# Patient Record
Sex: MALE | Race: WHITE | NOT HISPANIC OR LATINO | Employment: OTHER | URBAN - METROPOLITAN AREA
[De-identification: names, ages, dates, MRNs, and addresses within clinical notes are randomized per-mention and may not be internally consistent; named-entity substitution may affect disease eponyms.]

---

## 2017-04-11 ENCOUNTER — TELEPHONE (OUTPATIENT)
Dept: NEUROLOGY | Facility: MEDICAL CENTER | Age: 77
End: 2017-04-11

## 2017-04-11 ENCOUNTER — OFFICE VISIT (OUTPATIENT)
Dept: NEUROLOGY | Facility: MEDICAL CENTER | Age: 77
End: 2017-04-11
Payer: MEDICARE

## 2017-04-11 VITALS
WEIGHT: 188 LBS | RESPIRATION RATE: 18 BRPM | SYSTOLIC BLOOD PRESSURE: 110 MMHG | TEMPERATURE: 97 F | DIASTOLIC BLOOD PRESSURE: 64 MMHG | HEART RATE: 74 BPM | BODY MASS INDEX: 25.47 KG/M2 | OXYGEN SATURATION: 96 % | HEIGHT: 72 IN

## 2017-04-11 DIAGNOSIS — Z89.512 HISTORY OF BELOW KNEE AMPUTATION, LEFT (HCC): ICD-10-CM

## 2017-04-11 DIAGNOSIS — Q85.89 STURGE-WEBER DISEASE (HCC): ICD-10-CM

## 2017-04-11 DIAGNOSIS — Z91.81 RISK FOR FALLS: ICD-10-CM

## 2017-04-11 DIAGNOSIS — G40.909 SEIZURE DISORDER (HCC): ICD-10-CM

## 2017-04-11 DIAGNOSIS — Z86.73 HISTORY OF CVA (CEREBROVASCULAR ACCIDENT): ICD-10-CM

## 2017-04-11 PROCEDURE — 1100F PTFALLS ASSESS-DOCD GE2>/YR: CPT | Performed by: NURSE PRACTITIONER

## 2017-04-11 PROCEDURE — G8432 DEP SCR NOT DOC, RNG: HCPCS | Performed by: NURSE PRACTITIONER

## 2017-04-11 PROCEDURE — G8419 CALC BMI OUT NRM PARAM NOF/U: HCPCS | Performed by: NURSE PRACTITIONER

## 2017-04-11 PROCEDURE — 1036F TOBACCO NON-USER: CPT | Performed by: NURSE PRACTITIONER

## 2017-04-11 PROCEDURE — 99214 OFFICE O/P EST MOD 30 MIN: CPT | Performed by: NURSE PRACTITIONER

## 2017-04-11 PROCEDURE — 0518F FALL PLAN OF CARE DOCD: CPT | Mod: 8P | Performed by: NURSE PRACTITIONER

## 2017-04-11 PROCEDURE — 4040F PNEUMOC VAC/ADMIN/RCVD: CPT | Performed by: NURSE PRACTITIONER

## 2017-04-11 PROCEDURE — 3288F FALL RISK ASSESSMENT DOCD: CPT | Performed by: NURSE PRACTITIONER

## 2017-04-11 RX ORDER — ASPIRIN 325 MG
325 TABLET, DELAYED RELEASE (ENTERIC COATED) ORAL DAILY
Qty: 90 TAB | Refills: 1 | Status: ON HOLD | OUTPATIENT
Start: 2017-04-11 | End: 2019-01-01

## 2017-04-11 RX ORDER — LACOSAMIDE 200 MG/1
200 TABLET ORAL 2 TIMES DAILY
Qty: 60 TAB | Refills: 5 | Status: SHIPPED
Start: 2017-04-11 | End: 2017-04-11 | Stop reason: SDUPTHER

## 2017-04-11 RX ORDER — LEVETIRACETAM 100 MG/ML
750 SOLUTION ORAL 2 TIMES DAILY
Qty: 450 ML | Refills: 5 | Status: SHIPPED | OUTPATIENT
Start: 2017-04-11 | End: 2017-10-20 | Stop reason: SDUPTHER

## 2017-04-11 RX ORDER — LACOSAMIDE 200 MG/1
200 TABLET ORAL 2 TIMES DAILY
Qty: 60 TAB | Refills: 5 | Status: SHIPPED
Start: 2017-04-11 | End: 2017-12-19

## 2017-04-11 ASSESSMENT — ENCOUNTER SYMPTOMS
ABDOMINAL PAIN: 0
SEIZURES: 0
DOUBLE VISION: 0
NAUSEA: 0
DIARRHEA: 0
FOCAL WEAKNESS: 1
HEADACHES: 0
DIZZINESS: 0
DEPRESSION: 0
SORE THROAT: 0
BACK PAIN: 1
COUGH: 1
VOMITING: 0
NERVOUS/ANXIOUS: 0

## 2017-04-11 NOTE — PROGRESS NOTES
Subjective:      Conner Mora is a 76 y.o. 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           HPI  Last seen 6 months ago.  He has been generally doing well.  Of concern, he is sleeping a lot during the day and sleeping at night as well.  He and his wife are very isolated and the most interesting thing he reports doing is watching TV.    Last known breakthrough seizure was February 2016.  Vimpat was increased to 200mg BID and had been seizure free since that time.    Of most concern is the copay for the Vimpat.  Their donut hole of $3700 has been filled.  He wants to back to phenytoin however he was not seizure free on that medication.    Has chronic nasal dripping-- asking about allergy medication.    Last EEG 2015, significantly abnormal per Dr Graf during EMU visit.    CVA management:  Continues with aspirin 325mg and metoprolol low dose.    Had one bad fall a few years ago requiring hip repair.  Has had one other fall within the home in the past year-- he tripped his foot up on a rug.  Since then, there are no rugs on the floor where he ambulates.    Current Outpatient Prescriptions   Medication Sig Dispense Refill   • levetiracetam (KEPPRA) 100 MG/ML Solution Take 7.5 mL by mouth 2 times a day. 450 mL 5   • lacosamide (VIMPAT) 200 MG Tab tablet Take 200 mg by mouth 2 Times a Day. 60 Tab 5   • Cholecalciferol (VITAMIN D3) 2000 UNITS Tab Take 2,000 Units by mouth every day. 30 Tab 1   • metoprolol (LOPRESSOR) 25 MG Tab Take 0.5 Tabs by mouth 2 Times a Day. 180 Tab 4   • haloperidol (HALDOL) 1 MG Tab Take 1 Tab by mouth every evening. (Patient taking differently: Take 0.5 mg by mouth every evening.) 90 Tab 4   • ferrous sulfate 325 (65 FE) MG tablet Take 1 Tab by mouth 2 times a day, with meals. 60 Tab 1   • aspirin  MG TBEC Take 1 Tab by mouth every day. 100 Tab 0     No current facility-administered  medications for this visit.       Review of Systems   Constitutional: Positive for malaise/fatigue.   HENT: Negative for hearing loss, nosebleeds and sore throat.         No recent head injury.   Eyes: Negative for double vision.        No new loss of vision.   Respiratory: Positive for cough (chronic).         No recent lung infections.   Cardiovascular: Negative for chest pain.   Gastrointestinal: Negative for nausea, vomiting, abdominal pain and diarrhea.   Genitourinary: Negative.    Musculoskeletal: Positive for back pain and joint pain (right hip).   Skin: Negative.    Neurological: Positive for focal weakness (chronic). Negative for dizziness, seizures and headaches.   Endo/Heme/Allergies:        No history of endocrine dysfunction.  No new problems.   Psychiatric/Behavioral: Negative for depression. The patient is not nervous/anxious.         No recent mood changes.          Objective:     /64 mmHg  Pulse 74  Temp(Src) 36.1 °C (97 °F)  Resp 18  Ht 1.829 m (6')  Wt 85.276 kg (188 lb)  BMI 25.49 kg/m2  SpO2 96%     Physical Exam   Constitutional: He is oriented to person, place, and time. He appears well-developed and well-nourished. No distress.   HENT:   Head: Normocephalic and atraumatic.   Nose: Nose normal.   Mouth/Throat: Oropharynx is clear and moist.   Large left facies Sturge Shabazz marking.  No new hearing loss.   Eyes: EOM are normal.   No double vision or loss of vision.   Neck: Normal range of motion. Neck supple.   Cardiovascular: Normal rate, regular rhythm and normal heart sounds.    No recent chest pain.   Pulmonary/Chest: Effort normal.   Occasional moist cough.   Abdominal: There is no tenderness.   Genitourinary:   No recent infections.   Lymphadenopathy:     He has no cervical adenopathy.   Neurological: He is alert and oriented to person, place, and time. He has normal strength. He displays no tremor. No cranial nerve deficit. He exhibits abnormal muscle tone. He displays no  seizure activity. 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   Reflex Scores:       Tricep reflexes are 2+ on the right side and 2+ on the left side.       Bicep reflexes are 2+ on the right side and 2+ on the left side.       Brachioradialis reflexes are 2+ on the right side and 2+ on the left side.       Patellar reflexes are 2+ on the right side and 2+ on the left side.       Achilles reflexes are 0 on the right side and 0 on the left side.  No observable changes in neurologic status.  See initial new patient examination for details.     Skin: Skin is warm and dry.   Psychiatric: His speech is normal. His mood appears not anxious. He does not exhibit a depressed mood.   Very quiet, pleasant, hard of hearing  Appears tired               Assessment/Plan:   Refractory partial onset seizures status post right CVA during [facility] hospital stay.    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  No seizures since late February and since titration of Vimpat to 200mg BID.    Right CVA:  Continue ASA 325mg qday.    Hallucinations/grandiose thinking:  Well-controlled with low dose Haldol per day.    Doing very well in regards to his epilepsy management.  Continue LEV 750mg BID suspension and Vimpat 200mg BID.    No recent falls.    Will call with any concern for seizures and I would be inclined to further increase the Vimpat.    Needs to have a co-pay reduction for Vimpat.    Return for follow-up in 4-6 months per patient request.   I spent 35+ minutes with this patient, over fifty percent was spent counseling patient on their condition, best management practices, reviewing test results and risks and benefits of treatment.

## 2017-04-11 NOTE — Clinical Note
April 11, 2017        Conner Mora  [address]  [place] [address]      To whom this may concern:    Mr Mora is followed by adult neurology for the diagnosis of Refractory Seizure Disorder.  Vimpat has been prescribed as additional treatment.  In the past, his seizures were not well controlled with phenytoin so Keppra was added.  He continued to have breakthrough seizures with that add-on therapy.  It was decided to add Vimpat and taper off of phenytoin.  When titrated Vimpat to 150mg BID he had a breakthrough seizure and thus Vimpat was increased to 200mg BID.  Since that time, November 2014, he has been seizure-free.    His co-pay at this time is $94 for a one month supply which is unobtainable for his family.  Also, because of having a co-pay less than $100 per month, he is disqualified from patient assistance to receive Vimpat.    This letter has been written to request a tier reduction from tier 3 to tier 1 (preferred) or teir 2.  As a team, I urge you to allow Mr Mora to continue to take a very effective medication to manage his epilepsy and thus improve his quality of life and safety.  Please consider a co-pay of $0-$30 for a one month supply at the very most.    If you have any questions or concerns, please don't hesitate to call.        Sincerely,        ROBERTA Silva.    Electronically Signed

## 2017-04-11 NOTE — TELEPHONE ENCOUNTER
Letter printed for Vimpat tier reduction :)  Please make sure to f/u on Vimpat Rx through [facility].

## 2017-04-11 NOTE — TELEPHONE ENCOUNTER
[facility] received rx but it will be the same price as [facility]. They can only help out patients with no insurance.

## 2017-04-11 NOTE — MR AVS SNAPSHOT
Conner Mora   2017 10:00 AM   Office Visit   MRN: [id number]    Department:  [facility]   Dept Phone:  [phone]    Description:  Male : 1940   Provider:  CORNELIO Silva           Reason for Visit     Follow-Up Seizure disorder (CMS-HCC)      Allergies as of 2017     Allergen Noted Reactions    Egg White 2009   Vomiting, Swelling    Egg Yolk 2009   Vomiting, Swelling    Chicken Allergy 2009   Vomiting, Swelling    Omeprazole 2016       Coughing and choking    Statins [Hmg-Coa-R Inhibitors] 2010   Shortness of Breath      You were diagnosed with     Seizure disorder (CMS-HCC)   [828532]       History of CVA (cerebrovascular accident)   [822925]         Vital Signs     Blood Pressure Pulse Temperature Respirations Height Weight    110/64 mmHg 74 36.1 °C (97 °F) 18 1.829 m (6') 85.276 kg (188 lb)    Body Mass Index Oxygen Saturation Smoking Status             25.49 kg/m2 96% Former Smoker         Basic Information     Date Of Birth Sex Race Ethnicity Preferred Language    1940 Male White Non- English      Your appointments     Oct 12, 2017 10:00 AM   Follow Up Visit with CORNELIO Silva   [facility] Neurology (--)    [address] [place] [address], [address]  [facility] [id number] [phone]           You will be receiving a confirmation call a few days before your appointment from our automated call confirmation system.              Problem List              ICD-10-CM Priority Class Noted - Resolved    Seizure disorder (CMS-[facility]) G40.909   8/3/2009 - Present    History of colostomy Z98.890   10/26/2011 - Present    Peripheral vascular disease (CMS-HCC) I73.9   2012 - Present    History of abdominal aortic aneurysm repair Z98.890   2012 - Present    Murmur, cardiac R01.1   2013 - Present    GERD (gastroesophageal reflux disease) K21.9   2013 - Present    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) Z93.3 Medium   7/8/2013 - Present    History of Crohn's disease [facility] Z87.19 Low  7/8/2013 - Present    Employs prosthetic leg Z97.10 Low  7/24/2014 - Present    History of below knee amputation (CMS-HCC) Z89.519   7/24/2014 - Present    Sturge-Shabazz disease, suspected Q85.8   1/31/2015 - Present    COPD (chronic obstructive pulmonary disease) (CMS-HCC) J44.9 Low  2/2/2015 - Present    Tachycardia R00.0   4/28/2015 - Present    History of CVA (cerebrovascular accident) Z86.73 Low  4/29/2015 - Present    Chronic respiratory failure with hypoxia (CMS-HCC) J96.11 Medium  9/11/2015 - Present    Anemia D64.9 Medium  3/10/2016 - Present    Pure hypercholesterolemia with target low density lipoprotein (LDL) less than 130 E78.00   7/22/2016 - Present    Mood disorder (CMS-HCC) F39   9/13/2016 - Present    Hypovitaminosis D E55.9   9/13/2016 - Present    Risk for falls Z91.81   9/13/2016 - Present      Health Maintenance        Date Due Completion Dates    IMM ZOSTER VACCINE 9/24/2002 ---    COLONOSCOPY 9/23/2018 9/23/2015, 3/8/2013 (Done)    Override on 3/8/2013: Done ([facility]- Normal)    IMM DTaP/Tdap/Td Vaccine (2 - Td) 7/8/2023 7/8/2013            Current Immunizations     13-VALENT PCV PREVNAR 9/11/2015    Pneumococcal polysaccharide vaccine (PPSV-23) 9/13/2016    Tdap Vaccine 7/8/2013 11:34 AM      Below and/or attached are the medications your provider expects you to take. Review all of your home medications and newly ordered medications with your provider and/or pharmacist. Follow medication instructions as directed by your provider and/or pharmacist. Please keep your medication list with you and share with your provider. Update the information when medications are discontinued, doses are changed, or new medications (including over-the-counter products) are added; and carry medication information at all times in the event of emergency situations     Allergies:  EGG WHITE - Vomiting,Swelling     EGG  YOLK - Vomiting,Swelling     CHICKEN ALLERGY - Vomiting,Swelling     OMEPRAZOLE - (reactions not documented)     STATINS - Shortness of Breath               Medications  Valid as of: April 11, 2017 - 10:44 AM    Generic Name Brand Name Tablet Size Instructions for use    Aspirin (Tablet Delayed Response) Aspirin 325 MG Take 1 Tab by mouth every day.        Cholecalciferol (Tab) Vitamin D3 2000 UNITS Take 2,000 Units by mouth every day.        Ferrous Sulfate (Tab) ferrous sulfate 325 (65 FE) MG Take 1 Tab by mouth 2 times a day, with meals.        Haloperidol (Tab) HALDOL 1 MG Take 1 Tab by mouth every evening.        Lacosamide (Tab) VIMPAT 200 MG Take 200 mg by mouth 2 Times a Day.        LevETIRAcetam (Solution) KEPPRA 100 MG/ML Take 7.5 mL by mouth 2 times a day.        Metoprolol Tartrate (Tab) LOPRESSOR 25 MG Take 0.5 Tabs by mouth 2 Times a Day.        .                 Medicines prescribed today were sent to:     [facility] [address] [place] [address]    Phone: [phone] Fax: [phone]    Open 24 Hours?: No    [facility] - [address] [address]    Phone: [phone] Fax: [phone]    Open 24 Hours?: No      Medication refill instructions:       If your prescription bottle indicates you have medication refills left, it is not necessary to call your provider’s office. Please contact your pharmacy and they will refill your medication.    If your prescription bottle indicates you do not have any refills left, you may request refills at any time through one of the following ways: The online [employer] system (except Urgent Care), by calling your provider’s office, or by asking your pharmacy to contact your provider’s office with a refill request. Medication refills are processed only during regular business hours and may not be available until the next business day. Your provider may request  additional information or to have a follow-up visit with you prior to refilling your medication.   *Please Note: Medication refills are assigned a new Rx number when refilled electronically. Your pharmacy may indicate that no refills were authorized even though a new prescription for the same medication is available at the pharmacy. Please request the medicine by name with the pharmacy before contacting your provider for a refill.        Referral     A referral request has been sent to our patient care coordination department. Please allow 3-5 business days for us to process this request and contact you either by phone or mail. If you do not hear from us by the 5th business day, please call us at [phone].        Other Notes About Your Plan     [place] calculated 10 yr cardiovascular risk 12%. moderate risk. Diabetic: no. Recommended LDL <100 due to peripheral vascular disease and history of abdominal aortic aneurysm repair.              MyChart Status: Patient Declined

## 2017-06-28 ENCOUNTER — NON-PROVIDER VISIT (OUTPATIENT)
Dept: NEUROLOGY | Facility: MEDICAL CENTER | Age: 77
End: 2017-06-28
Payer: MEDICARE

## 2017-06-28 DIAGNOSIS — G40.909 SEIZURE DISORDER (HCC): ICD-10-CM

## 2017-06-28 DIAGNOSIS — Z86.73 HISTORY OF CVA (CEREBROVASCULAR ACCIDENT): ICD-10-CM

## 2017-06-28 PROCEDURE — 95951 PR EEG MONITORING/VIDEORECORD: CPT | Mod: 52 | Performed by: PSYCHIATRY & NEUROLOGY

## 2017-06-28 NOTE — MR AVS SNAPSHOT
Conner Short Abby   2017 7:30 AM   Non-Provider Visit   MRN: [id number]    Department:  [facility]   Dept Phone:  [phone]    Description:  Male : 1940   Provider:  NEUR[facility]NOSTIC LAB [facility]           Reason for Visit     Procedure           Allergies as of 2017     Allergen Noted Reactions    Egg White 2009   Vomiting, Swelling    Egg Yolk 2009   Vomiting, Swelling    Chicken Allergy 2009   Vomiting, Swelling    Omeprazole 2016       Coughing and choking    Statins [Hmg-Coa-R Inhibitors] 2010   Shortness of Breath      You were diagnosed with     Seizure disorder (CMS-HCC)   [180109]       History of CVA (cerebrovascular accident)   [690568]         Vital Signs     Smoking Status                   Former Smoker           Basic Information     Date Of Birth Sex Race Ethnicity Preferred Language    1940 Male White Non- English      Your appointments     Oct 12, 2017 10:00 AM   Follow Up Visit with CORNELIO Silva   [facility] Neu[facility] (--)    [address] [place] [address], [address]  [facility] [id number] [phone]           You will be receiving a confirmation call a few days before your appointment from our automated call confirmation system.              Problem List              ICD-10-CM Priority Class Noted - Resolved    Seizure disorder (CMS-HCC) G40.909   8/3/2009 - Present    History of colostomy Z98.890   10/26/2011 - Present    Peripheral vascular disease (CMS-HCC) I73.9   2012 - Present    History of abdominal aortic aneurysm repair Z98.890   2012 - Present    Murmur, cardiac R01.1   2013 - Present    GERD (gastroesophageal reflux disease) K21.9   2013 - Present    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) Z93.3 Medium  2013 - Present    History of Crohn's disease [facility] Z87.19 Low  2013 - Present    Employs prosthetic leg Z97.10 Low  2014 - Present    History of below knee  amputation (CMS-HCC) Z89.519   7/24/2014 - Present    Sturge-Shabazz disease, 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  1/31/2015 - Present    COPD (chronic obstructive pulmonary disease) (CMS-HCC) J44.9 Low  2/2/2015 - Present    Tachycardia R00.0   4/28/2015 - Present    History of CVA (cerebrovascular accident) Z86.73 Low  4/29/2015 - Present    Chronic respiratory failure with hypoxia (CMS-HCC) J96.11 Medium  9/11/2015 - Present    Anemia D64.9 Medium  3/10/2016 - Present    Pure hypercholesterolemia with target low density lipoprotein (LDL) less than 130 E78.00   7/22/2016 - Present    Mood disorder (CMS-HCC) F39   9/13/2016 - Present    Hypovitaminosis D E55.9   9/13/2016 - Present    Risk for falls Z91.81   9/13/2016 - Present      Health Maintenance        Date Due Completion Dates    IMM ZOSTER VACCINE 9/24/2002 ---    COLONOSCOPY 9/23/2018 9/23/2015, 3/8/2013 (Done)    Override on 3/8/2013: Done ([facility]- Normal)    IMM DTaP/Tdap/Td Vaccine (2 - Td) 7/8/2023 7/8/2013            Current Immunizations     13-VALENT PCV PREVNAR 9/11/2015    Pneumococcal polysaccharide vaccine (PPSV-23) 9/13/2016    Tdap Vaccine 7/8/2013 11:34 AM      Below and/or attached are the medications your provider expects you to take. Review all of your home medications and newly ordered medications with your provider and/or pharmacist. Follow medication instructions as directed by your provider and/or pharmacist. Please keep your medication list with you and share with your provider. Update the information when medications are discontinued, doses are changed, or new medications (including over-the-counter products) are added; and carry medication information at all times in the event of emergency situations     Allergies:  EGG WHITE - Vomiting,Swelling     EGG YOLK - Vomiting,Swelling     CHICKEN ALLERGY - Vomiting,Swelling     OMEPRAZOLE - (reactions not documented)     STATINS - Shortness of Breath               Medications  Valid as of: June  28, 2017 - 10:29 AM    Generic Name Brand Name Tablet Size Instructions for use    Aspirin (Tablet Delayed Response) Aspirin 325 MG Take 1 Tab by mouth every day.        Cholecalciferol (Tab) Vitamin D3 2000 UNITS Take 2,000 Units by mouth every day.        Ferrous Sulfate (Tab) ferrous sulfate 325 (65 FE) MG Take 1 Tab by mouth 2 times a day, with meals.        Haloperidol (Tab) HALDOL 1 MG Take 1 Tab by mouth every evening.        Lacosamide (Tab) VIMPAT 200 MG Take 200 mg by mouth 2 Times a Day.        LevETIRAcetam (Solution) KEPPRA 100 MG/ML Take 7.5 mL by mouth 2 times a day.        Metoprolol Tartrate (Tab) LOPRESSOR 25 MG Take 0.5 Tabs by mouth 2 Times a Day.        .                 Medicines prescribed today were sent to:     [facility] [address] [place] [address]    Phone: [phone] Fax: [phone]    Open 24 Hours?: [address] [address]    Phone: [phone] Fax: [phone]    Open 24 Hours?: No      Medication refill instructions:       If your prescription bottle indicates you have medication refills left, it is not necessary to call your provider’s office. Please contact your pharmacy and they will refill your medication.    If your prescription bottle indicates you do not have any refills left, you may request refills at any time through one of the following ways: The online [employer] system (except Urgent Care), by calling your provider’s office, or by asking your pharmacy to contact your provider’s office with a refill request. Medication refills are processed only during regular business hours and may not be available until the next business day. Your provider may request additional information or to have a follow-up visit with you prior to refilling your medication.   *Please Note: Medication refills are assigned a new Rx number when refilled electronically.  Your pharmacy may indicate that no refills were authorized even though a new prescription for the same medication is available at the pharmacy. Please request the medicine by name with the pharmacy before contacting your provider for a refill.        Other Notes About Your Plan     [place] calculated 10 yr cardiovascular risk 12%. moderate risk. Diabetic: no. Recommended LDL <100 due to peripheral vascular disease and history of abdominal aortic aneurysm repair.              MyChart Status: Patient Declined

## 2017-07-01 NOTE — PROGRESS NOTES
ROUTINE ELECTROENCEPHALOGRAM REPORT        INDICATION:     Daphne Parker APMARIEL Pt with refractory partial seizures status post CVA. Also, hx of sturge-reed disorder.    TECHNIQUE: 30 channel routine electroencephalogram (EEG) was performed in accordance with the international 10-20 system. The study was reviewed in bipolar and referential montages. The recording examined the patient during wakeful and drowsy state(s).     DESCRIPTION OF THE RECORD:      Background rhythm during awake stage shows 10 to 11 hertz alpha activity in the posterior regions over left hemisphere and attenuated over the right.  It blocks with eye openingPhotic stimulation did not produce any abnormalities. Hyperventilation did not produce any abnormalities.  Polymorphic delta over right and nearly continuous, no definite epileptiform Stage I sleep was achieved.    ACTIVATION PROCEDURES:      Hyperventilation and Photic Stimulation were done    ICTAL AND/OR INTERICTAL FINDINGS:    Polymorphic delta over right and nearly continuous, no definite epileptiform   No clinical events or seizures were reported or recorded during the study.      EKG: sampling of the EKG recording demonstrated sinus rhythm.        INTERPRETATION:    ________________________________________________________________________    Focal cortical dysfunction - moderate over the right hemisphere-- moderate degree    No definite epileptiform in this record  ________________________________________________________________________

## 2017-07-05 ENCOUNTER — TELEPHONE (OUTPATIENT)
Dept: NEUROLOGY | Facility: MEDICAL CENTER | Age: 77
End: 2017-07-05

## 2017-07-05 NOTE — TELEPHONE ENCOUNTER
Spoke with patient's wife today, she reports that patient was not taking his Vimpat for a approximately 10 days. During this time his stumbling, speech difficulties, and other issues went away. Patient restarted medication and symptoms came back, patient and wife are wondering if he might be allergic to the Vimpat or if it is reacting weirdly with his other medications.    Patient takes Vimpat 200mg BID          Caller: Courtney  Phone: [phone]   Message: yes

## 2017-07-06 ENCOUNTER — TELEPHONE (OUTPATIENT)
Dept: NEUROLOGY | Facility: MEDICAL CENTER | Age: 77
End: 2017-07-06

## 2017-07-06 NOTE — TELEPHONE ENCOUNTER
I explained this to the patient's wife earlier when I spoke with her. I did ask that she please give us a call in a couple of days to let us know how he is doing.

## 2017-07-06 NOTE — TELEPHONE ENCOUNTER
Well, I'm glad he didn't have a seizure with stopping the Vimpat!  Let's reduce his dose of Vimpat from 200mg BID to 100-200mg and see if that helps.  Also, he needs to avoid taking his meds with caffeine and always near the end of a meal.

## 2017-07-06 NOTE — TELEPHONE ENCOUNTER
It is possible that he has too much anti-seizure medication which is why I'd like to taper.  I'm concerned that he will have a seizure if we simply stop the Vimpat.

## 2017-07-06 NOTE — TELEPHONE ENCOUNTER
Called and spoke with patients wife again today, she understood and will comply. She will try giving him the lower dosage for a week and then give us a phone call to let us know how he is doing.  She continues to be concerned, Patient is extremely lethargic any activity makes him need a nap upon completion. Patient's wife reports that even doing the dishes makes him exhausted. She reports that during the time he was without his Vimpat that he was feeling better. She is wondering if he is allergic to the Vimpat. Please advise

## 2017-07-10 ENCOUNTER — TELEPHONE (OUTPATIENT)
Dept: NEUROLOGY | Facility: MEDICAL CENTER | Age: 77
End: 2017-07-10

## 2017-07-10 NOTE — TELEPHONE ENCOUNTER
Received samples from Doctor Veronica,    4 bottles of  200mg 14tabs    Per last note patient is no longer taking Vimpat 200mg BID, and instead is taking Vimpat 100mg - 200mg    **Note from 7/6/17  Well, I'm glad he didn't have a seizure with stopping the Vimpat!  Let's reduce his dose of Vimpat from 200mg BID to 100-200mg and see if that helps.  Also, he needs to avoid taking his meds with caffeine and always near the end of a meal.**

## 2017-07-10 NOTE — TELEPHONE ENCOUNTER
Patient's wife phoned today, she wanted to know if we had vimpat samples? Their co-pay for his vimat is $255 this month, as they are still outside of their deductible for their insurance. Patient is unable to afford this at this time. They are planning on reaching out to [employer] to see if they can get some assistance with payment for this medication this month. They did not want to try to sign up for Patient Assistance [employer] for this medication as they would only need it for a month, and last time they attempted to do so, they were told that they made too much money.

## 2017-08-03 ENCOUNTER — OFFICE VISIT (OUTPATIENT)
Dept: MEDICAL GROUP | Facility: PHYSICIAN GROUP | Age: 77
End: 2017-08-03
Payer: MEDICARE

## 2017-08-03 VITALS
HEART RATE: 86 BPM | SYSTOLIC BLOOD PRESSURE: 120 MMHG | RESPIRATION RATE: 16 BRPM | TEMPERATURE: 97.6 F | DIASTOLIC BLOOD PRESSURE: 64 MMHG | HEIGHT: 72 IN | BODY MASS INDEX: 24.38 KG/M2 | WEIGHT: 180 LBS | OXYGEN SATURATION: 89 %

## 2017-08-03 DIAGNOSIS — R00.0 TACHYCARDIA: ICD-10-CM

## 2017-08-03 DIAGNOSIS — Z12.5 SCREENING PSA (PROSTATE SPECIFIC ANTIGEN): ICD-10-CM

## 2017-08-03 DIAGNOSIS — Z87.891 PERSONAL HISTORY OF TOBACCO USE, PRESENTING HAZARDS TO HEALTH: ICD-10-CM

## 2017-08-03 DIAGNOSIS — D64.9 ANEMIA, UNSPECIFIED TYPE: ICD-10-CM

## 2017-08-03 DIAGNOSIS — Z13.6 SCREENING FOR CARDIOVASCULAR CONDITION: ICD-10-CM

## 2017-08-03 DIAGNOSIS — F39 MOOD DISORDER (HCC): ICD-10-CM

## 2017-08-03 PROCEDURE — 99214 OFFICE O/P EST MOD 30 MIN: CPT | Performed by: FAMILY MEDICINE

## 2017-08-03 RX ORDER — HALOPERIDOL 1 MG/1
0.5 TABLET ORAL EVERY EVENING
Qty: 45 TAB | Refills: 4 | Status: SHIPPED | OUTPATIENT
Start: 2017-08-03 | End: 2018-01-01

## 2017-08-03 NOTE — PROGRESS NOTES
Subjective:     Chief Complaint   Patient presents with   • Anxiety     Rx   • Heart Problem     Rx       Conner Mora is a 76 y.o. male here today today to est care with new provider. Patient is a former patient of Dr. Johns who is since retired.  Patient continues to follow with neurology for seizures. Patient reports nose recent seizures.    Patient has a history of tachycardia for which he takes metoprolol daily. Patient denies any chest pain, racing heartbeat, palpitations, orthopnea, or dyspnea on exertion.  Patient uses Haldol each night to aid in sleep and stabilize anxiety.    Patient has reported history of hypercholesterolemia and anemia. There are no recent labs available. Patient has no new complaints.      Allergies   Allergen Reactions   • Egg White Vomiting and Swelling   • Egg Yolk Vomiting and Swelling   • Chicken Allergy Vomiting and Swelling   • Omeprazole      Coughing and choking   • Prednisone      Mood changes   • Statins [Hmg-Coa-R Inhibitors] Shortness of Breath     Current medicines (including changes today)  Current Outpatient Prescriptions   Medication Sig Dispense Refill   • metoprolol (LOPRESSOR) 25 MG Tab Take 0.5 Tabs by mouth 2 Times a Day. 90 Tab 4   • haloperidol (HALDOL) 1 MG Tab Take 0.5 Tabs by mouth every evening. 45 Tab 4   • levetiracetam (KEPPRA) 100 MG/ML Solution Take 7.5 mL by mouth 2 times a day. 450 mL 5   • Aspirin 325 MG Tablet Delayed Response Take 1 Tab by mouth every day. 90 Tab 1   • lacosamide (VIMPAT) 200 MG Tab tablet Take 200 mg by mouth 2 Times a Day. 60 Tab 5   • Cholecalciferol (VITAMIN D3) 2000 UNITS Tab Take 2,000 Units by mouth every day. 30 Tab 1   • ferrous sulfate 325 (65 FE) MG tablet Take 1 Tab by mouth 2 times a day, with meals. 60 Tab 1     No current facility-administered medications for this visit.     Social History   Substance Use Topics   • Smoking status: Former Smoker -- 1.00 packs/day for 57 years     Types: Cigarettes     Start  "date: 1954     Quit date: 2011   • Smokeless tobacco: Never Used      Comment: avoid tobacco products   • Alcohol Use: No     Family Status   Relation Status Death Age   • Mother     • Father     • Sister Alive    • Brother Alive    • Brother     • Brother       Family History   Problem Relation Age of Onset   • Heart Attack Mother    • No Known Problems Father    • No Known Problems Sister    • No Known Problems Brother    • Lung Disease Neg Hx    • Cancer Neg Hx    • Diabetes Neg Hx    • Hypertension Neg Hx    • Heart Disease Neg Hx    • Hyperlipidemia Neg Hx    • Stroke Neg Hx    • No Known Problems Brother    • No Known Problems Brother      He    has a past medical history of Seizure disorder (CMS-[facility]); Snoring; Dental disorder; Indigestion; Arrhythmia; History of colostomy (10/26/2011); GERD (gastroesophageal reflux disease) (2013); History of CVA (cerebrovascular accident) (2015); Hypertension; and Cataract.        ROS   GEN: no weight loss, fevers, or chills  HEENT: no blurry vision or change in vision  Resp: no shortness of breath, no cough  CV: no racing heart, no irregular beats, no chest pain  Abd: no nausea, no vomiting, no change in ostomy output, no blood in stool, no dark stools, no incontinence  : no dysuria, no hematuria, no urinary incontinence, no increased frequency  MSK: no muscle aches, no joint pain, no limited motion  Neuro: no headaches, no dizziness, no LOC, no weakness, no numbness/tingling       Objective:     Blood pressure 120/64, pulse 86, temperature 36.4 °C (97.6 °F), resp. rate 16, height 1.829 m (6' 0.01\"), weight 81.647 kg (180 lb), SpO2 89 %. Body mass index is 24.41 kg/(m^2).   Physical Exam:  Constitutional: Alert, no distress.  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  ENMT: Lips without lesions, oropharynx non-erythematous, no exudate, moist oral mucosa  Neck: Trachea midline, no masses, no thyromegaly. No cervical or supraclavicular lymphadenopathy. Full ROM  Respiratory: Unlabored respiratory effort, good air movement, lungs clear to auscultation, no wheezes, no ronchi.  Cardiovascular:RRR, +S1, S2, no murmur, no peripheral edema, pedal and radial pulses equal and intact bilat  Abdomen: Soft, non-tender, no masses, no hepatosplenomegaly.  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  Neuro: CN2-12 intact, no gross motor or sensory deficits      Assessment and Plan:   The following treatment plan was discussed    1. Tachycardia  Chronic: Patient reports taking Toprol due to racing heart rate.    Rate is controlled.  - metoprolol (LOPRESSOR) 25 MG Tab; Take 0.5 Tabs by mouth 2 Times a Day.  Dispense: 90 Tab; Refill: 4  - COMP METABOLIC PANEL; Future  - CBC WITH DIFFERENTIAL; Future    2. Mood disorder (CMS-HCC)  Chronic: Continue Haldol each night  - haloperidol (HALDOL) 1 MG Tab; Take 0.5 Tabs by mouth every evening.  Dispense: 45 Tab; Refill: 4  - COMP METABOLIC PANEL; Future  - CBC WITH DIFFERENTIAL; Future    3. Screening PSA (prostate specific antigen)  - COMP METABOLIC PANEL; Future  - CBC WITH DIFFERENTIAL; Future  - PROSTATE SPECIFIC AG SCREENING; Future    4. Screening for cardiovascular condition  - LIPID PROFILE; Future  - COMP METABOLIC PANEL; Future  - CBC WITH DIFFERENTIAL; Future    5. Anemia, unspecified type  Reported history: Will recheck  - COMP METABOLIC PANEL; Future  - CBC WITH DIFFERENTIAL; Future    6. Personal history of tobacco use, presenting hazards to health  Recommended lung cancer screening which patient declines. Patient states he would like to think about it  - COMP METABOLIC PANEL; Future  - CBC WITH DIFFERENTIAL; Future      Followup: Return in about 6 months  (around 2/3/2018) for chronic conditions.    Please note that this dictation was created using voice recognition software. I have made every reasonable attempt to correct obvious errors, but I expect that there are errors of grammar and possibly content that I did not discover before finalizing the note.

## 2017-08-03 NOTE — MR AVS SNAPSHOT
"        Conner Short Abby   8/3/2017 8:40 AM   Office Visit   MRN: [id number]    Department:  [facility], [place]   Dept Phone:  [phone]    Description:  Male : 1940   Provider:  Renee Loera D.O.           Reason for Visit     Anxiety Rx    Heart Problem Rx      Allergies as of 8/3/2017     Allergen Noted Reactions    Egg White 2009   Vomiting, Swelling    Egg Yolk 2009   Vomiting, Swelling    Chicken Allergy 2009   Vomiting, Swelling    Omeprazole 2016       Coughing and choking    Prednisone 2017       Mood changes    Statins [Hmg-Coa-R Inhibitors] 2010   Shortness of Breath      You were diagnosed with     Tachycardia   [645287]       Mood disorder (CMS-HCC)   [151547]       Screening PSA (prostate specific antigen)   [960005]       Screening for cardiovascular condition   [329857]       Anemia, unspecified type   [2949501]       Personal history of tobacco use, presenting hazards to health   [V15.82.ICD-9-CM]         Vital Signs     Blood Pressure Pulse Temperature Respirations Height Weight    120/64 mmHg 86 36.4 °C (97.6 °F) 16 1.829 m (6' 0.01\") 81.647 kg (180 lb)    Body Mass Index Oxygen Saturation Smoking Status             24.41 kg/m2 89% Former Smoker         Basic Information     Date Of Birth Sex Race Ethnicity Preferred Language    1940 Male White Non- English      Your appointments     Oct 12, 2017 10:00 AM   Follow Up Visit with CORNELIO Silva   [facility] (--)    [address] [place] [address], [address]  [facility] [address] [phone]           You will be receiving a confirmation call a few days before your appointment from our automated call confirmation system.              Problem List              ICD-10-CM Priority Class Noted - Resolved    Seizure disorder (CMS-HCC) G40.909   8/3/2009 - Present    History of colostomy Z98.890   10/26/2011 - Present    Peripheral vascular disease (CMS-HCC) I73.9   2012 - " Present    History of abdominal aortic aneurysm repair Z98.890   11/21/2012 - Present    Murmur, cardiac R01.1   5/28/2013 - Present    GERD (gastroesophageal reflux disease) K21.9   7/8/2013 - Present    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) Z93.3 Medium  7/8/2013 - Present    History of Crohn's disease [facility] Z87.19 Low  7/8/2013 - Present    Employs prosthetic leg Z97.10 Low  7/24/2014 - Present    History of below knee amputation (CMS-HCC) Z89.519   7/24/2014 - Present    Sturge-Shabazz disease, 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  1/31/2015 - Present    COPD (chronic obstructive pulmonary disease) (CMS-HCC) J44.9 Low  2/2/2015 - Present    Tachycardia R00.0   4/28/2015 - Present    History of CVA (cerebrovascular accident) Z86.73 Low  4/29/2015 - Present    Chronic respiratory failure with hypoxia (CMS-HCC) J96.11 Medium  9/11/2015 - Present    Anemia D64.9 Medium  3/10/2016 - Present    Pure hypercholesterolemia with target low density lipoprotein (LDL) less than 130 E78.00   7/22/2016 - Present    Mood disorder (CMS-HCC) F39   9/13/2016 - Present    Hypovitaminosis D E55.9   9/13/2016 - Present    Risk for falls Z91.81   9/13/2016 - Present      Health Maintenance        Date Due Completion Dates    IMM ZOSTER VACCINE 9/24/2002 ---    COLONOSCOPY 9/23/2018 9/23/2015, 3/8/2013 (Done)    Override on 3/8/2013: Done ([facility]- Normal)    IMM DTaP/Tdap/Td Vaccine (2 - Td) 7/8/2023 7/8/2013            Current Immunizations     13-VALENT PCV PREVNAR 9/11/2015    Pneumococcal polysaccharide vaccine (PPSV-23) 9/13/2016    Tdap Vaccine 7/8/2013 11:34 AM      Below and/or attached are the medications your provider expects you to take. Review all of your home medications and newly ordered medications with your provider and/or pharmacist. Follow medication instructions as directed by your provider and/or pharmacist. Please keep your medication list with you and share with your provider. Update the information when  medications are discontinued, doses are changed, or new medications (including over-the-counter products) are added; and carry medication information at all times in the event of emergency situations     Allergies:  EGG WHITE - Vomiting,Swelling     EGG YOLK - Vomiting,Swelling     CHICKEN ALLERGY - Vomiting,Swelling     OMEPRAZOLE - (reactions not documented)     PREDNISONE - (reactions not documented)     STATINS - Shortness of Breath               Medications  Valid as of: August 03, 2017 - 10:19 AM    Generic Name Brand Name Tablet Size Instructions for use    Aspirin (Tablet Delayed Response) Aspirin 325 MG Take 1 Tab by mouth every day.        Cholecalciferol (Tab) Vitamin D3 2000 UNITS Take 2,000 Units by mouth every day.        Ferrous Sulfate (Tab) ferrous sulfate 325 (65 FE) MG Take 1 Tab by mouth 2 times a day, with meals.        Haloperidol (Tab) HALDOL 1 MG Take 0.5 Tabs by mouth every evening.        Lacosamide (Tab) VIMPAT 200 MG Take 200 mg by mouth 2 Times a Day.        LevETIRAcetam (Solution) KEPPRA 100 MG/ML Take 7.5 mL by mouth 2 times a day.        Metoprolol Tartrate (Tab) LOPRESSOR 25 MG Take 0.5 Tabs by mouth 2 Times a Day.        .                 Medicines prescribed today were sent to:     [facility] [address] [place] [address]    Phone: [phone] Fax: [phone]    Open 24 Hours?: [facility] - [address] [address]    Phone: [phone] Fax: [phone]    Open 24 Hours?: No      Medication refill instructions:       If your prescription bottle indicates you have medication refills left, it is not necessary to call your provider’s office. Please contact your pharmacy and they will refill your medication.    If your prescription bottle indicates you do not have any refills left, you may request refills at any time through one of the following ways: The online  [employer] system (except Urgent Care), by calling your provider’s office, or by asking your pharmacy to contact your provider’s office with a refill request. Medication refills are processed only during regular business hours and may not be available until the next business day. Your provider may request additional information or to have a follow-up visit with you prior to refilling your medication.   *Please Note: Medication refills are assigned a new Rx number when refilled electronically. Your pharmacy may indicate that no refills were authorized even though a new prescription for the same medication is available at the pharmacy. Please request the medicine by name with the pharmacy before contacting your provider for a refill.        Your To Do List     Future Labs/Procedures Complete By Expires    CBC WITH DIFFERENTIAL  As directed 8/3/2018    COMP METABOLIC PANEL  As directed 8/3/2018    LIPID PROFILE  As directed 8/3/2018    PROSTATE SPECIFIC AG SCREENING  As directed 8/3/2018      Other Notes About Your Plan     [place] calculated 10 yr cardiovascular risk 12%. moderate risk. Diabetic: no. Recommended LDL <100 due to peripheral vascular disease and history of abdominal aortic aneurysm repair.              [employer] Status: Patient Declined

## 2017-08-23 ENCOUNTER — TELEPHONE (OUTPATIENT)
Dept: NEUROLOGY | Facility: MEDICAL CENTER | Age: 77
End: 2017-08-23

## 2017-08-23 NOTE — TELEPHONE ENCOUNTER
"Spoke with patient's wife today it was reported to us that patient is unable to afford his medications still. Patient states that they are still within the \"[place]\" for his deductible on his insurance. Provided patient with  4 bottles of Vimpat from Dr. Martin to get patient through the month.  "

## 2017-10-12 ENCOUNTER — OFFICE VISIT (OUTPATIENT)
Dept: NEUROLOGY | Facility: MEDICAL CENTER | Age: 77
End: 2017-10-12
Payer: MEDICARE

## 2017-10-12 VITALS
BODY MASS INDEX: 25.19 KG/M2 | DIASTOLIC BLOOD PRESSURE: 66 MMHG | OXYGEN SATURATION: 93 % | HEART RATE: 74 BPM | HEIGHT: 72 IN | TEMPERATURE: 96.5 F | WEIGHT: 186 LBS | SYSTOLIC BLOOD PRESSURE: 110 MMHG | RESPIRATION RATE: 16 BRPM

## 2017-10-12 DIAGNOSIS — F39 MOOD DISORDER (HCC): ICD-10-CM

## 2017-10-12 DIAGNOSIS — Q85.89 STURGE-WEBER DISEASE (HCC): ICD-10-CM

## 2017-10-12 DIAGNOSIS — Z86.73 HISTORY OF CVA (CEREBROVASCULAR ACCIDENT): ICD-10-CM

## 2017-10-12 DIAGNOSIS — G40.909 SEIZURE DISORDER (HCC): ICD-10-CM

## 2017-10-12 PROCEDURE — 99214 OFFICE O/P EST MOD 30 MIN: CPT | Performed by: NURSE PRACTITIONER

## 2017-10-12 RX ORDER — LORAZEPAM 1 MG/1
TABLET ORAL
Qty: 10 TAB | Refills: 0 | Status: SHIPPED | OUTPATIENT
Start: 2017-10-12 | End: 2018-01-01

## 2017-10-12 ASSESSMENT — ENCOUNTER SYMPTOMS
SORE THROAT: 0
DEPRESSION: 0
NERVOUS/ANXIOUS: 0
ABDOMINAL PAIN: 0
SEIZURES: 0
VOMITING: 0
DOUBLE VISION: 0
DIZZINESS: 0
NAUSEA: 0
HEADACHES: 0
DIARRHEA: 0
COUGH: 1
FOCAL WEAKNESS: 1
FALLS: 0

## 2017-10-12 ASSESSMENT — PATIENT HEALTH QUESTIONNAIRE - PHQ9: CLINICAL INTERPRETATION OF PHQ2 SCORE: 0

## 2017-10-12 NOTE — PROGRESS NOTES
Subjective:      Conner Mora is a 77 y.o. male who presents with Follow-Up (Seizure disorder (CMS-HCC))        HPI  Wishes to be weaned off of the Vimpat.  Their co-pay has been too high for a long time.  He is currently taking Vimpat 100mg BID and has been using samples for many months.    There have been no breakthrough seizures.  Last known breakthrough seizure was February 2016.  Vimpat was increased to 200mg BID and had been seizure free since that time.    CVA management:  Continues with aspirin 325mg and metoprolol low dose.       INTERPRETATION:   _______________________________________________________________________     Focal cortical dysfunction - moderate over the right hemisphere-- moderate degree     No definite epileptiform in this record  ________________________________________________________________________       Current Outpatient Prescriptions   Medication Sig Dispense Refill   • metoprolol (LOPRESSOR) 25 MG Tab Take 0.5 Tabs by mouth 2 Times a Day. 90 Tab 4   • haloperidol (HALDOL) 1 MG Tab Take 0.5 Tabs by mouth every evening. 45 Tab 4   • levetiracetam (KEPPRA) 100 MG/ML Solution Take 7.5 mL by mouth 2 times a day. 450 mL 5   • Aspirin 325 MG Tablet Delayed Response Take 1 Tab by mouth every day. 90 Tab 1   • lacosamide (VIMPAT) 200 MG Tab tablet Take 200 mg by mouth 2 Times a Day. 60 Tab 5   • Cholecalciferol (VITAMIN D3) 2000 UNITS Tab Take 2,000 Units by mouth every day. 30 Tab 1   • ferrous sulfate 325 (65 FE) MG tablet Take 1 Tab by mouth 2 times a day, with meals. 60 Tab 1     No current facility-administered medications for this visit.        Review of Systems   Constitutional: Positive for malaise/fatigue.   HENT: Negative for hearing loss, nosebleeds and sore throat.         No recent head injury.   Eyes: Negative for double vision.        No new loss of vision.   Respiratory: Positive for cough.         No recent lung infections.   Cardiovascular: Negative for chest pain.    Gastrointestinal: Negative for abdominal pain, diarrhea, nausea and vomiting.   Genitourinary: Negative.    Musculoskeletal: Positive for joint pain. Negative for falls.   Skin: Negative.    Neurological: Positive for focal weakness (chronic). Negative for dizziness, seizures and headaches.   Endo/Heme/Allergies:        No history of endocrine dysfunction.  No new problems.   Psychiatric/Behavioral: Negative for depression. The patient is not nervous/anxious.         No recent mood changes.          Objective:     /66   Pulse 74   Temp 35.8 °C (96.5 °F)   Resp 16   Ht 1.829 m (6')   Wt 84.4 kg (186 lb)   SpO2 93%   BMI 25.23 kg/m²      Physical Exam   Constitutional: He is oriented to person, place, and time. He appears well-developed and well-nourished. No distress.   HENT:   Head: Normocephalic and atraumatic.   Nose: Nose normal.   Mouth/Throat: Oropharynx is clear and moist.   No new hearing loss.    Large left facies Sturge Shabazz marking.   Eyes:   No double vision or loss of vision.   Neck: Normal range of motion.   Cardiovascular: Normal rate, regular rhythm and normal heart sounds.    No recent chest pain.   Pulmonary/Chest: Effort normal.   Abdominal: There is no tenderness.   Genitourinary:   Genitourinary Comments: No recent infections.   Musculoskeletal: Normal range of motion.   Lymphadenopathy:     He has no cervical adenopathy.   Neurological: He is alert and oriented to person, place, and time. He has normal strength. He displays no tremor. No cranial nerve deficit. He displays no seizure activity. Coordination ( left BKA amputation, s/p recovering from right hip repair, using cane with minimal support) and gait abnormal.   Reflex Scores:       Tricep reflexes are 2+ on the right side and 2+ on the left side.       Bicep reflexes are 2+ on the right side and 2+ on the left side.       Brachioradialis reflexes are 2+ on the right side and 2+ on the left side.  No observable changes in  neurologic status.  See initial new patient examination for details.     Skin: Skin is warm and dry. There is pallor.   Psychiatric: His speech is normal. His mood appears not anxious. He does not exhibit a depressed mood.   Very quiet, pleasant, hard of hearing  Appears well today.               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  No seizures since late February and since titration of Vimpat to 200mg BID.     Right CVA:  Continue ASA 325mg qday.     Hallucinations/grandiose thinking:  Well-controlled with low dose Haldol per day.     Doing very well in regards to his epilepsy management.  Continue LEV 750mg BID suspension.  Will further reduce the Vimpat from 100mg BID until off.  This is only due to the fact that their co-pay is too spendy.       No recent falls.     Will call with any concern for seizures and I would be inclined to quickly start Zonegran.  Would also consider Lamictal.    New Rx for ativan provided and discussed urgent and emergent usage.    Return for follow-up in 4-6 months per patient request.   I spent 35+ minutes with this patient, over fifty percent was spent counseling patient on their condition, best management practices, reviewing test results and risks and benefits of treatment.

## 2017-11-14 ENCOUNTER — PATIENT OUTREACH (OUTPATIENT)
Dept: HEALTH INFORMATION MANAGEMENT | Facility: OTHER | Age: 77
End: 2017-11-14

## 2017-11-14 NOTE — PROGRESS NOTES
WebIZ Checked & Epic Updated: Yes  · WebIZ Recommendations: ZOSTAVAX (Shingles)  · Is patient due for Tdap? NO  · Is patient due for Shingles? YES. Patient was notified of copay/out of pocket cost.  [facility] Verified: yes  Verify PCP: yes    Communication Preference Obtained: yes     Review Care Team: yes    Annual Wellness Visit Scheduling  1. Scheduling Status:Scheduled     Care Gap Scheduling (Attempt to Schedule EACH Overdue Care Gap!)     Health Maintenance Due   Topic Date Due   • PFT SCREENING-FEV1 AND FEV/FVC RATIO / SPIROMETRY SHOULD BE PERFORMED ANNUALLY  09/24/1960   • IMM ZOSTER VACCINE  Declined   • Annual Wellness Visit  09/11/2016        Scheduled patient for Annual Wellness Visit       MyChart Activation: declined

## 2017-12-12 ENCOUNTER — TELEPHONE (OUTPATIENT)
Dept: MEDICAL GROUP | Facility: PHYSICIAN GROUP | Age: 77
End: 2017-12-12

## 2017-12-12 NOTE — TELEPHONE ENCOUNTER
Future Appointments       Provider Department Center    12/19/2017 8:00 AM Renee Robles D.O.; [facility]    4/16/2018 9:00 AM ROBERTA Silva. [facility]           ANNUAL WELLNESS VISIT PRE-VISIT PLANNING     1.  Reviewed note from last office visit with PCP: YES Visit date: 08/03/2017    2.  If any orders were placed at last visit, do we have Results/Consult Notes?        •  Labs - Labs ordered, NOT completed. Patient advised to complete prior to next appointment.Prostate, CBC, CMP, Lipid profile        •  Imaging - Imaging was not ordered at last office visit.        •  Referrals - No referrals were ordered at last office visit.     3.  Immunizations were updated in [employer] using WebIZ?: Yes       •  WebIZ Recommendations: FLU and ZOSTAVAX (Shingles) Declined all vaccines        •  Is patient due for Tdap? NO       •  Is patient due for Shingles? YES. Patient was notified of copay/out of pocket cost.     4.  Patient is due for the following Health Maintenance Topics:   Health Maintenance Due   Topic Date Due   • PFT SCREENING-FEV1 AND FEV/FVC RATIO / SPIROMETRY SHOULD BE PERFORMED ANNUALLY  09/24/1958   • IMM ZOSTER VACCINE  09/24/2002   • Annual Wellness Visit  09/11/2016       5.  Reviewed/Updated the following with patient:       •   Preferred Pharmacy? YES       •   Preferred Lab? YES       •   Preferred Communication? YES       •   Allergies? YES       •   Medications? YES. Was Abstract Encounter opened and chart updated? YES       •   Social History? YES. Was Abstract Encounter opened and chart updated? YES       •   Family History (document living status of immediate family members and if + hx of cancer, diabetes, hypertension, hyperlipidemia, heart attack, stroke) YES. Was Abstract Encounter opened and chart updated? YES    6.  Care Team Updated:       •   DME Company (gait device, O2, CPAP, etc.): YES O2 at night         •   Other Specialists (eye doctor, derm, GYN, cardiology, endo, etc): YES       •   Last eye exam: last year     7. Patient has the following [facility] Path diagnoses on Problem List:  COPD      8.  Is patient in need of any refills prior to office visit? No       •    Separate refill encounter created?: no    9. DPA/Advanced Directive:  Patient does not have an Advanced Directive.  A packet and workshop information was given on Advanced Directives.     10.  Specialty Comments was updated with diagnosis information provided by SCP: YES There is a note     11.  Patient was advised: “This is a free wellness visit. The provider will screen for medical conditions to help you stay healthy. If you have other concerns to address you may be asked to discuss these at a separate visit or there may be an additional fee.”     12.  Patient was informed to arrive 15 min prior to their scheduled appointment and bring in their medication bottles?  YES

## 2017-12-14 ENCOUNTER — HOSPITAL ENCOUNTER (OUTPATIENT)
Dept: LAB | Facility: MEDICAL CENTER | Age: 77
End: 2017-12-14
Attending: FAMILY MEDICINE
Payer: MEDICARE

## 2017-12-14 DIAGNOSIS — Z12.5 SCREENING PSA (PROSTATE SPECIFIC ANTIGEN): ICD-10-CM

## 2017-12-14 DIAGNOSIS — F39 MOOD DISORDER (HCC): ICD-10-CM

## 2017-12-14 DIAGNOSIS — Z87.891 PERSONAL HISTORY OF TOBACCO USE, PRESENTING HAZARDS TO HEALTH: ICD-10-CM

## 2017-12-14 DIAGNOSIS — Z13.6 SCREENING FOR CARDIOVASCULAR CONDITION: ICD-10-CM

## 2017-12-14 DIAGNOSIS — R00.0 TACHYCARDIA: ICD-10-CM

## 2017-12-14 DIAGNOSIS — D64.9 ANEMIA, UNSPECIFIED TYPE: ICD-10-CM

## 2017-12-14 LAB
ALBUMIN SERPL BCP-MCNC: 3 G/DL (ref 3.2–4.9)
ALBUMIN/GLOB SERPL: 0.7 G/DL
ALP SERPL-CCNC: 235 U/L (ref 30–99)
ALT SERPL-CCNC: 77 U/L (ref 2–50)
ANION GAP SERPL CALC-SCNC: 7 MMOL/L (ref 0–11.9)
AST SERPL-CCNC: 92 U/L (ref 12–45)
BASOPHILS # BLD AUTO: 1 % (ref 0–1.8)
BASOPHILS # BLD: 0.07 K/UL (ref 0–0.12)
BILIRUB SERPL-MCNC: 1.1 MG/DL (ref 0.1–1.5)
BUN SERPL-MCNC: 9 MG/DL (ref 8–22)
CALCIUM SERPL-MCNC: 10 MG/DL (ref 8.5–10.5)
CHLORIDE SERPL-SCNC: 106 MMOL/L (ref 96–112)
CHOLEST SERPL-MCNC: 198 MG/DL (ref 100–199)
CO2 SERPL-SCNC: 27 MMOL/L (ref 20–33)
CREAT SERPL-MCNC: 1.2 MG/DL (ref 0.5–1.4)
EOSINOPHIL # BLD AUTO: 1.43 K/UL (ref 0–0.51)
EOSINOPHIL NFR BLD: 20.1 % (ref 0–6.9)
ERYTHROCYTE [DISTWIDTH] IN BLOOD BY AUTOMATED COUNT: 52.8 FL (ref 35.9–50)
GFR SERPL CREATININE-BSD FRML MDRD: 59 ML/MIN/1.73 M 2
GLOBULIN SER CALC-MCNC: 4.1 G/DL (ref 1.9–3.5)
GLUCOSE SERPL-MCNC: 97 MG/DL (ref 65–99)
HCT VFR BLD AUTO: 45.7 % (ref 42–52)
HDLC SERPL-MCNC: 35 MG/DL
HGB BLD-MCNC: 13.7 G/DL (ref 14–18)
IMM GRANULOCYTES # BLD AUTO: 0.01 K/UL (ref 0–0.11)
IMM GRANULOCYTES NFR BLD AUTO: 0.1 % (ref 0–0.9)
LDLC SERPL CALC-MCNC: 137 MG/DL
LYMPHOCYTES # BLD AUTO: 2.05 K/UL (ref 1–4.8)
LYMPHOCYTES NFR BLD: 28.8 % (ref 22–41)
MCH RBC QN AUTO: 31 PG (ref 27–33)
MCHC RBC AUTO-ENTMCNC: 30 G/DL (ref 33.7–35.3)
MCV RBC AUTO: 103.4 FL (ref 81.4–97.8)
MONOCYTES # BLD AUTO: 0.28 K/UL (ref 0–0.85)
MONOCYTES NFR BLD AUTO: 3.9 % (ref 0–13.4)
NEUTROPHILS # BLD AUTO: 3.28 K/UL (ref 1.82–7.42)
NEUTROPHILS NFR BLD: 46.1 % (ref 44–72)
NRBC # BLD AUTO: 0 K/UL
NRBC BLD AUTO-RTO: 0 /100 WBC
PLATELET # BLD AUTO: 225 K/UL (ref 164–446)
PMV BLD AUTO: 9.9 FL (ref 9–12.9)
POTASSIUM SERPL-SCNC: 4.7 MMOL/L (ref 3.6–5.5)
PROT SERPL-MCNC: 7.1 G/DL (ref 6–8.2)
PSA SERPL-MCNC: 2.96 NG/ML (ref 0–4)
RBC # BLD AUTO: 4.42 M/UL (ref 4.7–6.1)
SODIUM SERPL-SCNC: 140 MMOL/L (ref 135–145)
TRIGL SERPL-MCNC: 128 MG/DL (ref 0–149)
WBC # BLD AUTO: 7.1 K/UL (ref 4.8–10.8)

## 2017-12-14 PROCEDURE — 36415 COLL VENOUS BLD VENIPUNCTURE: CPT

## 2017-12-14 PROCEDURE — 85025 COMPLETE CBC W/AUTO DIFF WBC: CPT

## 2017-12-14 PROCEDURE — 84153 ASSAY OF PSA TOTAL: CPT

## 2017-12-14 PROCEDURE — 80061 LIPID PANEL: CPT

## 2017-12-14 PROCEDURE — 80053 COMPREHEN METABOLIC PANEL: CPT

## 2017-12-15 ENCOUNTER — PATIENT OUTREACH (OUTPATIENT)
Dept: HEALTH INFORMATION MANAGEMENT | Facility: OTHER | Age: 77
End: 2017-12-15

## 2017-12-15 NOTE — PROGRESS NOTES
Outbound call to Conner for medication review. Left a voice message requesting patient to call [phone]. Mailed  letter to patient describing our service. Will follow-up when patient makes contact.     Ozzie HarrisonD

## 2017-12-15 NOTE — LETTER
December 15, 2017        Conner Ray Loghry  [address] [address]        Dear Conner:    I am sorry I have been unable to reach you via phone. As a clinical pharmacist with [facility], I am working with your health care providers to ensure optimal medication therapy. This service is available to you at no cost.    By participating in this review, I will:     • Review your medications, vitamins and other over-the-counter items.    • Assure there are no harmful interactions with your medications.  • Lower your out-of-pocket prescription costs, if possible.   • Communicate medication concerns between your healthcare specialists.  • Provide you with wellness, healthy lifestyle, and disease prevention strategies.  • Refer you to a nurse or  if needed.  • Consider remote health monitoring if you have high blood pressure.  • Answer any questions that you may have about your medications.    Please contact me at [phone] to discuss your medications. I am available Monday through Friday from 8am to 5pm. I look forward to hearing from you.         Sincerely,        Ilda Eugene, OzzieD

## 2017-12-19 ENCOUNTER — OFFICE VISIT (OUTPATIENT)
Dept: MEDICAL GROUP | Facility: PHYSICIAN GROUP | Age: 77
End: 2017-12-19
Payer: MEDICARE

## 2017-12-19 VITALS
WEIGHT: 178 LBS | BODY MASS INDEX: 24.11 KG/M2 | DIASTOLIC BLOOD PRESSURE: 72 MMHG | OXYGEN SATURATION: 92 % | TEMPERATURE: 97.6 F | HEART RATE: 90 BPM | HEIGHT: 72 IN | SYSTOLIC BLOOD PRESSURE: 108 MMHG

## 2017-12-19 DIAGNOSIS — Q85.89 STURGE-WEBER DISEASE (HCC): ICD-10-CM

## 2017-12-19 DIAGNOSIS — K21.9 GASTROESOPHAGEAL REFLUX DISEASE WITHOUT ESOPHAGITIS: ICD-10-CM

## 2017-12-19 DIAGNOSIS — Z89.512 HISTORY OF AMPUTATION OF LEFT LOWER EXTREMITY THROUGH TIBIA AND FIBULA (HCC): ICD-10-CM

## 2017-12-19 DIAGNOSIS — I73.9 PERIPHERAL VASCULAR DISEASE (HCC): ICD-10-CM

## 2017-12-19 DIAGNOSIS — R74.8 ELEVATED ALKALINE PHOSPHATASE LEVEL: ICD-10-CM

## 2017-12-19 DIAGNOSIS — J43.9 PULMONARY EMPHYSEMA, UNSPECIFIED EMPHYSEMA TYPE (HCC): ICD-10-CM

## 2017-12-19 DIAGNOSIS — J96.11 CHRONIC RESPIRATORY FAILURE WITH HYPOXIA (HCC): ICD-10-CM

## 2017-12-19 DIAGNOSIS — Z00.00 MEDICARE ANNUAL WELLNESS VISIT, SUBSEQUENT: ICD-10-CM

## 2017-12-19 DIAGNOSIS — E78.00 PURE HYPERCHOLESTEROLEMIA WITH TARGET LOW DENSITY LIPOPROTEIN (LDL) CHOLESTEROL LESS THAN 130 MG/DL: ICD-10-CM

## 2017-12-19 DIAGNOSIS — Z91.81 RISK FOR FALLS: ICD-10-CM

## 2017-12-19 DIAGNOSIS — E55.9 HYPOVITAMINOSIS D: ICD-10-CM

## 2017-12-19 DIAGNOSIS — Z93.3 COLOSTOMY IN PLACE (HCC): ICD-10-CM

## 2017-12-19 DIAGNOSIS — D64.9 ANEMIA, UNSPECIFIED TYPE: ICD-10-CM

## 2017-12-19 DIAGNOSIS — F39 MOOD DISORDER (HCC): ICD-10-CM

## 2017-12-19 DIAGNOSIS — R00.0 TACHYCARDIA: ICD-10-CM

## 2017-12-19 DIAGNOSIS — G40.909 SEIZURE DISORDER (HCC): ICD-10-CM

## 2017-12-19 PROCEDURE — G0439 PPPS, SUBSEQ VISIT: HCPCS | Performed by: FAMILY MEDICINE

## 2017-12-19 ASSESSMENT — PATIENT HEALTH QUESTIONNAIRE - PHQ9: CLINICAL INTERPRETATION OF PHQ2 SCORE: 0

## 2017-12-19 NOTE — PROGRESS NOTES
Chief Complaint   Patient presents with   • Annual Wellness Visit         HPI:  Conner is a 77 y.o. 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     Priority: High   • Anemia 03/10/2016     Priority: Medium   • Chronic respiratory failure with hypoxia (CMS-HCC) 09/11/2015     Priority: Medium   • 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) 07/08/2013     Priority: Medium   • History of CVA (cerebrovascular accident) 04/29/2015     Priority: Low   • COPD (chronic obstructive pulmonary disease) (CMS-HCC) 02/02/2015     Priority: Low   • Employs prosthetic leg 07/24/2014     Priority: Low   • History of Crohn's disease [facility] 07/08/2013     Priority: Low   • Mood disorder (CMS-HCC) 09/13/2016   • Hypovitaminosis D 09/13/2016   • Risk for falls 09/13/2016   • Pure hypercholesterolemia with target low density lipoprotein (LDL) less than 130 07/22/2016   • Tachycardia 04/28/2015   • History of below knee amputation (CMS-HCC) 07/24/2014   • GERD (gastroesophageal reflux disease) 07/08/2013   • Murmur, cardiac 05/28/2013   • Peripheral vascular disease (CMS-HCC) 11/21/2012   • History of abdominal aortic aneurysm repair 11/21/2012   • Seizure disorder (CMS-HCC) 08/03/2009       Current Outpatient Prescriptions   Medication Sig Dispense Refill   • Tiotropium Bromide Monohydrate (SPIRIVA RESPIMAT) 1.25 MCG/ACT Aero Soln Inhale 1 Puff by mouth every day. 1 Inhaler 0   • levetiracetam (KEPPRA) 100 MG/ML Solution TAKE ONE & ONE-HALF TEASPOONFUL (7.5 ML) BY MOUTH TWICE DAILY 450 mL 5   • lorazepam (ATIVAN) 1 MG Tab Take 1/2-1 tablet PO PRN breakthrough seizures.  Do not exceed more than 2 tablets in 24 hours unless directed otherwise by physician. 10 Tab 0   • metoprolol (LOPRESSOR) 25 MG Tab Take 0.5 Tabs by mouth 2 Times a Day. 90 Tab 4   • haloperidol (HALDOL) 1 MG Tab Take 0.5 Tabs by mouth every evening. 45 Tab 4   •  Aspirin 325 MG Tablet Delayed Response Take 1 Tab by mouth every day. 90 Tab 1   • Cholecalciferol (VITAMIN D3) 2000 UNITS Tab Take 2,000 Units by mouth every day. 30 Tab 1   • ferrous sulfate 325 (65 FE) MG tablet Take 1 Tab by mouth 2 times a day, with meals. 60 Tab 1     No current facility-administered medications for this visit.         Patient is taking medications as noted in medication list.  Current supplements as per medication list.     Allergies: Egg white; Egg yolk; Chicken allergy; Omeprazole; Prednisone; and Statins [hmg-coa-r inhibitors]    Current social contact/activities: visit with family/ walking      Is patient current with immunizations? No, due for ZOSTAVAX (Shingles). Patient is interested in receiving NONE today.    He  reports that he quit smoking about 6 years ago. His smoking use included Cigarettes. He started smoking about 63 years ago. He has a 54.00 pack-year smoking history. He has never used smokeless tobacco. He reports that he does not drink alcohol or use drugs.  Counseling given: Not Answered        DPA/Advanced directive: Patient does not have an Advanced Directive.  A packet and workshop information was given on Advanced Directives.    ROS:    Gait: Uses a cane   Ostomy: no   Other tubes: no   Amputations: yes  L. Leg   Chronic oxygen use yes   Last eye exam Last year    Wears hearing aids: no   : Denies any urinary leakage during the last 6 months      Screening:    COPD    1.  Is patient under the care of a pulmonologist? No.  2.  Has patient ever completed a PFT or spirometry? Yes, on 2 years ago .  3.  Is patient on oxygen or CPAP? Yes, CareTeam was updated.  4.  Has patient ever had instruction on inhaler technique by health care professional? No  5.  Is patient interested in a referral to respiratory therapy for more information on COPD, inhaler technique, and/or information on establishing an action plan?  No, patient is NOT interested.    Depression  Screening    Little interest or pleasure in doing things?  0 - not at all  Feeling down, depressed, or hopeless? 0 - not at all  Patient Health Questionnaire Score: 0    If depressive symptoms identified deferred to follow up visit unless specifically addressed in assessment and plan.    Interpretation of PHQ-9 Total Score   Score Severity   1-4 No Depression   5-9 Mild Depression   10-14 Moderate Depression   15-19 Moderately Severe Depression   20-27 Severe Depression    Screening for Cognitive Impairment    Three Minute Recall (apple, watch, nicholas)  2/3    Draw clock face with all 12 numbers set to the hand to show 10 minutes past 11 o'clock  0 3/5  If cognitive concerns identified, deferred for follow up unless specifically addressed in assessment and plan.    Fall Risk Assessment    Has the patient had two or more falls in the last year or any fall with injury in the last year?  No  If fall risk identified, deferred for follow up unless specifically addressed in assessment and plan.    Safety Assessment    Throw rugs on floor.  Yes  Handrails on all stairs.  Yes  Good lighting in all hallways.  Yes  Difficulty hearing.  No  Patient counseled about all safety risks that were identified.    Functional Assessment ADLs    Are there any barriers preventing you from cooking for yourself or meeting nutritional needs?  No.    Are there any barriers preventing you from driving safely or obtaining transportation?  No.    Are there any barriers preventing you from using a telephone or calling for help?  No.    Are there any barriers preventing you from shopping?  No.    Are there any barriers preventing you from taking care of your own finances?  No.    Are there any barriers preventing you from managing your medications?  No.    Are you currently engaging any exercise or physical activity?  Yes.       Health Maintenance Summary                PFT SCREENING-FEV1 AND FEV/FVC RATIO / SPIROMETRY SHOULD BE PERFORMED ANNUALLY  Overdue 9/24/1960     IMM ZOSTER VACCINE Overdue 9/24/2002     Annual Wellness Visit Overdue 9/11/2016      Done 9/11/2015 Visit Dx: Medicare annual wellness visit, initial     Patient has more history with this topic...    COLONOSCOPY Next Due 9/23/2018      Done 9/23/2015 AMB REFERRAL TO GI FOR COLONOSCOPY     Patient has more history with this topic...    IMM DTaP/Tdap/Td Vaccine Next Due 7/8/2023      Done 7/8/2013 Imm Admin: Tdap Vaccine          Patient Care Team:  Renee Robles D.O. as PCP - General (Family Medicine)  Jazmyn Blackwell O.D. as Consulting Physician (Optometry)  CORNELOI Silva as Consulting Physician (Neurology)    Social History   Substance Use Topics   • Smoking status: Former Smoker     Packs/day: 1.00     Years: 54.00     Types: Cigarettes     Start date: 9/24/1954     Quit date: 3/19/2011   • Smokeless tobacco: Never Used      Comment: Started smoking at 17   • Alcohol use No     Family History   Problem Relation Age of Onset   • Heart Attack Mother    • No Known Problems Father    • Hyperlipidemia Sister    • No Known Problems Brother    • No Known Problems Brother    • Heart Attack Brother    • No Known Problems Maternal Grandmother    • No Known Problems Maternal Grandfather    • No Known Problems Paternal Grandmother    • No Known Problems Paternal Grandfather    • Lung Disease Neg Hx    • Cancer Neg Hx    • Diabetes Neg Hx    • Hypertension Neg Hx    • Stroke Neg Hx      He  has a past medical history of Arrhythmia; Cataract; Dental disorder; GERD (gastroesophageal reflux disease) (7/8/2013); History of colostomy (10/26/2011); History of CVA (cerebrovascular accident) (4/29/2015); Hypertension; Indigestion; Seizure disorder (CMS-[facility]); and Snoring.   Past Surgical History:   Procedure Laterality Date   • HIP NAILING INTRAMEDULLARY Right 3/9/2016    Procedure: HIP NAILING INTRAMEDULLARY;  Surgeon: Freddy Gan M.D.;  Location: SURGERY [facility];  Service:     • CATARACT PHACO WITH IOL  8/26/2014    Performed by Abran Barber M.D. at SURGERY [facility]   • CATARACT PHACO WITH IOL  8/12/2014    Performed by Abran Barber M.D. at [facility]   • COLECTOMY  11/21/2010    Performed by BRYCE HDEZ at SURGERY [facility]   • COLOSTOMY  11/21/2010    Performed by BRYCE HDEZ at SURGERY [facility]   • SIGMOIDOSCOPY FLEX  11/9/2010    Performed by MINA MAYER at ENDOSCOPY [facility]   • ABDOMINAL AORTIC ANEURYSM  10/14/2009    Performed by PHOEBE CHEATHAM at SURGERY [facility]   • HIP ORIF  7/21/2009    Performed by WAYNE HOLMAN at SURGERY [facility]   • ABDOMINAL AORTIC ANEURYSM  2009   • AMPUTATION, BELOW THE KNEE  1980   • OTHER ABDOMINAL SURGERY      AAA repair 2009           Exam:     Blood pressure 108/72, pulse 90, temperature 36.4 °C (97.6 °F), height 1.829 m (6'), weight 80.7 kg (178 lb), SpO2 92 %. Body mass index is 24.14 kg/m².    Hearing fair.    Resp: CTA bilat  Alert, oriented in no acute distress.  Eye contact is good, speech goal directed, affect calm      Assessment and Plan. The following treatment and monitoring plan is recommended:    1. Medicare annual wellness visit, subsequent      2. Seizure disorder (CMS-HCC)  Chronic: stable, no recent seizures    3. Peripheral vascular disease (CMS-HCC)  Continue ASA    4. Mood disorder (CMS-HCC)  Continue Haldol    5. 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 Pulmonary emphysema, unspecified emphysema type (CMS-HCC)  Patient is currently not taking inhalers. He reports shortness of breath with walking greater than 50 feet. Recommend Spiriva which has been sent to healthcare pharmacy.    7. Chronic respiratory failure with hypoxia (CMS-HCC)  2 nocturnal oxygen    8. 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)  No changes.    9. Gastroesophageal reflux disease without esophagitis  Chronic:  Controlled    10. Anemia, unspecified type  History of anemia. Currently macrocytic anemia concern for vitamin B12 deficiency we will check B12 level and repeat CBC.  - VITAMIN B12; Future  - CBC WITH DIFFERENTIAL; Future    11. Hypovitaminosis D  Vitamin D supplementation    12. Sturge-Shabazz disease, suspected  Noted in history.    13. Tachycardia  Resolved with beta blocker    14. Risk for falls  Declines physical therapy    15. Pure hypercholesterolemia with target low density lipoprotein (LDL) less than 130  The 10-year ASCVD risk score (Juanraffaele DAVIS Jr., et al., 2013) is: 21.2%    Values used to calculate the score:      Age: 77 years      Sex: Male      Is Non- : No      Diabetic: No      Tobacco smoker: No      Systolic Blood Pressure: 108 mmHg      Is BP treated: No      HDL Cholesterol: 35 mg/dL      Total Cholesterol: 198 mg/dL  Continue statin    16. Elevated alkaline phosphatase level  Patient has had multiple medication changes. Medications may have affected liver enzymes. Repeat CMP with GGT for specificity  - COMP METABOLIC PANEL; Future  - GAMMA GT (GGT); Future        Services suggested: No services needed at this time  Health Care Screening recommendations as per orders if indicated.  Referrals offered: PT/OT/Nutrition counseling/Behavioral Health/Smoking cessation as per orders if indicated.    Discussion today about general wellness and lifestyle habits:    · Prevent falls and reduce trip hazards; Cautioned about securing or removing rugs.  · Have a working fire alarm and carbon monoxide detector;   · Engage in regular physical activity and social activities       Follow-up: Return in about 3 months (around 3/19/2018) for chronic conditions.

## 2018-01-01 ENCOUNTER — OFFICE VISIT (OUTPATIENT)
Dept: CARDIOLOGY | Facility: MEDICAL CENTER | Age: 78
End: 2018-01-01
Payer: MEDICARE

## 2018-01-01 ENCOUNTER — APPOINTMENT (OUTPATIENT)
Dept: RADIOLOGY | Facility: MEDICAL CENTER | Age: 78
DRG: 871 | End: 2018-01-01
Attending: INTERNAL MEDICINE
Payer: MEDICARE

## 2018-01-01 ENCOUNTER — APPOINTMENT (OUTPATIENT)
Dept: RADIOLOGY | Facility: MEDICAL CENTER | Age: 78
DRG: 871 | End: 2018-01-01
Attending: RADIOLOGY
Payer: MEDICARE

## 2018-01-01 ENCOUNTER — APPOINTMENT (OUTPATIENT)
Dept: RADIOLOGY | Facility: MEDICAL CENTER | Age: 78
End: 2018-01-01
Attending: EMERGENCY MEDICINE
Payer: MEDICARE

## 2018-01-01 ENCOUNTER — APPOINTMENT (OUTPATIENT)
Dept: RADIOLOGY | Facility: MEDICAL CENTER | Age: 78
DRG: 871 | End: 2018-01-01
Attending: HOSPITALIST
Payer: MEDICARE

## 2018-01-01 ENCOUNTER — HOSPITAL ENCOUNTER (INPATIENT)
Facility: MEDICAL CENTER | Age: 78
LOS: 2 days | DRG: 057 | End: 2018-03-09
Attending: EMERGENCY MEDICINE | Admitting: HOSPITALIST
Payer: MEDICARE

## 2018-01-01 ENCOUNTER — APPOINTMENT (OUTPATIENT)
Dept: CARDIOLOGY | Facility: MEDICAL CENTER | Age: 78
DRG: 871 | End: 2018-01-01
Attending: HOSPITALIST
Payer: MEDICARE

## 2018-01-01 ENCOUNTER — PATIENT OUTREACH (OUTPATIENT)
Dept: HEALTH INFORMATION MANAGEMENT | Facility: OTHER | Age: 78
End: 2018-01-01

## 2018-01-01 ENCOUNTER — TELEPHONE (OUTPATIENT)
Dept: MEDICAL GROUP | Facility: PHYSICIAN GROUP | Age: 78
End: 2018-01-01

## 2018-01-01 ENCOUNTER — OFFICE VISIT (OUTPATIENT)
Dept: NEUROLOGY | Facility: MEDICAL CENTER | Age: 78
End: 2018-01-01
Payer: MEDICARE

## 2018-01-01 ENCOUNTER — APPOINTMENT (OUTPATIENT)
Dept: RADIOLOGY | Facility: MEDICAL CENTER | Age: 78
DRG: 057 | End: 2018-01-01
Attending: HOSPITALIST
Payer: MEDICARE

## 2018-01-01 ENCOUNTER — OFFICE VISIT (OUTPATIENT)
Dept: BEHAVIORAL HEALTH | Facility: PHYSICIAN GROUP | Age: 78
End: 2018-01-01
Payer: MEDICARE

## 2018-01-01 ENCOUNTER — APPOINTMENT (OUTPATIENT)
Dept: CARDIOLOGY | Facility: MEDICAL CENTER | Age: 78
DRG: 871 | End: 2018-01-01
Attending: INTERNAL MEDICINE
Payer: MEDICARE

## 2018-01-01 ENCOUNTER — HOSPITAL ENCOUNTER (OUTPATIENT)
Dept: LAB | Facility: MEDICAL CENTER | Age: 78
End: 2018-04-20
Attending: NURSE PRACTITIONER
Payer: MEDICARE

## 2018-01-01 ENCOUNTER — HOSPITAL ENCOUNTER (OUTPATIENT)
Dept: LAB | Facility: MEDICAL CENTER | Age: 78
End: 2018-04-20
Attending: FAMILY MEDICINE
Payer: MEDICARE

## 2018-01-01 ENCOUNTER — OFFICE VISIT (OUTPATIENT)
Dept: MEDICAL GROUP | Facility: MEDICAL CENTER | Age: 78
End: 2018-01-01
Payer: MEDICARE

## 2018-01-01 ENCOUNTER — HOSPITAL ENCOUNTER (INPATIENT)
Facility: MEDICAL CENTER | Age: 78
LOS: 23 days | DRG: 871 | End: 2019-01-03
Attending: EMERGENCY MEDICINE | Admitting: INTERNAL MEDICINE
Payer: MEDICARE

## 2018-01-01 ENCOUNTER — HOSPITAL ENCOUNTER (EMERGENCY)
Facility: MEDICAL CENTER | Age: 78
End: 2018-01-31
Attending: EMERGENCY MEDICINE
Payer: MEDICARE

## 2018-01-01 ENCOUNTER — OFFICE VISIT (OUTPATIENT)
Dept: BEHAVIORAL HEALTH | Facility: CLINIC | Age: 78
End: 2018-01-01
Payer: MEDICARE

## 2018-01-01 ENCOUNTER — APPOINTMENT (OUTPATIENT)
Dept: RADIOLOGY | Facility: MEDICAL CENTER | Age: 78
DRG: 871 | End: 2018-01-01
Attending: EMERGENCY MEDICINE
Payer: MEDICARE

## 2018-01-01 ENCOUNTER — RESOLUTE PROFESSIONAL BILLING HOSPITAL PROF FEE (OUTPATIENT)
Dept: HOSPITALIST | Facility: MEDICAL CENTER | Age: 78
End: 2018-01-01
Payer: MEDICARE

## 2018-01-01 VITALS
OXYGEN SATURATION: 91 % | HEART RATE: 110 BPM | DIASTOLIC BLOOD PRESSURE: 82 MMHG | WEIGHT: 174.4 LBS | SYSTOLIC BLOOD PRESSURE: 122 MMHG | BODY MASS INDEX: 23.11 KG/M2 | HEIGHT: 73 IN

## 2018-01-01 VITALS
DIASTOLIC BLOOD PRESSURE: 72 MMHG | BODY MASS INDEX: 21.47 KG/M2 | HEART RATE: 104 BPM | SYSTOLIC BLOOD PRESSURE: 93 MMHG | HEIGHT: 73 IN | WEIGHT: 162 LBS

## 2018-01-01 VITALS
SYSTOLIC BLOOD PRESSURE: 106 MMHG | DIASTOLIC BLOOD PRESSURE: 70 MMHG | HEIGHT: 73 IN | OXYGEN SATURATION: 96 % | BODY MASS INDEX: 23.19 KG/M2 | HEART RATE: 110 BPM | WEIGHT: 175 LBS | RESPIRATION RATE: 16 BRPM

## 2018-01-01 VITALS
HEIGHT: 73 IN | HEART RATE: 118 BPM | SYSTOLIC BLOOD PRESSURE: 142 MMHG | BODY MASS INDEX: 21.74 KG/M2 | DIASTOLIC BLOOD PRESSURE: 88 MMHG | WEIGHT: 164 LBS

## 2018-01-01 VITALS
RESPIRATION RATE: 18 BRPM | HEIGHT: 73 IN | SYSTOLIC BLOOD PRESSURE: 113 MMHG | TEMPERATURE: 97.8 F | DIASTOLIC BLOOD PRESSURE: 79 MMHG | BODY MASS INDEX: 23.4 KG/M2 | HEART RATE: 105 BPM | WEIGHT: 176.59 LBS | OXYGEN SATURATION: 95 %

## 2018-01-01 VITALS
DIASTOLIC BLOOD PRESSURE: 60 MMHG | WEIGHT: 173.28 LBS | SYSTOLIC BLOOD PRESSURE: 110 MMHG | TEMPERATURE: 97.6 F | BODY MASS INDEX: 22.97 KG/M2 | HEIGHT: 73 IN | HEART RATE: 100 BPM

## 2018-01-01 VITALS
BODY MASS INDEX: 21.74 KG/M2 | OXYGEN SATURATION: 90 % | SYSTOLIC BLOOD PRESSURE: 110 MMHG | HEIGHT: 73 IN | HEART RATE: 107 BPM | DIASTOLIC BLOOD PRESSURE: 68 MMHG | WEIGHT: 164 LBS

## 2018-01-01 VITALS
SYSTOLIC BLOOD PRESSURE: 141 MMHG | HEIGHT: 73 IN | TEMPERATURE: 98.1 F | BODY MASS INDEX: 23.19 KG/M2 | HEART RATE: 67 BPM | OXYGEN SATURATION: 95 % | DIASTOLIC BLOOD PRESSURE: 71 MMHG | WEIGHT: 175 LBS | RESPIRATION RATE: 18 BRPM

## 2018-01-01 VITALS
RESPIRATION RATE: 18 BRPM | HEIGHT: 73 IN | TEMPERATURE: 97.9 F | HEART RATE: 99 BPM | OXYGEN SATURATION: 90 % | SYSTOLIC BLOOD PRESSURE: 114 MMHG | BODY MASS INDEX: 21.75 KG/M2 | WEIGHT: 164.1 LBS | DIASTOLIC BLOOD PRESSURE: 76 MMHG

## 2018-01-01 DIAGNOSIS — I73.9 PERIPHERAL VASCULAR DISEASE (HCC): ICD-10-CM

## 2018-01-01 DIAGNOSIS — J43.9 PULMONARY EMPHYSEMA, UNSPECIFIED EMPHYSEMA TYPE (HCC): ICD-10-CM

## 2018-01-01 DIAGNOSIS — J18.9 PNEUMONIA OF BOTH LUNGS DUE TO INFECTIOUS ORGANISM, UNSPECIFIED PART OF LUNG: ICD-10-CM

## 2018-01-01 DIAGNOSIS — G40.909 SEIZURE DISORDER (HCC): ICD-10-CM

## 2018-01-01 DIAGNOSIS — G40.909 NONINTRACTABLE EPILEPSY WITHOUT STATUS EPILEPTICUS, UNSPECIFIED EPILEPSY TYPE (HCC): ICD-10-CM

## 2018-01-01 DIAGNOSIS — J18.9 PNEUMONIA OF BOTH LOWER LOBES DUE TO INFECTIOUS ORGANISM: ICD-10-CM

## 2018-01-01 DIAGNOSIS — I48.3 TYPICAL ATRIAL FLUTTER (HCC): ICD-10-CM

## 2018-01-01 DIAGNOSIS — D72.829 LEUKOCYTOSIS, UNSPECIFIED TYPE: ICD-10-CM

## 2018-01-01 DIAGNOSIS — E78.00 PURE HYPERCHOLESTEROLEMIA WITH TARGET LOW DENSITY LIPOPROTEIN (LDL) CHOLESTEROL LESS THAN 130 MG/DL: ICD-10-CM

## 2018-01-01 DIAGNOSIS — G47.9 SLEEP DISTURBANCES: ICD-10-CM

## 2018-01-01 DIAGNOSIS — R00.0 TACHYCARDIA: ICD-10-CM

## 2018-01-01 DIAGNOSIS — Z86.73 HISTORY OF CVA (CEREBROVASCULAR ACCIDENT): ICD-10-CM

## 2018-01-01 DIAGNOSIS — F39 MOOD DISORDER (HCC): ICD-10-CM

## 2018-01-01 DIAGNOSIS — Z93.3 COLOSTOMY IN PLACE (HCC): ICD-10-CM

## 2018-01-01 DIAGNOSIS — K86.3 PANCREATIC PSEUDOCYST: ICD-10-CM

## 2018-01-01 DIAGNOSIS — R56.9 SEIZURE (HCC): ICD-10-CM

## 2018-01-01 DIAGNOSIS — R11.14 BILIOUS VOMITING WITH NAUSEA: ICD-10-CM

## 2018-01-01 DIAGNOSIS — Z89.512 HISTORY OF BELOW KNEE AMPUTATION, LEFT (HCC): ICD-10-CM

## 2018-01-01 DIAGNOSIS — Q85.89 STURGE-WEBER DISEASE (HCC): ICD-10-CM

## 2018-01-01 DIAGNOSIS — D64.9 ANEMIA, UNSPECIFIED TYPE: ICD-10-CM

## 2018-01-01 DIAGNOSIS — Z97.10 EMPLOYS PROSTHETIC LEG: ICD-10-CM

## 2018-01-01 DIAGNOSIS — Z89.512 HISTORY OF AMPUTATION OF LEFT LOWER EXTREMITY THROUGH TIBIA AND FIBULA (HCC): ICD-10-CM

## 2018-01-01 DIAGNOSIS — R79.89 LOW VITAMIN B12 LEVEL: ICD-10-CM

## 2018-01-01 DIAGNOSIS — A41.9 SEPSIS, DUE TO UNSPECIFIED ORGANISM: ICD-10-CM

## 2018-01-01 DIAGNOSIS — I44.1 ATRIOVENTRICULAR BLOCK, MOBITZ TYPE 1, WENCKEBACH: Chronic | ICD-10-CM

## 2018-01-01 DIAGNOSIS — E86.0 DEHYDRATION WITH HYPERNATREMIA: ICD-10-CM

## 2018-01-01 DIAGNOSIS — R74.8 ELEVATED ALKALINE PHOSPHATASE LEVEL: ICD-10-CM

## 2018-01-01 DIAGNOSIS — S42.201A CLOSED FRACTURE OF PROXIMAL END OF RIGHT HUMERUS, UNSPECIFIED FRACTURE MORPHOLOGY, INITIAL ENCOUNTER: ICD-10-CM

## 2018-01-01 DIAGNOSIS — Z87.898 HISTORY OF DELIRIUM: ICD-10-CM

## 2018-01-01 DIAGNOSIS — R79.89 LOW VITAMIN D LEVEL: ICD-10-CM

## 2018-01-01 DIAGNOSIS — R91.8 MASS OF UPPER LOBE OF RIGHT LUNG: ICD-10-CM

## 2018-01-01 DIAGNOSIS — C34.90 SQUAMOUS CELL LUNG CANCER, UNSPECIFIED LATERALITY (HCC): ICD-10-CM

## 2018-01-01 DIAGNOSIS — K85.10 ACUTE BILIARY PANCREATITIS, UNSPECIFIED COMPLICATION STATUS: ICD-10-CM

## 2018-01-01 DIAGNOSIS — Z98.890 HISTORY OF ABDOMINAL AORTIC ANEURYSM REPAIR: ICD-10-CM

## 2018-01-01 DIAGNOSIS — E87.0 DEHYDRATION WITH HYPERNATREMIA: ICD-10-CM

## 2018-01-01 DIAGNOSIS — R13.11 ORAL PHASE DYSPHAGIA: ICD-10-CM

## 2018-01-01 DIAGNOSIS — Z87.19 HISTORY OF CROHN'S DISEASE: ICD-10-CM

## 2018-01-01 DIAGNOSIS — J96.11 CHRONIC RESPIRATORY FAILURE WITH HYPOXIA (HCC): ICD-10-CM

## 2018-01-01 DIAGNOSIS — J96.21 ACUTE ON CHRONIC RESPIRATORY FAILURE WITH HYPOXIA (HCC): ICD-10-CM

## 2018-01-01 LAB
25(OH)D3 SERPL-MCNC: 79 NG/ML (ref 30–100)
ABO GROUP BLD: NORMAL
ACTION RANGE TRIGGERED IACRT: NO
ALBUMIN SERPL BCP-MCNC: 1.9 G/DL (ref 3.2–4.9)
ALBUMIN SERPL BCP-MCNC: 2.1 G/DL (ref 3.2–4.9)
ALBUMIN SERPL BCP-MCNC: 2.2 G/DL (ref 3.2–4.9)
ALBUMIN SERPL BCP-MCNC: 2.2 G/DL (ref 3.2–4.9)
ALBUMIN SERPL BCP-MCNC: 2.4 G/DL (ref 3.2–4.9)
ALBUMIN SERPL BCP-MCNC: 2.4 G/DL (ref 3.2–4.9)
ALBUMIN SERPL BCP-MCNC: 2.5 G/DL (ref 3.2–4.9)
ALBUMIN SERPL BCP-MCNC: 2.6 G/DL (ref 3.2–4.9)
ALBUMIN SERPL BCP-MCNC: 2.7 G/DL (ref 3.2–4.9)
ALBUMIN SERPL BCP-MCNC: 2.8 G/DL (ref 3.2–4.9)
ALBUMIN SERPL BCP-MCNC: 2.9 G/DL (ref 3.2–4.9)
ALBUMIN SERPL BCP-MCNC: 2.9 G/DL (ref 3.2–4.9)
ALBUMIN SERPL BCP-MCNC: 3.1 G/DL (ref 3.2–4.9)
ALBUMIN SERPL BCP-MCNC: 3.1 G/DL (ref 3.2–4.9)
ALBUMIN SERPL BCP-MCNC: 3.5 G/DL (ref 3.2–4.9)
ALBUMIN SERPL BCP-MCNC: 3.7 G/DL (ref 3.2–4.9)
ALBUMIN SERPL BCP-MCNC: 3.8 G/DL (ref 3.2–4.9)
ALBUMIN/GLOB SERPL: 0.7 G/DL
ALBUMIN/GLOB SERPL: 0.8 G/DL
ALBUMIN/GLOB SERPL: 0.9 G/DL
ALBUMIN/GLOB SERPL: 1 G/DL
ALBUMIN/GLOB SERPL: 1 G/DL
ALBUMIN/GLOB SERPL: 1.2 G/DL
ALBUMIN/GLOB SERPL: 1.2 G/DL
ALBUMIN/GLOB SERPL: 1.3 G/DL
ALBUMIN/GLOB SERPL: 1.4 G/DL
ALP SERPL-CCNC: 107 U/L (ref 30–99)
ALP SERPL-CCNC: 110 U/L (ref 30–99)
ALP SERPL-CCNC: 111 U/L (ref 30–99)
ALP SERPL-CCNC: 116 U/L (ref 30–99)
ALP SERPL-CCNC: 117 U/L (ref 30–99)
ALP SERPL-CCNC: 128 U/L (ref 30–99)
ALP SERPL-CCNC: 132 U/L (ref 30–99)
ALP SERPL-CCNC: 135 U/L (ref 30–99)
ALP SERPL-CCNC: 143 U/L (ref 30–99)
ALP SERPL-CCNC: 152 U/L (ref 30–99)
ALP SERPL-CCNC: 158 U/L (ref 30–99)
ALP SERPL-CCNC: 200 U/L (ref 30–99)
ALP SERPL-CCNC: 209 U/L (ref 30–99)
ALP SERPL-CCNC: 211 U/L (ref 30–99)
ALP SERPL-CCNC: 214 U/L (ref 30–99)
ALP SERPL-CCNC: 275 U/L (ref 30–99)
ALP SERPL-CCNC: 325 U/L (ref 30–99)
ALP SERPL-CCNC: 80 U/L (ref 30–99)
ALP SERPL-CCNC: 88 U/L (ref 30–99)
ALP SERPL-CCNC: 98 U/L (ref 30–99)
ALT SERPL-CCNC: 11 U/L (ref 2–50)
ALT SERPL-CCNC: 12 U/L (ref 2–50)
ALT SERPL-CCNC: 13 U/L (ref 2–50)
ALT SERPL-CCNC: 14 U/L (ref 2–50)
ALT SERPL-CCNC: 14 U/L (ref 2–50)
ALT SERPL-CCNC: 15 U/L (ref 2–50)
ALT SERPL-CCNC: 16 U/L (ref 2–50)
ALT SERPL-CCNC: 19 U/L (ref 2–50)
ALT SERPL-CCNC: 20 U/L (ref 2–50)
ALT SERPL-CCNC: 25 U/L (ref 2–50)
ALT SERPL-CCNC: 26 U/L (ref 2–50)
ALT SERPL-CCNC: 27 U/L (ref 2–50)
ALT SERPL-CCNC: 31 U/L (ref 2–50)
ALT SERPL-CCNC: 32 U/L (ref 2–50)
ALT SERPL-CCNC: 37 U/L (ref 2–50)
ALT SERPL-CCNC: 47 U/L (ref 2–50)
ALT SERPL-CCNC: 54 U/L (ref 2–50)
ALT SERPL-CCNC: 7 U/L (ref 2–50)
ALT SERPL-CCNC: 7 U/L (ref 2–50)
ANION GAP SERPL CALC-SCNC: 10 MMOL/L (ref 0–11.9)
ANION GAP SERPL CALC-SCNC: 11 MMOL/L (ref 0–11.9)
ANION GAP SERPL CALC-SCNC: 19 MMOL/L (ref 0–11.9)
ANION GAP SERPL CALC-SCNC: 4 MMOL/L (ref 0–11.9)
ANION GAP SERPL CALC-SCNC: 6 MMOL/L (ref 0–11.9)
ANION GAP SERPL CALC-SCNC: 7 MMOL/L (ref 0–11.9)
ANION GAP SERPL CALC-SCNC: 8 MMOL/L (ref 0–11.9)
ANION GAP SERPL CALC-SCNC: 9 MMOL/L (ref 0–11.9)
ANISOCYTOSIS BLD QL SMEAR: ABNORMAL
ANISOCYTOSIS BLD QL SMEAR: ABNORMAL
APPEARANCE UR: ABNORMAL
APPEARANCE UR: CLEAR
AST SERPL-CCNC: 10 U/L (ref 12–45)
AST SERPL-CCNC: 12 U/L (ref 12–45)
AST SERPL-CCNC: 13 U/L (ref 12–45)
AST SERPL-CCNC: 13 U/L (ref 12–45)
AST SERPL-CCNC: 14 U/L (ref 12–45)
AST SERPL-CCNC: 14 U/L (ref 12–45)
AST SERPL-CCNC: 15 U/L (ref 12–45)
AST SERPL-CCNC: 16 U/L (ref 12–45)
AST SERPL-CCNC: 17 U/L (ref 12–45)
AST SERPL-CCNC: 19 U/L (ref 12–45)
AST SERPL-CCNC: 20 U/L (ref 12–45)
AST SERPL-CCNC: 22 U/L (ref 12–45)
AST SERPL-CCNC: 26 U/L (ref 12–45)
AST SERPL-CCNC: 26 U/L (ref 12–45)
AST SERPL-CCNC: 29 U/L (ref 12–45)
AST SERPL-CCNC: 30 U/L (ref 12–45)
AST SERPL-CCNC: 31 U/L (ref 12–45)
AST SERPL-CCNC: 32 U/L (ref 12–45)
AST SERPL-CCNC: 63 U/L (ref 12–45)
AST SERPL-CCNC: 76 U/L (ref 12–45)
BACTERIA #/AREA URNS HPF: NEGATIVE /HPF
BACTERIA BLD CULT: ABNORMAL
BACTERIA BLD CULT: NORMAL
BACTERIA BLD CULT: NORMAL
BACTERIA UR CULT: NORMAL
BARCODED ABORH UBTYP: 6200
BARCODED PRD CODE UBPRD: NORMAL
BARCODED UNIT NUM UBUNT: NORMAL
BASE EXCESS BLDA CALC-SCNC: 1 MMOL/L (ref -4–3)
BASOPHILS # BLD AUTO: 0 % (ref 0–1.8)
BASOPHILS # BLD AUTO: 0 % (ref 0–1.8)
BASOPHILS # BLD AUTO: 0.1 % (ref 0–1.8)
BASOPHILS # BLD AUTO: 0.2 % (ref 0–1.8)
BASOPHILS # BLD AUTO: 0.3 % (ref 0–1.8)
BASOPHILS # BLD AUTO: 0.4 % (ref 0–1.8)
BASOPHILS # BLD AUTO: 0.5 % (ref 0–1.8)
BASOPHILS # BLD AUTO: 0.7 % (ref 0–1.8)
BASOPHILS # BLD AUTO: 0.9 % (ref 0–1.8)
BASOPHILS # BLD AUTO: 0.9 % (ref 0–1.8)
BASOPHILS # BLD AUTO: 1 % (ref 0–1.8)
BASOPHILS # BLD AUTO: 1.1 % (ref 0–1.8)
BASOPHILS # BLD: 0 K/UL (ref 0–0.12)
BASOPHILS # BLD: 0 K/UL (ref 0–0.12)
BASOPHILS # BLD: 0.03 K/UL (ref 0–0.12)
BASOPHILS # BLD: 0.05 K/UL (ref 0–0.12)
BASOPHILS # BLD: 0.06 K/UL (ref 0–0.12)
BASOPHILS # BLD: 0.06 K/UL (ref 0–0.12)
BASOPHILS # BLD: 0.07 K/UL (ref 0–0.12)
BASOPHILS # BLD: 0.08 K/UL (ref 0–0.12)
BASOPHILS # BLD: 0.08 K/UL (ref 0–0.12)
BASOPHILS # BLD: 0.09 K/UL (ref 0–0.12)
BASOPHILS # BLD: 0.13 K/UL (ref 0–0.12)
BASOPHILS # BLD: 0.15 K/UL (ref 0–0.12)
BASOPHILS # BLD: 0.16 K/UL (ref 0–0.12)
BILIRUB CONJ SERPL-MCNC: 3.1 MG/DL (ref 0.1–0.5)
BILIRUB INDIRECT SERPL-MCNC: 0.9 MG/DL (ref 0–1)
BILIRUB SERPL-MCNC: 0.4 MG/DL (ref 0.1–1.5)
BILIRUB SERPL-MCNC: 0.5 MG/DL (ref 0.1–1.5)
BILIRUB SERPL-MCNC: 0.6 MG/DL (ref 0.1–1.5)
BILIRUB SERPL-MCNC: 0.7 MG/DL (ref 0.1–1.5)
BILIRUB SERPL-MCNC: 0.8 MG/DL (ref 0.1–1.5)
BILIRUB SERPL-MCNC: 0.8 MG/DL (ref 0.1–1.5)
BILIRUB SERPL-MCNC: 0.9 MG/DL (ref 0.1–1.5)
BILIRUB SERPL-MCNC: 1.2 MG/DL (ref 0.1–1.5)
BILIRUB SERPL-MCNC: 1.7 MG/DL (ref 0.1–1.5)
BILIRUB SERPL-MCNC: 4 MG/DL (ref 0.1–1.5)
BILIRUB UR QL STRIP.AUTO: NEGATIVE
BILIRUB UR QL STRIP.AUTO: NEGATIVE
BLD GP AB SCN SERPL QL: NORMAL
BODY TEMPERATURE: ABNORMAL DEGREES
BUN SERPL-MCNC: 13 MG/DL (ref 8–22)
BUN SERPL-MCNC: 14 MG/DL (ref 8–22)
BUN SERPL-MCNC: 16 MG/DL (ref 8–22)
BUN SERPL-MCNC: 16 MG/DL (ref 8–22)
BUN SERPL-MCNC: 17 MG/DL (ref 8–22)
BUN SERPL-MCNC: 20 MG/DL (ref 8–22)
BUN SERPL-MCNC: 24 MG/DL (ref 8–22)
BUN SERPL-MCNC: 26 MG/DL (ref 8–22)
BUN SERPL-MCNC: 28 MG/DL (ref 8–22)
BUN SERPL-MCNC: 29 MG/DL (ref 8–22)
BUN SERPL-MCNC: 29 MG/DL (ref 8–22)
BUN SERPL-MCNC: 30 MG/DL (ref 8–22)
BUN SERPL-MCNC: 31 MG/DL (ref 8–22)
BUN SERPL-MCNC: 32 MG/DL (ref 8–22)
BUN SERPL-MCNC: 36 MG/DL (ref 8–22)
BUN SERPL-MCNC: 40 MG/DL (ref 8–22)
BUN SERPL-MCNC: 42 MG/DL (ref 8–22)
BUN SERPL-MCNC: 42 MG/DL (ref 8–22)
BUN SERPL-MCNC: 43 MG/DL (ref 8–22)
BUN SERPL-MCNC: 44 MG/DL (ref 8–22)
BUN SERPL-MCNC: 45 MG/DL (ref 8–22)
CALCIUM SERPL-MCNC: 10 MG/DL (ref 8.5–10.5)
CALCIUM SERPL-MCNC: 10.1 MG/DL (ref 8.5–10.5)
CALCIUM SERPL-MCNC: 10.2 MG/DL (ref 8.5–10.5)
CALCIUM SERPL-MCNC: 10.6 MG/DL (ref 8.5–10.5)
CALCIUM SERPL-MCNC: 10.6 MG/DL (ref 8.5–10.5)
CALCIUM SERPL-MCNC: 10.8 MG/DL (ref 8.5–10.5)
CALCIUM SERPL-MCNC: 10.9 MG/DL (ref 8.5–10.5)
CALCIUM SERPL-MCNC: 11.6 MG/DL (ref 8.5–10.5)
CALCIUM SERPL-MCNC: 11.8 MG/DL (ref 8.5–10.5)
CALCIUM SERPL-MCNC: 8.7 MG/DL (ref 8.5–10.5)
CALCIUM SERPL-MCNC: 8.8 MG/DL (ref 8.5–10.5)
CALCIUM SERPL-MCNC: 8.9 MG/DL (ref 8.5–10.5)
CALCIUM SERPL-MCNC: 9.1 MG/DL (ref 8.5–10.5)
CALCIUM SERPL-MCNC: 9.5 MG/DL (ref 8.5–10.5)
CALCIUM SERPL-MCNC: 9.6 MG/DL (ref 8.5–10.5)
CALCIUM SERPL-MCNC: 9.6 MG/DL (ref 8.5–10.5)
CALCIUM SERPL-MCNC: 9.9 MG/DL (ref 8.5–10.5)
CALCIUM SERPL-MCNC: 9.9 MG/DL (ref 8.5–10.5)
CAOX CRY #/AREA URNS HPF: ABNORMAL /HPF
CHLORIDE SERPL-SCNC: 100 MMOL/L (ref 96–112)
CHLORIDE SERPL-SCNC: 101 MMOL/L (ref 96–112)
CHLORIDE SERPL-SCNC: 104 MMOL/L (ref 96–112)
CHLORIDE SERPL-SCNC: 105 MMOL/L (ref 96–112)
CHLORIDE SERPL-SCNC: 106 MMOL/L (ref 96–112)
CHLORIDE SERPL-SCNC: 108 MMOL/L (ref 96–112)
CHLORIDE SERPL-SCNC: 110 MMOL/L (ref 96–112)
CHLORIDE SERPL-SCNC: 110 MMOL/L (ref 96–112)
CHLORIDE SERPL-SCNC: 113 MMOL/L (ref 96–112)
CHLORIDE SERPL-SCNC: 113 MMOL/L (ref 96–112)
CHLORIDE SERPL-SCNC: 114 MMOL/L (ref 96–112)
CHLORIDE SERPL-SCNC: 116 MMOL/L (ref 96–112)
CHLORIDE SERPL-SCNC: 116 MMOL/L (ref 96–112)
CHLORIDE SERPL-SCNC: 118 MMOL/L (ref 96–112)
CHLORIDE SERPL-SCNC: 118 MMOL/L (ref 96–112)
CHLORIDE SERPL-SCNC: 95 MMOL/L (ref 96–112)
CHLORIDE SERPL-SCNC: 95 MMOL/L (ref 96–112)
CHLORIDE SERPL-SCNC: 99 MMOL/L (ref 96–112)
CHOLEST SERPL-MCNC: 157 MG/DL (ref 100–199)
CO2 BLDA-SCNC: 26 MMOL/L (ref 20–33)
CO2 SERPL-SCNC: 18 MMOL/L (ref 20–33)
CO2 SERPL-SCNC: 22 MMOL/L (ref 20–33)
CO2 SERPL-SCNC: 22 MMOL/L (ref 20–33)
CO2 SERPL-SCNC: 23 MMOL/L (ref 20–33)
CO2 SERPL-SCNC: 24 MMOL/L (ref 20–33)
CO2 SERPL-SCNC: 25 MMOL/L (ref 20–33)
CO2 SERPL-SCNC: 26 MMOL/L (ref 20–33)
CO2 SERPL-SCNC: 27 MMOL/L (ref 20–33)
CO2 SERPL-SCNC: 28 MMOL/L (ref 20–33)
CO2 SERPL-SCNC: 30 MMOL/L (ref 20–33)
CO2 SERPL-SCNC: 31 MMOL/L (ref 20–33)
CO2 SERPL-SCNC: 32 MMOL/L (ref 20–33)
CO2 SERPL-SCNC: 33 MMOL/L (ref 20–33)
CO2 SERPL-SCNC: 33 MMOL/L (ref 20–33)
CO2 SERPL-SCNC: 34 MMOL/L (ref 20–33)
CO2 SERPL-SCNC: 35 MMOL/L (ref 20–33)
CO2 SERPL-SCNC: 37 MMOL/L (ref 20–33)
COLOR UR: YELLOW
COLOR UR: YELLOW
COMMENT 1642: NORMAL
COMMENT 1642: NORMAL
COMPONENT R 8504R: NORMAL
CORTIS SERPL-MCNC: 14.6 UG/DL (ref 0–23)
CREAT SERPL-MCNC: 0.71 MG/DL (ref 0.5–1.4)
CREAT SERPL-MCNC: 0.72 MG/DL (ref 0.5–1.4)
CREAT SERPL-MCNC: 0.78 MG/DL (ref 0.5–1.4)
CREAT SERPL-MCNC: 0.89 MG/DL (ref 0.5–1.4)
CREAT SERPL-MCNC: 0.91 MG/DL (ref 0.5–1.4)
CREAT SERPL-MCNC: 0.93 MG/DL (ref 0.5–1.4)
CREAT SERPL-MCNC: 1.04 MG/DL (ref 0.5–1.4)
CREAT SERPL-MCNC: 1.08 MG/DL (ref 0.5–1.4)
CREAT SERPL-MCNC: 1.1 MG/DL (ref 0.5–1.4)
CREAT SERPL-MCNC: 1.11 MG/DL (ref 0.5–1.4)
CREAT SERPL-MCNC: 1.11 MG/DL (ref 0.5–1.4)
CREAT SERPL-MCNC: 1.15 MG/DL (ref 0.5–1.4)
CREAT SERPL-MCNC: 1.23 MG/DL (ref 0.5–1.4)
CREAT SERPL-MCNC: 1.24 MG/DL (ref 0.5–1.4)
CREAT SERPL-MCNC: 1.26 MG/DL (ref 0.5–1.4)
CREAT SERPL-MCNC: 1.28 MG/DL (ref 0.5–1.4)
CREAT SERPL-MCNC: 1.29 MG/DL (ref 0.5–1.4)
CREAT SERPL-MCNC: 1.3 MG/DL (ref 0.5–1.4)
CREAT SERPL-MCNC: 1.31 MG/DL (ref 0.5–1.4)
CREAT SERPL-MCNC: 1.33 MG/DL (ref 0.5–1.4)
CREAT SERPL-MCNC: 1.41 MG/DL (ref 0.5–1.4)
CREAT SERPL-MCNC: 1.43 MG/DL (ref 0.5–1.4)
CREAT SERPL-MCNC: 1.54 MG/DL (ref 0.5–1.4)
CREAT SERPL-MCNC: 1.61 MG/DL (ref 0.5–1.4)
CREAT SERPL-MCNC: 1.72 MG/DL (ref 0.5–1.4)
CRP SERPL HS-MCNC: 10.91 MG/DL (ref 0–0.75)
CRP SERPL HS-MCNC: 28.44 MG/DL (ref 0–0.75)
EKG IMPRESSION: NORMAL
EOSINOPHIL # BLD AUTO: 0 K/UL (ref 0–0.51)
EOSINOPHIL # BLD AUTO: 0.04 K/UL (ref 0–0.51)
EOSINOPHIL # BLD AUTO: 0.14 K/UL (ref 0–0.51)
EOSINOPHIL # BLD AUTO: 0.29 K/UL (ref 0–0.51)
EOSINOPHIL # BLD AUTO: 0.32 K/UL (ref 0–0.51)
EOSINOPHIL # BLD AUTO: 0.38 K/UL (ref 0–0.51)
EOSINOPHIL # BLD AUTO: 0.39 K/UL (ref 0–0.51)
EOSINOPHIL # BLD AUTO: 0.4 K/UL (ref 0–0.51)
EOSINOPHIL # BLD AUTO: 0.42 K/UL (ref 0–0.51)
EOSINOPHIL # BLD AUTO: 0.45 K/UL (ref 0–0.51)
EOSINOPHIL # BLD AUTO: 0.45 K/UL (ref 0–0.51)
EOSINOPHIL # BLD AUTO: 0.46 K/UL (ref 0–0.51)
EOSINOPHIL # BLD AUTO: 0.47 K/UL (ref 0–0.51)
EOSINOPHIL # BLD AUTO: 0.5 K/UL (ref 0–0.51)
EOSINOPHIL # BLD AUTO: 0.53 K/UL (ref 0–0.51)
EOSINOPHIL # BLD AUTO: 0.66 K/UL (ref 0–0.51)
EOSINOPHIL # BLD AUTO: 0.72 K/UL (ref 0–0.51)
EOSINOPHIL # BLD AUTO: 0.79 K/UL (ref 0–0.51)
EOSINOPHIL # BLD AUTO: 0.81 K/UL (ref 0–0.51)
EOSINOPHIL # BLD AUTO: 0.82 K/UL (ref 0–0.51)
EOSINOPHIL # BLD AUTO: 0.99 K/UL (ref 0–0.51)
EOSINOPHIL # BLD AUTO: 1.95 K/UL (ref 0–0.51)
EOSINOPHIL NFR BLD: 0 % (ref 0–6.9)
EOSINOPHIL NFR BLD: 0.2 % (ref 0–6.9)
EOSINOPHIL NFR BLD: 0.9 % (ref 0–6.9)
EOSINOPHIL NFR BLD: 1.4 % (ref 0–6.9)
EOSINOPHIL NFR BLD: 1.5 % (ref 0–6.9)
EOSINOPHIL NFR BLD: 1.6 % (ref 0–6.9)
EOSINOPHIL NFR BLD: 1.7 % (ref 0–6.9)
EOSINOPHIL NFR BLD: 10 % (ref 0–6.9)
EOSINOPHIL NFR BLD: 19.3 % (ref 0–6.9)
EOSINOPHIL NFR BLD: 2.1 % (ref 0–6.9)
EOSINOPHIL NFR BLD: 2.1 % (ref 0–6.9)
EOSINOPHIL NFR BLD: 2.2 % (ref 0–6.9)
EOSINOPHIL NFR BLD: 2.4 % (ref 0–6.9)
EOSINOPHIL NFR BLD: 2.4 % (ref 0–6.9)
EOSINOPHIL NFR BLD: 2.7 % (ref 0–6.9)
EOSINOPHIL NFR BLD: 3.4 % (ref 0–6.9)
EOSINOPHIL NFR BLD: 4.1 % (ref 0–6.9)
EOSINOPHIL NFR BLD: 5 % (ref 0–6.9)
EOSINOPHIL NFR BLD: 5.8 % (ref 0–6.9)
EOSINOPHIL NFR BLD: 6 % (ref 0–6.9)
EOSINOPHIL NFR BLD: 6.1 % (ref 0–6.9)
EOSINOPHIL NFR BLD: 7 % (ref 0–6.9)
EPI CELLS #/AREA URNS HPF: NEGATIVE /HPF
ERYTHROCYTE [DISTWIDTH] IN BLOOD BY AUTOMATED COUNT: 49.7 FL (ref 35.9–50)
ERYTHROCYTE [DISTWIDTH] IN BLOOD BY AUTOMATED COUNT: 50.5 FL (ref 35.9–50)
ERYTHROCYTE [DISTWIDTH] IN BLOOD BY AUTOMATED COUNT: 50.7 FL (ref 35.9–50)
ERYTHROCYTE [DISTWIDTH] IN BLOOD BY AUTOMATED COUNT: 50.9 FL (ref 35.9–50)
ERYTHROCYTE [DISTWIDTH] IN BLOOD BY AUTOMATED COUNT: 51 FL (ref 35.9–50)
ERYTHROCYTE [DISTWIDTH] IN BLOOD BY AUTOMATED COUNT: 51.3 FL (ref 35.9–50)
ERYTHROCYTE [DISTWIDTH] IN BLOOD BY AUTOMATED COUNT: 51.8 FL (ref 35.9–50)
ERYTHROCYTE [DISTWIDTH] IN BLOOD BY AUTOMATED COUNT: 51.8 FL (ref 35.9–50)
ERYTHROCYTE [DISTWIDTH] IN BLOOD BY AUTOMATED COUNT: 51.9 FL (ref 35.9–50)
ERYTHROCYTE [DISTWIDTH] IN BLOOD BY AUTOMATED COUNT: 52.1 FL (ref 35.9–50)
ERYTHROCYTE [DISTWIDTH] IN BLOOD BY AUTOMATED COUNT: 52.5 FL (ref 35.9–50)
ERYTHROCYTE [DISTWIDTH] IN BLOOD BY AUTOMATED COUNT: 52.9 FL (ref 35.9–50)
ERYTHROCYTE [DISTWIDTH] IN BLOOD BY AUTOMATED COUNT: 52.9 FL (ref 35.9–50)
ERYTHROCYTE [DISTWIDTH] IN BLOOD BY AUTOMATED COUNT: 53.1 FL (ref 35.9–50)
ERYTHROCYTE [DISTWIDTH] IN BLOOD BY AUTOMATED COUNT: 53.1 FL (ref 35.9–50)
ERYTHROCYTE [DISTWIDTH] IN BLOOD BY AUTOMATED COUNT: 53.7 FL (ref 35.9–50)
ERYTHROCYTE [DISTWIDTH] IN BLOOD BY AUTOMATED COUNT: 54.4 FL (ref 35.9–50)
ERYTHROCYTE [DISTWIDTH] IN BLOOD BY AUTOMATED COUNT: 54.5 FL (ref 35.9–50)
ERYTHROCYTE [DISTWIDTH] IN BLOOD BY AUTOMATED COUNT: 55 FL (ref 35.9–50)
ERYTHROCYTE [DISTWIDTH] IN BLOOD BY AUTOMATED COUNT: 55.5 FL (ref 35.9–50)
ERYTHROCYTE [DISTWIDTH] IN BLOOD BY AUTOMATED COUNT: 56 FL (ref 35.9–50)
ERYTHROCYTE [DISTWIDTH] IN BLOOD BY AUTOMATED COUNT: 56.4 FL (ref 35.9–50)
ERYTHROCYTE [DISTWIDTH] IN BLOOD BY AUTOMATED COUNT: 57.5 FL (ref 35.9–50)
ERYTHROCYTE [DISTWIDTH] IN BLOOD BY AUTOMATED COUNT: 57.9 FL (ref 35.9–50)
ERYTHROCYTE [DISTWIDTH] IN BLOOD BY AUTOMATED COUNT: 58.3 FL (ref 35.9–50)
EST. AVERAGE GLUCOSE BLD GHB EST-MCNC: 111 MG/DL
FLUAV RNA SPEC QL NAA+PROBE: NEGATIVE
FLUBV RNA SPEC QL NAA+PROBE: NEGATIVE
GGT SERPL-CCNC: 142 U/L (ref 7–51)
GLOBULIN SER CALC-MCNC: 2.2 G/DL (ref 1.9–3.5)
GLOBULIN SER CALC-MCNC: 2.3 G/DL (ref 1.9–3.5)
GLOBULIN SER CALC-MCNC: 2.5 G/DL (ref 1.9–3.5)
GLOBULIN SER CALC-MCNC: 2.5 G/DL (ref 1.9–3.5)
GLOBULIN SER CALC-MCNC: 2.6 G/DL (ref 1.9–3.5)
GLOBULIN SER CALC-MCNC: 2.6 G/DL (ref 1.9–3.5)
GLOBULIN SER CALC-MCNC: 2.7 G/DL (ref 1.9–3.5)
GLOBULIN SER CALC-MCNC: 2.7 G/DL (ref 1.9–3.5)
GLOBULIN SER CALC-MCNC: 2.8 G/DL (ref 1.9–3.5)
GLOBULIN SER CALC-MCNC: 3.1 G/DL (ref 1.9–3.5)
GLOBULIN SER CALC-MCNC: 3.2 G/DL (ref 1.9–3.5)
GLOBULIN SER CALC-MCNC: 3.3 G/DL (ref 1.9–3.5)
GLOBULIN SER CALC-MCNC: 3.3 G/DL (ref 1.9–3.5)
GLOBULIN SER CALC-MCNC: 3.4 G/DL (ref 1.9–3.5)
GLOBULIN SER CALC-MCNC: 3.5 G/DL (ref 1.9–3.5)
GLOBULIN SER CALC-MCNC: 3.5 G/DL (ref 1.9–3.5)
GLOBULIN SER CALC-MCNC: 3.6 G/DL (ref 1.9–3.5)
GLOBULIN SER CALC-MCNC: 3.7 G/DL (ref 1.9–3.5)
GLOBULIN SER CALC-MCNC: 3.9 G/DL (ref 1.9–3.5)
GLUCOSE BLD-MCNC: 108 MG/DL (ref 65–99)
GLUCOSE SERPL-MCNC: 104 MG/DL (ref 65–99)
GLUCOSE SERPL-MCNC: 107 MG/DL (ref 65–99)
GLUCOSE SERPL-MCNC: 109 MG/DL (ref 65–99)
GLUCOSE SERPL-MCNC: 113 MG/DL (ref 65–99)
GLUCOSE SERPL-MCNC: 117 MG/DL (ref 65–99)
GLUCOSE SERPL-MCNC: 118 MG/DL (ref 65–99)
GLUCOSE SERPL-MCNC: 122 MG/DL (ref 65–99)
GLUCOSE SERPL-MCNC: 123 MG/DL (ref 65–99)
GLUCOSE SERPL-MCNC: 123 MG/DL (ref 65–99)
GLUCOSE SERPL-MCNC: 125 MG/DL (ref 65–99)
GLUCOSE SERPL-MCNC: 126 MG/DL (ref 65–99)
GLUCOSE SERPL-MCNC: 127 MG/DL (ref 65–99)
GLUCOSE SERPL-MCNC: 130 MG/DL (ref 65–99)
GLUCOSE SERPL-MCNC: 134 MG/DL (ref 65–99)
GLUCOSE SERPL-MCNC: 142 MG/DL (ref 65–99)
GLUCOSE SERPL-MCNC: 147 MG/DL (ref 65–99)
GLUCOSE SERPL-MCNC: 162 MG/DL (ref 65–99)
GLUCOSE SERPL-MCNC: 168 MG/DL (ref 65–99)
GLUCOSE SERPL-MCNC: 87 MG/DL (ref 65–99)
GLUCOSE SERPL-MCNC: 90 MG/DL (ref 65–99)
GLUCOSE SERPL-MCNC: 95 MG/DL (ref 65–99)
GLUCOSE UR STRIP.AUTO-MCNC: NEGATIVE MG/DL
GLUCOSE UR STRIP.AUTO-MCNC: NEGATIVE MG/DL
HBA1C MFR BLD: 5.5 % (ref 0–5.6)
HCO3 BLDA-SCNC: 24.6 MMOL/L (ref 17–25)
HCT VFR BLD AUTO: 22.4 % (ref 42–52)
HCT VFR BLD AUTO: 26.4 % (ref 42–52)
HCT VFR BLD AUTO: 27.5 % (ref 42–52)
HCT VFR BLD AUTO: 27.5 % (ref 42–52)
HCT VFR BLD AUTO: 29.3 % (ref 42–52)
HCT VFR BLD AUTO: 29.4 % (ref 42–52)
HCT VFR BLD AUTO: 29.7 % (ref 42–52)
HCT VFR BLD AUTO: 31.7 % (ref 42–52)
HCT VFR BLD AUTO: 31.8 % (ref 42–52)
HCT VFR BLD AUTO: 32.3 % (ref 42–52)
HCT VFR BLD AUTO: 33 % (ref 42–52)
HCT VFR BLD AUTO: 33.4 % (ref 42–52)
HCT VFR BLD AUTO: 33.6 % (ref 42–52)
HCT VFR BLD AUTO: 33.7 % (ref 42–52)
HCT VFR BLD AUTO: 34.3 % (ref 42–52)
HCT VFR BLD AUTO: 34.4 % (ref 42–52)
HCT VFR BLD AUTO: 34.8 % (ref 42–52)
HCT VFR BLD AUTO: 35 % (ref 42–52)
HCT VFR BLD AUTO: 35.4 % (ref 42–52)
HCT VFR BLD AUTO: 35.5 % (ref 42–52)
HCT VFR BLD AUTO: 35.7 % (ref 42–52)
HCT VFR BLD AUTO: 36 % (ref 42–52)
HCT VFR BLD AUTO: 41.8 % (ref 42–52)
HCT VFR BLD AUTO: 43 % (ref 42–52)
HCT VFR BLD AUTO: 44.2 % (ref 42–52)
HCT VFR BLD AUTO: 45.1 % (ref 42–52)
HDLC SERPL-MCNC: 26 MG/DL
HEMOCCULT STL QL: POSITIVE
HGB BLD-MCNC: 10 G/DL (ref 14–18)
HGB BLD-MCNC: 10.1 G/DL (ref 14–18)
HGB BLD-MCNC: 10.3 G/DL (ref 14–18)
HGB BLD-MCNC: 10.4 G/DL (ref 14–18)
HGB BLD-MCNC: 10.5 G/DL (ref 14–18)
HGB BLD-MCNC: 10.5 G/DL (ref 14–18)
HGB BLD-MCNC: 10.6 G/DL (ref 14–18)
HGB BLD-MCNC: 10.6 G/DL (ref 14–18)
HGB BLD-MCNC: 10.7 G/DL (ref 14–18)
HGB BLD-MCNC: 10.9 G/DL (ref 14–18)
HGB BLD-MCNC: 10.9 G/DL (ref 14–18)
HGB BLD-MCNC: 11.1 G/DL (ref 14–18)
HGB BLD-MCNC: 11.1 G/DL (ref 14–18)
HGB BLD-MCNC: 11.2 G/DL (ref 14–18)
HGB BLD-MCNC: 11.2 G/DL (ref 14–18)
HGB BLD-MCNC: 13.1 G/DL (ref 14–18)
HGB BLD-MCNC: 13.8 G/DL (ref 14–18)
HGB BLD-MCNC: 13.8 G/DL (ref 14–18)
HGB BLD-MCNC: 13.9 G/DL (ref 14–18)
HGB BLD-MCNC: 6.9 G/DL (ref 14–18)
HGB BLD-MCNC: 8.2 G/DL (ref 14–18)
HGB BLD-MCNC: 8.2 G/DL (ref 14–18)
HGB BLD-MCNC: 8.5 G/DL (ref 14–18)
HGB BLD-MCNC: 8.7 G/DL (ref 14–18)
HGB BLD-MCNC: 8.8 G/DL (ref 14–18)
HGB BLD-MCNC: 9.3 G/DL (ref 14–18)
HGB BLD-MCNC: 9.4 G/DL (ref 14–18)
HOROWITZ INDEX BLDA+IHG-RTO: 99 MM[HG]
HYALINE CASTS #/AREA URNS LPF: ABNORMAL /LPF
HYPOCHROMIA BLD QL SMEAR: ABNORMAL
IMM GRANULOCYTES # BLD AUTO: 0.02 K/UL (ref 0–0.11)
IMM GRANULOCYTES # BLD AUTO: 0.04 K/UL (ref 0–0.11)
IMM GRANULOCYTES # BLD AUTO: 0.05 K/UL (ref 0–0.11)
IMM GRANULOCYTES # BLD AUTO: 0.06 K/UL (ref 0–0.11)
IMM GRANULOCYTES # BLD AUTO: 0.07 K/UL (ref 0–0.11)
IMM GRANULOCYTES # BLD AUTO: 0.08 K/UL (ref 0–0.11)
IMM GRANULOCYTES # BLD AUTO: 0.1 K/UL (ref 0–0.11)
IMM GRANULOCYTES # BLD AUTO: 0.1 K/UL (ref 0–0.11)
IMM GRANULOCYTES # BLD AUTO: 0.11 K/UL (ref 0–0.11)
IMM GRANULOCYTES # BLD AUTO: 0.12 K/UL (ref 0–0.11)
IMM GRANULOCYTES # BLD AUTO: 0.13 K/UL (ref 0–0.11)
IMM GRANULOCYTES # BLD AUTO: 0.13 K/UL (ref 0–0.11)
IMM GRANULOCYTES # BLD AUTO: 0.14 K/UL (ref 0–0.11)
IMM GRANULOCYTES # BLD AUTO: 0.19 K/UL (ref 0–0.11)
IMM GRANULOCYTES # BLD AUTO: 0.22 K/UL (ref 0–0.11)
IMM GRANULOCYTES # BLD AUTO: 0.28 K/UL (ref 0–0.11)
IMM GRANULOCYTES # BLD AUTO: 0.28 K/UL (ref 0–0.11)
IMM GRANULOCYTES # BLD AUTO: 0.32 K/UL (ref 0–0.11)
IMM GRANULOCYTES # BLD AUTO: 0.35 K/UL (ref 0–0.11)
IMM GRANULOCYTES NFR BLD AUTO: 0.2 % (ref 0–0.9)
IMM GRANULOCYTES NFR BLD AUTO: 0.3 % (ref 0–0.9)
IMM GRANULOCYTES NFR BLD AUTO: 0.4 % (ref 0–0.9)
IMM GRANULOCYTES NFR BLD AUTO: 0.5 % (ref 0–0.9)
IMM GRANULOCYTES NFR BLD AUTO: 0.6 % (ref 0–0.9)
IMM GRANULOCYTES NFR BLD AUTO: 0.8 % (ref 0–0.9)
IMM GRANULOCYTES NFR BLD AUTO: 0.9 % (ref 0–0.9)
IMM GRANULOCYTES NFR BLD AUTO: 1.2 % (ref 0–0.9)
IMM GRANULOCYTES NFR BLD AUTO: 1.2 % (ref 0–0.9)
IMM GRANULOCYTES NFR BLD AUTO: 1.4 % (ref 0–0.9)
IMM GRANULOCYTES NFR BLD AUTO: 1.6 % (ref 0–0.9)
IMM GRANULOCYTES NFR BLD AUTO: 1.7 % (ref 0–0.9)
INR PPP: 1.13 (ref 0.87–1.13)
INST. QUALIFIED PATIENT IIQPT: YES
KETONES UR STRIP.AUTO-MCNC: ABNORMAL MG/DL
KETONES UR STRIP.AUTO-MCNC: NEGATIVE MG/DL
LACTATE BLD-SCNC: 1.3 MMOL/L (ref 0.5–2)
LACTATE BLD-SCNC: 1.5 MMOL/L (ref 0.5–2)
LACTATE BLD-SCNC: 1.5 MMOL/L (ref 0.5–2)
LACTATE BLD-SCNC: 1.9 MMOL/L (ref 0.5–2)
LACTATE BLD-SCNC: 2.4 MMOL/L (ref 0.5–2)
LACTATE BLD-SCNC: 2.5 MMOL/L (ref 0.5–2)
LACTATE BLD-SCNC: 3.8 MMOL/L (ref 0.5–2)
LACTATE BLD-SCNC: 8.1 MMOL/L (ref 0.5–2)
LDLC SERPL CALC-MCNC: 103 MG/DL
LEUKOCYTE ESTERASE UR QL STRIP.AUTO: NEGATIVE
LEUKOCYTE ESTERASE UR QL STRIP.AUTO: NEGATIVE
LEVETIRACETAM SERPL-MCNC: 25 UG/ML (ref 12–46)
LG PLATELETS BLD QL SMEAR: NORMAL
LIPASE SERPL-CCNC: 21 U/L (ref 11–82)
LIPASE SERPL-CCNC: 263 U/L (ref 11–82)
LIPASE SERPL-CCNC: 45 U/L (ref 11–82)
LIPASE SERPL-CCNC: 7 U/L (ref 11–82)
LV EJECT FRACT  99904: 60
LV EJECT FRACT  99904: 60
LV EJECT FRACT  99904: 65
LV EJECT FRACT MOD 2C 99903: 56.36
LV EJECT FRACT MOD 2C 99903: 66.96
LV EJECT FRACT MOD 4C 99902: 58.94
LV EJECT FRACT MOD 4C 99902: 64.65
LV EJECT FRACT MOD 4C 99902: 79.83
LV EJECT FRACT MOD BP 99901: 66.11
LV EJECT FRACT MOD BP 99901: 70.79
LYMPHOCYTES # BLD AUTO: 0.14 K/UL (ref 1–4.8)
LYMPHOCYTES # BLD AUTO: 0.69 K/UL (ref 1–4.8)
LYMPHOCYTES # BLD AUTO: 0.77 K/UL (ref 1–4.8)
LYMPHOCYTES # BLD AUTO: 1.05 K/UL (ref 1–4.8)
LYMPHOCYTES # BLD AUTO: 1.1 K/UL (ref 1–4.8)
LYMPHOCYTES # BLD AUTO: 1.23 K/UL (ref 1–4.8)
LYMPHOCYTES # BLD AUTO: 1.29 K/UL (ref 1–4.8)
LYMPHOCYTES # BLD AUTO: 1.3 K/UL (ref 1–4.8)
LYMPHOCYTES # BLD AUTO: 1.31 K/UL (ref 1–4.8)
LYMPHOCYTES # BLD AUTO: 1.33 K/UL (ref 1–4.8)
LYMPHOCYTES # BLD AUTO: 1.4 K/UL (ref 1–4.8)
LYMPHOCYTES # BLD AUTO: 1.4 K/UL (ref 1–4.8)
LYMPHOCYTES # BLD AUTO: 1.41 K/UL (ref 1–4.8)
LYMPHOCYTES # BLD AUTO: 1.41 K/UL (ref 1–4.8)
LYMPHOCYTES # BLD AUTO: 1.43 K/UL (ref 1–4.8)
LYMPHOCYTES # BLD AUTO: 1.5 K/UL (ref 1–4.8)
LYMPHOCYTES # BLD AUTO: 1.53 K/UL (ref 1–4.8)
LYMPHOCYTES # BLD AUTO: 1.57 K/UL (ref 1–4.8)
LYMPHOCYTES # BLD AUTO: 1.61 K/UL (ref 1–4.8)
LYMPHOCYTES # BLD AUTO: 1.63 K/UL (ref 1–4.8)
LYMPHOCYTES # BLD AUTO: 1.81 K/UL (ref 1–4.8)
LYMPHOCYTES # BLD AUTO: 1.91 K/UL (ref 1–4.8)
LYMPHOCYTES # BLD AUTO: 2.01 K/UL (ref 1–4.8)
LYMPHOCYTES # BLD AUTO: 2.76 K/UL (ref 1–4.8)
LYMPHOCYTES NFR BLD: 0.9 % (ref 22–41)
LYMPHOCYTES NFR BLD: 10.3 % (ref 22–41)
LYMPHOCYTES NFR BLD: 10.8 % (ref 22–41)
LYMPHOCYTES NFR BLD: 10.8 % (ref 22–41)
LYMPHOCYTES NFR BLD: 11.1 % (ref 22–41)
LYMPHOCYTES NFR BLD: 11.7 % (ref 22–41)
LYMPHOCYTES NFR BLD: 11.8 % (ref 22–41)
LYMPHOCYTES NFR BLD: 14.7 % (ref 22–41)
LYMPHOCYTES NFR BLD: 14.9 % (ref 22–41)
LYMPHOCYTES NFR BLD: 2.6 % (ref 22–41)
LYMPHOCYTES NFR BLD: 20.4 % (ref 22–41)
LYMPHOCYTES NFR BLD: 4.8 % (ref 22–41)
LYMPHOCYTES NFR BLD: 5 % (ref 22–41)
LYMPHOCYTES NFR BLD: 5.5 % (ref 22–41)
LYMPHOCYTES NFR BLD: 6.1 % (ref 22–41)
LYMPHOCYTES NFR BLD: 6.3 % (ref 22–41)
LYMPHOCYTES NFR BLD: 7.3 % (ref 22–41)
LYMPHOCYTES NFR BLD: 7.3 % (ref 22–41)
LYMPHOCYTES NFR BLD: 7.4 % (ref 22–41)
LYMPHOCYTES NFR BLD: 7.6 % (ref 22–41)
LYMPHOCYTES NFR BLD: 7.7 % (ref 22–41)
LYMPHOCYTES NFR BLD: 8.3 % (ref 22–41)
LYMPHOCYTES NFR BLD: 9.5 % (ref 22–41)
LYMPHOCYTES NFR BLD: 9.8 % (ref 22–41)
MACROCYTES BLD QL SMEAR: ABNORMAL
MACROCYTES BLD QL SMEAR: ABNORMAL
MAGNESIUM SERPL-MCNC: 1.7 MG/DL (ref 1.5–2.5)
MAGNESIUM SERPL-MCNC: 2 MG/DL (ref 1.5–2.5)
MAGNESIUM SERPL-MCNC: 2 MG/DL (ref 1.5–2.5)
MAGNESIUM SERPL-MCNC: 2.3 MG/DL (ref 1.5–2.5)
MAGNESIUM SERPL-MCNC: 2.4 MG/DL (ref 1.5–2.5)
MANUAL DIFF BLD: NORMAL
MCH RBC QN AUTO: 30.3 PG (ref 27–33)
MCH RBC QN AUTO: 31.2 PG (ref 27–33)
MCH RBC QN AUTO: 31.2 PG (ref 27–33)
MCH RBC QN AUTO: 31.5 PG (ref 27–33)
MCH RBC QN AUTO: 31.6 PG (ref 27–33)
MCH RBC QN AUTO: 31.6 PG (ref 27–33)
MCH RBC QN AUTO: 31.7 PG (ref 27–33)
MCH RBC QN AUTO: 31.8 PG (ref 27–33)
MCH RBC QN AUTO: 31.9 PG (ref 27–33)
MCH RBC QN AUTO: 32 PG (ref 27–33)
MCH RBC QN AUTO: 32.1 PG (ref 27–33)
MCH RBC QN AUTO: 32.1 PG (ref 27–33)
MCH RBC QN AUTO: 32.2 PG (ref 27–33)
MCH RBC QN AUTO: 32.3 PG (ref 27–33)
MCH RBC QN AUTO: 32.4 PG (ref 27–33)
MCH RBC QN AUTO: 32.7 PG (ref 27–33)
MCH RBC QN AUTO: 32.9 PG (ref 27–33)
MCHC RBC AUTO-ENTMCNC: 29.9 G/DL (ref 33.7–35.3)
MCHC RBC AUTO-ENTMCNC: 29.9 G/DL (ref 33.7–35.3)
MCHC RBC AUTO-ENTMCNC: 30.3 G/DL (ref 33.7–35.3)
MCHC RBC AUTO-ENTMCNC: 30.5 G/DL (ref 33.7–35.3)
MCHC RBC AUTO-ENTMCNC: 30.8 G/DL (ref 33.7–35.3)
MCHC RBC AUTO-ENTMCNC: 30.9 G/DL (ref 33.7–35.3)
MCHC RBC AUTO-ENTMCNC: 31.1 G/DL (ref 33.7–35.3)
MCHC RBC AUTO-ENTMCNC: 31.1 G/DL (ref 33.7–35.3)
MCHC RBC AUTO-ENTMCNC: 31.2 G/DL (ref 33.7–35.3)
MCHC RBC AUTO-ENTMCNC: 31.3 G/DL (ref 33.7–35.3)
MCHC RBC AUTO-ENTMCNC: 31.5 G/DL (ref 33.7–35.3)
MCHC RBC AUTO-ENTMCNC: 31.6 G/DL (ref 33.7–35.3)
MCHC RBC AUTO-ENTMCNC: 31.7 G/DL (ref 33.7–35.3)
MCHC RBC AUTO-ENTMCNC: 31.8 G/DL (ref 33.7–35.3)
MCHC RBC AUTO-ENTMCNC: 32.1 G/DL (ref 33.7–35.3)
MCHC RBC AUTO-ENTMCNC: 32.1 G/DL (ref 33.7–35.3)
MCHC RBC AUTO-ENTMCNC: 32.2 G/DL (ref 33.7–35.3)
MCHC RBC AUTO-ENTMCNC: 32.3 G/DL (ref 33.7–35.3)
MCHC RBC AUTO-ENTMCNC: 32.4 G/DL (ref 33.7–35.3)
MCHC RBC AUTO-ENTMCNC: 33.1 G/DL (ref 33.7–35.3)
MCV RBC AUTO: 100 FL (ref 81.4–97.8)
MCV RBC AUTO: 100 FL (ref 81.4–97.8)
MCV RBC AUTO: 100.3 FL (ref 81.4–97.8)
MCV RBC AUTO: 101 FL (ref 81.4–97.8)
MCV RBC AUTO: 101.1 FL (ref 81.4–97.8)
MCV RBC AUTO: 101.3 FL (ref 81.4–97.8)
MCV RBC AUTO: 101.5 FL (ref 81.4–97.8)
MCV RBC AUTO: 101.8 FL (ref 81.4–97.8)
MCV RBC AUTO: 102.9 FL (ref 81.4–97.8)
MCV RBC AUTO: 103 FL (ref 81.4–97.8)
MCV RBC AUTO: 103.3 FL (ref 81.4–97.8)
MCV RBC AUTO: 103.3 FL (ref 81.4–97.8)
MCV RBC AUTO: 103.7 FL (ref 81.4–97.8)
MCV RBC AUTO: 104.7 FL (ref 81.4–97.8)
MCV RBC AUTO: 106.2 FL (ref 81.4–97.8)
MCV RBC AUTO: 106.3 FL (ref 81.4–97.8)
MCV RBC AUTO: 107.3 FL (ref 81.4–97.8)
MCV RBC AUTO: 108.6 FL (ref 81.4–97.8)
MCV RBC AUTO: 96.5 FL (ref 81.4–97.8)
MCV RBC AUTO: 97.1 FL (ref 81.4–97.8)
MCV RBC AUTO: 97.8 FL (ref 81.4–97.8)
MCV RBC AUTO: 99.7 FL (ref 81.4–97.8)
MCV RBC AUTO: 99.7 FL (ref 81.4–97.8)
MICRO URNS: ABNORMAL
MICRO URNS: NORMAL
MONOCYTES # BLD AUTO: 0 K/UL (ref 0–0.85)
MONOCYTES # BLD AUTO: 0.14 K/UL (ref 0–0.85)
MONOCYTES # BLD AUTO: 0.18 K/UL (ref 0–0.85)
MONOCYTES # BLD AUTO: 0.26 K/UL (ref 0–0.85)
MONOCYTES # BLD AUTO: 0.27 K/UL (ref 0–0.85)
MONOCYTES # BLD AUTO: 0.38 K/UL (ref 0–0.85)
MONOCYTES # BLD AUTO: 0.53 K/UL (ref 0–0.85)
MONOCYTES # BLD AUTO: 0.78 K/UL (ref 0–0.85)
MONOCYTES # BLD AUTO: 0.79 K/UL (ref 0–0.85)
MONOCYTES # BLD AUTO: 0.96 K/UL (ref 0–0.85)
MONOCYTES # BLD AUTO: 0.97 K/UL (ref 0–0.85)
MONOCYTES # BLD AUTO: 0.98 K/UL (ref 0–0.85)
MONOCYTES # BLD AUTO: 0.98 K/UL (ref 0–0.85)
MONOCYTES # BLD AUTO: 1.18 K/UL (ref 0–0.85)
MONOCYTES # BLD AUTO: 1.25 K/UL (ref 0–0.85)
MONOCYTES # BLD AUTO: 1.32 K/UL (ref 0–0.85)
MONOCYTES # BLD AUTO: 1.35 K/UL (ref 0–0.85)
MONOCYTES # BLD AUTO: 1.36 K/UL (ref 0–0.85)
MONOCYTES # BLD AUTO: 1.47 K/UL (ref 0–0.85)
MONOCYTES # BLD AUTO: 1.51 K/UL (ref 0–0.85)
MONOCYTES # BLD AUTO: 1.54 K/UL (ref 0–0.85)
MONOCYTES # BLD AUTO: 1.55 K/UL (ref 0–0.85)
MONOCYTES # BLD AUTO: 1.63 K/UL (ref 0–0.85)
MONOCYTES # BLD AUTO: 1.64 K/UL (ref 0–0.85)
MONOCYTES NFR BLD AUTO: 0 % (ref 0–13.4)
MONOCYTES NFR BLD AUTO: 0.9 % (ref 0–13.4)
MONOCYTES NFR BLD AUTO: 1 % (ref 0–13.4)
MONOCYTES NFR BLD AUTO: 1.9 % (ref 0–13.4)
MONOCYTES NFR BLD AUTO: 2.6 % (ref 0–13.4)
MONOCYTES NFR BLD AUTO: 3.3 % (ref 0–13.4)
MONOCYTES NFR BLD AUTO: 4.6 % (ref 0–13.4)
MONOCYTES NFR BLD AUTO: 5.4 % (ref 0–13.4)
MONOCYTES NFR BLD AUTO: 5.5 % (ref 0–13.4)
MONOCYTES NFR BLD AUTO: 5.9 % (ref 0–13.4)
MONOCYTES NFR BLD AUTO: 5.9 % (ref 0–13.4)
MONOCYTES NFR BLD AUTO: 6 % (ref 0–13.4)
MONOCYTES NFR BLD AUTO: 6 % (ref 0–13.4)
MONOCYTES NFR BLD AUTO: 6.7 % (ref 0–13.4)
MONOCYTES NFR BLD AUTO: 6.8 % (ref 0–13.4)
MONOCYTES NFR BLD AUTO: 7.3 % (ref 0–13.4)
MONOCYTES NFR BLD AUTO: 7.4 % (ref 0–13.4)
MONOCYTES NFR BLD AUTO: 7.7 % (ref 0–13.4)
MONOCYTES NFR BLD AUTO: 7.7 % (ref 0–13.4)
MONOCYTES NFR BLD AUTO: 7.9 % (ref 0–13.4)
MONOCYTES NFR BLD AUTO: 8.2 % (ref 0–13.4)
MONOCYTES NFR BLD AUTO: 8.4 % (ref 0–13.4)
MONOCYTES NFR BLD AUTO: 9.2 % (ref 0–13.4)
MONOCYTES NFR BLD AUTO: 9.7 % (ref 0–13.4)
MORPHOLOGY BLD-IMP: NORMAL
NEUTROPHILS # BLD AUTO: 10.19 K/UL (ref 1.82–7.42)
NEUTROPHILS # BLD AUTO: 10.28 K/UL (ref 1.82–7.42)
NEUTROPHILS # BLD AUTO: 10.75 K/UL (ref 1.82–7.42)
NEUTROPHILS # BLD AUTO: 13.18 K/UL (ref 1.82–7.42)
NEUTROPHILS # BLD AUTO: 13.9 K/UL (ref 1.82–7.42)
NEUTROPHILS # BLD AUTO: 14.32 K/UL (ref 1.82–7.42)
NEUTROPHILS # BLD AUTO: 14.57 K/UL (ref 1.82–7.42)
NEUTROPHILS # BLD AUTO: 14.79 K/UL (ref 1.82–7.42)
NEUTROPHILS # BLD AUTO: 15.27 K/UL (ref 1.82–7.42)
NEUTROPHILS # BLD AUTO: 15.37 K/UL (ref 1.82–7.42)
NEUTROPHILS # BLD AUTO: 15.98 K/UL (ref 1.82–7.42)
NEUTROPHILS # BLD AUTO: 17.27 K/UL (ref 1.82–7.42)
NEUTROPHILS # BLD AUTO: 18.5 K/UL (ref 1.82–7.42)
NEUTROPHILS # BLD AUTO: 18.67 K/UL (ref 1.82–7.42)
NEUTROPHILS # BLD AUTO: 19.26 K/UL (ref 1.82–7.42)
NEUTROPHILS # BLD AUTO: 19.76 K/UL (ref 1.82–7.42)
NEUTROPHILS # BLD AUTO: 20.2 K/UL (ref 1.82–7.42)
NEUTROPHILS # BLD AUTO: 28.83 K/UL (ref 1.82–7.42)
NEUTROPHILS # BLD AUTO: 6.25 K/UL (ref 1.82–7.42)
NEUTROPHILS # BLD AUTO: 6.29 K/UL (ref 1.82–7.42)
NEUTROPHILS # BLD AUTO: 8.58 K/UL (ref 1.82–7.42)
NEUTROPHILS # BLD AUTO: 9.23 K/UL (ref 1.82–7.42)
NEUTROPHILS # BLD AUTO: 9.71 K/UL (ref 1.82–7.42)
NEUTROPHILS # BLD AUTO: 9.8 K/UL (ref 1.82–7.42)
NEUTROPHILS NFR BLD: 61.4 % (ref 44–72)
NEUTROPHILS NFR BLD: 63.3 % (ref 44–72)
NEUTROPHILS NFR BLD: 72.2 % (ref 44–72)
NEUTROPHILS NFR BLD: 75 % (ref 44–72)
NEUTROPHILS NFR BLD: 75.1 % (ref 44–72)
NEUTROPHILS NFR BLD: 76.2 % (ref 44–72)
NEUTROPHILS NFR BLD: 76.4 % (ref 44–72)
NEUTROPHILS NFR BLD: 77.3 % (ref 44–72)
NEUTROPHILS NFR BLD: 78.5 % (ref 44–72)
NEUTROPHILS NFR BLD: 79.4 % (ref 44–72)
NEUTROPHILS NFR BLD: 80.4 % (ref 44–72)
NEUTROPHILS NFR BLD: 80.5 % (ref 44–72)
NEUTROPHILS NFR BLD: 80.9 % (ref 44–72)
NEUTROPHILS NFR BLD: 81.2 % (ref 44–72)
NEUTROPHILS NFR BLD: 81.5 % (ref 44–72)
NEUTROPHILS NFR BLD: 82.8 % (ref 44–72)
NEUTROPHILS NFR BLD: 83.1 % (ref 44–72)
NEUTROPHILS NFR BLD: 83.9 % (ref 44–72)
NEUTROPHILS NFR BLD: 85 % (ref 44–72)
NEUTROPHILS NFR BLD: 85.4 % (ref 44–72)
NEUTROPHILS NFR BLD: 88.6 % (ref 44–72)
NEUTROPHILS NFR BLD: 91.6 % (ref 44–72)
NEUTROPHILS NFR BLD: 93 % (ref 44–72)
NEUTROPHILS NFR BLD: 97.3 % (ref 44–72)
NEUTS BAND NFR BLD MANUAL: 0.9 % (ref 0–10)
NEUTS BAND NFR BLD MANUAL: 4.4 % (ref 0–10)
NITRITE UR QL STRIP.AUTO: NEGATIVE
NITRITE UR QL STRIP.AUTO: NEGATIVE
NRBC # BLD AUTO: 0 K/UL
NRBC # BLD AUTO: 0.02 K/UL
NRBC # BLD AUTO: 0.02 K/UL
NRBC # BLD AUTO: 0.04 K/UL
NRBC BLD-RTO: 0 /100 WBC
NRBC BLD-RTO: 0.1 /100 WBC
NRBC BLD-RTO: 0.1 /100 WBC
NRBC BLD-RTO: 0.2 /100 WBC
O2/TOTAL GAS SETTING VFR VENT: 100 %
PATHOLOGY CONSULT NOTE: NORMAL
PCO2 BLDA: 35.3 MMHG (ref 26–37)
PCO2 TEMP ADJ BLDA: 34.2 MMHG (ref 26–37)
PH BLDA: 7.45 [PH] (ref 7.4–7.5)
PH TEMP ADJ BLDA: 7.46 [PH] (ref 7.4–7.5)
PH UR STRIP.AUTO: 5 [PH]
PH UR STRIP.AUTO: 6 [PH]
PHOSPHATE SERPL-MCNC: 2.6 MG/DL (ref 2.5–4.5)
PHOSPHATE SERPL-MCNC: 3.5 MG/DL (ref 2.5–4.5)
PHOSPHATE SERPL-MCNC: 5.9 MG/DL (ref 2.5–4.5)
PLATELET # BLD AUTO: 172 K/UL (ref 164–446)
PLATELET # BLD AUTO: 201 K/UL (ref 164–446)
PLATELET # BLD AUTO: 229 K/UL (ref 164–446)
PLATELET # BLD AUTO: 238 K/UL (ref 164–446)
PLATELET # BLD AUTO: 253 K/UL (ref 164–446)
PLATELET # BLD AUTO: 290 K/UL (ref 164–446)
PLATELET # BLD AUTO: 308 K/UL (ref 164–446)
PLATELET # BLD AUTO: 339 K/UL (ref 164–446)
PLATELET # BLD AUTO: 361 K/UL (ref 164–446)
PLATELET # BLD AUTO: 372 K/UL (ref 164–446)
PLATELET # BLD AUTO: 386 K/UL (ref 164–446)
PLATELET # BLD AUTO: 387 K/UL (ref 164–446)
PLATELET # BLD AUTO: 389 K/UL (ref 164–446)
PLATELET # BLD AUTO: 392 K/UL (ref 164–446)
PLATELET # BLD AUTO: 394 K/UL (ref 164–446)
PLATELET # BLD AUTO: 399 K/UL (ref 164–446)
PLATELET # BLD AUTO: 410 K/UL (ref 164–446)
PLATELET # BLD AUTO: 425 K/UL (ref 164–446)
PLATELET # BLD AUTO: 428 K/UL (ref 164–446)
PLATELET # BLD AUTO: 440 K/UL (ref 164–446)
PLATELET # BLD AUTO: 442 K/UL (ref 164–446)
PLATELET # BLD AUTO: 453 K/UL (ref 164–446)
PLATELET # BLD AUTO: 463 K/UL (ref 164–446)
PLATELET # BLD AUTO: 510 K/UL (ref 164–446)
PLATELET # BLD AUTO: 533 K/UL (ref 164–446)
PLATELET BLD QL SMEAR: NORMAL
PMV BLD AUTO: 10 FL (ref 9–12.9)
PMV BLD AUTO: 10 FL (ref 9–12.9)
PMV BLD AUTO: 10.1 FL (ref 9–12.9)
PMV BLD AUTO: 10.1 FL (ref 9–12.9)
PMV BLD AUTO: 10.3 FL (ref 9–12.9)
PMV BLD AUTO: 10.3 FL (ref 9–12.9)
PMV BLD AUTO: 10.4 FL (ref 9–12.9)
PMV BLD AUTO: 10.4 FL (ref 9–12.9)
PMV BLD AUTO: 10.8 FL (ref 9–12.9)
PMV BLD AUTO: 8.9 FL (ref 9–12.9)
PMV BLD AUTO: 8.9 FL (ref 9–12.9)
PMV BLD AUTO: 9.2 FL (ref 9–12.9)
PMV BLD AUTO: 9.3 FL (ref 9–12.9)
PMV BLD AUTO: 9.3 FL (ref 9–12.9)
PMV BLD AUTO: 9.4 FL (ref 9–12.9)
PMV BLD AUTO: 9.4 FL (ref 9–12.9)
PMV BLD AUTO: 9.5 FL (ref 9–12.9)
PMV BLD AUTO: 9.6 FL (ref 9–12.9)
PMV BLD AUTO: 9.7 FL (ref 9–12.9)
PMV BLD AUTO: 9.7 FL (ref 9–12.9)
PMV BLD AUTO: 9.8 FL (ref 9–12.9)
PMV BLD AUTO: 9.8 FL (ref 9–12.9)
PMV BLD AUTO: 9.9 FL (ref 9–12.9)
PO2 BLDA: 99 MMHG (ref 64–87)
PO2 TEMP ADJ BLDA: 95 MMHG (ref 64–87)
POLYCHROMASIA BLD QL SMEAR: NORMAL
POTASSIUM SERPL-SCNC: 3.1 MMOL/L (ref 3.6–5.5)
POTASSIUM SERPL-SCNC: 3.4 MMOL/L (ref 3.6–5.5)
POTASSIUM SERPL-SCNC: 3.4 MMOL/L (ref 3.6–5.5)
POTASSIUM SERPL-SCNC: 3.5 MMOL/L (ref 3.6–5.5)
POTASSIUM SERPL-SCNC: 3.6 MMOL/L (ref 3.6–5.5)
POTASSIUM SERPL-SCNC: 3.6 MMOL/L (ref 3.6–5.5)
POTASSIUM SERPL-SCNC: 3.8 MMOL/L (ref 3.6–5.5)
POTASSIUM SERPL-SCNC: 3.9 MMOL/L (ref 3.6–5.5)
POTASSIUM SERPL-SCNC: 4 MMOL/L (ref 3.6–5.5)
POTASSIUM SERPL-SCNC: 4.1 MMOL/L (ref 3.6–5.5)
POTASSIUM SERPL-SCNC: 4.1 MMOL/L (ref 3.6–5.5)
POTASSIUM SERPL-SCNC: 4.2 MMOL/L (ref 3.6–5.5)
POTASSIUM SERPL-SCNC: 4.3 MMOL/L (ref 3.6–5.5)
POTASSIUM SERPL-SCNC: 4.5 MMOL/L (ref 3.6–5.5)
POTASSIUM SERPL-SCNC: 4.6 MMOL/L (ref 3.6–5.5)
POTASSIUM SERPL-SCNC: 4.8 MMOL/L (ref 3.6–5.5)
POTASSIUM SERPL-SCNC: 4.9 MMOL/L (ref 3.6–5.5)
PREALB SERPL-MCNC: 13 MG/DL (ref 18–38)
PREALB SERPL-MCNC: 4 MG/DL (ref 18–38)
PROCALCITONIN SERPL-MCNC: 28.02 NG/ML
PRODUCT TYPE UPROD: NORMAL
PROT SERPL-MCNC: 4.2 G/DL (ref 6–8.2)
PROT SERPL-MCNC: 4.4 G/DL (ref 6–8.2)
PROT SERPL-MCNC: 4.4 G/DL (ref 6–8.2)
PROT SERPL-MCNC: 4.8 G/DL (ref 6–8.2)
PROT SERPL-MCNC: 4.9 G/DL (ref 6–8.2)
PROT SERPL-MCNC: 5.1 G/DL (ref 6–8.2)
PROT SERPL-MCNC: 5.2 G/DL (ref 6–8.2)
PROT SERPL-MCNC: 5.6 G/DL (ref 6–8.2)
PROT SERPL-MCNC: 5.8 G/DL (ref 6–8.2)
PROT SERPL-MCNC: 6 G/DL (ref 6–8.2)
PROT SERPL-MCNC: 6.1 G/DL (ref 6–8.2)
PROT SERPL-MCNC: 6.3 G/DL (ref 6–8.2)
PROT SERPL-MCNC: 6.4 G/DL (ref 6–8.2)
PROT SERPL-MCNC: 6.5 G/DL (ref 6–8.2)
PROT SERPL-MCNC: 6.5 G/DL (ref 6–8.2)
PROT SERPL-MCNC: 6.8 G/DL (ref 6–8.2)
PROT SERPL-MCNC: 6.9 G/DL (ref 6–8.2)
PROT UR QL STRIP: 30 MG/DL
PROT UR QL STRIP: NEGATIVE MG/DL
PROTHROMBIN TIME: 14.6 SEC (ref 12–14.6)
RBC # BLD AUTO: 2.11 M/UL (ref 4.7–6.1)
RBC # BLD AUTO: 2.67 M/UL (ref 4.7–6.1)
RBC # BLD AUTO: 2.74 M/UL (ref 4.7–6.1)
RBC # BLD AUTO: 2.75 M/UL (ref 4.7–6.1)
RBC # BLD AUTO: 2.92 M/UL (ref 4.7–6.1)
RBC # BLD AUTO: 2.94 M/UL (ref 4.7–6.1)
RBC # BLD AUTO: 3.04 M/UL (ref 4.7–6.1)
RBC # BLD AUTO: 3.14 M/UL (ref 4.7–6.1)
RBC # BLD AUTO: 3.22 M/UL (ref 4.7–6.1)
RBC # BLD AUTO: 3.23 M/UL (ref 4.7–6.1)
RBC # BLD AUTO: 3.24 M/UL (ref 4.7–6.1)
RBC # BLD AUTO: 3.31 M/UL (ref 4.7–6.1)
RBC # BLD AUTO: 3.33 M/UL (ref 4.7–6.1)
RBC # BLD AUTO: 3.33 M/UL (ref 4.7–6.1)
RBC # BLD AUTO: 3.36 M/UL (ref 4.7–6.1)
RBC # BLD AUTO: 3.41 M/UL (ref 4.7–6.1)
RBC # BLD AUTO: 3.42 M/UL (ref 4.7–6.1)
RBC # BLD AUTO: 3.44 M/UL (ref 4.7–6.1)
RBC # BLD AUTO: 3.48 M/UL (ref 4.7–6.1)
RBC # BLD AUTO: 3.49 M/UL (ref 4.7–6.1)
RBC # BLD AUTO: 3.5 M/UL (ref 4.7–6.1)
RBC # BLD AUTO: 4.28 M/UL (ref 4.7–6.1)
RBC # BLD AUTO: 4.33 M/UL (ref 4.7–6.1)
RBC # BLD AUTO: 4.43 M/UL (ref 4.7–6.1)
RBC # BLD AUTO: 4.46 M/UL (ref 4.7–6.1)
RBC # URNS HPF: ABNORMAL /HPF
RBC BLD AUTO: NORMAL
RBC BLD AUTO: NORMAL
RBC BLD AUTO: PRESENT
RBC UR QL AUTO: NEGATIVE
RBC UR QL AUTO: NEGATIVE
RH BLD: NORMAL
SAO2 % BLDA: 98 % (ref 93–99)
SIGNIFICANT IND 70042: ABNORMAL
SIGNIFICANT IND 70042: ABNORMAL
SIGNIFICANT IND 70042: NORMAL
SITE SITE: ABNORMAL
SITE SITE: ABNORMAL
SITE SITE: NORMAL
SMUDGE CELLS BLD QL SMEAR: NORMAL
SODIUM SERPL-SCNC: 135 MMOL/L (ref 135–145)
SODIUM SERPL-SCNC: 136 MMOL/L (ref 135–145)
SODIUM SERPL-SCNC: 137 MMOL/L (ref 135–145)
SODIUM SERPL-SCNC: 138 MMOL/L (ref 135–145)
SODIUM SERPL-SCNC: 138 MMOL/L (ref 135–145)
SODIUM SERPL-SCNC: 139 MMOL/L (ref 135–145)
SODIUM SERPL-SCNC: 140 MMOL/L (ref 135–145)
SODIUM SERPL-SCNC: 142 MMOL/L (ref 135–145)
SODIUM SERPL-SCNC: 143 MMOL/L (ref 135–145)
SODIUM SERPL-SCNC: 143 MMOL/L (ref 135–145)
SODIUM SERPL-SCNC: 144 MMOL/L (ref 135–145)
SODIUM SERPL-SCNC: 145 MMOL/L (ref 135–145)
SODIUM SERPL-SCNC: 146 MMOL/L (ref 135–145)
SODIUM SERPL-SCNC: 147 MMOL/L (ref 135–145)
SODIUM SERPL-SCNC: 150 MMOL/L (ref 135–145)
SODIUM SERPL-SCNC: 151 MMOL/L (ref 135–145)
SODIUM SERPL-SCNC: 153 MMOL/L (ref 135–145)
SODIUM SERPL-SCNC: 153 MMOL/L (ref 135–145)
SODIUM SERPL-SCNC: 154 MMOL/L (ref 135–145)
SODIUM SERPL-SCNC: 155 MMOL/L (ref 135–145)
SOURCE SOURCE: ABNORMAL
SOURCE SOURCE: ABNORMAL
SOURCE SOURCE: NORMAL
SP GR UR STRIP.AUTO: 1.01
SP GR UR STRIP.AUTO: 1.02
SPECIMEN DRAWN FROM PATIENT: ABNORMAL
TOXIC GRANULES BLD QL SMEAR: SLIGHT
TOXIC GRANULES BLD QL SMEAR: SLIGHT
TRIGL SERPL-MCNC: 139 MG/DL (ref 0–149)
TROPONIN I SERPL-MCNC: 0.01 NG/ML (ref 0–0.04)
TROPONIN I SERPL-MCNC: 0.09 NG/ML (ref 0–0.04)
TROPONIN I SERPL-MCNC: 0.11 NG/ML (ref 0–0.04)
TSH SERPL DL<=0.005 MIU/L-ACNC: 0.91 UIU/ML (ref 0.38–5.33)
UNIT STATUS USTAT: NORMAL
UROBILINOGEN UR STRIP.AUTO-MCNC: 0.2 MG/DL
UROBILINOGEN UR STRIP.AUTO-MCNC: 0.2 MG/DL
VIT B12 SERPL-MCNC: 268 PG/ML (ref 211–911)
VIT B12 SERPL-MCNC: >1500 PG/ML (ref 211–911)
WBC # BLD AUTO: 10.1 K/UL (ref 4.8–10.8)
WBC # BLD AUTO: 11.6 K/UL (ref 4.8–10.8)
WBC # BLD AUTO: 11.9 K/UL (ref 4.8–10.8)
WBC # BLD AUTO: 12.9 K/UL (ref 4.8–10.8)
WBC # BLD AUTO: 13.1 K/UL (ref 4.8–10.8)
WBC # BLD AUTO: 13.3 K/UL (ref 4.8–10.8)
WBC # BLD AUTO: 13.3 K/UL (ref 4.8–10.8)
WBC # BLD AUTO: 14.1 K/UL (ref 4.8–10.8)
WBC # BLD AUTO: 14.4 K/UL (ref 4.8–10.8)
WBC # BLD AUTO: 15.8 K/UL (ref 4.8–10.8)
WBC # BLD AUTO: 17.6 K/UL (ref 4.8–10.8)
WBC # BLD AUTO: 17.7 K/UL (ref 4.8–10.8)
WBC # BLD AUTO: 17.8 K/UL (ref 4.8–10.8)
WBC # BLD AUTO: 18.4 K/UL (ref 4.8–10.8)
WBC # BLD AUTO: 18.8 K/UL (ref 4.8–10.8)
WBC # BLD AUTO: 19.7 K/UL (ref 4.8–10.8)
WBC # BLD AUTO: 20.9 K/UL (ref 4.8–10.8)
WBC # BLD AUTO: 21.2 K/UL (ref 4.8–10.8)
WBC # BLD AUTO: 22.2 K/UL (ref 4.8–10.8)
WBC # BLD AUTO: 22.5 K/UL (ref 4.8–10.8)
WBC # BLD AUTO: 23.2 K/UL (ref 4.8–10.8)
WBC # BLD AUTO: 24.3 K/UL (ref 4.8–10.8)
WBC # BLD AUTO: 28.9 K/UL (ref 4.8–10.8)
WBC # BLD AUTO: 29.6 K/UL (ref 4.8–10.8)
WBC # BLD AUTO: 9.9 K/UL (ref 4.8–10.8)
WBC #/AREA URNS HPF: ABNORMAL /HPF

## 2018-01-01 PROCEDURE — 700111 HCHG RX REV CODE 636 W/ 250 OVERRIDE (IP): Performed by: HOSPITALIST

## 2018-01-01 PROCEDURE — 700105 HCHG RX REV CODE 258: Performed by: INTERNAL MEDICINE

## 2018-01-01 PROCEDURE — 700102 HCHG RX REV CODE 250 W/ 637 OVERRIDE(OP): Performed by: HOSPITALIST

## 2018-01-01 PROCEDURE — 307059 PAD,EAR PROTECTOR: Performed by: INTERNAL MEDICINE

## 2018-01-01 PROCEDURE — 700102 HCHG RX REV CODE 250 W/ 637 OVERRIDE(OP): Performed by: FAMILY MEDICINE

## 2018-01-01 PROCEDURE — A9270 NON-COVERED ITEM OR SERVICE: HCPCS | Performed by: FAMILY MEDICINE

## 2018-01-01 PROCEDURE — 700101 HCHG RX REV CODE 250

## 2018-01-01 PROCEDURE — 770022 HCHG ROOM/CARE - ICU (200)

## 2018-01-01 PROCEDURE — 94640 AIRWAY INHALATION TREATMENT: CPT

## 2018-01-01 PROCEDURE — 99233 SBSQ HOSP IP/OBS HIGH 50: CPT | Performed by: HOSPITALIST

## 2018-01-01 PROCEDURE — 83690 ASSAY OF LIPASE: CPT

## 2018-01-01 PROCEDURE — 302106 OSTOMY POWDER: Performed by: FAMILY MEDICINE

## 2018-01-01 PROCEDURE — 96368 THER/DIAG CONCURRENT INF: CPT

## 2018-01-01 PROCEDURE — A9270 NON-COVERED ITEM OR SERVICE: HCPCS | Performed by: INTERNAL MEDICINE

## 2018-01-01 PROCEDURE — 99291 CRITICAL CARE FIRST HOUR: CPT | Performed by: INTERNAL MEDICINE

## 2018-01-01 PROCEDURE — 85025 COMPLETE CBC W/AUTO DIFF WBC: CPT

## 2018-01-01 PROCEDURE — 700105 HCHG RX REV CODE 258

## 2018-01-01 PROCEDURE — 700111 HCHG RX REV CODE 636 W/ 250 OVERRIDE (IP): Performed by: INTERNAL MEDICINE

## 2018-01-01 PROCEDURE — 700102 HCHG RX REV CODE 250 W/ 637 OVERRIDE(OP): Performed by: INTERNAL MEDICINE

## 2018-01-01 PROCEDURE — A9270 NON-COVERED ITEM OR SERVICE: HCPCS | Performed by: HOSPITALIST

## 2018-01-01 PROCEDURE — 770020 HCHG ROOM/CARE - TELE (206)

## 2018-01-01 PROCEDURE — 700105 HCHG RX REV CODE 258: Performed by: HOSPITALIST

## 2018-01-01 PROCEDURE — 302146: Performed by: FAMILY MEDICINE

## 2018-01-01 PROCEDURE — 93005 ELECTROCARDIOGRAM TRACING: CPT | Performed by: EMERGENCY MEDICINE

## 2018-01-01 PROCEDURE — 160035 HCHG PACU - 1ST 60 MINS PHASE I: Performed by: INTERNAL MEDICINE

## 2018-01-01 PROCEDURE — 80048 BASIC METABOLIC PNL TOTAL CA: CPT

## 2018-01-01 PROCEDURE — 80053 COMPREHEN METABOLIC PANEL: CPT

## 2018-01-01 PROCEDURE — 86850 RBC ANTIBODY SCREEN: CPT

## 2018-01-01 PROCEDURE — 85027 COMPLETE CBC AUTOMATED: CPT

## 2018-01-01 PROCEDURE — 700105 HCHG RX REV CODE 258: Performed by: EMERGENCY MEDICINE

## 2018-01-01 PROCEDURE — 99233 SBSQ HOSP IP/OBS HIGH 50: CPT | Performed by: INTERNAL MEDICINE

## 2018-01-01 PROCEDURE — 700105 HCHG RX REV CODE 258: Performed by: PSYCHIATRY & NEUROLOGY

## 2018-01-01 PROCEDURE — 83036 HEMOGLOBIN GLYCOSYLATED A1C: CPT

## 2018-01-01 PROCEDURE — 99292 CRITICAL CARE ADDL 30 MIN: CPT | Mod: 25 | Performed by: INTERNAL MEDICINE

## 2018-01-01 PROCEDURE — P9016 RBC LEUKOCYTES REDUCED: HCPCS

## 2018-01-01 PROCEDURE — 97166 OT EVAL MOD COMPLEX 45 MIN: CPT

## 2018-01-01 PROCEDURE — 92526 ORAL FUNCTION THERAPY: CPT

## 2018-01-01 PROCEDURE — 700101 HCHG RX REV CODE 250: Performed by: HOSPITALIST

## 2018-01-01 PROCEDURE — 83735 ASSAY OF MAGNESIUM: CPT

## 2018-01-01 PROCEDURE — 93010 ELECTROCARDIOGRAM REPORT: CPT | Performed by: INTERNAL MEDICINE

## 2018-01-01 PROCEDURE — C2617 STENT, NON-COR, TEM W/O DEL: HCPCS | Performed by: INTERNAL MEDICINE

## 2018-01-01 PROCEDURE — 36415 COLL VENOUS BLD VENIPUNCTURE: CPT

## 2018-01-01 PROCEDURE — 70450 CT HEAD/BRAIN W/O DYE: CPT

## 2018-01-01 PROCEDURE — 71045 X-RAY EXAM CHEST 1 VIEW: CPT

## 2018-01-01 PROCEDURE — 700111 HCHG RX REV CODE 636 W/ 250 OVERRIDE (IP): Performed by: EMERGENCY MEDICINE

## 2018-01-01 PROCEDURE — 82962 GLUCOSE BLOOD TEST: CPT

## 2018-01-01 PROCEDURE — G8979 MOBILITY GOAL STATUS: HCPCS | Mod: CI

## 2018-01-01 PROCEDURE — 99232 SBSQ HOSP IP/OBS MODERATE 35: CPT | Performed by: HOSPITALIST

## 2018-01-01 PROCEDURE — G8996 SWALLOW CURRENT STATUS: HCPCS | Mod: CL

## 2018-01-01 PROCEDURE — C1769 GUIDE WIRE: HCPCS | Performed by: INTERNAL MEDICINE

## 2018-01-01 PROCEDURE — 502240 HCHG MISC OR SUPPLY RC 0272: Performed by: INTERNAL MEDICINE

## 2018-01-01 PROCEDURE — 700111 HCHG RX REV CODE 636 W/ 250 OVERRIDE (IP): Mod: JG | Performed by: INTERNAL MEDICINE

## 2018-01-01 PROCEDURE — 302111 WAFER OST 2.25IN N IMG RD 2 PC (BARRIER): Performed by: HOSPITALIST

## 2018-01-01 PROCEDURE — 82977 ASSAY OF GGT: CPT

## 2018-01-01 PROCEDURE — 81003 URINALYSIS AUTO W/O SCOPE: CPT

## 2018-01-01 PROCEDURE — 83605 ASSAY OF LACTIC ACID: CPT | Mod: 91

## 2018-01-01 PROCEDURE — 99291 CRITICAL CARE FIRST HOUR: CPT | Mod: 25 | Performed by: INTERNAL MEDICINE

## 2018-01-01 PROCEDURE — G8978 MOBILITY CURRENT STATUS: HCPCS | Mod: CK

## 2018-01-01 PROCEDURE — 93010 ELECTROCARDIOGRAM REPORT: CPT | Mod: 77 | Performed by: INTERNAL MEDICINE

## 2018-01-01 PROCEDURE — 500066 HCHG BITE BLOCK, ECT: Performed by: INTERNAL MEDICINE

## 2018-01-01 PROCEDURE — 96366 THER/PROPH/DIAG IV INF ADDON: CPT

## 2018-01-01 PROCEDURE — 99214 OFFICE O/P EST MOD 30 MIN: CPT | Performed by: NURSE PRACTITIONER

## 2018-01-01 PROCEDURE — 96367 TX/PROPH/DG ADDL SEQ IV INF: CPT

## 2018-01-01 PROCEDURE — 85014 HEMATOCRIT: CPT

## 2018-01-01 PROCEDURE — 82607 VITAMIN B-12: CPT

## 2018-01-01 PROCEDURE — 73200 CT UPPER EXTREMITY W/O DYE: CPT | Mod: RT

## 2018-01-01 PROCEDURE — 80177 DRUG SCRN QUAN LEVETIRACETAM: CPT

## 2018-01-01 PROCEDURE — 94760 N-INVAS EAR/PLS OXIMETRY 1: CPT

## 2018-01-01 PROCEDURE — G8997 SWALLOW GOAL STATUS: HCPCS | Mod: CI

## 2018-01-01 PROCEDURE — G8988 SELF CARE GOAL STATUS: HCPCS | Mod: CI

## 2018-01-01 PROCEDURE — 99214 OFFICE O/P EST MOD 30 MIN: CPT | Performed by: INTERNAL MEDICINE

## 2018-01-01 PROCEDURE — 94669 MECHANICAL CHEST WALL OSCILL: CPT

## 2018-01-01 PROCEDURE — 99223 1ST HOSP IP/OBS HIGH 75: CPT | Mod: AI | Performed by: INTERNAL MEDICINE

## 2018-01-01 PROCEDURE — 87077 CULTURE AEROBIC IDENTIFY: CPT

## 2018-01-01 PROCEDURE — 96365 THER/PROPH/DIAG IV INF INIT: CPT

## 2018-01-01 PROCEDURE — 84134 ASSAY OF PREALBUMIN: CPT

## 2018-01-01 PROCEDURE — 96375 TX/PRO/DX INJ NEW DRUG ADDON: CPT

## 2018-01-01 PROCEDURE — 0F798DZ DILATION OF COMMON BILE DUCT WITH INTRALUMINAL DEVICE, VIA NATURAL OR ARTIFICIAL OPENING ENDOSCOPIC: ICD-10-PCS | Performed by: INTERNAL MEDICINE

## 2018-01-01 PROCEDURE — 97116 GAIT TRAINING THERAPY: CPT

## 2018-01-01 PROCEDURE — 99232 SBSQ HOSP IP/OBS MODERATE 35: CPT | Performed by: FAMILY MEDICINE

## 2018-01-01 PROCEDURE — 93306 TTE W/DOPPLER COMPLETE: CPT | Mod: 26 | Performed by: INTERNAL MEDICINE

## 2018-01-01 PROCEDURE — 99285 EMERGENCY DEPT VISIT HI MDM: CPT

## 2018-01-01 PROCEDURE — 77012 CT SCAN FOR NEEDLE BIOPSY: CPT | Mod: RT

## 2018-01-01 PROCEDURE — 0BBC3ZX EXCISION OF RIGHT UPPER LUNG LOBE, PERCUTANEOUS APPROACH, DIAGNOSTIC: ICD-10-PCS | Performed by: RADIOLOGY

## 2018-01-01 PROCEDURE — 86901 BLOOD TYPING SEROLOGIC RH(D): CPT

## 2018-01-01 PROCEDURE — 82533 TOTAL CORTISOL: CPT

## 2018-01-01 PROCEDURE — 99213 OFFICE O/P EST LOW 20 MIN: CPT | Performed by: PSYCHIATRY & NEUROLOGY

## 2018-01-01 PROCEDURE — 74177 CT ABD & PELVIS W/CONTRAST: CPT

## 2018-01-01 PROCEDURE — 160203 HCHG ENDO MINUTES - 1ST 30 MINS LEVEL 4: Performed by: INTERNAL MEDICINE

## 2018-01-01 PROCEDURE — BF101ZZ FLUOROSCOPY OF BILE DUCTS USING LOW OSMOLAR CONTRAST: ICD-10-PCS | Performed by: INTERNAL MEDICINE

## 2018-01-01 PROCEDURE — 700101 HCHG RX REV CODE 250: Performed by: INTERNAL MEDICINE

## 2018-01-01 PROCEDURE — 84100 ASSAY OF PHOSPHORUS: CPT

## 2018-01-01 PROCEDURE — 160036 HCHG PACU - EA ADDL 30 MINS PHASE I: Performed by: INTERNAL MEDICINE

## 2018-01-01 PROCEDURE — 71275 CT ANGIOGRAPHY CHEST: CPT

## 2018-01-01 PROCEDURE — 700117 HCHG RX CONTRAST REV CODE 255: Performed by: HOSPITALIST

## 2018-01-01 PROCEDURE — 93005 ELECTROCARDIOGRAM TRACING: CPT | Performed by: HOSPITALIST

## 2018-01-01 PROCEDURE — 32557 INSERT CATH PLEURA W/ IMAGE: CPT

## 2018-01-01 PROCEDURE — 05HY33Z INSERTION OF INFUSION DEVICE INTO UPPER VEIN, PERCUTANEOUS APPROACH: ICD-10-PCS | Performed by: INTERNAL MEDICINE

## 2018-01-01 PROCEDURE — 99204 OFFICE O/P NEW MOD 45 MIN: CPT | Performed by: PSYCHIATRY & NEUROLOGY

## 2018-01-01 PROCEDURE — 99291 CRITICAL CARE FIRST HOUR: CPT

## 2018-01-01 PROCEDURE — 87040 BLOOD CULTURE FOR BACTERIA: CPT | Mod: 91

## 2018-01-01 PROCEDURE — 94668 MNPJ CHEST WALL SBSQ: CPT

## 2018-01-01 PROCEDURE — 304561 HCHG STAT O2

## 2018-01-01 PROCEDURE — 99285 EMERGENCY DEPT VISIT HI MDM: CPT | Performed by: INTERNAL MEDICINE

## 2018-01-01 PROCEDURE — 160208 HCHG ENDO MINUTES - EA ADDL 1 MIN LEVEL 4: Performed by: INTERNAL MEDICINE

## 2018-01-01 PROCEDURE — 96374 THER/PROPH/DIAG INJ IV PUSH: CPT

## 2018-01-01 PROCEDURE — 85610 PROTHROMBIN TIME: CPT

## 2018-01-01 PROCEDURE — 85018 HEMOGLOBIN: CPT | Mod: 91

## 2018-01-01 PROCEDURE — 700111 HCHG RX REV CODE 636 W/ 250 OVERRIDE (IP): Performed by: PSYCHIATRY & NEUROLOGY

## 2018-01-01 PROCEDURE — 88305 TISSUE EXAM BY PATHOLOGIST: CPT

## 2018-01-01 PROCEDURE — A9270 NON-COVERED ITEM OR SERVICE: HCPCS | Performed by: EMERGENCY MEDICINE

## 2018-01-01 PROCEDURE — 74328 X-RAY BILE DUCT ENDOSCOPY: CPT

## 2018-01-01 PROCEDURE — 36600 WITHDRAWAL OF ARTERIAL BLOOD: CPT

## 2018-01-01 PROCEDURE — 99284 EMERGENCY DEPT VISIT MOD MDM: CPT

## 2018-01-01 PROCEDURE — 82272 OCCULT BLD FECES 1-3 TESTS: CPT

## 2018-01-01 PROCEDURE — 83605 ASSAY OF LACTIC ACID: CPT

## 2018-01-01 PROCEDURE — 302255 BARRIER CREAM MOISTURE BAZA PROTECT (ZINC) 5OZ: Performed by: FAMILY MEDICINE

## 2018-01-01 PROCEDURE — 82803 BLOOD GASES ANY COMBINATION: CPT

## 2018-01-01 PROCEDURE — 81001 URINALYSIS AUTO W/SCOPE: CPT

## 2018-01-01 PROCEDURE — B548ZZA ULTRASONOGRAPHY OF SUPERIOR VENA CAVA, GUIDANCE: ICD-10-PCS | Performed by: INTERNAL MEDICINE

## 2018-01-01 PROCEDURE — 160002 HCHG RECOVERY MINUTES (STAT): Performed by: INTERNAL MEDICINE

## 2018-01-01 PROCEDURE — 95951 EEG: CPT

## 2018-01-01 PROCEDURE — 84145 PROCALCITONIN (PCT): CPT

## 2018-01-01 PROCEDURE — 88341 IMHCHEM/IMCYTCHM EA ADD ANTB: CPT | Mod: 91

## 2018-01-01 PROCEDURE — 93306 TTE W/DOPPLER COMPLETE: CPT

## 2018-01-01 PROCEDURE — G8978 MOBILITY CURRENT STATUS: HCPCS | Mod: CJ

## 2018-01-01 PROCEDURE — 36430 TRANSFUSION BLD/BLD COMPNT: CPT

## 2018-01-01 PROCEDURE — P9047 ALBUMIN (HUMAN), 25%, 50ML: HCPCS | Mod: JG | Performed by: INTERNAL MEDICINE

## 2018-01-01 PROCEDURE — G8987 SELF CARE CURRENT STATUS: HCPCS | Mod: CK

## 2018-01-01 PROCEDURE — 302099 OSTOMY PASTE: Performed by: HOSPITALIST

## 2018-01-01 PROCEDURE — 02HV33Z INSERTION OF INFUSION DEVICE INTO SUPERIOR VENA CAVA, PERCUTANEOUS APPROACH: ICD-10-PCS | Performed by: INTERNAL MEDICINE

## 2018-01-01 PROCEDURE — 94667 MNPJ CHEST WALL 1ST: CPT

## 2018-01-01 PROCEDURE — 87086 URINE CULTURE/COLONY COUNT: CPT

## 2018-01-01 PROCEDURE — 700111 HCHG RX REV CODE 636 W/ 250 OVERRIDE (IP)

## 2018-01-01 PROCEDURE — 93005 ELECTROCARDIOGRAM TRACING: CPT | Performed by: STUDENT IN AN ORGANIZED HEALTH CARE EDUCATION/TRAINING PROGRAM

## 2018-01-01 PROCEDURE — A6213 FOAM DRG >16<=48 SQ IN W/BDR: HCPCS | Performed by: INTERNAL MEDICINE

## 2018-01-01 PROCEDURE — 30233N1 TRANSFUSION OF NONAUTOLOGOUS RED BLOOD CELLS INTO PERIPHERAL VEIN, PERCUTANEOUS APPROACH: ICD-10-PCS | Performed by: HOSPITALIST

## 2018-01-01 PROCEDURE — 74183 MRI ABD W/O CNTR FLWD CNTR: CPT

## 2018-01-01 PROCEDURE — 99223 1ST HOSP IP/OBS HIGH 75: CPT | Mod: AI | Performed by: HOSPITALIST

## 2018-01-01 PROCEDURE — 160048 HCHG OR STATISTICAL LEVEL 1-5: Performed by: INTERNAL MEDICINE

## 2018-01-01 PROCEDURE — 97162 PT EVAL MOD COMPLEX 30 MIN: CPT

## 2018-01-01 PROCEDURE — 700117 HCHG RX CONTRAST REV CODE 255: Performed by: INTERNAL MEDICINE

## 2018-01-01 PROCEDURE — 87502 INFLUENZA DNA AMP PROBE: CPT

## 2018-01-01 PROCEDURE — 302098 PASTE RING (FLAT): Performed by: HOSPITALIST

## 2018-01-01 PROCEDURE — G8987 SELF CARE CURRENT STATUS: HCPCS | Mod: CJ

## 2018-01-01 PROCEDURE — 302102 BAG OST N IMG 2.25IN 2PC (FECAL): Performed by: HOSPITALIST

## 2018-01-01 PROCEDURE — 700102 HCHG RX REV CODE 250 W/ 637 OVERRIDE(OP): Performed by: EMERGENCY MEDICINE

## 2018-01-01 PROCEDURE — 74176 CT ABD & PELVIS W/O CONTRAST: CPT

## 2018-01-01 PROCEDURE — 85007 BL SMEAR W/DIFF WBC COUNT: CPT

## 2018-01-01 PROCEDURE — 0W9930Z DRAINAGE OF RIGHT PLEURAL CAVITY WITH DRAINAGE DEVICE, PERCUTANEOUS APPROACH: ICD-10-PCS | Performed by: RADIOLOGY

## 2018-01-01 PROCEDURE — 87186 SC STD MICRODIL/AGAR DIL: CPT

## 2018-01-01 PROCEDURE — 73030 X-RAY EXAM OF SHOULDER: CPT | Mod: RT

## 2018-01-01 PROCEDURE — 110371 HCHG SHELL REV 272: Performed by: INTERNAL MEDICINE

## 2018-01-01 PROCEDURE — 160009 HCHG ANES TIME/MIN: Performed by: INTERNAL MEDICINE

## 2018-01-01 PROCEDURE — 84484 ASSAY OF TROPONIN QUANT: CPT

## 2018-01-01 PROCEDURE — 92610 EVALUATE SWALLOWING FUNCTION: CPT

## 2018-01-01 PROCEDURE — 86140 C-REACTIVE PROTEIN: CPT

## 2018-01-01 PROCEDURE — 36569 INSJ PICC 5 YR+ W/O IMAGING: CPT

## 2018-01-01 PROCEDURE — 84443 ASSAY THYROID STIM HORMONE: CPT

## 2018-01-01 PROCEDURE — 80061 LIPID PANEL: CPT

## 2018-01-01 PROCEDURE — 99232 SBSQ HOSP IP/OBS MODERATE 35: CPT | Mod: GC | Performed by: INTERNAL MEDICINE

## 2018-01-01 PROCEDURE — A9585 GADOBUTROL INJECTION: HCPCS | Performed by: INTERNAL MEDICINE

## 2018-01-01 PROCEDURE — 51798 US URINE CAPACITY MEASURE: CPT

## 2018-01-01 PROCEDURE — 80076 HEPATIC FUNCTION PANEL: CPT

## 2018-01-01 PROCEDURE — 86923 COMPATIBILITY TEST ELECTRIC: CPT

## 2018-01-01 PROCEDURE — 36556 INSERT NON-TUNNEL CV CATH: CPT | Mod: RT | Performed by: INTERNAL MEDICINE

## 2018-01-01 PROCEDURE — 97530 THERAPEUTIC ACTIVITIES: CPT

## 2018-01-01 PROCEDURE — 99221 1ST HOSP IP/OBS SF/LOW 40: CPT | Performed by: INTERNAL MEDICINE

## 2018-01-01 PROCEDURE — 302135 SEQUENTIAL COMPRESSION MACHINE: Performed by: HOSPITALIST

## 2018-01-01 PROCEDURE — 86900 BLOOD TYPING SEROLOGIC ABO: CPT

## 2018-01-01 PROCEDURE — 82306 VITAMIN D 25 HYDROXY: CPT

## 2018-01-01 PROCEDURE — 93000 ELECTROCARDIOGRAM COMPLETE: CPT | Performed by: NURSE PRACTITIONER

## 2018-01-01 PROCEDURE — 99221 1ST HOSP IP/OBS SF/LOW 40: CPT | Mod: GC | Performed by: INTERNAL MEDICINE

## 2018-01-01 PROCEDURE — 302136 NUTRITION PUMP: Performed by: FAMILY MEDICINE

## 2018-01-01 PROCEDURE — 99239 HOSP IP/OBS DSCHRG MGMT >30: CPT | Performed by: HOSPITALIST

## 2018-01-01 PROCEDURE — 88342 IMHCHEM/IMCYTCHM 1ST ANTB: CPT

## 2018-01-01 PROCEDURE — 302255 BARRIER CREAM MOISTURE BAZA PROTECT (ZINC) 5OZ: Performed by: HOSPITALIST

## 2018-01-01 PROCEDURE — 71260 CT THORAX DX C+: CPT

## 2018-01-01 DEVICE — STENT BILIARY ADVANTIX 10FR X 9CM: Type: IMPLANTABLE DEVICE | Status: FUNCTIONAL

## 2018-01-01 RX ORDER — ASCORBIC ACID 500 MG
250 TABLET ORAL DAILY
Status: ON HOLD | COMMUNITY
End: 2019-01-01

## 2018-01-01 RX ORDER — DEXTROSE MONOHYDRATE 50 MG/ML
INJECTION, SOLUTION INTRAVENOUS CONTINUOUS
Status: DISCONTINUED | OUTPATIENT
Start: 2018-01-01 | End: 2018-01-01

## 2018-01-01 RX ORDER — POTASSIUM CHLORIDE 20 MEQ/1
20 TABLET, EXTENDED RELEASE ORAL DAILY
Status: DISCONTINUED | OUTPATIENT
Start: 2018-01-01 | End: 2018-01-01

## 2018-01-01 RX ORDER — SODIUM CHLORIDE 450 MG/100ML
INJECTION, SOLUTION INTRAVENOUS CONTINUOUS
Status: DISCONTINUED | OUTPATIENT
Start: 2018-01-01 | End: 2018-01-01

## 2018-01-01 RX ORDER — IPRATROPIUM BROMIDE AND ALBUTEROL SULFATE 2.5; .5 MG/3ML; MG/3ML
3 SOLUTION RESPIRATORY (INHALATION)
Status: DISCONTINUED | OUTPATIENT
Start: 2018-01-01 | End: 2019-01-01 | Stop reason: HOSPADM

## 2018-01-01 RX ORDER — POTASSIUM CHLORIDE 7.45 MG/ML
10 INJECTION INTRAVENOUS ONCE
Status: COMPLETED | OUTPATIENT
Start: 2018-01-01 | End: 2018-01-01

## 2018-01-01 RX ORDER — MICONAZOLE NITRATE 20 MG/G
CREAM TOPICAL 2 TIMES DAILY
Status: DISCONTINUED | OUTPATIENT
Start: 2018-01-01 | End: 2019-01-01 | Stop reason: HOSPADM

## 2018-01-01 RX ORDER — MIDAZOLAM HYDROCHLORIDE 1 MG/ML
.5-2 INJECTION INTRAMUSCULAR; INTRAVENOUS PRN
Status: ACTIVE | OUTPATIENT
Start: 2018-01-01 | End: 2018-01-01

## 2018-01-01 RX ORDER — ALBUMIN (HUMAN) 12.5 G/50ML
25 SOLUTION INTRAVENOUS EVERY 6 HOURS
Status: COMPLETED | OUTPATIENT
Start: 2018-01-01 | End: 2018-01-01

## 2018-01-01 RX ORDER — LORAZEPAM 2 MG/ML
4 INJECTION INTRAMUSCULAR
Status: DISCONTINUED | OUTPATIENT
Start: 2018-01-01 | End: 2018-01-01 | Stop reason: HOSPADM

## 2018-01-01 RX ORDER — ONDANSETRON 2 MG/ML
4 INJECTION INTRAMUSCULAR; INTRAVENOUS ONCE
Status: COMPLETED | OUTPATIENT
Start: 2018-01-01 | End: 2018-01-01

## 2018-01-01 RX ORDER — FUROSEMIDE 10 MG/ML
40 INJECTION INTRAMUSCULAR; INTRAVENOUS
Status: COMPLETED | OUTPATIENT
Start: 2018-01-01 | End: 2018-01-01

## 2018-01-01 RX ORDER — LEVETIRACETAM 100 MG/ML
750 SOLUTION ORAL 2 TIMES DAILY
Qty: 450 ML | Refills: 11 | Status: SHIPPED | OUTPATIENT
Start: 2018-01-01

## 2018-01-01 RX ORDER — FERROUS SULFATE 325(65) MG
325 TABLET ORAL 2 TIMES DAILY WITH MEALS
Status: DISCONTINUED | OUTPATIENT
Start: 2018-01-01 | End: 2018-01-01

## 2018-01-01 RX ORDER — LIDOCAINE HYDROCHLORIDE 10 MG/ML
INJECTION, SOLUTION INFILTRATION; PERINEURAL
Status: COMPLETED
Start: 2018-01-01 | End: 2018-01-01

## 2018-01-01 RX ORDER — SODIUM CHLORIDE 9 MG/ML
INJECTION, SOLUTION INTRAVENOUS
Status: COMPLETED
Start: 2018-01-01 | End: 2018-01-01

## 2018-01-01 RX ORDER — ASCORBIC ACID 500 MG
500 TABLET ORAL DAILY
Status: DISCONTINUED | OUTPATIENT
Start: 2018-01-01 | End: 2018-01-01

## 2018-01-01 RX ORDER — SODIUM CHLORIDE 9 MG/ML
1000 INJECTION, SOLUTION INTRAVENOUS
Status: DISCONTINUED | OUTPATIENT
Start: 2018-01-01 | End: 2018-01-01

## 2018-01-01 RX ORDER — OXYCODONE HYDROCHLORIDE 5 MG/1
5 TABLET ORAL
Status: DISCONTINUED | OUTPATIENT
Start: 2018-01-01 | End: 2018-01-01

## 2018-01-01 RX ORDER — FUROSEMIDE 10 MG/ML
20 INJECTION INTRAMUSCULAR; INTRAVENOUS ONCE
Status: COMPLETED | OUTPATIENT
Start: 2018-01-01 | End: 2018-01-01

## 2018-01-01 RX ORDER — HALOPERIDOL 0.5 MG/1
0.5 TABLET ORAL EVERY EVENING
Status: DISCONTINUED | OUTPATIENT
Start: 2018-01-01 | End: 2018-01-01 | Stop reason: HOSPADM

## 2018-01-01 RX ORDER — SODIUM CHLORIDE 9 MG/ML
INJECTION, SOLUTION INTRAVENOUS CONTINUOUS
Status: DISCONTINUED | OUTPATIENT
Start: 2018-01-01 | End: 2018-01-01

## 2018-01-01 RX ORDER — FAMOTIDINE 20 MG/1
20 TABLET, FILM COATED ORAL DAILY
Status: DISCONTINUED | OUTPATIENT
Start: 2018-01-01 | End: 2019-01-01

## 2018-01-01 RX ORDER — PHENYLEPHRINE HCL IN 0.9% NACL 0.5 MG/5ML
200 SYRINGE (ML) INTRAVENOUS ONCE
Status: COMPLETED | OUTPATIENT
Start: 2018-01-01 | End: 2018-01-01

## 2018-01-01 RX ORDER — ASPIRIN 325 MG
325 TABLET ORAL DAILY
Status: DISCONTINUED | OUTPATIENT
Start: 2018-01-01 | End: 2018-01-01 | Stop reason: HOSPADM

## 2018-01-01 RX ORDER — LEVETIRACETAM 100 MG/ML
750 SOLUTION ORAL 2 TIMES DAILY
Qty: 450 ML | Refills: 0 | Status: SHIPPED | OUTPATIENT
Start: 2018-01-01 | End: 2018-01-01 | Stop reason: SDUPTHER

## 2018-01-01 RX ORDER — MORPHINE SULFATE 10 MG/ML
2 INJECTION, SOLUTION INTRAMUSCULAR; INTRAVENOUS
Status: DISCONTINUED | OUTPATIENT
Start: 2018-01-01 | End: 2018-01-01

## 2018-01-01 RX ORDER — SODIUM CHLORIDE 9 MG/ML
1000 INJECTION, SOLUTION INTRAVENOUS
Status: COMPLETED | OUTPATIENT
Start: 2018-01-01 | End: 2018-01-01

## 2018-01-01 RX ORDER — MIDAZOLAM HYDROCHLORIDE 1 MG/ML
INJECTION INTRAMUSCULAR; INTRAVENOUS
Status: COMPLETED
Start: 2018-01-01 | End: 2018-01-01

## 2018-01-01 RX ORDER — MORPHINE SULFATE 4 MG/ML
2 INJECTION, SOLUTION INTRAMUSCULAR; INTRAVENOUS
Status: DISCONTINUED | OUTPATIENT
Start: 2018-01-01 | End: 2019-01-01 | Stop reason: HOSPADM

## 2018-01-01 RX ORDER — SODIUM CHLORIDE, SODIUM LACTATE, POTASSIUM CHLORIDE, CALCIUM CHLORIDE 600; 310; 30; 20 MG/100ML; MG/100ML; MG/100ML; MG/100ML
INJECTION, SOLUTION INTRAVENOUS CONTINUOUS
Status: DISCONTINUED | OUTPATIENT
Start: 2018-01-01 | End: 2018-01-01 | Stop reason: HOSPADM

## 2018-01-01 RX ORDER — BISACODYL 10 MG
10 SUPPOSITORY, RECTAL RECTAL
Status: DISCONTINUED | OUTPATIENT
Start: 2018-01-01 | End: 2018-01-01 | Stop reason: HOSPADM

## 2018-01-01 RX ORDER — POTASSIUM CHLORIDE 20 MEQ/1
40 TABLET, EXTENDED RELEASE ORAL ONCE
Status: COMPLETED | OUTPATIENT
Start: 2018-01-01 | End: 2018-01-01

## 2018-01-01 RX ORDER — SODIUM CHLORIDE 9 MG/ML
500 INJECTION, SOLUTION INTRAVENOUS
Status: ACTIVE | OUTPATIENT
Start: 2018-01-01 | End: 2018-01-01

## 2018-01-01 RX ORDER — ACETAMINOPHEN 325 MG/1
650 TABLET ORAL EVERY 6 HOURS PRN
Status: DISCONTINUED | OUTPATIENT
Start: 2018-01-01 | End: 2019-01-01 | Stop reason: HOSPADM

## 2018-01-01 RX ORDER — POLYETHYLENE GLYCOL 3350 17 G/17G
1 POWDER, FOR SOLUTION ORAL
Status: DISCONTINUED | OUTPATIENT
Start: 2018-01-01 | End: 2018-01-01 | Stop reason: HOSPADM

## 2018-01-01 RX ORDER — OXYCODONE HYDROCHLORIDE 5 MG/1
2.5 TABLET ORAL
Status: DISCONTINUED | OUTPATIENT
Start: 2018-01-01 | End: 2018-01-01

## 2018-01-01 RX ORDER — FAMOTIDINE 20 MG/1
20 TABLET, FILM COATED ORAL 2 TIMES DAILY
Status: DISCONTINUED | OUTPATIENT
Start: 2018-01-01 | End: 2018-01-01

## 2018-01-01 RX ORDER — HYDROCODONE BITARTRATE AND ACETAMINOPHEN 5; 325 MG/1; MG/1
1 TABLET ORAL EVERY 4 HOURS PRN
Qty: 12 TAB | Refills: 0 | Status: SHIPPED | OUTPATIENT
Start: 2018-01-01 | End: 2018-01-01

## 2018-01-01 RX ORDER — PHENYLEPHRINE HCL IN 0.9% NACL 0.5 MG/5ML
SYRINGE (ML) INTRAVENOUS
Status: COMPLETED
Start: 2018-01-01 | End: 2018-01-01

## 2018-01-01 RX ORDER — HALOPERIDOL 0.5 MG/1
TABLET ORAL
Qty: 45 TAB | Refills: 0 | Status: SHIPPED | OUTPATIENT
Start: 2018-01-01 | End: 2018-01-01

## 2018-01-01 RX ORDER — OXYCODONE HCL 5 MG/5 ML
10 SOLUTION, ORAL ORAL
Status: DISCONTINUED | OUTPATIENT
Start: 2018-01-01 | End: 2018-01-01 | Stop reason: HOSPADM

## 2018-01-01 RX ORDER — SODIUM CHLORIDE, SODIUM LACTATE, POTASSIUM CHLORIDE, CALCIUM CHLORIDE 600; 310; 30; 20 MG/100ML; MG/100ML; MG/100ML; MG/100ML
INJECTION, SOLUTION INTRAVENOUS CONTINUOUS
Status: DISCONTINUED | OUTPATIENT
Start: 2018-01-01 | End: 2018-01-01

## 2018-01-01 RX ORDER — SODIUM CHLORIDE 9 MG/ML
INJECTION, SOLUTION INTRAVENOUS
Status: ACTIVE
Start: 2018-01-01 | End: 2018-01-01

## 2018-01-01 RX ORDER — NOREPINEPHRINE BITARTRATE 1 MG/ML
INJECTION, SOLUTION INTRAVENOUS
Status: ACTIVE
Start: 2018-01-01 | End: 2018-01-01

## 2018-01-01 RX ORDER — HALOPERIDOL 5 MG/ML
1 INJECTION INTRAMUSCULAR
Status: DISCONTINUED | OUTPATIENT
Start: 2018-01-01 | End: 2018-01-01 | Stop reason: HOSPADM

## 2018-01-01 RX ORDER — SODIUM CHLORIDE 9 MG/ML
1000 INJECTION, SOLUTION INTRAVENOUS ONCE
Status: COMPLETED | OUTPATIENT
Start: 2018-01-01 | End: 2018-01-01

## 2018-01-01 RX ORDER — ONDANSETRON 2 MG/ML
4 INJECTION INTRAMUSCULAR; INTRAVENOUS EVERY 6 HOURS PRN
Status: DISCONTINUED | OUTPATIENT
Start: 2018-01-01 | End: 2019-01-01 | Stop reason: HOSPADM

## 2018-01-01 RX ORDER — HYDROCODONE BITARTRATE AND ACETAMINOPHEN 5; 325 MG/1; MG/1
2 TABLET ORAL ONCE
Status: COMPLETED | OUTPATIENT
Start: 2018-01-01 | End: 2018-01-01

## 2018-01-01 RX ORDER — HYDRALAZINE HYDROCHLORIDE 20 MG/ML
5 INJECTION INTRAMUSCULAR; INTRAVENOUS
Status: DISCONTINUED | OUTPATIENT
Start: 2018-01-01 | End: 2018-01-01 | Stop reason: HOSPADM

## 2018-01-01 RX ORDER — ONDANSETRON 2 MG/ML
4 INJECTION INTRAMUSCULAR; INTRAVENOUS EVERY 4 HOURS PRN
Status: DISCONTINUED | OUTPATIENT
Start: 2018-01-01 | End: 2018-01-01

## 2018-01-01 RX ORDER — LEVETIRACETAM 250 MG/1
750 TABLET ORAL 2 TIMES DAILY
Status: DISCONTINUED | OUTPATIENT
Start: 2018-01-01 | End: 2018-01-01 | Stop reason: HOSPADM

## 2018-01-01 RX ORDER — LINEZOLID 2 MG/ML
600 INJECTION, SOLUTION INTRAVENOUS EVERY 12 HOURS
Status: DISCONTINUED | OUTPATIENT
Start: 2018-01-01 | End: 2018-01-01

## 2018-01-01 RX ORDER — AMOXICILLIN 250 MG
2 CAPSULE ORAL 2 TIMES DAILY
Status: DISCONTINUED | OUTPATIENT
Start: 2018-01-01 | End: 2018-01-01 | Stop reason: HOSPADM

## 2018-01-01 RX ORDER — GADOBUTROL 604.72 MG/ML
7 INJECTION INTRAVENOUS ONCE
Status: COMPLETED | OUTPATIENT
Start: 2018-01-01 | End: 2018-01-01

## 2018-01-01 RX ORDER — NALOXONE HYDROCHLORIDE 0.4 MG/ML
INJECTION, SOLUTION INTRAMUSCULAR; INTRAVENOUS; SUBCUTANEOUS
Status: COMPLETED
Start: 2018-01-01 | End: 2018-01-01

## 2018-01-01 RX ORDER — ONDANSETRON 4 MG/1
4 TABLET, ORALLY DISINTEGRATING ORAL EVERY 4 HOURS PRN
Status: DISCONTINUED | OUTPATIENT
Start: 2018-01-01 | End: 2018-01-01

## 2018-01-01 RX ORDER — OXYCODONE HYDROCHLORIDE 5 MG/1
5 TABLET ORAL
Status: DISCONTINUED | OUTPATIENT
Start: 2018-01-01 | End: 2019-01-01 | Stop reason: HOSPADM

## 2018-01-01 RX ORDER — LEVETIRACETAM 100 MG/ML
750 SOLUTION ORAL 2 TIMES DAILY
Status: DISCONTINUED | OUTPATIENT
Start: 2018-01-01 | End: 2018-01-01

## 2018-01-01 RX ORDER — ONDANSETRON 2 MG/ML
4 INJECTION INTRAMUSCULAR; INTRAVENOUS PRN
Status: ACTIVE | OUTPATIENT
Start: 2018-01-01 | End: 2018-01-01

## 2018-01-01 RX ORDER — FUROSEMIDE 10 MG/ML
40 INJECTION INTRAMUSCULAR; INTRAVENOUS ONCE
Status: COMPLETED | OUTPATIENT
Start: 2018-01-01 | End: 2018-01-01

## 2018-01-01 RX ORDER — ASCORBIC ACID 500 MG
500 TABLET ORAL DAILY
Status: DISCONTINUED | OUTPATIENT
Start: 2018-01-01 | End: 2019-01-01 | Stop reason: HOSPADM

## 2018-01-01 RX ORDER — OXYCODONE HCL 5 MG/5 ML
5 SOLUTION, ORAL ORAL
Status: DISCONTINUED | OUTPATIENT
Start: 2018-01-01 | End: 2018-01-01 | Stop reason: HOSPADM

## 2018-01-01 RX ORDER — HEPARIN SODIUM 5000 [USP'U]/ML
5000 INJECTION, SOLUTION INTRAVENOUS; SUBCUTANEOUS EVERY 8 HOURS
Status: DISCONTINUED | OUTPATIENT
Start: 2018-01-01 | End: 2018-01-01 | Stop reason: HOSPADM

## 2018-01-01 RX ORDER — OXYCODONE HYDROCHLORIDE 5 MG/1
2.5 TABLET ORAL
Status: DISCONTINUED | OUTPATIENT
Start: 2018-01-01 | End: 2019-01-01 | Stop reason: HOSPADM

## 2018-01-01 RX ORDER — CHLORAL HYDRATE 500 MG
1000 CAPSULE ORAL 2 TIMES DAILY
Status: ON HOLD | COMMUNITY
Start: 2018-01-01 | End: 2019-01-01

## 2018-01-01 RX ORDER — ACETAMINOPHEN 325 MG/1
650 TABLET ORAL EVERY 6 HOURS PRN
Status: DISCONTINUED | OUTPATIENT
Start: 2018-01-01 | End: 2018-01-01

## 2018-01-01 RX ORDER — ONDANSETRON 2 MG/ML
4 INJECTION INTRAMUSCULAR; INTRAVENOUS
Status: DISCONTINUED | OUTPATIENT
Start: 2018-01-01 | End: 2018-01-01 | Stop reason: HOSPADM

## 2018-01-01 RX ADMIN — Medication 200 MCG: at 19:00

## 2018-01-01 RX ADMIN — Medication 325 MG: at 06:38

## 2018-01-01 RX ADMIN — LEVETIRACETAM 750 MG: 500 TABLET ORAL at 16:58

## 2018-01-01 RX ADMIN — FAMOTIDINE 20 MG: 20 TABLET ORAL at 04:54

## 2018-01-01 RX ADMIN — LIDOCAINE HYDROCHLORIDE 2 ML: 10 INJECTION, SOLUTION EPIDURAL; INFILTRATION; INTRACAUDAL; PERINEURAL at 17:00

## 2018-01-01 RX ADMIN — Medication 325 MG: at 08:17

## 2018-01-01 RX ADMIN — ASPIRIN 325 MG: 325 TABLET ORAL at 09:49

## 2018-01-01 RX ADMIN — PIPERACILLIN AND TAZOBACTAM 3.38 G: 3; .375 INJECTION, POWDER, LYOPHILIZED, FOR SOLUTION INTRAVENOUS; PARENTERAL at 05:23

## 2018-01-01 RX ADMIN — AMIODARONE HYDROCHLORIDE 150 MG: 1.5 INJECTION, SOLUTION INTRAVENOUS at 18:03

## 2018-01-01 RX ADMIN — PIPERACILLIN SODIUM AND TAZOBACTAM SODIUM 3.38 G: 3; .375 INJECTION, POWDER, FOR SOLUTION INTRAVENOUS at 14:05

## 2018-01-01 RX ADMIN — METRONIDAZOLE 500 MG: 500 INJECTION, SOLUTION INTRAVENOUS at 08:28

## 2018-01-01 RX ADMIN — METOPROLOL TARTRATE 12.5 MG: 25 TABLET, FILM COATED ORAL at 05:50

## 2018-01-01 RX ADMIN — MICONAZOLE NITRATE: 20 CREAM TOPICAL at 21:00

## 2018-01-01 RX ADMIN — FAMOTIDINE 20 MG: 20 TABLET ORAL at 05:03

## 2018-01-01 RX ADMIN — METOPROLOL TARTRATE 12.5 MG: 25 TABLET, FILM COATED ORAL at 05:15

## 2018-01-01 RX ADMIN — LEVETIRACETAM 750 MG: 500 TABLET ORAL at 05:27

## 2018-01-01 RX ADMIN — POTASSIUM BICARBONATE 25 MEQ: 25 TABLET, EFFERVESCENT ORAL at 05:14

## 2018-01-01 RX ADMIN — LEVETIRACETAM 750 MG: 500 TABLET ORAL at 05:53

## 2018-01-01 RX ADMIN — PIPERACILLIN SODIUM AND TAZOBACTAM SODIUM 3.38 G: 3; .375 INJECTION, POWDER, FOR SOLUTION INTRAVENOUS at 20:17

## 2018-01-01 RX ADMIN — SODIUM CHLORIDE 750 MG: 9 INJECTION, SOLUTION INTRAVENOUS at 17:31

## 2018-01-01 RX ADMIN — LEVETIRACETAM 750 MG: 500 TABLET ORAL at 01:52

## 2018-01-01 RX ADMIN — FAMOTIDINE 20 MG: 20 TABLET ORAL at 18:08

## 2018-01-01 RX ADMIN — LEVETIRACETAM 750 MG: 500 TABLET ORAL at 05:37

## 2018-01-01 RX ADMIN — Medication 325 MG: at 19:32

## 2018-01-01 RX ADMIN — ENOXAPARIN SODIUM 40 MG: 100 INJECTION SUBCUTANEOUS at 05:42

## 2018-01-01 RX ADMIN — OXYCODONE HYDROCHLORIDE AND ACETAMINOPHEN 500 MG: 500 TABLET ORAL at 05:02

## 2018-01-01 RX ADMIN — Medication 325 MG: at 09:31

## 2018-01-01 RX ADMIN — FAMOTIDINE 20 MG: 10 INJECTION INTRAVENOUS at 17:08

## 2018-01-01 RX ADMIN — AMIODARONE HYDROCHLORIDE 0.5 MG/MIN: 50 INJECTION, SOLUTION INTRAVENOUS at 05:11

## 2018-01-01 RX ADMIN — Medication 325 MG: at 16:52

## 2018-01-01 RX ADMIN — DEXTROSE MONOHYDRATE: 50 INJECTION, SOLUTION INTRAVENOUS at 10:34

## 2018-01-01 RX ADMIN — PIPERACILLIN SODIUM AND TAZOBACTAM SODIUM 3.38 G: 3; .375 INJECTION, POWDER, FOR SOLUTION INTRAVENOUS at 21:46

## 2018-01-01 RX ADMIN — FAMOTIDINE 20 MG: 20 TABLET ORAL at 05:50

## 2018-01-01 RX ADMIN — HEPARIN SODIUM 5000 UNITS: 5000 INJECTION, SOLUTION INTRAVENOUS; SUBCUTANEOUS at 22:24

## 2018-01-01 RX ADMIN — Medication 325 MG: at 17:35

## 2018-01-01 RX ADMIN — SODIUM CHLORIDE 2000 MG: 9 INJECTION, SOLUTION INTRAVENOUS at 23:23

## 2018-01-01 RX ADMIN — METOPROLOL TARTRATE 12.5 MG: 25 TABLET, FILM COATED ORAL at 05:47

## 2018-01-01 RX ADMIN — ALBUMIN (HUMAN) 25 G: 5 SOLUTION INTRAVENOUS at 11:47

## 2018-01-01 RX ADMIN — HEPARIN SODIUM 5000 UNITS: 5000 INJECTION, SOLUTION INTRAVENOUS; SUBCUTANEOUS at 05:30

## 2018-01-01 RX ADMIN — SODIUM CHLORIDE: 4.5 INJECTION, SOLUTION INTRAVENOUS at 11:15

## 2018-01-01 RX ADMIN — ASPIRIN 325 MG: 325 TABLET ORAL at 08:44

## 2018-01-01 RX ADMIN — SODIUM CHLORIDE 1000 ML: 9 INJECTION, SOLUTION INTRAVENOUS at 17:05

## 2018-01-01 RX ADMIN — FENTANYL CITRATE 50 MCG: 50 INJECTION, SOLUTION INTRAMUSCULAR; INTRAVENOUS at 23:47

## 2018-01-01 RX ADMIN — Medication 325 MG: at 08:48

## 2018-01-01 RX ADMIN — FERROUS SULFATE TAB 325 MG (65 MG ELEMENTAL FE) 325 MG: 325 (65 FE) TAB at 17:08

## 2018-01-01 RX ADMIN — FAMOTIDINE 20 MG: 20 TABLET ORAL at 04:46

## 2018-01-01 RX ADMIN — OXYCODONE HYDROCHLORIDE AND ACETAMINOPHEN 500 MG: 500 TABLET ORAL at 06:00

## 2018-01-01 RX ADMIN — LEVETIRACETAM 750 MG: 500 TABLET ORAL at 05:50

## 2018-01-01 RX ADMIN — LEVETIRACETAM 750 MG: 500 TABLET ORAL at 05:17

## 2018-01-01 RX ADMIN — PIPERACILLIN SODIUM AND TAZOBACTAM SODIUM 3.38 G: 3; .375 INJECTION, POWDER, FOR SOLUTION INTRAVENOUS at 16:27

## 2018-01-01 RX ADMIN — METOPROLOL TARTRATE 12.5 MG: 25 TABLET, FILM COATED ORAL at 05:38

## 2018-01-01 RX ADMIN — PIPERACILLIN SODIUM AND TAZOBACTAM SODIUM 3.38 G: 3; .375 INJECTION, POWDER, FOR SOLUTION INTRAVENOUS at 19:44

## 2018-01-01 RX ADMIN — ENOXAPARIN SODIUM 40 MG: 100 INJECTION SUBCUTANEOUS at 05:28

## 2018-01-01 RX ADMIN — ENOXAPARIN SODIUM 40 MG: 100 INJECTION SUBCUTANEOUS at 05:02

## 2018-01-01 RX ADMIN — OXYCODONE HYDROCHLORIDE 5 MG: 5 TABLET ORAL at 01:51

## 2018-01-01 RX ADMIN — OXYCODONE HYDROCHLORIDE 5 MG: 5 TABLET ORAL at 08:04

## 2018-01-01 RX ADMIN — CEFTRIAXONE SODIUM 2 G: 2 INJECTION, POWDER, FOR SOLUTION INTRAMUSCULAR; INTRAVENOUS at 13:59

## 2018-01-01 RX ADMIN — POTASSIUM BICARBONATE 50 MEQ: 25 TABLET, EFFERVESCENT ORAL at 10:51

## 2018-01-01 RX ADMIN — HUMAN ALBUMIN MICROSPHERES AND PERFLUTREN 3 ML: 10; .22 INJECTION, SOLUTION INTRAVENOUS at 10:30

## 2018-01-01 RX ADMIN — METOPROLOL TARTRATE 12.5 MG: 25 TABLET, FILM COATED ORAL at 17:27

## 2018-01-01 RX ADMIN — Medication 325 MG: at 18:14

## 2018-01-01 RX ADMIN — LEVETIRACETAM 750 MG: 500 TABLET ORAL at 06:56

## 2018-01-01 RX ADMIN — OXYCODONE HYDROCHLORIDE 5 MG: 5 TABLET ORAL at 20:02

## 2018-01-01 RX ADMIN — POTASSIUM BICARBONATE 50 MEQ: 25 TABLET, EFFERVESCENT ORAL at 06:12

## 2018-01-01 RX ADMIN — PIPERACILLIN SODIUM AND TAZOBACTAM SODIUM 3.38 G: 3; .375 INJECTION, POWDER, FOR SOLUTION INTRAVENOUS at 20:14

## 2018-01-01 RX ADMIN — ONDANSETRON 4 MG: 2 INJECTION INTRAMUSCULAR; INTRAVENOUS at 04:26

## 2018-01-01 RX ADMIN — LEVETIRACETAM 750 MG: 500 TABLET ORAL at 05:09

## 2018-01-01 RX ADMIN — SODIUM CHLORIDE: 4.5 INJECTION, SOLUTION INTRAVENOUS at 12:47

## 2018-01-01 RX ADMIN — HALOPERIDOL 0.5 MG: 0.5 TABLET ORAL at 22:24

## 2018-01-01 RX ADMIN — FAMOTIDINE 20 MG: 10 INJECTION INTRAVENOUS at 04:57

## 2018-01-01 RX ADMIN — MICONAZOLE NITRATE: 20 CREAM TOPICAL at 05:50

## 2018-01-01 RX ADMIN — PIPERACILLIN SODIUM AND TAZOBACTAM SODIUM 3.38 G: 3; .375 INJECTION, POWDER, FOR SOLUTION INTRAVENOUS at 14:40

## 2018-01-01 RX ADMIN — ALBUMIN (HUMAN) 25 G: 5 SOLUTION INTRAVENOUS at 04:50

## 2018-01-01 RX ADMIN — METRONIDAZOLE 500 MG: 500 INJECTION, SOLUTION INTRAVENOUS at 21:30

## 2018-01-01 RX ADMIN — FAMOTIDINE 20 MG: 20 TABLET ORAL at 05:38

## 2018-01-01 RX ADMIN — PIPERACILLIN SODIUM AND TAZOBACTAM SODIUM 3.38 G: 3; .375 INJECTION, POWDER, FOR SOLUTION INTRAVENOUS at 14:38

## 2018-01-01 RX ADMIN — IPRATROPIUM BROMIDE AND ALBUTEROL SULFATE 3 ML: .5; 3 SOLUTION RESPIRATORY (INHALATION) at 15:10

## 2018-01-01 RX ADMIN — MEROPENEM 500 MG: 500 INJECTION, POWDER, FOR SOLUTION INTRAVENOUS at 04:46

## 2018-01-01 RX ADMIN — Medication 325 MG: at 18:03

## 2018-01-01 RX ADMIN — LEVETIRACETAM 750 MG: 500 TABLET ORAL at 17:44

## 2018-01-01 RX ADMIN — HALOPERIDOL 0.5 MG: 0.5 TABLET ORAL at 03:31

## 2018-01-01 RX ADMIN — SODIUM CHLORIDE 750 MG: 9 INJECTION, SOLUTION INTRAVENOUS at 16:57

## 2018-01-01 RX ADMIN — FAMOTIDINE 20 MG: 20 TABLET ORAL at 05:09

## 2018-01-01 RX ADMIN — LEVETIRACETAM 750 MG: 500 TABLET ORAL at 05:46

## 2018-01-01 RX ADMIN — POTASSIUM CHLORIDE: 2 INJECTION, SOLUTION, CONCENTRATE INTRAVENOUS at 08:00

## 2018-01-01 RX ADMIN — OXYCODONE HYDROCHLORIDE 5 MG: 5 TABLET ORAL at 17:08

## 2018-01-01 RX ADMIN — METOPROLOL TARTRATE 12.5 MG: 25 TABLET, FILM COATED ORAL at 22:47

## 2018-01-01 RX ADMIN — MORPHINE SULFATE 2 MG: 10 INJECTION INTRAVENOUS at 22:46

## 2018-01-01 RX ADMIN — PIPERACILLIN SODIUM AND TAZOBACTAM SODIUM 3.38 G: 3; .375 INJECTION, POWDER, FOR SOLUTION INTRAVENOUS at 05:03

## 2018-01-01 RX ADMIN — POTASSIUM BICARBONATE 50 MEQ: 25 TABLET, EFFERVESCENT ORAL at 05:00

## 2018-01-01 RX ADMIN — LEVETIRACETAM 750 MG: 500 TABLET ORAL at 17:50

## 2018-01-01 RX ADMIN — PIPERACILLIN SODIUM AND TAZOBACTAM SODIUM 3.38 G: 3; .375 INJECTION, POWDER, FOR SOLUTION INTRAVENOUS at 04:47

## 2018-01-01 RX ADMIN — HYDROCODONE BITARTRATE AND ACETAMINOPHEN 2 TABLET: 5; 325 TABLET ORAL at 02:33

## 2018-01-01 RX ADMIN — FAMOTIDINE 20 MG: 20 TABLET ORAL at 16:55

## 2018-01-01 RX ADMIN — Medication 325 MG: at 18:25

## 2018-01-01 RX ADMIN — IPRATROPIUM BROMIDE AND ALBUTEROL SULFATE 3 ML: .5; 3 SOLUTION RESPIRATORY (INHALATION) at 09:04

## 2018-01-01 RX ADMIN — Medication 325 MG: at 17:45

## 2018-01-01 RX ADMIN — MICONAZOLE NITRATE: 20 CREAM TOPICAL at 05:15

## 2018-01-01 RX ADMIN — METOPROLOL TARTRATE 12.5 MG: 25 TABLET, FILM COATED ORAL at 04:47

## 2018-01-01 RX ADMIN — POTASSIUM CHLORIDE: 2 INJECTION, SOLUTION, CONCENTRATE INTRAVENOUS at 02:16

## 2018-01-01 RX ADMIN — PIPERACILLIN SODIUM AND TAZOBACTAM SODIUM 3.38 G: 3; .375 INJECTION, POWDER, FOR SOLUTION INTRAVENOUS at 21:45

## 2018-01-01 RX ADMIN — METOPROLOL TARTRATE 12.5 MG: 25 TABLET, FILM COATED ORAL at 06:56

## 2018-01-01 RX ADMIN — MICONAZOLE NITRATE: 20 CREAM TOPICAL at 05:47

## 2018-01-01 RX ADMIN — HEPARIN SODIUM 5000 UNITS: 5000 INJECTION, SOLUTION INTRAVENOUS; SUBCUTANEOUS at 06:11

## 2018-01-01 RX ADMIN — PIPERACILLIN AND TAZOBACTAM 4.5 G: 4; .5 INJECTION, POWDER, LYOPHILIZED, FOR SOLUTION INTRAVENOUS; PARENTERAL at 17:26

## 2018-01-01 RX ADMIN — LEVETIRACETAM 750 MG: 500 TABLET ORAL at 17:08

## 2018-01-01 RX ADMIN — FAMOTIDINE 20 MG: 20 TABLET ORAL at 18:00

## 2018-01-01 RX ADMIN — METRONIDAZOLE 500 MG: 500 INJECTION, SOLUTION INTRAVENOUS at 14:00

## 2018-01-01 RX ADMIN — PIPERACILLIN SODIUM AND TAZOBACTAM SODIUM 3.38 G: 3; .375 INJECTION, POWDER, FOR SOLUTION INTRAVENOUS at 05:35

## 2018-01-01 RX ADMIN — FERROUS SULFATE TAB 325 MG (65 MG ELEMENTAL FE) 325 MG: 325 (65 FE) TAB at 07:51

## 2018-01-01 RX ADMIN — METOPROLOL TARTRATE 12.5 MG: 25 TABLET, FILM COATED ORAL at 05:12

## 2018-01-01 RX ADMIN — LEVETIRACETAM 750 MG: 500 TABLET ORAL at 05:00

## 2018-01-01 RX ADMIN — AMIODARONE HYDROCHLORIDE 1 MG/MIN: 50 INJECTION, SOLUTION INTRAVENOUS at 18:26

## 2018-01-01 RX ADMIN — HEPARIN SODIUM 5000 UNITS: 5000 INJECTION, SOLUTION INTRAVENOUS; SUBCUTANEOUS at 22:46

## 2018-01-01 RX ADMIN — FUROSEMIDE 40 MG: 10 INJECTION, SOLUTION INTRAMUSCULAR; INTRAVENOUS at 10:51

## 2018-01-01 RX ADMIN — FAMOTIDINE 20 MG: 20 TABLET ORAL at 18:16

## 2018-01-01 RX ADMIN — POTASSIUM BICARBONATE 50 MEQ: 25 TABLET, EFFERVESCENT ORAL at 17:34

## 2018-01-01 RX ADMIN — PIPERACILLIN SODIUM AND TAZOBACTAM SODIUM 3.38 G: 3; .375 INJECTION, POWDER, FOR SOLUTION INTRAVENOUS at 12:45

## 2018-01-01 RX ADMIN — PIPERACILLIN SODIUM AND TAZOBACTAM SODIUM 3.38 G: 3; .375 INJECTION, POWDER, FOR SOLUTION INTRAVENOUS at 05:50

## 2018-01-01 RX ADMIN — LEVETIRACETAM 750 MG: 500 TABLET ORAL at 05:38

## 2018-01-01 RX ADMIN — SODIUM CHLORIDE 500 ML: 9 INJECTION, SOLUTION INTRAVENOUS at 05:48

## 2018-01-01 RX ADMIN — OXYCODONE HYDROCHLORIDE 5 MG: 5 TABLET ORAL at 05:37

## 2018-01-01 RX ADMIN — OXYCODONE HYDROCHLORIDE 5 MG: 5 TABLET ORAL at 07:34

## 2018-01-01 RX ADMIN — FAMOTIDINE 20 MG: 20 TABLET ORAL at 17:44

## 2018-01-01 RX ADMIN — SODIUM CHLORIDE: 9 INJECTION, SOLUTION INTRAVENOUS at 14:45

## 2018-01-01 RX ADMIN — MICONAZOLE NITRATE: 20 CREAM TOPICAL at 04:36

## 2018-01-01 RX ADMIN — METRONIDAZOLE 500 MG: 500 INJECTION, SOLUTION INTRAVENOUS at 16:40

## 2018-01-01 RX ADMIN — LEVETIRACETAM 750 MG: 500 TABLET ORAL at 18:15

## 2018-01-01 RX ADMIN — LINEZOLID 600 MG: 600 INJECTION, SOLUTION INTRAVENOUS at 17:16

## 2018-01-01 RX ADMIN — METRONIDAZOLE 500 MG: 500 INJECTION, SOLUTION INTRAVENOUS at 05:37

## 2018-01-01 RX ADMIN — OXYCODONE HYDROCHLORIDE AND ACETAMINOPHEN 500 MG: 500 TABLET ORAL at 05:27

## 2018-01-01 RX ADMIN — Medication 325 MG: at 17:03

## 2018-01-01 RX ADMIN — LEVETIRACETAM 750 MG: 500 TABLET ORAL at 18:16

## 2018-01-01 RX ADMIN — LEVETIRACETAM 750 MG: 500 TABLET ORAL at 17:03

## 2018-01-01 RX ADMIN — FAMOTIDINE 20 MG: 20 TABLET ORAL at 16:59

## 2018-01-01 RX ADMIN — Medication 325 MG: at 17:34

## 2018-01-01 RX ADMIN — Medication 325 MG: at 05:50

## 2018-01-01 RX ADMIN — ENOXAPARIN SODIUM 40 MG: 100 INJECTION SUBCUTANEOUS at 05:52

## 2018-01-01 RX ADMIN — IOHEXOL 100 ML: 350 INJECTION, SOLUTION INTRAVENOUS at 11:54

## 2018-01-01 RX ADMIN — ENOXAPARIN SODIUM 40 MG: 100 INJECTION SUBCUTANEOUS at 05:46

## 2018-01-01 RX ADMIN — METRONIDAZOLE 500 MG: 500 INJECTION, SOLUTION INTRAVENOUS at 13:16

## 2018-01-01 RX ADMIN — FAMOTIDINE 20 MG: 20 TABLET ORAL at 17:51

## 2018-01-01 RX ADMIN — PIPERACILLIN SODIUM AND TAZOBACTAM SODIUM 3.38 G: 3; .375 INJECTION, POWDER, FOR SOLUTION INTRAVENOUS at 04:58

## 2018-01-01 RX ADMIN — MICONAZOLE NITRATE: 20 CREAM TOPICAL at 17:00

## 2018-01-01 RX ADMIN — ENOXAPARIN SODIUM 40 MG: 100 INJECTION SUBCUTANEOUS at 05:00

## 2018-01-01 RX ADMIN — LEVETIRACETAM 750 MG: 500 TABLET ORAL at 05:14

## 2018-01-01 RX ADMIN — LEVETIRACETAM 750 MG: 500 TABLET ORAL at 06:37

## 2018-01-01 RX ADMIN — LINEZOLID 600 MG: 600 INJECTION, SOLUTION INTRAVENOUS at 21:52

## 2018-01-01 RX ADMIN — FAMOTIDINE 20 MG: 20 TABLET ORAL at 06:00

## 2018-01-01 RX ADMIN — POTASSIUM CHLORIDE 20 MEQ: 1500 TABLET, EXTENDED RELEASE ORAL at 04:48

## 2018-01-01 RX ADMIN — PIPERACILLIN SODIUM AND TAZOBACTAM SODIUM 3.38 G: 3; .375 INJECTION, POWDER, FOR SOLUTION INTRAVENOUS at 13:18

## 2018-01-01 RX ADMIN — FAMOTIDINE 20 MG: 20 TABLET ORAL at 05:12

## 2018-01-01 RX ADMIN — METOPROLOL TARTRATE 12.5 MG: 25 TABLET, FILM COATED ORAL at 04:46

## 2018-01-01 RX ADMIN — OXYCODONE HYDROCHLORIDE AND ACETAMINOPHEN 500 MG: 500 TABLET ORAL at 05:50

## 2018-01-01 RX ADMIN — Medication 325 MG: at 05:46

## 2018-01-01 RX ADMIN — LEVETIRACETAM 750 MG: 500 TABLET ORAL at 16:52

## 2018-01-01 RX ADMIN — PIPERACILLIN SODIUM AND TAZOBACTAM SODIUM 3.38 G: 3; .375 INJECTION, POWDER, FOR SOLUTION INTRAVENOUS at 21:08

## 2018-01-01 RX ADMIN — MIDAZOLAM 1 MG: 1 INJECTION INTRAMUSCULAR; INTRAVENOUS at 17:00

## 2018-01-01 RX ADMIN — MEROPENEM 500 MG: 500 INJECTION, POWDER, FOR SOLUTION INTRAVENOUS at 20:57

## 2018-01-01 RX ADMIN — PIPERACILLIN SODIUM AND TAZOBACTAM SODIUM 3.38 G: 3; .375 INJECTION, POWDER, FOR SOLUTION INTRAVENOUS at 14:00

## 2018-01-01 RX ADMIN — Medication 325 MG: at 16:48

## 2018-01-01 RX ADMIN — FAMOTIDINE 20 MG: 20 TABLET ORAL at 17:16

## 2018-01-01 RX ADMIN — METOPROLOL TARTRATE 12.5 MG: 25 TABLET, FILM COATED ORAL at 17:50

## 2018-01-01 RX ADMIN — Medication 325 MG: at 05:52

## 2018-01-01 RX ADMIN — SODIUM CHLORIDE 750 MG: 9 INJECTION, SOLUTION INTRAVENOUS at 06:11

## 2018-01-01 RX ADMIN — MICONAZOLE NITRATE: 20 CREAM TOPICAL at 04:48

## 2018-01-01 RX ADMIN — POTASSIUM BICARBONATE 50 MEQ: 25 TABLET, EFFERVESCENT ORAL at 09:50

## 2018-01-01 RX ADMIN — SODIUM CHLORIDE 750 MG: 9 INJECTION, SOLUTION INTRAVENOUS at 06:15

## 2018-01-01 RX ADMIN — MEROPENEM 500 MG: 500 INJECTION, POWDER, FOR SOLUTION INTRAVENOUS at 15:17

## 2018-01-01 RX ADMIN — LEVETIRACETAM 750 MG: 500 TABLET ORAL at 17:00

## 2018-01-01 RX ADMIN — OXYCODONE HYDROCHLORIDE 5 MG: 5 TABLET ORAL at 14:10

## 2018-01-01 RX ADMIN — FAMOTIDINE 20 MG: 20 TABLET ORAL at 17:27

## 2018-01-01 RX ADMIN — MICONAZOLE NITRATE: 20 CREAM TOPICAL at 17:36

## 2018-01-01 RX ADMIN — HEPARIN SODIUM 5000 UNITS: 5000 INJECTION, SOLUTION INTRAVENOUS; SUBCUTANEOUS at 13:48

## 2018-01-01 RX ADMIN — POTASSIUM CHLORIDE 20 MEQ: 1500 TABLET, EXTENDED RELEASE ORAL at 04:46

## 2018-01-01 RX ADMIN — Medication 325 MG: at 04:47

## 2018-01-01 RX ADMIN — METRONIDAZOLE 500 MG: 500 INJECTION, SOLUTION INTRAVENOUS at 15:24

## 2018-01-01 RX ADMIN — PIPERACILLIN SODIUM AND TAZOBACTAM SODIUM 3.38 G: 3; .375 INJECTION, POWDER, FOR SOLUTION INTRAVENOUS at 13:57

## 2018-01-01 RX ADMIN — PIPERACILLIN SODIUM AND TAZOBACTAM SODIUM 3.38 G: 3; .375 INJECTION, POWDER, FOR SOLUTION INTRAVENOUS at 04:42

## 2018-01-01 RX ADMIN — PIPERACILLIN SODIUM AND TAZOBACTAM SODIUM 3.38 G: 3; .375 INJECTION, POWDER, FOR SOLUTION INTRAVENOUS at 21:33

## 2018-01-01 RX ADMIN — SODIUM CHLORIDE 750 MG: 9 INJECTION, SOLUTION INTRAVENOUS at 21:25

## 2018-01-01 RX ADMIN — CEFTRIAXONE SODIUM 2 G: 2 INJECTION, POWDER, FOR SOLUTION INTRAMUSCULAR; INTRAVENOUS at 13:17

## 2018-01-01 RX ADMIN — HEPARIN SODIUM 5000 UNITS: 5000 INJECTION, SOLUTION INTRAVENOUS; SUBCUTANEOUS at 14:39

## 2018-01-01 RX ADMIN — SODIUM CHLORIDE 1000 ML: 9 INJECTION, SOLUTION INTRAVENOUS at 18:05

## 2018-01-01 RX ADMIN — ENOXAPARIN SODIUM 40 MG: 100 INJECTION SUBCUTANEOUS at 08:04

## 2018-01-01 RX ADMIN — METRONIDAZOLE 500 MG: 500 INJECTION, SOLUTION INTRAVENOUS at 22:06

## 2018-01-01 RX ADMIN — OXYCODONE HYDROCHLORIDE AND ACETAMINOPHEN 500 MG: 500 TABLET ORAL at 06:47

## 2018-01-01 RX ADMIN — OXYCODONE HYDROCHLORIDE AND ACETAMINOPHEN 500 MG: 500 TABLET ORAL at 05:53

## 2018-01-01 RX ADMIN — OXYCODONE HYDROCHLORIDE 5 MG: 5 TABLET ORAL at 09:59

## 2018-01-01 RX ADMIN — OXYCODONE HYDROCHLORIDE 5 MG: 5 TABLET ORAL at 16:56

## 2018-01-01 RX ADMIN — MICONAZOLE NITRATE: 20 CREAM TOPICAL at 06:57

## 2018-01-01 RX ADMIN — OXYCODONE HYDROCHLORIDE 5 MG: 5 TABLET ORAL at 21:33

## 2018-01-01 RX ADMIN — OXYCODONE HYDROCHLORIDE AND ACETAMINOPHEN 500 MG: 500 TABLET ORAL at 05:12

## 2018-01-01 RX ADMIN — PIPERACILLIN SODIUM AND TAZOBACTAM SODIUM 3.38 G: 3; .375 INJECTION, POWDER, FOR SOLUTION INTRAVENOUS at 13:02

## 2018-01-01 RX ADMIN — POTASSIUM CHLORIDE 20 MEQ: 1500 TABLET, EXTENDED RELEASE ORAL at 08:58

## 2018-01-01 RX ADMIN — OXYCODONE HYDROCHLORIDE AND ACETAMINOPHEN 500 MG: 500 TABLET ORAL at 05:46

## 2018-01-01 RX ADMIN — ENOXAPARIN SODIUM 40 MG: 100 INJECTION SUBCUTANEOUS at 05:11

## 2018-01-01 RX ADMIN — FAMOTIDINE 20 MG: 20 TABLET ORAL at 06:57

## 2018-01-01 RX ADMIN — FAMOTIDINE 20 MG: 20 TABLET ORAL at 05:46

## 2018-01-01 RX ADMIN — METOPROLOL TARTRATE 12.5 MG: 25 TABLET, FILM COATED ORAL at 17:46

## 2018-01-01 RX ADMIN — LEVETIRACETAM 750 MG: 500 TABLET ORAL at 04:36

## 2018-01-01 RX ADMIN — FAMOTIDINE 20 MG: 20 TABLET ORAL at 17:00

## 2018-01-01 RX ADMIN — FAMOTIDINE 20 MG: 20 TABLET ORAL at 05:00

## 2018-01-01 RX ADMIN — OXYCODONE HYDROCHLORIDE 5 MG: 5 TABLET ORAL at 03:33

## 2018-01-01 RX ADMIN — NOREPINEPHRINE BITARTRATE 10 MCG/MIN: 1 INJECTION INTRAVENOUS at 19:54

## 2018-01-01 RX ADMIN — METRONIDAZOLE 500 MG: 500 INJECTION, SOLUTION INTRAVENOUS at 05:09

## 2018-01-01 RX ADMIN — ONDANSETRON 4 MG: 2 INJECTION INTRAMUSCULAR; INTRAVENOUS at 00:33

## 2018-01-01 RX ADMIN — Medication 325 MG: at 16:59

## 2018-01-01 RX ADMIN — ENOXAPARIN SODIUM 40 MG: 100 INJECTION SUBCUTANEOUS at 05:38

## 2018-01-01 RX ADMIN — LEVETIRACETAM 750 MG: 500 TABLET ORAL at 06:12

## 2018-01-01 RX ADMIN — Medication 325 MG: at 07:44

## 2018-01-01 RX ADMIN — Medication 325 MG: at 18:16

## 2018-01-01 RX ADMIN — IOHEXOL 50 ML: 240 INJECTION, SOLUTION INTRATHECAL; INTRAVASCULAR; INTRAVENOUS; ORAL at 13:42

## 2018-01-01 RX ADMIN — FERROUS SULFATE TAB 325 MG (65 MG ELEMENTAL FE) 325 MG: 325 (65 FE) TAB at 01:51

## 2018-01-01 RX ADMIN — METOPROLOL TARTRATE 12.5 MG: 25 TABLET, FILM COATED ORAL at 18:16

## 2018-01-01 RX ADMIN — MICONAZOLE NITRATE: 20 CREAM TOPICAL at 05:12

## 2018-01-01 RX ADMIN — ALBUMIN (HUMAN) 25 G: 5 SOLUTION INTRAVENOUS at 17:35

## 2018-01-01 RX ADMIN — FUROSEMIDE 40 MG: 10 INJECTION, SOLUTION INTRAMUSCULAR; INTRAVENOUS at 05:00

## 2018-01-01 RX ADMIN — Medication 325 MG: at 05:41

## 2018-01-01 RX ADMIN — MIDAZOLAM HYDROCHLORIDE 1 MG: 1 INJECTION INTRAMUSCULAR; INTRAVENOUS at 17:00

## 2018-01-01 RX ADMIN — FUROSEMIDE 20 MG: 10 INJECTION, SOLUTION INTRAMUSCULAR; INTRAVENOUS at 16:52

## 2018-01-01 RX ADMIN — OXYCODONE HYDROCHLORIDE 5 MG: 5 TABLET ORAL at 01:00

## 2018-01-01 RX ADMIN — MICONAZOLE NITRATE: 20 CREAM TOPICAL at 18:17

## 2018-01-01 RX ADMIN — POTASSIUM CHLORIDE 20 MEQ: 1500 TABLET, EXTENDED RELEASE ORAL at 06:56

## 2018-01-01 RX ADMIN — SODIUM CHLORIDE 750 MG: 9 INJECTION, SOLUTION INTRAVENOUS at 05:18

## 2018-01-01 RX ADMIN — FUROSEMIDE 40 MG: 10 INJECTION, SOLUTION INTRAMUSCULAR; INTRAVENOUS at 06:13

## 2018-01-01 RX ADMIN — PIPERACILLIN AND TAZOBACTAM 3.38 G: 3; .375 INJECTION, POWDER, LYOPHILIZED, FOR SOLUTION INTRAVENOUS; PARENTERAL at 12:58

## 2018-01-01 RX ADMIN — PIPERACILLIN SODIUM AND TAZOBACTAM SODIUM 3.38 G: 3; .375 INJECTION, POWDER, FOR SOLUTION INTRAVENOUS at 04:29

## 2018-01-01 RX ADMIN — LEVETIRACETAM 750 MG: 500 TABLET ORAL at 18:24

## 2018-01-01 RX ADMIN — POTASSIUM BICARBONATE 25 MEQ: 25 TABLET, EFFERVESCENT ORAL at 09:12

## 2018-01-01 RX ADMIN — ENOXAPARIN SODIUM 40 MG: 100 INJECTION SUBCUTANEOUS at 05:16

## 2018-01-01 RX ADMIN — PIPERACILLIN SODIUM AND TAZOBACTAM SODIUM 3.38 G: 3; .375 INJECTION, POWDER, FOR SOLUTION INTRAVENOUS at 06:47

## 2018-01-01 RX ADMIN — PIPERACILLIN AND TAZOBACTAM 3.38 G: 3; .375 INJECTION, POWDER, LYOPHILIZED, FOR SOLUTION INTRAVENOUS; PARENTERAL at 05:00

## 2018-01-01 RX ADMIN — ALBUMIN (HUMAN) 25 G: 5 SOLUTION INTRAVENOUS at 22:46

## 2018-01-01 RX ADMIN — HEPARIN SODIUM 5000 UNITS: 5000 INJECTION, SOLUTION INTRAVENOUS; SUBCUTANEOUS at 06:15

## 2018-01-01 RX ADMIN — METOPROLOL TARTRATE 12.5 MG: 25 TABLET, FILM COATED ORAL at 14:47

## 2018-01-01 RX ADMIN — METRONIDAZOLE 500 MG: 500 INJECTION, SOLUTION INTRAVENOUS at 04:54

## 2018-01-01 RX ADMIN — PIPERACILLIN SODIUM AND TAZOBACTAM SODIUM 3.38 G: 3; .375 INJECTION, POWDER, FOR SOLUTION INTRAVENOUS at 20:21

## 2018-01-01 RX ADMIN — POTASSIUM CHLORIDE 40 MEQ: 1500 TABLET, EXTENDED RELEASE ORAL at 10:56

## 2018-01-01 RX ADMIN — PIPERACILLIN SODIUM AND TAZOBACTAM SODIUM 3.38 G: 3; .375 INJECTION, POWDER, FOR SOLUTION INTRAVENOUS at 21:58

## 2018-01-01 RX ADMIN — FAMOTIDINE 20 MG: 20 TABLET ORAL at 05:53

## 2018-01-01 RX ADMIN — LEVETIRACETAM 750 MG: 500 TABLET ORAL at 06:07

## 2018-01-01 RX ADMIN — SODIUM CHLORIDE: 9 INJECTION, SOLUTION INTRAVENOUS at 14:00

## 2018-01-01 RX ADMIN — MICONAZOLE NITRATE: 20 CREAM TOPICAL at 17:28

## 2018-01-01 RX ADMIN — HALOPERIDOL 0.5 MG: 0.5 TABLET ORAL at 22:46

## 2018-01-01 RX ADMIN — PIPERACILLIN SODIUM AND TAZOBACTAM SODIUM 3.38 G: 3; .375 INJECTION, POWDER, FOR SOLUTION INTRAVENOUS at 05:45

## 2018-01-01 RX ADMIN — POTASSIUM CHLORIDE 20 MEQ: 1500 TABLET, EXTENDED RELEASE ORAL at 05:37

## 2018-01-01 RX ADMIN — LEVETIRACETAM 750 MG: 500 TABLET ORAL at 18:14

## 2018-01-01 RX ADMIN — MICONAZOLE NITRATE: 20 CREAM TOPICAL at 17:45

## 2018-01-01 RX ADMIN — FENTANYL CITRATE 25 MCG: 50 INJECTION, SOLUTION INTRAMUSCULAR; INTRAVENOUS at 17:00

## 2018-01-01 RX ADMIN — CEFTRIAXONE SODIUM 2 G: 2 INJECTION, POWDER, FOR SOLUTION INTRAMUSCULAR; INTRAVENOUS at 14:42

## 2018-01-01 RX ADMIN — LEVETIRACETAM 750 MG: 500 TABLET ORAL at 23:30

## 2018-01-01 RX ADMIN — FUROSEMIDE 40 MG: 10 INJECTION, SOLUTION INTRAVENOUS at 09:06

## 2018-01-01 RX ADMIN — VANCOMYCIN HYDROCHLORIDE 1700 MG: 100 INJECTION, POWDER, LYOPHILIZED, FOR SOLUTION INTRAVENOUS at 17:47

## 2018-01-01 RX ADMIN — ENOXAPARIN SODIUM 40 MG: 100 INJECTION SUBCUTANEOUS at 05:50

## 2018-01-01 RX ADMIN — FAMOTIDINE 20 MG: 20 TABLET ORAL at 05:14

## 2018-01-01 RX ADMIN — LIDOCAINE HYDROCHLORIDE: 10 INJECTION, SOLUTION EPIDURAL; INFILTRATION; INTRACAUDAL; PERINEURAL at 17:30

## 2018-01-01 RX ADMIN — OXYCODONE HYDROCHLORIDE 5 MG: 5 TABLET ORAL at 11:43

## 2018-01-01 RX ADMIN — METRONIDAZOLE 500 MG: 500 INJECTION, SOLUTION INTRAVENOUS at 23:20

## 2018-01-01 RX ADMIN — METOPROLOL TARTRATE 12.5 MG: 25 TABLET, FILM COATED ORAL at 09:49

## 2018-01-01 RX ADMIN — MORPHINE SULFATE 2 MG: 10 INJECTION INTRAVENOUS at 07:52

## 2018-01-01 RX ADMIN — POTASSIUM CHLORIDE 10 MEQ: 7.46 INJECTION, SOLUTION INTRAVENOUS at 04:53

## 2018-01-01 RX ADMIN — IOHEXOL 100 ML: 350 INJECTION, SOLUTION INTRAVENOUS at 21:58

## 2018-01-01 RX ADMIN — Medication 325 MG: at 16:30

## 2018-01-01 RX ADMIN — FAMOTIDINE 20 MG: 20 TABLET ORAL at 06:13

## 2018-01-01 RX ADMIN — DEXTROSE MONOHYDRATE: 50 INJECTION, SOLUTION INTRAVENOUS at 22:46

## 2018-01-01 RX ADMIN — ONDANSETRON 4 MG: 2 INJECTION INTRAMUSCULAR; INTRAVENOUS at 23:47

## 2018-01-01 RX ADMIN — SODIUM CHLORIDE: 9 INJECTION, SOLUTION INTRAVENOUS at 08:59

## 2018-01-01 RX ADMIN — DEXTROSE MONOHYDRATE: 50 INJECTION, SOLUTION INTRAVENOUS at 21:32

## 2018-01-01 RX ADMIN — ENOXAPARIN SODIUM 40 MG: 100 INJECTION SUBCUTANEOUS at 04:46

## 2018-01-01 RX ADMIN — METOPROLOL TARTRATE 12.5 MG: 25 TABLET, FILM COATED ORAL at 18:09

## 2018-01-01 RX ADMIN — OXYCODONE HYDROCHLORIDE AND ACETAMINOPHEN 500 MG: 500 TABLET ORAL at 04:46

## 2018-01-01 RX ADMIN — Medication 325 MG: at 04:36

## 2018-01-01 RX ADMIN — NOREPINEPHRINE BITARTRATE 2 MCG/MIN: 1 INJECTION INTRAVENOUS at 20:48

## 2018-01-01 RX ADMIN — PIPERACILLIN AND TAZOBACTAM 3.38 G: 3; .375 INJECTION, POWDER, LYOPHILIZED, FOR SOLUTION INTRAVENOUS; PARENTERAL at 22:01

## 2018-01-01 RX ADMIN — Medication 325 MG: at 07:20

## 2018-01-01 RX ADMIN — SODIUM CHLORIDE: 9 INJECTION, SOLUTION INTRAVENOUS at 09:45

## 2018-01-01 RX ADMIN — PIPERACILLIN SODIUM AND TAZOBACTAM SODIUM 3.38 G: 3; .375 INJECTION, POWDER, FOR SOLUTION INTRAVENOUS at 05:53

## 2018-01-01 RX ADMIN — ALBUMIN (HUMAN) 25 G: 5 SOLUTION INTRAVENOUS at 17:16

## 2018-01-01 RX ADMIN — Medication 325 MG: at 08:37

## 2018-01-01 RX ADMIN — METRONIDAZOLE 500 MG: 500 INJECTION, SOLUTION INTRAVENOUS at 22:46

## 2018-01-01 RX ADMIN — FAMOTIDINE 20 MG: 20 TABLET ORAL at 05:37

## 2018-01-01 RX ADMIN — MICONAZOLE NITRATE: 20 CREAM TOPICAL at 05:39

## 2018-01-01 RX ADMIN — LINEZOLID 600 MG: 600 INJECTION, SOLUTION INTRAVENOUS at 05:11

## 2018-01-01 RX ADMIN — SODIUM CHLORIDE, POTASSIUM CHLORIDE, SODIUM LACTATE AND CALCIUM CHLORIDE: 600; 310; 30; 20 INJECTION, SOLUTION INTRAVENOUS at 00:13

## 2018-01-01 RX ADMIN — Medication 325 MG: at 17:14

## 2018-01-01 RX ADMIN — LEVETIRACETAM 750 MG: 500 TABLET ORAL at 17:27

## 2018-01-01 RX ADMIN — OXYCODONE HYDROCHLORIDE AND ACETAMINOPHEN 500 MG: 500 TABLET ORAL at 05:14

## 2018-01-01 RX ADMIN — POTASSIUM CHLORIDE: 2 INJECTION, SOLUTION, CONCENTRATE INTRAVENOUS at 18:15

## 2018-01-01 RX ADMIN — OXYCODONE HYDROCHLORIDE AND ACETAMINOPHEN 500 MG: 500 TABLET ORAL at 04:47

## 2018-01-01 RX ADMIN — LEVETIRACETAM 750 MG: 500 TABLET ORAL at 18:09

## 2018-01-01 RX ADMIN — Medication 325 MG: at 05:15

## 2018-01-01 RX ADMIN — SODIUM CHLORIDE, POTASSIUM CHLORIDE, SODIUM LACTATE AND CALCIUM CHLORIDE: 600; 310; 30; 20 INJECTION, SOLUTION INTRAVENOUS at 10:55

## 2018-01-01 RX ADMIN — HUMAN ALBUMIN MICROSPHERES AND PERFLUTREN 3 ML: 10; .22 INJECTION, SOLUTION INTRAVENOUS at 21:45

## 2018-01-01 RX ADMIN — CEFTRIAXONE SODIUM 2 G: 2 INJECTION, POWDER, FOR SOLUTION INTRAMUSCULAR; INTRAVENOUS at 16:40

## 2018-01-01 RX ADMIN — FAMOTIDINE 20 MG: 10 INJECTION INTRAVENOUS at 05:24

## 2018-01-01 RX ADMIN — LEVETIRACETAM 750 MG: 500 TABLET ORAL at 17:35

## 2018-01-01 RX ADMIN — PIPERACILLIN SODIUM AND TAZOBACTAM SODIUM 3.38 G: 3; .375 INJECTION, POWDER, FOR SOLUTION INTRAVENOUS at 13:54

## 2018-01-01 RX ADMIN — PIPERACILLIN AND TAZOBACTAM 3.38 G: 3; .375 INJECTION, POWDER, LYOPHILIZED, FOR SOLUTION INTRAVENOUS; PARENTERAL at 01:54

## 2018-01-01 RX ADMIN — Medication 325 MG: at 18:18

## 2018-01-01 RX ADMIN — GADOBUTROL 7 ML: 604.72 INJECTION INTRAVENOUS at 16:39

## 2018-01-01 RX ADMIN — ASPIRIN 325 MG: 325 TABLET ORAL at 09:06

## 2018-01-01 RX ADMIN — MEROPENEM 500 MG: 500 INJECTION, POWDER, FOR SOLUTION INTRAVENOUS at 03:24

## 2018-01-01 RX ADMIN — SODIUM CHLORIDE: 9 INJECTION, SOLUTION INTRAVENOUS at 10:45

## 2018-01-01 RX ADMIN — SODIUM CHLORIDE 500 ML: 9 INJECTION, SOLUTION INTRAVENOUS at 15:20

## 2018-01-01 RX ADMIN — LINEZOLID 600 MG: 600 INJECTION, SOLUTION INTRAVENOUS at 04:36

## 2018-01-01 RX ADMIN — LEVETIRACETAM 750 MG: 500 TABLET ORAL at 05:24

## 2018-01-01 RX ADMIN — Medication 325 MG: at 18:09

## 2018-01-01 RX ADMIN — FAMOTIDINE 20 MG: 20 TABLET ORAL at 17:03

## 2018-01-01 RX ADMIN — LEVETIRACETAM 750 MG: 500 TABLET ORAL at 19:32

## 2018-01-01 RX ADMIN — Medication 325 MG: at 12:31

## 2018-01-01 RX ADMIN — LEVETIRACETAM 750 MG: 500 TABLET ORAL at 05:02

## 2018-01-01 RX ADMIN — FAMOTIDINE 20 MG: 20 TABLET ORAL at 05:27

## 2018-01-01 RX ADMIN — PIPERACILLIN SODIUM AND TAZOBACTAM SODIUM 3.38 G: 3; .375 INJECTION, POWDER, FOR SOLUTION INTRAVENOUS at 05:28

## 2018-01-01 RX ADMIN — POTASSIUM CHLORIDE 20 MEQ: 1500 TABLET, EXTENDED RELEASE ORAL at 05:12

## 2018-01-01 RX ADMIN — ENOXAPARIN SODIUM 40 MG: 100 INJECTION SUBCUTANEOUS at 04:47

## 2018-01-01 RX ADMIN — FAMOTIDINE 20 MG: 20 TABLET ORAL at 18:25

## 2018-01-01 RX ADMIN — LEVETIRACETAM 750 MG: 500 TABLET ORAL at 17:14

## 2018-01-01 RX ADMIN — ENOXAPARIN SODIUM 40 MG: 100 INJECTION SUBCUTANEOUS at 12:40

## 2018-01-01 RX ADMIN — LEVETIRACETAM 750 MG: 500 TABLET ORAL at 16:55

## 2018-01-01 RX ADMIN — MICONAZOLE NITRATE: 20 CREAM TOPICAL at 17:17

## 2018-01-01 RX ADMIN — PIPERACILLIN SODIUM AND TAZOBACTAM SODIUM 3.38 G: 3; .375 INJECTION, POWDER, FOR SOLUTION INTRAVENOUS at 13:25

## 2018-01-01 RX ADMIN — PIPERACILLIN SODIUM AND TAZOBACTAM SODIUM 3.38 G: 3; .375 INJECTION, POWDER, FOR SOLUTION INTRAVENOUS at 20:24

## 2018-01-01 RX ADMIN — FAMOTIDINE 20 MG: 20 TABLET ORAL at 04:47

## 2018-01-01 RX ADMIN — OXYCODONE HYDROCHLORIDE AND ACETAMINOPHEN 500 MG: 500 TABLET ORAL at 05:00

## 2018-01-01 RX ADMIN — LEVETIRACETAM 750 MG: 500 TABLET ORAL at 04:47

## 2018-01-01 RX ADMIN — MICONAZOLE NITRATE: 20 CREAM TOPICAL at 18:01

## 2018-01-01 RX ADMIN — MICONAZOLE NITRATE: 20 CREAM TOPICAL at 20:17

## 2018-01-01 RX ADMIN — PIPERACILLIN AND TAZOBACTAM 3.38 G: 3; .375 INJECTION, POWDER, LYOPHILIZED, FOR SOLUTION INTRAVENOUS; PARENTERAL at 12:43

## 2018-01-01 RX ADMIN — ENOXAPARIN SODIUM 40 MG: 100 INJECTION SUBCUTANEOUS at 05:37

## 2018-01-01 RX ADMIN — PIPERACILLIN SODIUM AND TAZOBACTAM SODIUM 3.38 G: 3; .375 INJECTION, POWDER, FOR SOLUTION INTRAVENOUS at 20:33

## 2018-01-01 RX ADMIN — Medication 325 MG: at 09:23

## 2018-01-01 RX ADMIN — ALBUMIN (HUMAN) 25 G: 5 SOLUTION INTRAVENOUS at 14:41

## 2018-01-01 RX ADMIN — IOHEXOL 75 ML: 350 INJECTION, SOLUTION INTRAVENOUS at 13:31

## 2018-01-01 ASSESSMENT — ENCOUNTER SYMPTOMS
ORTHOPNEA: 0
NAUSEA: 0
PALPITATIONS: 0
CHILLS: 0
HEADACHES: 0
SORE THROAT: 0
SHORTNESS OF BREATH: 0
EYE REDNESS: 0
SHORTNESS OF BREATH: 0
SEIZURES: 1
SPUTUM PRODUCTION: 0
SHORTNESS OF BREATH: 0
CHILLS: 0
SHORTNESS OF BREATH: 0
PHOTOPHOBIA: 0
WEAKNESS: 1
ORTHOPNEA: 0
PALPITATIONS: 0
COUGH: 1
SPUTUM PRODUCTION: 1
COUGH: 1
ABDOMINAL PAIN: 0
SPUTUM PRODUCTION: 1
EYES NEGATIVE: 1
BACK PAIN: 0
FEVER: 0
LOSS OF CONSCIOUSNESS: 0
NAUSEA: 0
HEMOPTYSIS: 0
HEADACHES: 0
TINGLING: 0
WEAKNESS: 1
CHILLS: 0
FEVER: 0
WEAKNESS: 0
ABDOMINAL PAIN: 0
ACTIVITY CHANGE: 1
NERVOUS/ANXIOUS: 0
ORTHOPNEA: 0
NAUSEA: 0
HEADACHES: 0
ARTHRALGIAS: 0
DIZZINESS: 0
TINGLING: 0
BLOOD IN STOOL: 0
PALPITATIONS: 0
NAUSEA: 0
NERVOUS/ANXIOUS: 0
PALPITATIONS: 0
FEVER: 0
COUGH: 0
DIARRHEA: 0
BRUISES/BLEEDS EASILY: 0
SHORTNESS OF BREATH: 1
EYE DISCHARGE: 0
SPUTUM PRODUCTION: 0
VOMITING: 0
ABDOMINAL PAIN: 1
SPEECH CHANGE: 0
EYE PAIN: 0
TINGLING: 0
FLANK PAIN: 0
CHILLS: 0
SHORTNESS OF BREATH: 0
NAUSEA: 1
FOCAL WEAKNESS: 0
FLANK PAIN: 0
ABDOMINAL DISTENTION: 1
SPUTUM PRODUCTION: 0
VOMITING: 0
MUSCULOSKELETAL NEGATIVE: 1
SPUTUM PRODUCTION: 0
CHILLS: 1
ABDOMINAL PAIN: 0
HEADACHES: 0
NECK PAIN: 0
VOMITING: 0
SPEECH CHANGE: 0
EYE REDNESS: 0
COUGH: 1
DEPRESSION: 0
ABDOMINAL PAIN: 0
PHOTOPHOBIA: 0
WHEEZING: 0
FEVER: 0
SHORTNESS OF BREATH: 0
FEVER: 0
SPUTUM PRODUCTION: 0
FOCAL WEAKNESS: 0
NAUSEA: 0
ABDOMINAL PAIN: 1
FEVER: 1
COUGH: 0
NAUSEA: 0
VOMITING: 0
MEMORY LOSS: 0
MUSCULOSKELETAL NEGATIVE: 1
FOCAL WEAKNESS: 0
NERVOUS/ANXIOUS: 0
FATIGUE: 1
WEAKNESS: 1
BACK PAIN: 0
HEMOPTYSIS: 0
HALLUCINATIONS: 0
COUGH: 1
NERVOUS/ANXIOUS: 0
BACK PAIN: 1
EYE DISCHARGE: 0
WEAKNESS: 1
HEADACHES: 0
DIARRHEA: 0
COUGH: 0
DIZZINESS: 0
NECK PAIN: 0
LOSS OF CONSCIOUSNESS: 0
SHORTNESS OF BREATH: 0
EYE DISCHARGE: 0
DIARRHEA: 0
BLURRED VISION: 0
SHORTNESS OF BREATH: 0
HEADACHES: 0
NECK PAIN: 0
NAUSEA: 0
WEAKNESS: 1
PSYCHIATRIC NEGATIVE: 1
NECK PAIN: 0
SORE THROAT: 0
DOUBLE VISION: 0
COUGH: 1
ABDOMINAL PAIN: 1
VOMITING: 0
DIARRHEA: 0
BLURRED VISION: 0
CHILLS: 0
COUGH: 1
MYALGIAS: 0
DIZZINESS: 0
EYE DISCHARGE: 0
BLURRED VISION: 0
SHORTNESS OF BREATH: 0
ORTHOPNEA: 1
DIARRHEA: 0
SPUTUM PRODUCTION: 0
FALLS: 0
EYE DISCHARGE: 0
PALPITATIONS: 0
ABDOMINAL PAIN: 0
PSYCHIATRIC NEGATIVE: 1
HEMOPTYSIS: 0
ABDOMINAL PAIN: 1
ABDOMINAL PAIN: 1
WEAKNESS: 1
DIARRHEA: 0
CHILLS: 0
COUGH: 1
PHOTOPHOBIA: 0
WEAKNESS: 1
BACK PAIN: 0
ABDOMINAL PAIN: 0
VOMITING: 0
VOMITING: 1
ABDOMINAL PAIN: 1
DIZZINESS: 0
WEAKNESS: 1
SHORTNESS OF BREATH: 0
FALLS: 0
HEMOPTYSIS: 0
ORTHOPNEA: 0
EYE PAIN: 0
CHILLS: 0
FEVER: 0
FEVER: 0
DEPRESSION: 0
EYE DISCHARGE: 0
BLURRED VISION: 0
WEAKNESS: 0
EYE PAIN: 0
PALPITATIONS: 0
PALPITATIONS: 0
ABDOMINAL PAIN: 0
SORE THROAT: 0
PSYCHIATRIC NEGATIVE: 1
WEAKNESS: 1
STRIDOR: 0
NAUSEA: 0
VOMITING: 0
PALPITATIONS: 0
EYE REDNESS: 0
VOMITING: 0
VOMITING: 0
ORTHOPNEA: 0
EYE DISCHARGE: 0
PALPITATIONS: 0
DEPRESSION: 0
ABDOMINAL PAIN: 1
COUGH: 0
MYALGIAS: 0
EYE DISCHARGE: 0
HEADACHES: 0
ABDOMINAL PAIN: 1
ABDOMINAL PAIN: 0
SHORTNESS OF BREATH: 1
EYE REDNESS: 0
SORE THROAT: 0
PHOTOPHOBIA: 0
EYES NEGATIVE: 1
FOCAL WEAKNESS: 0
COUGH: 1
COUGH: 0
GASTROINTESTINAL NEGATIVE: 1
TINGLING: 0
NERVOUS/ANXIOUS: 0
EYES NEGATIVE: 1
NERVOUS/ANXIOUS: 0
HEMOPTYSIS: 0
SPEECH CHANGE: 0
BACK PAIN: 0
SPUTUM PRODUCTION: 1
DIZZINESS: 0
SHORTNESS OF BREATH: 0
DOUBLE VISION: 0
ABDOMINAL PAIN: 0
FEVER: 0
SEIZURES: 0
FEVER: 0
EYES NEGATIVE: 1
SHORTNESS OF BREATH: 0
CHILLS: 0
TROUBLE SWALLOWING: 0
BACK PAIN: 0
FEVER: 0
ABDOMINAL PAIN: 0
CONSTITUTIONAL NEGATIVE: 1
APPETITE CHANGE: 1
BLOOD IN STOOL: 0
PND: 0
COLOR CHANGE: 0
SPUTUM PRODUCTION: 0
BACK PAIN: 0
NAUSEA: 0
EYE DISCHARGE: 0
CLAUDICATION: 0
HEADACHES: 0
LOSS OF CONSCIOUSNESS: 0
BACK PAIN: 0
NAUSEA: 0
HEADACHES: 0
SPUTUM PRODUCTION: 0
PALPITATIONS: 0
ABDOMINAL PAIN: 0
NECK PAIN: 0
BACK PAIN: 0
ABDOMINAL PAIN: 1
CHILLS: 0
STRIDOR: 0
HEMOPTYSIS: 0
ORTHOPNEA: 0
PALPITATIONS: 0
NAUSEA: 0
SHORTNESS OF BREATH: 1
GASTROINTESTINAL NEGATIVE: 1
NECK PAIN: 0
SPEECH CHANGE: 0
TINGLING: 0
BRUISES/BLEEDS EASILY: 0
VOMITING: 1
VOMITING: 0
FALLS: 0
SHORTNESS OF BREATH: 0
DOUBLE VISION: 0
WHEEZING: 0
WEAKNESS: 1
SEIZURES: 1
NAUSEA: 0
COUGH: 0
BLOOD IN STOOL: 0
COUGH: 1
DIARRHEA: 0
HEADACHES: 0
VOMITING: 0
PALPITATIONS: 0
MYALGIAS: 0
NAUSEA: 0
VOMITING: 0
SPUTUM PRODUCTION: 0
SHORTNESS OF BREATH: 1
EYE PAIN: 0
FEVER: 0
STRIDOR: 0
ORTHOPNEA: 0
ABDOMINAL PAIN: 1
SHORTNESS OF BREATH: 0
SPEECH CHANGE: 0
SPUTUM PRODUCTION: 0
BACK PAIN: 1
BLURRED VISION: 0
CHILLS: 0
WEAKNESS: 1
FOCAL WEAKNESS: 1
EYES NEGATIVE: 1
CHILLS: 0
AGITATION: 0
BLOOD IN STOOL: 0
WEAKNESS: 1
COUGH: 0
VOMITING: 0
VOMITING: 0
DIZZINESS: 0
VOMITING: 0
EYE REDNESS: 0
SEIZURES: 0
SHORTNESS OF BREATH: 0
FEVER: 0
CHILLS: 0
HEADACHES: 0
NAUSEA: 0
HEADACHES: 0
FLANK PAIN: 0
COUGH: 1
GASTROINTESTINAL NEGATIVE: 1
CHEST TIGHTNESS: 0
NECK PAIN: 0
NAUSEA: 0
WEIGHT LOSS: 1
DIARRHEA: 0
SORE THROAT: 0
WEAKNESS: 1
EYE PAIN: 0
EYE REDNESS: 0
SHORTNESS OF BREATH: 1
SPEECH CHANGE: 0
DIARRHEA: 0
MEMORY LOSS: 0
NAUSEA: 0
WHEEZING: 0
NAUSEA: 0
CHILLS: 0
SPUTUM PRODUCTION: 0
NAUSEA: 1
FEVER: 0
ORTHOPNEA: 0
ADENOPATHY: 0
NAUSEA: 0
BLOOD IN STOOL: 0
BRUISES/BLEEDS EASILY: 0
DOUBLE VISION: 0
HALLUCINATIONS: 0
SPUTUM PRODUCTION: 0
NECK PAIN: 0
FEVER: 0
BACK PAIN: 1
SPUTUM PRODUCTION: 0
WEIGHT LOSS: 0
FOCAL WEAKNESS: 0
VOMITING: 0
COUGH: 0
CONFUSION: 0
WEIGHT LOSS: 0
VOMITING: 0
ABDOMINAL PAIN: 0
NECK PAIN: 0
DIARRHEA: 0
MUSCULOSKELETAL NEGATIVE: 1
SHORTNESS OF BREATH: 1
PALPITATIONS: 0
VOMITING: 0
TINGLING: 0
VOMITING: 0
ABDOMINAL PAIN: 0
DIZZINESS: 0
PALPITATIONS: 0
COUGH: 1
SHORTNESS OF BREATH: 1
SHORTNESS OF BREATH: 1
SEIZURES: 0
NERVOUS/ANXIOUS: 0
PSYCHIATRIC NEGATIVE: 1
CARDIOVASCULAR NEGATIVE: 1
GASTROINTESTINAL NEGATIVE: 1
PALPITATIONS: 0
BACK PAIN: 0
SORE THROAT: 0
CHILLS: 0
COUGH: 1
TREMORS: 0
NAUSEA: 0
FOCAL WEAKNESS: 0
SHORTNESS OF BREATH: 1
COUGH: 0
NECK PAIN: 0
CHILLS: 0
CHILLS: 0
GASTROINTESTINAL NEGATIVE: 1
WEAKNESS: 1
BLURRED VISION: 0
SPUTUM PRODUCTION: 0
NERVOUS/ANXIOUS: 0
VOMITING: 1
HEMOPTYSIS: 0
STRIDOR: 0
ORTHOPNEA: 0
PHOTOPHOBIA: 0
HEMOPTYSIS: 0
MYALGIAS: 0
NAUSEA: 1
DIZZINESS: 0
WEAKNESS: 0
EYE DISCHARGE: 0
DIZZINESS: 0
ABDOMINAL PAIN: 0
HEADACHES: 0
FALLS: 0
BACK PAIN: 0
HEADACHES: 0
BLURRED VISION: 0
BACK PAIN: 0
DIZZINESS: 0
COUGH: 1
SHORTNESS OF BREATH: 0
BACK PAIN: 0
NECK PAIN: 0
SHORTNESS OF BREATH: 0
SPUTUM PRODUCTION: 1
CHILLS: 0
VOMITING: 0
NECK PAIN: 0
WEAKNESS: 0
WEAKNESS: 1
COUGH: 1
FOCAL WEAKNESS: 0
WEAKNESS: 0
WEIGHT LOSS: 0
FLANK PAIN: 0
MUSCULOSKELETAL NEGATIVE: 1
NECK PAIN: 0
EYE PAIN: 0
WHEEZING: 0
SEIZURES: 0
FALLS: 0
BLOOD IN STOOL: 0
HALLUCINATIONS: 0
DIARRHEA: 0
NAUSEA: 1
WEIGHT LOSS: 1
NAUSEA: 0
HALLUCINATIONS: 0
HEADACHES: 0
FOCAL WEAKNESS: 1
BACK PAIN: 0
DIARRHEA: 0
BACK PAIN: 0
BLURRED VISION: 0
ABDOMINAL PAIN: 0
FOCAL WEAKNESS: 0
NAUSEA: 0
ABDOMINAL PAIN: 1
VOMITING: 1
CHILLS: 0
FLANK PAIN: 0
COUGH: 0
WEAKNESS: 1
SEIZURES: 0
DIARRHEA: 0
SHORTNESS OF BREATH: 1
VOMITING: 0
FEVER: 0
MUSCULOSKELETAL NEGATIVE: 1
EYE REDNESS: 0
SPEECH DIFFICULTY: 0
NAUSEA: 0
VOMITING: 0
LOSS OF CONSCIOUSNESS: 0
CHILLS: 0
FEVER: 0
SHORTNESS OF BREATH: 1
EYES NEGATIVE: 1
DIAPHORESIS: 0
NECK PAIN: 0
MUSCULOSKELETAL NEGATIVE: 1
NAUSEA: 1
EYE PAIN: 0
EYE DISCHARGE: 0
ABDOMINAL PAIN: 0
HEMOPTYSIS: 0
HEMOPTYSIS: 1
PHOTOPHOBIA: 0
HEMOPTYSIS: 0
EYE PAIN: 0
CHILLS: 0
BACK PAIN: 1
NECK PAIN: 0
PHOTOPHOBIA: 0
DIZZINESS: 0
COUGH: 1
SHORTNESS OF BREATH: 0
PALPITATIONS: 0
PALPITATIONS: 0
SHORTNESS OF BREATH: 0
HEMOPTYSIS: 0

## 2018-01-01 ASSESSMENT — COGNITIVE AND FUNCTIONAL STATUS - GENERAL
MOVING TO AND FROM BED TO CHAIR: UNABLE
TOILETING: A LITTLE
SUGGESTED CMS G CODE MODIFIER DAILY ACTIVITY: CK
PERSONAL GROOMING: A LITTLE
HELP NEEDED FOR BATHING: A LITTLE
WALKING IN HOSPITAL ROOM: A LOT
DRESSING REGULAR LOWER BODY CLOTHING: A LOT
CLIMB 3 TO 5 STEPS WITH RAILING: TOTAL
PERSONAL GROOMING: A LITTLE
WALKING IN HOSPITAL ROOM: A LITTLE
SUGGESTED CMS G CODE MODIFIER MOBILITY: CL
DAILY ACTIVITIY SCORE: 20
TURNING FROM BACK TO SIDE WHILE IN FLAT BAD: A LOT
SUGGESTED CMS G CODE MODIFIER DAILY ACTIVITY: CJ
WALKING IN HOSPITAL ROOM: A LITTLE
MOVING FROM LYING ON BACK TO SITTING ON SIDE OF FLAT BED: A LOT
DRESSING REGULAR LOWER BODY CLOTHING: A LITTLE
MOVING TO AND FROM BED TO CHAIR: A LOT
MOVING TO AND FROM BED TO CHAIR: UNABLE
TOILETING: A LITTLE
HELP NEEDED FOR BATHING: A LITTLE
CLIMB 3 TO 5 STEPS WITH RAILING: TOTAL
MOVING TO AND FROM BED TO CHAIR: UNABLE
SUGGESTED CMS G CODE MODIFIER MOBILITY: CM
TURNING FROM BACK TO SIDE WHILE IN FLAT BAD: A LOT
TURNING FROM BACK TO SIDE WHILE IN FLAT BAD: UNABLE
MOBILITY SCORE: 9
WALKING IN HOSPITAL ROOM: A LOT
DAILY ACTIVITIY SCORE: 19
TURNING FROM BACK TO SIDE WHILE IN FLAT BAD: A LOT
SUGGESTED CMS G CODE MODIFIER DAILY ACTIVITY: CK
STANDING UP FROM CHAIR USING ARMS: A LOT
STANDING UP FROM CHAIR USING ARMS: A LITTLE
MOBILITY SCORE: 11
DRESSING REGULAR LOWER BODY CLOTHING: A LITTLE
TOILETING: A LITTLE
SUGGESTED CMS G CODE MODIFIER MOBILITY: CL
MOBILITY SCORE: 12
PERSONAL GROOMING: A LITTLE
CLIMB 3 TO 5 STEPS WITH RAILING: TOTAL
MOVING FROM LYING ON BACK TO SITTING ON SIDE OF FLAT BED: UNABLE
STANDING UP FROM CHAIR USING ARMS: A LITTLE
MOVING FROM LYING ON BACK TO SITTING ON SIDE OF FLAT BED: A LOT
MOVING FROM LYING ON BACK TO SITTING ON SIDE OF FLAT BED: UNABLE
EATING MEALS: A LITTLE
MOBILITY SCORE: 12
DAILY ACTIVITIY SCORE: 16
HELP NEEDED FOR BATHING: A LOT
SUGGESTED CMS G CODE MODIFIER MOBILITY: CL
STANDING UP FROM CHAIR USING ARMS: A LITTLE
DRESSING REGULAR UPPER BODY CLOTHING: A LITTLE
DRESSING REGULAR UPPER BODY CLOTHING: A LITTLE
CLIMB 3 TO 5 STEPS WITH RAILING: A LOT

## 2018-01-01 ASSESSMENT — PAIN SCALES - GENERAL
PAINLEVEL_OUTOF10: 0
PAINLEVEL_OUTOF10: 3
PAINLEVEL_OUTOF10: 7
PAINLEVEL_OUTOF10: 0
PAINLEVEL_OUTOF10: 7
PAINLEVEL_OUTOF10: 4
PAINLEVEL_OUTOF10: 0
PAINLEVEL_OUTOF10: 4
PAINLEVEL_OUTOF10: 0
PAINLEVEL_OUTOF10: 2
PAINLEVEL_OUTOF10: 0
PAINLEVEL_OUTOF10: 0
PAINLEVEL_OUTOF10: 2
PAINLEVEL_OUTOF10: 0
PAINLEVEL_OUTOF10: 8
PAINLEVEL_OUTOF10: 0
PAINLEVEL_OUTOF10: 3
PAINLEVEL_OUTOF10: 0
PAINLEVEL_OUTOF10: 2
PAINLEVEL_OUTOF10: 0
PAINLEVEL_OUTOF10: 4
PAINLEVEL_OUTOF10: 0
PAINLEVEL_OUTOF10: 6
PAINLEVEL_OUTOF10: 0
PAINLEVEL_OUTOF10: 3
PAINLEVEL_OUTOF10: 0
PAINLEVEL_OUTOF10: 5
PAINLEVEL_OUTOF10: 0
PAINLEVEL_OUTOF10: 0
PAINLEVEL_OUTOF10: 3
PAINLEVEL_OUTOF10: 0
PAINLEVEL_OUTOF10: 0
PAINLEVEL_OUTOF10: 5
PAINLEVEL_OUTOF10: 0
PAINLEVEL_OUTOF10: 0
PAINLEVEL_OUTOF10: 10
PAINLEVEL_OUTOF10: 0
PAINLEVEL_OUTOF10: 6
PAINLEVEL_OUTOF10: 0
PAINLEVEL_OUTOF10: 7
PAINLEVEL_OUTOF10: 0
PAINLEVEL_OUTOF10: 2
PAINLEVEL_OUTOF10: 0
PAINLEVEL_OUTOF10: 8
PAINLEVEL_OUTOF10: 0
PAINLEVEL_OUTOF10: 7
PAINLEVEL_OUTOF10: 0
PAINLEVEL_OUTOF10: 7
PAINLEVEL_OUTOF10: 0
PAINLEVEL_OUTOF10: 2
PAINLEVEL_OUTOF10: 0
PAINLEVEL_OUTOF10: 2
PAINLEVEL_OUTOF10: 0
PAINLEVEL_OUTOF10: 3
PAINLEVEL_OUTOF10: 0
PAINLEVEL_OUTOF10: 7
PAINLEVEL_OUTOF10: 4
PAINLEVEL_OUTOF10: 0
PAINLEVEL_OUTOF10: 7
PAINLEVEL_OUTOF10: 0
PAINLEVEL_OUTOF10: 8
PAINLEVEL_OUTOF10: 0
PAINLEVEL_OUTOF10: 5
PAINLEVEL_OUTOF10: 6
PAINLEVEL_OUTOF10: 0
PAINLEVEL_OUTOF10: 0
PAINLEVEL_OUTOF10: 8
PAINLEVEL_OUTOF10: 0
PAINLEVEL_OUTOF10: 7
PAINLEVEL_OUTOF10: 0
PAINLEVEL_OUTOF10: 3
PAINLEVEL_OUTOF10: 4
PAINLEVEL_OUTOF10: 0
PAINLEVEL_OUTOF10: 5
PAINLEVEL_OUTOF10: 2
PAINLEVEL_OUTOF10: 0
PAINLEVEL_OUTOF10: 0
PAINLEVEL_OUTOF10: 6

## 2018-01-01 ASSESSMENT — COPD QUESTIONNAIRES
HAVE YOU SMOKED AT LEAST 100 CIGARETTES IN YOUR ENTIRE LIFE: YES
DO YOU EVER COUGH UP ANY MUCUS OR PHLEGM?: YES, A FEW DAYS A WEEK OR MONTH
DURING THE PAST 4 WEEKS HOW MUCH DID YOU FEEL SHORT OF BREATH: SOME OF THE TIME
DO YOU EVER COUGH UP ANY MUCUS OR PHLEGM?: NO/ONLY WITH OCCASIONAL COLDS OR INFECTIONS
DURING THE PAST 4 WEEKS HOW MUCH DID YOU FEEL SHORT OF BREATH: NONE/LITTLE OF THE TIME
COPD SCREENING SCORE: 7
COPD SCREENING SCORE: 4
COPD SCREENING SCORE: 7
HAVE YOU SMOKED AT LEAST 100 CIGARETTES IN YOUR ENTIRE LIFE: YES
DURING THE PAST 4 WEEKS HOW MUCH DID YOU FEEL SHORT OF BREATH: SOME OF THE TIME
DO YOU EVER COUGH UP ANY MUCUS OR PHLEGM?: YES, A FEW DAYS A WEEK OR MONTH
HAVE YOU SMOKED AT LEAST 100 CIGARETTES IN YOUR ENTIRE LIFE: YES

## 2018-01-01 ASSESSMENT — GAIT ASSESSMENTS
ASSISTIVE DEVICE: FRONT WHEEL WALKER
DEVIATION: BRADYKINETIC;SHUFFLED GAIT;INCREASED BASE OF SUPPORT
DISTANCE (FEET): 3
GAIT LEVEL OF ASSIST: CONTACT GUARD ASSIST
DISTANCE (FEET): 5
ASSISTIVE DEVICE: FRONT WHEEL WALKER
GAIT LEVEL OF ASSIST: MINIMAL ASSIST
GAIT LEVEL OF ASSIST: CONTACT GUARD ASSIST
DEVIATION: BRADYKINETIC;SHUFFLED GAIT
DISTANCE (FEET): 10

## 2018-01-01 ASSESSMENT — ACTIVITIES OF DAILY LIVING (ADL)
TOILETING: INDEPENDENT
TOILETING: INDEPENDENT

## 2018-01-01 ASSESSMENT — PATIENT HEALTH QUESTIONNAIRE - PHQ9
SUM OF ALL RESPONSES TO PHQ9 QUESTIONS 1 AND 2: 0
1. LITTLE INTEREST OR PLEASURE IN DOING THINGS: NOT AT ALL
1. LITTLE INTEREST OR PLEASURE IN DOING THINGS: NOT AT ALL
SUM OF ALL RESPONSES TO PHQ9 QUESTIONS 1 AND 2: 0
1. LITTLE INTEREST OR PLEASURE IN DOING THINGS: NOT AT ALL
SUM OF ALL RESPONSES TO PHQ QUESTIONS 1-9: 0
SUM OF ALL RESPONSES TO PHQ9 QUESTIONS 1 AND 2: 0
1. LITTLE INTEREST OR PLEASURE IN DOING THINGS: NOT AT ALL
2. FEELING DOWN, DEPRESSED, IRRITABLE, OR HOPELESS: NOT AT ALL
1. LITTLE INTEREST OR PLEASURE IN DOING THINGS: NOT AT ALL
1. LITTLE INTEREST OR PLEASURE IN DOING THINGS: NOT AT ALL
SUM OF ALL RESPONSES TO PHQ QUESTIONS 1-9: 0
2. FEELING DOWN, DEPRESSED, IRRITABLE, OR HOPELESS: NOT AT ALL
SUM OF ALL RESPONSES TO PHQ9 QUESTIONS 1 AND 2: 0
2. FEELING DOWN, DEPRESSED, IRRITABLE, OR HOPELESS: NOT AT ALL
SUM OF ALL RESPONSES TO PHQ9 QUESTIONS 1 AND 2: 0
1. LITTLE INTEREST OR PLEASURE IN DOING THINGS: NOT AT ALL
SUM OF ALL RESPONSES TO PHQ9 QUESTIONS 1 AND 2: 0
2. FEELING DOWN, DEPRESSED, IRRITABLE, OR HOPELESS: NOT AT ALL
2. FEELING DOWN, DEPRESSED, IRRITABLE, OR HOPELESS: NOT AT ALL
1. LITTLE INTEREST OR PLEASURE IN DOING THINGS: NOT AT ALL

## 2018-01-01 ASSESSMENT — LIFESTYLE VARIABLES
EVER_SMOKED: YES
SUBSTANCE_ABUSE: 0
EVER_SMOKED: YES
ALCOHOL_USE: NO
SUBSTANCE_ABUSE: 0
DO YOU DRINK ALCOHOL: NO
EVER_SMOKED: YES

## 2018-01-30 NOTE — PROGRESS NOTES
1. Attempt #: 1    2. HealthConnect Verified: yes    3. Verify PCP: yes    4. Care Team Updated:       •   DME Company (gait device, O2, CPAP, etc.): YES       •   Other Specialists (eye doctor, derm, GYN, cardiology, endo, etc): YES    5.  Reviewed/Updated the following with patient:       •   Communication Preference Obtained? YES       •   Preferred Pharmacy? YES       •   Preferred Lab? YES       •   Family History (document living status of immediate family members and if + hx of cancer, diabetes, hypertension, hyperlipidemia, heart attack, stroke) NO - pts wife stated this information should already be updated     6. Possible Web Activation: declined    7. Possible Web Cash: no    8. Annual Wellness Visit Scheduling  Scheduling Status:Scheduled      9. Care Gap Scheduling (Attempt to Schedule EACH Overdue Care Gap!)     Health Maintenance Due   Topic Date Due   • PFT SCREENING-FEV1 AND FEV/FVC RATIO / SPIROMETRY SHOULD BE PERFORMED ANNUALLY  09/24/1960   • IMM ZOSTER VACCINE  09/24/2002        Scheduled patient for Annual Wellness Visit    10. Patient was advised: “This is a free wellness visit. The provider will screen for medical conditions to help you stay healthy. If you have other concerns to address you may be asked to discuss these at a separate visit or there may be an additional fee.”     11. Patient was informed to arrive 15 min prior to their scheduled appointment and bring in their medication bottles.

## 2018-01-31 NOTE — DISCHARGE INSTRUCTIONS
Call tomorrow to schedule follow-up with orthopedics, using the contact information was provided. Keep your splint in place until you see orthopedics.     Extremity Fracture  Broken bones (fractures) take several weeks to months to heal depending on the bone involved. The broken ends must be lined up correctly and kept in position for proper healing. Do not remove the splint, immobilizer, or cast that has been applied to treat your injury. This is the most important part of your treatment. Other measures to treat fractures include:  · Keeping the injured limb at rest and elevated above your heart as recommended by your caregiver. This will help reduce pain and swelling.   · Ice packs can be applied to your fracture site for 20-30 minutes every 3-4 hours over the next 2-3 days.   · Pain medicine may be prescribed in the first days after a fracture.   SEEK IMMEDIATE MEDICAL CARE IF:  · You develop increasing pain or pressure in the injured limb, or if it becomes cold, numb, or pale.   · There is increasing pain with motion of your fingers or toes.   Document Released: 01/25/2006 Document Revised: 03/11/2013 Document Reviewed: 04/07/2010  ExitCare® Patient Information ©2013 [employer], [place].

## 2018-01-31 NOTE — ED TRIAGE NOTES
Conner Mora  77 y.o.  Chief Complaint   Patient presents with   • GLF     tripped over home oxygen tubing and fell on floor   • Shoulder Injury     RIGHT, + deformity on palpation, limited ROM to joint     Patient to triage via wheelchair for above. C/o 8/10 pain to R shoulder. States that he has slight numbness to his R hand which is new since the fall.    Charge RN Naina notified of patient.    Triage process explained to patient, apologized for wait time, and returned to lobby.

## 2018-01-31 NOTE — ED PROVIDER NOTES
ED Provider Note    Scribed for Juan Jose Bustillo M.D. by Juan Jose Bustillo. 1/30/2018,  11:19 PM.    CHIEF COMPLAINT  Chief Complaint   Patient presents with   • GLF     tripped over home oxygen tubing and fell on floor   • Shoulder Injury     RIGHT, + deformity on palpation, limited ROM to joint       HPI  Conner Mora is a 77 y.o. male who presents to the Emergency Department for right shoulder pain after a ground level fall, tripping over oxygent ubing at home. He denies any prodrome of illness or weakness or dizziness. He denies hitting his head or pain other than in his right shoulder which is an 8/10 at rest, 10/10 with movement. He appears fairly comfortable at rest. He is pleasant and cooperative.  He denies shortness of breath. He reports some mild paresthesias to his hand.     REVIEW OF SYSTEMS  See HPI for further details. All other systems are negative.     PAST MEDICAL HISTORY   has a past medical history of Arrhythmia; Cataract; Dental disorder; GERD (gastroesophageal reflux disease) (7/8/2013); History of colostomy (10/26/2011); History of CVA (cerebrovascular accident) (4/29/2015); Hypertension; Indigestion; Multiple falls; On home oxygen therapy; Seizure disorder (CMS-HCC); and Snoring.    SOCIAL HISTORY  Social History     Social History Main Topics   • Smoking status: Former Smoker     Packs/day: 1.00     Years: 54.00     Types: Cigarettes     Start date: 9/24/1954     Quit date: 3/19/2011   • Smokeless tobacco: Never Used      Comment: Started smoking at 17   • Alcohol use No   • Drug use: No   • Sexual activity: No     History   Drug Use No       SURGICAL HISTORY   has a past surgical history that includes amputation, below the knee (Left, 1980); hip orif (7/21/2009); abdominal aortic aneurysm (10/14/2009); abdominal aortic aneurysm (2009); other abdominal surgery; sigmoidoscopy flex (11/9/2010); colectomy (11/21/2010); colostomy (11/21/2010); cataract phaco with iol (8/12/2014);  "cataract phaco with iol (8/26/2014); and hip nailing intramedullary (Right, 3/9/2016).    CURRENT MEDICATIONS  Home Medications     Reviewed by Poornima Burgos R.N. (Registered Nurse) on 01/30/18 at 2313  Med List Status: Complete   Medication Last Dose Status   Aspirin 325 MG Tablet Delayed Response 1/30/2018 Active   Cholecalciferol (VITAMIN D3) 2000 UNITS Tab 1/30/2018 Active   ferrous sulfate 325 (65 FE) MG tablet 1/30/2018 Active   haloperidol (HALDOL) 1 MG Tab 1/30/2018 Active   levetiracetam (KEPPRA) 100 MG/ML Solution 1/30/2018 Active   lorazepam (ATIVAN) 1 MG Tab 12/19/2017 Active   metoprolol (LOPRESSOR) 25 MG Tab 1/30/2018 Active                ALLERGIES  Allergies   Allergen Reactions   • Egg White Vomiting and Swelling   • Egg Yolk Vomiting and Swelling   • Chicken Allergy Vomiting and Swelling   • Omeprazole      Coughing and choking   • Prednisone      Mood changes   • Statins [Hmg-Coa-R Inhibitors] Shortness of Breath       PHYSICAL EXAM  VITAL SIGNS: /71   Pulse 67   Temp 36.7 °C (98.1 °F)   Resp 18   Ht 1.854 m (6' 1\")   Wt 79.4 kg (175 lb)   SpO2 95%   BMI 23.09 kg/m²   Pulse ox interpretation: I interpret this pulse ox as normal.  Constitutional: Alert in no apparent distress.  HENT: No signs of trauma, Bilateral external ears normal, Nose normal.   Eyes: Conjunctiva normal, Non-icteric.   Neck: Normal range of motion, Supple, No stridor.   Cardiovascular: Regular rate and rhythm, no murmurs.   Thorax & Lungs: Normal breath sounds, No respiratory distress, No wheezing, No chest tenderness.   Skin: Warm, Dry, No erythema, No rash.   Musculoskeletal: Obvious deformity to right shoulder. No skin breakdown. FROM to wrist and fingers distal to the injured shoulder. Strong radial pulse and brisk capillary refill.   Neurologic: Alert , Normal motor function, Normal sensory function, No focal deficits noted.   Psychiatric: Affect normal, Judgment normal, Mood normal.     DIAGNOSTIC " STUDIES / PROCEDURES    LABS  Labs Reviewed - No data to display  All labs reviewed by me.    RADIOLOGY  CT-SHOULDER W/O RIGHT   Final Result      1.  Comminuted fracture of the RIGHT proximal humerus involving surgical neck and head with impaction and apex anterior angulation.   2.  No RIGHT shoulder dislocation.   3.  Associated edema and/or contusion within the subcutaneous soft tissues and in the axilla.   4.  RIGHT upper lobe pulmonary nodule measuring 25 mm with associated cavitary change, concerning for neoplasm.      DX-SHOULDER 2+ RIGHT   Final Result      1.  Comminuted, impacted and angulated fracture of the RIGHT proximal humerus involving surgical neck and head.   2.  No RIGHT shoulder dislocation.   3.  Ill-defined opacity in the RIGHT mid to upper lung concerning for nodule or focal inflammatory process.      DX-CHEST-LIMITED (1 VIEW)   Final Result      1.  Mild bibasilar atelectasis.   2.  Minimal RIGHT mid to upper lung opacity, possibly developing pneumonia.        The radiologist's interpretation of all radiological studies have been reviewed by me.    COURSE & MEDICAL DECISION MAKING  Nursing notes, VS, PMSFHx reviewed in chart.     11:19 PM Patient seen and examined at bedside. Differential diagnosis includes but is not limited to fracture, dislocation, or contusion of shoulder, less likely rib fracture or pneumothorax. Ordered for xrays of the chest and shoulder to evaluate. Patient will be treated with 50mcg IV fentanyl for his symptoms.     12:02 AM This patient has a comminuted and impacted humerus fracture. I've paged orthopedics. His CXR shows minumal right upper lung opacity, which is read as possible developing pneumonia, though I think may be pulmonary contusion, given the context of the injury.    Returned to the bedside after the patient's CT was performed, but not yet read. He had been placed in a shoulder immobilizer with improved comfort. I ordered him 2 Norco for discharge, and  gave him a prescription for some additional Norco.      The patient will return for new or worsening symptoms and is stable at the time of discharge.    The patient is referred to a primary physician for blood pressure management, diabetic screening, and for all other preventative health concerns.    DISPOSITION:  Patient will be discharged home in stable condition.    FOLLOW UP:  Jeremy Woods M.D.  [address]  [place] [id number] [phone]    Call in 1 day        OUTPATIENT MEDICATIONS:  Discharge Medication List as of 1/31/2018  2:29 AM      START taking these medications    Details   hydrocodone-acetaminophen (NORCO) 5-325 MG Tab per tablet Take 1 Tab by mouth every four hours as needed for up to 12 doses., Disp-12 Tab, R-0, Print Rx Paper, For 12 doses             FINAL IMPRESSION  1. Closed fracture of proximal end of right humerus, unspecified fracture morphology, initial encounter

## 2018-01-31 NOTE — ED NOTES
Pt emptying colostomy bag upon CT tech arrival. CT notified of pt readiness for imaging at this time.

## 2018-01-31 NOTE — ED NOTES
Pt medicated per MAR. Discharge orders received, IV and monitor discontinued, instructions and education given, follow-up discussed, prescription provided, pt verbalized understanding.

## 2018-03-07 PROBLEM — R56.9 SEIZURES (HCC): Status: ACTIVE | Noted: 2018-01-01

## 2018-03-07 PROBLEM — G93.40 ENCEPHALOPATHY: Status: ACTIVE | Noted: 2018-01-01

## 2018-03-07 NOTE — ED PROVIDER NOTES
ED Provider Note    CHIEF COMPLAINT  Chief Complaint   Patient presents with   • Seizure     Pt bib ems from home. per family pt has been having focal seizures since 2030hrs. Pt took ativan with no effect. EMS gave 2mg versed IV enroute       HPI  Conner Mora is a 77 y.o. male who presents with focal seizures starting around 8:00 tonight. He has a history of seizure disorder but has been very well controlled. He was taken off his Vimpat last year and this is the 1st seizure activity he has had since then. No fevers. The seizures are described as left-sided. He does not lose consciousness with them but his pain associated with his left side. He has postictal afterwards according to family.      REVIEW OF SYSTEMS  positive for seizure, negative for fever. All other systems are negative.     PAST MEDICAL HISTORY   has a past medical history of Arrhythmia; Cataract; Dental disorder; GERD (gastroesophageal reflux disease) (7/8/2013); History of colostomy (10/26/2011); History of CVA (cerebrovascular accident) (4/29/2015); Hypertension; Indigestion; Multiple falls; On home oxygen therapy; Seizure disorder (CMS-[facility]); and Snoring.    SOCIAL HISTORY  Social History     Social History Main Topics   • Smoking status: Former Smoker     Packs/day: 1.00     Years: 54.00     Types: Cigarettes     Start date: 9/24/1954     Quit date: 3/19/2011   • Smokeless tobacco: Never Used      Comment: Started smoking at 17   • Alcohol use No   • Drug use: No   • Sexual activity: No       SURGICAL HISTORY   has a past surgical history that includes amputation, below the knee (Left, 1980); hip orif (7/21/2009); abdominal aortic aneurysm (10/14/2009); abdominal aortic aneurysm (2009); other abdominal surgery; sigmoidoscopy flex (11/9/2010); colectomy (11/21/2010); colostomy (11/21/2010); cataract phaco with iol (8/12/2014); cataract phaco with iol (8/26/2014); and hip nailing intramedullary (Right, 3/9/2016).    CURRENT MEDICATIONS  Home  "Medications    **Home medications have not yet been reviewed for this encounter**         ALLERGIES  Allergies   Allergen Reactions   • Egg White Vomiting and Swelling   • Egg Yolk Vomiting and Swelling   • Chicken Allergy Vomiting and Swelling   • Omeprazole      Coughing and choking   • Prednisone      Mood changes   • Statins [Hmg-Coa-R Inhibitors] Shortness of Breath       PHYSICAL EXAM  VITAL SIGNS: Ht 1.854 m (6' 1\")   Wt 79.4 kg (175 lb)   BMI 23.09 kg/m² .  Constitutional: Alert in no apparent distress. Sleeping but easily arousable  HENT: No signs of trauma, Bilateral external ears normal, Nose normal.   Eyes: Pupils are equal and reactive, Conjunctiva normal, Non-icteric.   Neck: Normal range of motion, No tenderness, Supple, No stridor.   Cardiovascular: Regular rate and rhythm, no murmurs.   Thorax & Lungs: Normal breath sounds, No respiratory distress, No wheezing, No chest tenderness.   Abdomen: Bowel sounds normal, Soft, No tenderness, No masses, No peritoneal signs.  Skin: Warm, Dry, No erythema, No rash.   Musculoskeletal:  no major deformities noted. Right arm in a shoulder immobilizer  Neurologic: Alert,  No focal deficits noted.   Psychiatric: Affect normal, Judgment normal, Mood normal.       DIAGNOSTIC STUDIES / PROCEDURES      LABS  Labs Reviewed   CBC WITH DIFFERENTIAL - Abnormal; Notable for the following:        Result Value    WBC 11.6 (*)     RBC 4.43 (*)     Hemoglobin 13.8 (*)     MCHC 32.1 (*)     RDW 50.9 (*)     Neutrophils-Polys 79.40 (*)     Lymphocytes 9.50 (*)     Eosinophils 7.00 (*)     Neutrophils (Absolute) 9.23 (*)     Eos (Absolute) 0.81 (*)     All other components within normal limits   COMP METABOLIC PANEL - Abnormal; Notable for the following:     Glucose 117 (*)     Alkaline Phosphatase 209 (*)     All other components within normal limits   ESTIMATED GFR   URINALYSIS,CULTURE IF INDICATED           COURSE & MEDICAL DECISION MAKING  Pertinent Labs & Imaging studies " reviewed. (See chart for details)  This is a 77-year-old gentleman who presents with seizure. He does have a history of focal seizures however these have been quite well controlled. He used to be on Vimpat which controlled his symptoms quite well however the co-pay was too expensive and he had to be changed. I do think this is the result of this medication cessation. His labs were checked to evaluate for possible infection or dehydration. There unremarkable. The patient is closely followed by the neurology group. He will be admitted so they can consult in the morning and make recommendations on further seizure medication regimen. I spoke with Dr. Jaeger who is agreeable to this. Patient will be admitted to the hospital service in stable condition.        FINAL IMPRESSION  1. Seizure (CMS-HCC)        2.   3.    This dictation has been creating using voice recognition software. The accuracy of the dictation is limited the abilities of the software.  I expect there may be some errors of grammar and possibly content. I made every attempt to manually correct the errors within my dictation. However errors related to this voice recognition software may still exist and should be interpreted within the appropriate context.      The note accurately reflects work and decisions made by me.  Stacy Agrawal  3/7/2018  1:05 AM

## 2018-03-07 NOTE — PROGRESS NOTES
Two RN skin check completed.  Patient notified RNs of pressure ulcer on buttocks.  Would consult completed.  Mepilex placed.

## 2018-03-07 NOTE — H&P
Hospital Medicine History and Physical    Date of Service  3/7/2018    Chief Complaint  Chief Complaint   Patient presents with   • Seizure     Pt bib ems from home. per family pt has been having focal seizures since 2030hrs. Pt took ativan with no effect. EMS gave 2mg versed IV enroute       History of Presenting Illness  77 y.o. male who presented 3/6/2018 with multiple seizures since 8 PM. Patient wife states he's had multiple seizures starting at 8 PM wife thinks he's had about 3 seizures. He took Ativan for breakthrough seizures given by his neurologist without any effect. He is currently on Keppra as been compliant with his regimen. He was taken off Vimpat last year this is 1st the seizure activity since then. Said no signs of acute infection no fevers no chills no congestion. Seizures and left-sided since stroke in 2015 no loss of consciousness he has postictal activity afterwards according to family. He is currently postictal and very sleepy. History is very limited at this time.    Primary Care Physician  Renee Robles D.O.    Consultants  None    Code Status  Full    Review of Systems  Review of Systems   Unable to perform ROS: Medical condition        Past Medical History  Past Medical History:   Diagnosis Date   • History of CVA (cerebrovascular accident) 4/29/2015   • GERD (gastroesophageal reflux disease) 7/8/2013   • History of colostomy 10/26/2011   • Arrhythmia     Pt states r/t caffeine   • Cataract     Bilat   • Dental disorder     dentures   • Hypertension    • Indigestion    • Multiple falls    • On home oxygen therapy    • Seizure disorder (CMS-HCC)     10 yrs ago  dilantin n o seizure 10-11 years   • Snoring        Surgical History  Past Surgical History:   Procedure Laterality Date   • HIP NAILING INTRAMEDULLARY Right 3/9/2016    Procedure: HIP NAILING INTRAMEDULLARY;  Surgeon: Freddy Gan M.D.;  [place] [facility] [place];  Service:    • CATARACT PHACO WITH IOL   8/26/2014    Performed by Abran Barber M.D. at SURGERY [place]   • CATARACT PHACO WITH IOL  8/12/2014    Performed by Abran Barber M.D. at [facility]   • COLECTOMY  11/21/2010    Performed by BRYCE HDEZ at SURGERY [facility]   • COLOSTOMY  11/21/2010    Performed by BRYCE HDEZ at SURGERY [facility]   • SIGMOIDOSCOPY FLEX  11/9/2010    Performed by MINA MAYER at ENDOSCOPY [facility] ORS   • ABDOMINAL AORTIC ANEURYSM  10/14/2009    Performed by PHOEBE CHEATHAM at SURGERY [facility]   • HIP ORIF  7/21/2009    Performed by WAYNE HOLMAN at SURGERY [facility] OR[facility]   • ABDOMINAL AORTIC ANEURYSM  2009   • AMPUTATION, BELOW THE KNEE Left 1980   • OTHER ABDOMINAL SURGERY      AAA repair 2009       Medications  No current facility-administered medications on file prior to encounter.      Current Outpatient Prescriptions on File Prior to Encounter   Medication Sig Dispense Refill   • levetiracetam (KEPPRA) 100 MG/ML Solution TAKE ONE & ONE-HALF TEASPOONFUL (7.5 ML) BY MOUTH TWICE DAILY 450 mL 5   • lorazepam (ATIVAN) 1 MG Tab Take 1/2-1 tablet PO PRN breakthrough seizures.  Do not exceed more than 2 tablets in 24 hours unless directed otherwise by physician. 10 Tab 0   • metoprolol (LOPRESSOR) 25 MG Tab Take 0.5 Tabs by mouth 2 Times a Day. 90 Tab 4   • haloperidol (HALDOL) 1 MG Tab Take 0.5 Tabs by mouth every evening. 45 Tab 4   • Aspirin 325 MG Tablet Delayed Response Take 1 Tab by mouth every day. 90 Tab 1   • Cholecalciferol (VITAMIN D3) 2000 UNITS Tab Take 2,000 Units by mouth every day. 30 Tab 1   • ferrous sulfate 325 (65 FE) MG tablet Take 1 Tab by mouth 2 times a day, with meals. 60 Tab 1       Family History  Family History   Problem Relation Age of Onset   • Heart Attack Mother    • No Known Problems Father    • Hyperlipidemia Sister    • No Known Problems Brother    • No Known Problems Brother    • Heart Attack Brother    • No Known  Problems Maternal Grandmother    • No Known Problems Maternal Grandfather    • No Known Problems Paternal Grandmother    • No Known Problems Paternal Grandfather    • Lung Disease Neg Hx    • Cancer Neg Hx    • Diabetes Neg Hx    • Hypertension Neg Hx    • Stroke Neg Hx        Social History  Social History   Substance Use Topics   • Smoking status: Former Smoker     Packs/day: 1.00     Years: 54.00     Types: Cigarettes     Start date: 1954     Quit date: 3/19/2011   • Smokeless tobacco: Never Used      Comment: Started smoking at 17   • Alcohol use No       Allergies  Allergies   Allergen Reactions   • Egg White Vomiting and Swelling   • Egg Yolk Vomiting and Swelling   • Chicken Allergy Vomiting and Swelling   • Omeprazole      Coughing and choking   • Prednisone      Mood changes   • Statins [Hmg-Coa-R Inhibitors] Shortness of Breath        Physical Exam  Laboratory   Hemodynamics  No data recorded.      Pulse  Av.8  Min: 83  Max: 95    NIBP: 106/67      Respiratory      Respiration: 18, Pulse Oximetry: 96 %             Physical Exam   Constitutional:   postictal   HENT:   Right sided 'port wine stain' rash   Eyes: Conjunctivae and EOM are normal. Pupils are equal, round, and reactive to light.   Neck: Normal range of motion. Neck supple. No JVD present.   Cardiovascular: Normal rate, regular rhythm, normal heart sounds and intact distal pulses.    Pulmonary/Chest: Effort normal and breath sounds normal.   Abdominal: Soft. Bowel sounds are normal. He exhibits no distension. There is no tenderness.   Musculoskeletal: He exhibits no edema.   Right arm in a shoulder immobilizer   Neurological: He is alert. He exhibits normal muscle tone.   Skin: Skin is warm and dry.   Psychiatric: He has a normal mood and affect. His behavior is normal. Judgment and thought content normal.       Recent Labs      18   2336   WBC  11.6*   RBC  4.43*   HEMOGLOBIN  13.8*   HEMATOCRIT  43.0   MCV  97.1   MCH  31.2    MCHC  32.1*   RDW  50.9*   PLATELETCT  201   MPV  10.1     Recent Labs      03/06/18   2336   SODIUM  137   POTASSIUM  4.9   CHLORIDE  105   CO2  22   GLUCOSE  117*   BUN  14   CREATININE  1.10   CALCIUM  9.9     Recent Labs      03/06/18   2336   ALTSGPT  26   ASTSGOT  31   ALKPHOSPHAT  209*   TBILIRUBIN  0.6   GLUCOSE  117*                 Lab Results   Component Value Date    TROPONINI 0.02 03/24/2015     Urinalysis:    Lab Results  Component Value Date/Time   SPECGRAVITY 1.011 03/20/2015 0700   GLUCOSEUR Negative 03/20/2015 0700   KETONES Negative 03/20/2015 0700   NITRITE Negative 03/20/2015 0700   WBCURINE 10-20 (A) 12/06/2010 1715   RBCURINE 0-2 (A) 12/06/2010 1715   BACTERIA Moderate (A) 12/06/2010 1715   EPITHELCELL Few 12/06/2010 1715        Imaging  none   Assessment/Plan     I anticipate this patient will require at least two midnights for appropriate medical management, necessitating inpatient admission.    * Seizures (CMS-HCC)   Assessment & Plan    Meets inpatient criteria >2 seizures noted  From hx of cva  Check head ct   Will place on IV keppra  Was previously on vimpat unable to afford 2/2 to insurance stopped this medication 1 year ago  IV benzo for breakthrough   Dysphagia screen prior to diet  Will need neurology consultation for medication reccomendation   siezure precautions, neuro checks        Anemia- (present on admission)   Assessment & Plan    Monitor no signs of acute bleeding         Chronic respiratory failure with hypoxia (CMS-HCC)- (present on admission)   Assessment & Plan    Not in acute exacerbation at [facility] 3-4 l        Sturge-Shabazz disease, 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)   Assessment & Plan    With rash noted right sided  Needs outpatient follow up and surveillance         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)- (present on admission)   Assessment & Plan    Wound care         Encephalopathy   Assessment & Plan    Likely postictal state  No signs of infecitous or metabolic  etiology        History of below knee amputation (CMS-HCC)- (present on admission)   Assessment & Plan    Hx of        GERD (gastroesophageal reflux disease)- (present on admission)   Assessment & Plan    Hx of no complaints        History of CVA (cerebrovascular accident)- (present on admission)   Assessment & Plan    Left sided deficits chronic         COPD (chronic obstructive pulmonary disease) (CMS-HCC)- (present on admission)   Assessment & Plan    resp care protocol   Not in acute exacerbation            VTE prophylaxis: heparin
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
## 2018-03-07 NOTE — ED NOTES
Gave bedside report to Desire LANDEROS. Pt is aware of POC. Pt belongings are on bed. Pt is ready for transport.

## 2018-03-07 NOTE — DIETARY
[employer] - Alert received for newly identified wound. Wound team consult pending, will await wound staging to make recommendations if appropriate. RD will monitor per dept policy.

## 2018-03-07 NOTE — ED TRIAGE NOTES
Chief Complaint   Patient presents with   • Seizure     Pt bib ems from home. per family pt has been having focal seizures since 2030hrs. Pt took ativan with no effect. EMS gave 2mg versed IV enroute

## 2018-03-07 NOTE — ED NOTES
Pt resting comfortably in bed. Monitors in place VSS. No s/s of distress noted. Family at bedside.

## 2018-03-07 NOTE — DISCHARGE PLANNING
Transitional Care Navigator:    Chart reviewed for post acute needs.  Pt with multiple co morbidities and a LACE score of 78.    Please consider a home health referral for this patient.

## 2018-03-07 NOTE — ASSESSMENT & PLAN NOTE
Neuro consulted continue on keppra for now ct head no acute finding. eeg showed focal seizure from the right.

## 2018-03-07 NOTE — PROGRESS NOTES
More awake, denies pain. Trying to void. Yesy po's, ileotomy active, appliance intact. Wife now @ bedside and updated w/ plan of care.

## 2018-03-07 NOTE — WOUND TEAM
"Patient seen by wound team per ostomy and wound consult order. Wound note to follow. Patient has RLQ ileostomy with belt in place, for the last 8 years. Per patient report, \" My wife changes it every other day, she will be in at noon. Patient given supplies if needed for nursing to change when wife is not present. Declines need to change at this time. Barrier is intact, no leakage, stool present in pouch.   "

## 2018-03-07 NOTE — PROGRESS NOTES
Bedside report completed in the Emergency Room.  RN Assumed pt care and brought patient to uit.  Pt AAOx3, resting in bed comfortably with no signs of labored breathing.  Pt tele monitor in place, cardiac rhythm being monitored.  Pt call light within reach, bed in low position, non skid socks in place.  Pt denies any pain or other distress at this time. Family at bedside.  Patient and family deny any needs at this time.

## 2018-03-08 NOTE — CARE PLAN
Problem: Safety  Goal: Will remain free from falls    Intervention: Implement fall precautions   03/07/18 0830 03/08/18 0508   OTHER   Environmental Precautions Bed in Low Position;Personal Belongings, Wastebasket, Call Bell etc. in Easy Reach --    Chair/Bed Strip Alarm --  Yes - Alarm On

## 2018-03-08 NOTE — PROGRESS NOTES
No seizure activity noted. Seizure precautions in progress. Repositioned routinely. Voiding using urinal.

## 2018-03-08 NOTE — CARE PLAN
Problem: Safety  Goal: Will remain free from falls    Intervention: Implement fall precautions   03/07/18 0830   OTHER   Environmental Precautions Bed in Low Position;Personal Belongings, Wastebasket, Call Bell etc. in Easy Reach

## 2018-03-08 NOTE — CONSULTS
NEUROLOGY NOTE    Referring Physician  Cally Jaeger M.D.      CHIEF COMPLAINT:    seizure  Chief Complaint   Patient presents with   • Seizure     Pt bib ems from home. per family pt has been having focal seizures since 2030hrs. Pt took ativan with no effect. EMS gave 2mg versed IV enroute           IMPRESSION:    1. Focal seizures affecting the left limb  2. Sturge-Shabazz Syndrome     PLAN/RECOMMENDATIONS:    Increase the seizure medicine -- Kappa since the EEG shows tendency of seizures      ________________________________________________________________________      Sturge-Shabazz syndrome (SWS) is a neurological disorder marked by a distinctive port-wine stain on the forehead, scalp, or around the eye. This stain is a birthmark caused by an overabundance of capillaries near the surface of the skin. Blood vessels on the same side of the brain as the stain may also be affected.     ________________________________________________________________________         INTERPRETATION:        ________________________________________________________________________     This scalp EEG is consistent with     1. Focal seizure from the right      2. Moderate focal cortical dysfunction      EEG 03/07/18 9:47 PM     ________________________________________________________________________                     SIGNATURE:  Madhu Casas      CC:  Renee Robles D.O.  1. Examination is significantly limited by patient motion. No occlusion is identified. No aneurysm is seen.    2. Again noted are gyriform calcifications along the right temporal and occipital lobes compatible with history of Sturge-Shabazz.    3. Atrophy    4. Paranasal sinus disease.            PRESENT ILLNESS:   Pt bib ems from home. per family pt has been having focal seizures since 2030hrs. Pt took ativan with no effect. EMS gave 2mg versed IV enroute  The patient's seizures consists of left limb shaking    77 y.o. male who presented 3/6/2018 with multiple  seizures since 8 PM. Patient wife states he's had multiple seizures starting at 8 PM wife thinks he's had about 3 seizures. He took Ativan for breakthrough seizures given by his neurologist without any effect. He is currently on Keppra as been compliant with his regimen. He was taken off Vimpat last year this is 1st the seizure activity since then.     PAST MEDICAL HISTORY:  Past Medical History:   Diagnosis Date   • Arrhythmia     Pt states r/t caffeine   • Cataract     Bilat   • Dental disorder     dentures   • GERD (gastroesophageal reflux disease) 7/8/2013   • History of colostomy 10/26/2011   • History of CVA (cerebrovascular accident) 4/29/2015   • Hypertension    • Indigestion    • Multiple falls    • On home oxygen therapy    • Seizure disorder (CMS-HCC)     10 yrs ago  dilantin n o seizure 10-11 years   • Snoring        PAST SURGICAL HISTORY:  Past Surgical History:   Procedure Laterality Date   • HIP NAILING INTRAMEDULLARY Right 3/9/2016    Procedure: HIP NAILING INTRAMEDULLARY;  Surgeon: Freddy Gan M.D.;  Location: [facility];  Service:    • CATARACT PHACO WITH IOL  8/26/2014    Performed by Abran Barber M.D. at [facility]   • CATARACT PHACO WITH IOL  8/12/2014    Performed by Abran Barber M.D. at [facility]   • COLECTOMY  11/21/2010    Performed by BRYCE HDEZ at SURGERY [facility]   • COLOSTOMY  11/21/2010    Performed by BRYCE HDEZ at SURGERY [facility]   • SIGMOIDOSCOPY FLEX  11/9/2010    Performed by MINA MAYER at ENDOSCOPY [facility]   • ABDOMINAL AORTIC ANEURYSM  10/14/2009    Performed by PHOEBE CHEATHAM at SURGERY [facility]   • HIP ORIF  7/21/2009    Performed by WAYNE HOLMAN at SURGERY [facility]   • ABDOMINAL AORTIC ANEURYSM  2009   • AMPUTATION, BELOW THE KNEE Left 1980   • OTHER ABDOMINAL SURGERY      AAA repair 2009       FAMILY HISTORY:  Family History   Problem Relation Age of  Onset   • Heart Attack Mother    • No Known Problems Father    • Hyperlipidemia Sister    • No Known Problems Brother    • No Known Problems Brother    • Heart Attack Brother    • No Known Problems Maternal Grandmother    • No Known Problems Maternal Grandfather    • No Known Problems Paternal Grandmother    • No Known Problems Paternal Grandfather    • Lung Disease Neg Hx    • Cancer Neg Hx    • Diabetes Neg Hx    • Hypertension Neg Hx    • Stroke Neg Hx        SOCIAL HISTORY:  Social History     Social History   • Marital status:      Spouse name: N/A   • Number of children: N/A   • Years of education: N/A     Occupational History   • Not on file.     Social History Main Topics   • Smoking status: Former Smoker     Packs/day: 1.00     Years: 54.00     Types: Cigarettes     Start date: [birth year]/24/1954     Quit date: 3/19/2011   • Smokeless tobacco: Never Used      Comment: Started smoking at 17   • Alcohol use No   • Drug use: No   • Sexual activity: No     Other Topics Concern   • Not on file     Social History Narrative   • No narrative on file     ALLERGIES:  Allergies   Allergen Reactions   • Egg White Vomiting and Swelling   • Egg Yolk Vomiting and Swelling   • Chicken Allergy Vomiting and Swelling   • Omeprazole      Coughing and choking   • Prednisone      Mood changes   • Statins [Hmg-Coa-R Inhibitors] Shortness of Breath     TOBHX  History   Smoking Status   • Former Smoker   • Packs/day: 1.00   • Years: 54.00   • Types: Cigarettes   • Start date: [birth year]/24/1954   • Quit date: 3/19/2011   Smokeless Tobacco   • Never Used     Comment: Started smoking at 17     ALCHX  History   Alcohol Use No     DRUGHX  History   Drug Use No           MEDICATIONS:  Current Facility-Administered Medications   Medication Dose   • senna-docusate (PERICOLACE or SENOKOT S) 8.6-50 MG per tablet 2 Tab  2 Tab    And   • polyethylene glycol/lytes (MIRALAX) PACKET 1 Packet  1 Packet    And   • magnesium hydroxide (MILK OF MAGNESIA)  "suspension 30 mL  30 mL    And   • bisacodyl (DULCOLAX) suppository 10 mg  10 mg   • heparin injection 5,000 Units  5,000 Units   • aspirin (ASA) tablet 325 mg  325 mg   • haloperidol (HALDOL) tablet 0.5 mg  0.5 mg   • metoprolol (LOPRESSOR) tablet 12.5 mg  12.5 mg   • LORazepam (ATIVAN) injection 4 mg  4 mg   • Respiratory Care per Protocol     • levETIRAcetam (KEPPRA) 750 mg in  mL IVPB  750 mg       REVIEW OF SYSTEM:    Constitutional: Denies fevers, Denies weight changes   Eyes: Denies changes in vision, no eye pain   Ears/Nose/Throat/Mouth: Denies nasal congestion or sore throat   Cardiovascular: Denies chest pain or palpitations   Respiratory: Denies SOB.   Gastrointestinal/Hepatic: Denies abdominal pain, nausea, vomiting, diarrhea, constipation or GI bleeding   Genitourinary: Denies bladder dysfunction, dysuria or frequency   Musculoskeletal/Rheum: Denies joint pain and swelling   Skin/Breast: Denies rash, denies breast lumps or discharge   Neurological: focal seizure, left limb shaking, sturge Shabazz  Psychiatric: denies mood disorder   Endocrine: denies hx of diabetes or thyroid dysfunction   Heme/Oncology/Lymph Nodes: Denies enlarged lymph nodes, denies brusing or known bleeding disorder   Allergic/Immunologic: Denies hx of allergies         PHYSICAL AND NEUROLOGICAL EXMAINATIONS:  VITAL SIGNS: /65   Pulse 72   Temp 36.4 °C (97.6 °F)   Resp (!) 22   Ht 1.854 m (6' 1\")   Wt 79.4 kg (175 lb)   SpO2 95%   BMI 23.09 kg/m²   CURRENT WEIGHT: BMI: Body mass index is 23.09 kg/m².  PREVIOUS WEIGHTS:  Wt Readings from Last 25 Encounters:   03/06/18 79.4 kg (175 lb)   01/30/18 79.4 kg (175 lb)   12/19/17 80.7 kg (178 lb)   10/12/17 84.4 kg (186 lb)   08/03/17 81.6 kg (180 lb)   04/11/17 85.3 kg (188 lb)   10/12/16 89 kg (196 lb 3.2 oz)   09/13/16 87.1 kg (192 lb)   04/06/16 86.2 kg (190 lb)   03/23/16 87.5 kg (193 lb)   03/09/16 80 kg (176 lb 5.9 oz)   02/02/16 87.5 kg (193 lb)   01/13/16 78 kg (172 " lb)   12/01/15 83.9 kg (185 lb)   09/11/15 79.8 kg (176 lb)   08/04/15 82.6 kg (182 lb)   04/29/15 77.6 kg (171 lb)   04/28/15 79.4 kg (175 lb)   04/05/15 79.4 kg (175 lb 1.6 oz)   03/13/15 72.6 kg (160 lb)   03/04/15 83.5 kg (184 lb)   01/31/15 86.2 kg (190 lb)   07/24/14 88.5 kg (195 lb)   12/05/13 86.2 kg (190 lb)   07/08/13 85.6 kg (188 lb 12.8 oz)       General appearance of patient: WDWN(+) NAD(+)    EYES  o Fundus : Papilledem(-) Exudates(-) Hemorrhage(-)  Nervous System  Orientation to time, place and person(+)  Memory normal(-)  Language: aphasia(-)  Knowledge: past(+) Current(+)  Attention(+)  Cranial Nerves  • Nerve 2: intact  • Nerve 3,4,6: intact  • Nerve 5 : intact  • Nerve 7: intact  • Nerve 8: intact  • Nerve 9 & 10: intact  • Nerve 11: intact  • Nerve 12: intact  Muscle Power and muscle tone: symmetric  Sensory System: Pin sensation intact(+)  Reflexes: symmetric throughout  Cerebellar Function FNP normal   Gait : bed ridden   Heart and Vascular  Peripheral Vasucular system : Edema (-) Swelling(-)  RHB, Breathing sound clear  abdomen bowel sound normoactive  Extremities freely moveable  Joints no contracture       NEUROIMAGING: I reviewed the MRI/CT of brain       LAB:  Recent Labs      03/06/18   2336   WBC  11.6*   RBC  4.43*   HEMOGLOBIN  13.8*   HEMATOCRIT  43.0   MCV  97.1   MCH  31.2   MCHC  32.1*   RDW  50.9*   PLATELETCT  201   MPV  10.1           IMPRESSION:    1. Focal seizures affecting the left limb  2. Sturge-Shabazz Syndrome     PLAN/RECOMMENDATIONS:    Increase the seizure medicine -- Kappa since the EEG shows tendency of seizures      ________________________________________________________________________      Sturge-Shabazz syndrome (SWS) is a neurological disorder marked by a distinctive port-wine stain on the forehead, scalp, or around the eye. This stain is a birthmark caused by an overabundance of capillaries near the surface of the skin. Blood vessels on the same side of the brain  as the stain may also be affected.     ________________________________________________________________________         INTERPRETATION:        ________________________________________________________________________     This scalp EEG is consistent with     1. Focal seizure from the right      2. Moderate focal cortical dysfunction      EEG 03/07/18 9:47 PM     ________________________________________________________________________                     SIGNATURE:  Madhu Casas      CC:  Renee Robles D.O.  1. Examination is significantly limited by patient motion. No occlusion is identified. No aneurysm is seen.    2. Again noted are gyriform calcifications along the right temporal and occipital lobes compatible with history of Sturge-Shabazz.    3. Atrophy    4. Paranasal sinus disease.

## 2018-03-08 NOTE — RESPIRATORY CARE
COPD EDUCATION by COPD CLINICAL EDUCATOR  3/8/2018 at 6:25 AM by Monica Sarmiento     Patient reviewed by COPD education team. Patient does not qualify for COPD program.

## 2018-03-08 NOTE — CARE PLAN
Problem: Safety  Goal: Will remain free from falls    Intervention: Implement fall precautions   03/08/18 1131   OTHER   Environmental Precautions Treaded Slipper Socks on Patient;Personal Belongings, Wastebasket, Call Bell etc. in Easy Reach;Communication Sign for Patients & Families;Bed in Low Position;Transferred to Stronger Side;Report Given to Other Health Care Providers Regarding Fall Risk;Mobility Assessed & Appropriate Sign Placed   Bedrails Bedrails Closest to Bathroom Down   Chair/Bed Strip Alarm Yes - Alarm On   Bed Alarm (Built in - for ICU ONLY) Yes - Alarm On         Problem: Knowledge Deficit  Goal: Knowledge of disease process/condition, treatment plan, diagnostic tests, and medications will improve    Intervention: Assess knowledge level of disease process/condition, treatment plan, diagnostic tests, and medications  Pt and family educated on POC, all questions answered in regards to disease process, treatment and diet. Pt and family verbalize understanding and voice no further questions at this time. Discussed heart block, echocardiogram, PT/OT eval, and EKGs.

## 2018-03-08 NOTE — EEG PROGRESS NOTE
EEG 03/07/18 9:40 PM    ROUTINE ELECTROENCEPHALOGRAM REPORT      NAME: Conner Mora    REFERRING Dr:    INDICATION: Seizure      TECHNIQUE: 30 channel routine electroencephalogram (EEG) was performed in accordance with the international 10-20 system. The study was reviewed in bipolar and referential montages. The recording examined the patient during wakeful and drowsy state(s).     DESCRIPTION OF THE RECORD:      Background rhythm during awake stage shows well-organized, well-developed, average voltage 10 to 11 hertz alpha activity in the left posterior regions.  It blocks with eye opening and it is bilaterally synchronous and symmetrical.  There are epielptiform spike-and-wave discharges and lateralizing delta abnormalities over right -- nearly continuous are seen.  Photic stimulation did not produce any abnormalities. Stage I sleep was achieved.      ACTIVATION PROCEDURES:      Photic Stimulation were done    ICTAL AND/OR INTERICTAL FINDINGS:    There are epielptiform spike-and-wave discharges and lateralizing delta abnormalities over right -- nearly continuous are seen..  No clinical events or seizures were reported or recorded during the study.      EKG: sampling of the EKG recording demonstrated sinus rhythm.        INTERPRETATION:      ________________________________________________________________________    This scalp EEG is consistent with    1. Focal seizure from the right     2. Moderate focal cortical dysfunction     EEG 03/07/18 9:47 PM    ________________________________________________________________________

## 2018-03-08 NOTE — PROGRESS NOTES
Report received by NOC RN. Assumed care of pt. Assessment complete. Personal items at bedside. Pt A&O x 4. Pt has seizure and fall precautions in place, per NOC RN-has had runs of 2nd degree type 1 and 2 HB-will update , and has functioning ileostomy in place . Pt in no apparent signs of distress. Plan of care discussed. Call light in reach,  bed in lowest position, and pt has no further questions at this time.

## 2018-03-08 NOTE — PROCEDURES
DATE OF SERVICE:  03/07/2018    The inpatient EEG was done on 03/07/2018.    ROUTINE ELECTROENCEPHALOGRAM REPORT        NAME: Conner Mora Han     REFERRING Dr:     INDICATION: Seizure        TECHNIQUE: 30 channel routine electroencephalogram (EEG) was performed in accordance with the international 10-20 system. The study was reviewed in bipolar and referential montages. The recording examined the patient during wakeful and drowsy state(s).      DESCRIPTION OF THE RECORD:        Background rhythm during awake stage shows well-organized, well-developed, average voltage 10 to 11 hertz alpha activity in the left posterior regions.  It blocks with eye opening and it is bilaterally synchronous and symmetrical.  There are epielptiform spike-and-wave discharges and lateralizing delta abnormalities over right -- nearly continuous are seen.  Photic stimulation did not produce any abnormalities. Stage I sleep was achieved.        ACTIVATION PROCEDURES:       Photic Stimulation were done     ICTAL AND/OR INTERICTAL FINDINGS:    There are epielptiform spike-and-wave discharges and lateralizing delta abnormalities over right -- nearly continuous are seen..  No clinical events or seizures were reported or recorded during the study.       EKG: sampling of the EKG recording demonstrated sinus rhythm.          INTERPRETATION:        ________________________________________________________________________     This scalp EEG is consistent with     1. Focal seizure from the right      2. Moderate focal cortical dysfunction      EEG 03/07/18 9:47 PM     ________________________________________________________________________                          ____________________________________     MD DANIELA SHARIF / DEBORAH    DD:  03/07/2018 21:52:35  DT:  03/07/2018 23:11:36    D#:  [id number]  Job#:  [id number]

## 2018-03-08 NOTE — PROGRESS NOTES
Report received by night RN. Assumed care of patient. Assessment complete. Pt AOx4. Seizure and fall precautions are in place. Plan of care discussed. Call light is in reach, bed is in lowest position.

## 2018-03-08 NOTE — PROGRESS NOTES
Updated  of the pt going in and out of 2nd degree HB type 1 and 2.  Dr. Polanco states hold metoprolol for now.

## 2018-03-09 PROBLEM — G93.40 ENCEPHALOPATHY: Status: RESOLVED | Noted: 2018-01-01 | Resolved: 2018-01-01

## 2018-03-09 NOTE — PROGRESS NOTES
Pt A&Ox 4. Discharged instructions discussed with pt all belongings accounted for per pt and spouse. Tele box removed and returned to monitors. Rx sent to [facility] on [facility] [facility]. Verified pharmacy has received order

## 2018-03-09 NOTE — CARE PLAN
Problem: Safety  Goal: Will remain free from falls    Intervention: Implement fall precautions   03/08/18 1131 03/08/18 2000   OTHER   Environmental Precautions --  Treaded Slipper Socks on Patient;Personal Belongings, Wastebasket, Call Bell etc. in Easy Reach;Report Given to Other Health Care Providers Regarding Fall Risk;Bed in Low Position;Communication Sign for Patients & Families;Mobility Assessed & Appropriate Sign Placed;Transferred to [facility] Side   Bedrails Bedrails Closest to Bathroom Down --    Chair/Bed Strip Alarm Yes - Alarm On --    Bed Alarm (Built in - for ICU ONLY) Yes - Alarm On --

## 2018-03-09 NOTE — THERAPY
"Occupational Therapy Evaluation completed.   Functional Status:  Pt presents to skilled OT services following sz, demonstrates mild to moderate deficits with ADLS due to RUE fx, needs updated information in regards to ROM and wb,no ortho consult in chart,  impaired balance, generalized strength and endurance deficits. Performed bed mobility with mod a, donned LLE prosthesis with sba, RLE required assist to thread socks and shoes due to RUE pain and decreased ROM, eob->chair with min a given HHA. Pt would benefit from acute skilled services while in house and will continue to assess for safe d/c disposition pending pt's PLOF and level of assist spouse able to provide.  Plan of Care: Will benefit from Occupational Therapy 3 times per week  Discharge Recommendations:  Equipment: Will Continue to Assess for Equipment Needs.     See \"Rehab Therapy-Acute\" Patient Summary Report for complete documentation.    "

## 2018-03-09 NOTE — PROGRESS NOTES
Echo completed. Spouse at bedside appears agitated; arms are crossed and svetlana during updates discussion with pt regarding POC.

## 2018-03-09 NOTE — PROGRESS NOTES
IMPRESSION:     1. Focal seizures affecting the left limb  2. Sturge-Shabazz Syndrome      PLAN/RECOMMENDATIONS:     ________________________________________________________________________      KEEP Kappa to 750mg bid since the EEG shows a high tendency of seizures  keppra was changed to oral   Follow up in [facility] neurology clinic with Catrachita    If circumstances change do not hesitate to re-consult neurology.     Thank you for the consult.     Madhu Casas M.D.  [facility]  3:15 PM03/09/18    ________________________________________________________________________      ________________________________________________________________________        Sturge-Shabazz syndrome (SWS) is a neurological disorder marked by a distinctive port-wine stain on the forehead, scalp, or around the eye. This stain is a birthmark caused by an overabundance of capillaries near the surface of the skin. Blood vessels on the same side of the brain as the stain may also be affected.      ________________________________________________________________________        Vitals:    03/08/18 2330 03/09/18 0400 03/09/18 0800 03/09/18 1200   BP: 118/73 116/69 126/79 113/79   Pulse: 82 78 80 (!) 105   Resp: 20 18 18 18   Temp: 36.2 °C (97.2 °F) 36.6 °C (97.8 °F) 36.4 °C (97.6 °F) 36.6 °C (97.8 °F)   SpO2: 95% 95% 96% 95%   Weight:       Height:         Physical Exam   Constitutional: He is oriented to person, place, and time and well-developed, well-nourished, and in no distress.   HENT:   Head: Normocephalic.   Pulmonary/Chest: Effort normal.   Abdominal: Soft.   Musculoskeletal: Normal range of motion.   Neurological: He is alert and oriented to person, place, and time.   Skin: Skin is warm.     Review of Systems   Constitutional: Negative for fever.   HENT: Negative for ear discharge.    Eyes: Negative for redness.   Respiratory: Negative for hemoptysis.    Cardiovascular: Negative for leg swelling.   Gastrointestinal:  Negative for blood in stool.   Genitourinary: Negative for hematuria.   Neurological: Positive for seizures.

## 2018-03-09 NOTE — DISCHARGE SUMMARY
CHIEF COMPLAINT ON ADMISSION  Chief Complaint   Patient presents with   • Seizure     Pt bib ems from home. per family pt has been having focal seizures since 2030hrs. Pt took ativan with no effect. EMS gave 2mg versed IV enroute       CODE STATUS  Full Code    HPI & HOSPITAL COURSE  Please see original H&P and consult notes specific information, patient was admitted on 3/6/18 and discharged on 3/9/18, patient admitted due to seizures, patient had CT head that did not showed acute finding, he had EEG that show seizure activity, neurologist was consulted and recommended to increased keppra to 750 mg bid patient has been stable, no more seizures, patient had a episode of  2 degree  type 1-2 AV block he is asymptomatic patient was on metoprolol that is stopped now, cardiologist was consulted, echo is wnl, patient was evaluated by cardio and recommended to keep holding metoprolol for now, patient was cleared for discharge by cardio and neuro, patient is d/c home today and he will f/u with his neurologist and cardiology, patient and family expressed understanding of his d/c plan and agreed with it. He is alert and oriented he has chronic respiratory failure that is at baseline denies any symptoms.         The patient met 2-midnight criteria for an inpatient stay at the time of discharge.    Therefore, he is discharged in good and stable condition with close outpatient follow-up.    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 [facility]   4/16/2018 9:00 AM CORNELIO Silva [facility] None     Renee Robles D.O.  [address]  [address]  [place] [address]  [phone]    In 1 week        MEDICATIONS ON DISCHARGE   Conner Mora   Home Medication Instructions MAUREEN:[id number]    Printed on:03/09/18 1423   Medication Information                      ascorbic acid (ASCORBIC ACID) 500 MG Tab  Take 250 mg by mouth every day.             Aspirin 325 MG Tablet Delayed Response  Take 1 Tab by mouth every day.             Cholecalciferol (VITAMIN D3) 2000 UNITS Tab  Take 2,000 Units by mouth every day.             ferrous sulfate 325 (65 FE) MG tablet  Take 1 Tab by mouth 2 times a day, with meals.             haloperidol (HALDOL) 1 MG Tab  Take 0.5 Tabs by mouth every evening.             levETIRAcetam (KEPPRA) 100 MG/ML Solution  Take 7.5 mL by mouth 2 times a day.             lorazepam (ATIVAN) 1 MG Tab  Take 1/2-1 tablet PO PRN breakthrough seizures.  Do not exceed more than 2 tablets in 24 hours unless directed otherwise by physician.                 DIET  Orders Placed This Encounter   Procedures   • DIET ORDER     Standing Status:   Standing     Number of Occurrences:   1     Order Specific Question:   Diet:     Answer:   Low Fiber(GI Soft) [2]       ACTIVITY  As tolerated.  Weight bearing as tolerated      CONSULTATIONS  Neuro  cardio    PROCEDURES  none    LABORATORY  Lab Results   Component Value Date/Time    SODIUM 140 03/08/2018 03:30 AM    POTASSIUM 4.0 03/08/2018 03:30 AM    CHLORIDE 106 03/08/2018 03:30 AM    CO2 26 03/08/2018 03:30 AM    GLUCOSE 95 03/08/2018 03:30 AM    BUN 13 03/08/2018 03:30 AM    CREATININE 1.04 03/08/2018 03:30 AM    CREATININE 1.20 04/21/2011 11:30 AM     GLOMRATE 47 (L) 10/26/2010 10:15 AM        Lab Results   Component Value Date/Time    WBC 9.9 03/08/2018 03:29 AM    WBC 9.3 10/26/2010 10:15 AM    HEMOGLOBIN 13.1 (L) 03/08/2018 03:29 AM    HEMATOCRIT 41.8 (L) 03/08/2018 03:29 AM    PLATELETCT 172 03/08/2018 03:29 AM        Total time of the discharge process exceeds 38 minutes

## 2018-03-09 NOTE — THERAPY
"Physical Therapy Evaluation completed.   Bed Mobility:  Supine to Sit: Moderate Assist  Transfers: Sit to Stand: Minimal Assist  Gait: Level Of Assist: Contact Guard Assist       Plan of Care: Will benefit from Physical Therapy 3 times per week  Discharge Recommendations: Equipment: Will Continue to Assess for Equipment Needs.      Patient is admitted with seizures and 1st degree HB.  MD planning  medication changes to assist with seizures.  Patient has PMHX for right shld fx (non-surgical), left hip nailing and CVA.  Patient's spouse is very supportive at home and assists pt as needed.  Patient will benefit from continued acute PT services followed by home health PT vs outpatient at discharge.  Patient reports he has Orthopedic follow up 3/15 for right shld.      See \"Rehab Therapy-Acute\" Patient Summary Report for complete documentation.     "

## 2018-03-09 NOTE — PROGRESS NOTES
[facility]    Date of Service: 3/8/2018    Chief Complaint  77 y.o. male admitted 3/6/2018 with seizures    Interval Problem Update  Patient on keppra this was increased to 750mg bid, no more seizure for now. Neurologist consulted.  Patient had second degree type 1-2 AV block cardio was consulted, metoprolol is stopped he is asymptomatic and back to baseline now continue telemetry.    Consultants/Specialty  Neuro  cardio    Disposition  Home when stable.         Review of Systems   Constitutional: Negative for chills and fever.   HENT: Negative for hearing loss and tinnitus.    Eyes: Negative for blurred vision.   Respiratory: Negative for cough.    Cardiovascular: Negative for chest pain.   Gastrointestinal: Negative for nausea.   Genitourinary: Negative for dysuria.   Musculoskeletal: Negative for myalgias.   Neurological: Negative for dizziness.   Endo/Heme/Allergies: Does not bruise/bleed easily.   Psychiatric/Behavioral: Negative for depression.      Physical Exam  Laboratory/Imaging   Hemodynamics  Temp (24hrs), Av.7 °C (98 °F), Min:36.2 °C (97.2 °F), Max:37 °C (98.6 °F)   Temperature: 36.5 °C (97.7 °F)  Pulse  Av.8  Min: 63  Max: 95    Blood Pressure : 117/66      Respiratory      Respiration: 18, Pulse Oximetry: 95 %     Work Of Breathing / Effort: Mild  RUL Breath Sounds: Clear, RML Breath Sounds: Diminished, RLL Breath Sounds: Diminished, LISA Breath Sounds: Clear, LLL Breath Sounds: Diminished    Fluids    Intake/Output Summary (Last 24 hours) at 18 1631  Last data filed at 18 1400   Gross per 24 hour   Intake              120 ml   Output              301 ml   Net             -181 ml       Nutrition  Orders Placed This Encounter   Procedures   • DIET ORDER     Standing Status:   Standing     Number of Occurrences:   1     Order Specific Question:   Diet:     Answer:   Low Fiber(GI Soft) [2]     Physical Exam   Constitutional: He is oriented to person, place, and  time. No distress.   HENT:   Head: Normocephalic.   Mouth/Throat: No oropharyngeal exudate.   Eyes: Conjunctivae are normal. No scleral icterus.   Neck: Neck supple. No JVD present.   Cardiovascular: Normal rate, regular rhythm and normal heart sounds.    Pulmonary/Chest: Effort normal and breath sounds normal. No respiratory distress. He has no wheezes.   Abdominal: Soft. Bowel sounds are normal. He exhibits no distension. There is no tenderness.   Musculoskeletal: Normal range of motion. He exhibits no tenderness.   Neurological: He is alert and oriented to person, place, and time.   Skin: No erythema.   Psychiatric: He has a normal mood and affect.   Nursing note and vitals reviewed.      Recent Labs      03/06/18 2336 03/08/18   0329   WBC  11.6*  9.9   RBC  4.43*  4.33*   HEMOGLOBIN  13.8*  13.1*   HEMATOCRIT  43.0  41.8*   MCV  97.1  96.5   MCH  31.2  30.3   MCHC  32.1*  31.3*   RDW  50.9*  50.5*   PLATELETCT  201  172   MPV  10.1  9.4     Recent Labs      03/06/18   2336  03/08/18   0330   SODIUM  137  140   POTASSIUM  4.9  4.0   CHLORIDE  105  106   CO2  22  26   GLUCOSE  117*  95   BUN  14  13   CREATININE  1.10  1.04   CALCIUM  9.9  10.0             Recent Labs      03/08/18   0330   TRIGLYCERIDE  139   HDL  26*   LDL  103*          Assessment/Plan     * Seizures (CMS-HCC)   Assessment & Plan    Neuro consulted continue on keppra for now ct head no acute finding. eeg showed focal seizure from the right.           Anemia- (present on admission)   Assessment & Plan    Hb stable.         Chronic respiratory failure with hypoxia (CMS-HCC)- (present on admission)   Assessment & Plan    Not in acute exacerbation at [facility] 3-4 l        Sturge-Shabazz disease, 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)   Assessment & Plan    With rash noted right sided  Needs outpatient follow up and surveillance.         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)- (present on admission)   Assessment & Plan    Wound care .        Encephalopathy    Assessment & Plan    Acute metabolic due to seizures, resolved.         History of below knee amputation (CMS-HCC)- (present on admission)   Assessment & Plan    Stable.         GERD (gastroesophageal reflux disease)- (present on admission)   Assessment & Plan    Stable.         History of CVA (cerebrovascular accident)- (present on admission)   Assessment & Plan    Left sided deficits chronic.        COPD (chronic obstructive pulmonary disease) (CMS-HCC)- (present on admission)   Assessment & Plan    Stable no exacerbation.           Quality-Core Measures   Reviewed items::  Labs reviewed, Medications reviewed and Radiology images reviewed  DVT prophylaxis - mechanical:  SCDs

## 2018-03-09 NOTE — DISCHARGE INSTRUCTIONS
Discharge Instructions      F/u with PCP in 1 week   F/u with own neurologist in 1-2 weeks   F/u with cardio as needed      Discharged to home by car with relative. Discharged via wheelchair, hospital escort: Yes.  Special equipment needed: Not Applicable    Be sure to schedule a follow-up appointment with your primary care doctor or any specialists as instructed.     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 Unless I have been given specific instructions below for another diet, I accept this instruction as my diet prescription.   Other diet: Heart Healthy    Special Instructions: None    · Is patient discharged on Warfarin / Coumadin?   No     Depression / Suicide Risk    [facility], it is important to learn how to keep safe from harming yourself.    Recognize the warning signs:  · Abrupt changes in personality, positive or negative- including increase in energy   · Giving away possessions  · Change in eating patterns- significant weight changes-  positive or negative  · Change in sleeping patterns- unable to sleep or sleeping all the time   · Unwillingness or inability to communicate  · Depression  · Unusual sadness, discouragement and loneliness  · Talk of wanting to die  · Neglect of personal appearance   · Rebelliousness- reckless behavior  · Withdrawal from people/activities they love  · Confusion- inability to concentrate     If you or a loved one observes any of these behaviors or has concerns about self-harm, here's what you can do:  · Talk about it- your feelings and reasons for harming yourself  · Remove any means  that you might use to hurt yourself (examples: pills, rope, extension cords, firearm)  · Get professional help from the community (Mental Health, Substance Abuse, psychological counseling)  · Do not be alone:Call your Safe Contact- someone whom you trust who will be there for you.  · Call your local CRISIS HOTLINE 005-5916 or 046-905-8801  · Call your local Children's Mobile Crisis Response Team [place] (389) 049-9614 or wwwRunner  · Call the toll free National Suicide Prevention Hotlines   · National Suicide Prevention Lifeline 124-589-WRNY (2532)  · [employer] Line Network 800-SUICIDE (961-2678)    Seizure, Adult  When you have a seizure:  · Parts of your body may move.  · How aware or awake (conscious) you are may change.  · You may shake (convulse).  Some people have symptoms right before a seizure happens. These symptoms may include:  · Fear.  · Worry (anxiety).  · Feeling like you are going to throw up (nausea).  · Feeling like the room is spinning (vertigo).  · Feeling like you saw or heard something before (renato vu).  · Odd tastes or smells.  · Changes in vision, such as seeing flashing lights or spots.  Seizures usually last from 30 seconds to 2 minutes. Usually, they are not harmful unless they last a long time.  Follow these instructions at home:  Medicines  · Take over-the-counter and prescription medicines only as told by your doctor.  · Avoid anything that may keep your medicine from working, such as alcohol.  Activity  · Do not do any activities that would be dangerous if you had another seizure, like driving or swimming. Wait until your doctor approves.  · If you live in the [place] ask your local DMV (department of [employer]) when you can drive.  · Rest.  Teaching others  · Teach friends and family what to do when you have a seizure. They should:  ¨ Lay you on the ground.  ¨ Protect your head and body.  ¨ Loosen any tight clothing around your neck.  ¨ Turn you on your  side.  ¨ Stay with you until you are better.  ¨ Not hold you down.  ¨ Not put anything in your mouth.  ¨ Know whether or not you need emergency care.  General instructions  · Contact your doctor each time you have a seizure.  · Avoid anything that gives you seizures.  · Keep a seizure diary. Write down:  ¨ What you think caused each seizure.  ¨ What you remember about each seizure.  · Keep all follow-up visits as told by your doctor. This is important.  Contact a doctor if:  · You have another seizure.  · You have seizures more often.  · There is any change in what happens during your seizures.  · You continue to have seizures with treatment.  · You have symptoms of being sick or having an infection.  Get help right away if:  · You have a seizure:  ¨ That lasts longer than 5 minutes.  ¨ That is different than seizures you had before.  ¨ That makes it harder to breathe.  ¨ After you hurt your head.  · After a seizure, you cannot speak or use a part of your body.  · After a seizure, you are confused or have a bad headache.  · You have two or more seizures in a row.  · You are having seizures more often.  · You do not wake up right after a seizure.  · You get hurt during a seizure.  In an emergency:  · These symptoms may be an emergency. Do not wait to see if the symptoms will go away. Get medical help right away. Call your local emergency services (911 in the [place] Do not drive yourself to the hospital.  This information is not intended to replace advice given to you by your health care provider. Make sure you discuss any questions you have with your health care provider.  Document Released: 06/05/2009 Document Revised: 08/30/2017 Document Reviewed: 08/30/2017  Elsevier Interactive Patient Education © 2017 [employer]

## 2018-03-09 NOTE — PROGRESS NOTES
IMPRESSION:     1. Focal seizures affecting the left limb  2. Sturge-Shabazz Syndrome      PLAN/RECOMMENDATIONS:     Increase Kappa to 750mg bid since the EEG shows a high tendency of seizures  Could change to oral   Follow up in [facility] neurology clinic      ________________________________________________________________________        Sturge-Shabazz syndrome (SWS) is a neurological disorder marked by a distinctive port-wine stain on the forehead, scalp, or around the eye. This stain is a birthmark caused by an overabundance of capillaries near the surface of the skin. Blood vessels on the same side of the brain as the stain may also be affected.      ________________________________________________________________________        Vitals:    03/07/18 2320 03/08/18 0300 03/08/18 0820 03/08/18 1200   BP: 100/66 108/71 104/66 109/70   Pulse: 77 69 73 83   Resp: 18 18 18 19   Temp: 36.7 °C (98.1 °F) 37 °C (98.6 °F) 36.2 °C (97.2 °F) 36.8 °C (98.3 °F)   SpO2: 95% 95% 92% 96%   Weight:       Height:         Physical Exam   Constitutional: He is oriented to person, place, and time and well-developed, well-nourished, and in no distress.   HENT:   Head: Normocephalic.   Pulmonary/Chest: Effort normal.   Abdominal: Soft.   Musculoskeletal: Normal range of motion.   Neurological: He is alert and oriented to person, place, and time.   Skin: Skin is warm.     Review of Systems   Constitutional: Negative for fever.   HENT: Negative for ear discharge.    Eyes: Negative for redness.   Respiratory: Negative for hemoptysis.    Cardiovascular: Negative for leg swelling.   Gastrointestinal: Negative for blood in stool.   Genitourinary: Negative for hematuria.   Neurological: Positive for seizures.

## 2018-03-09 NOTE — CONSULTS
Reason for Consult:  Asked by Dr Beckford to see this patient with Wenckebach  Patient's PCP: Renee Robles D.O.    CC:   Chief Complaint   Patient presents with   • Seizure     Pt bib ems from home. per family pt has been having focal seizures since 2030hrs. Pt took ativan with no effect. EMS gave 2mg versed IV enroute       HPI: 77-year-old gentleman who comes in with seizures with a history of seizure disorder he has been on metoprolol with abnormal echo for years.  It was noted that he had a couple minutes of Wenckebach with 2:1 conduction.  He does have dizziness on occasion but no other symptoms in the past he had been on metoprolol for abnormal echo.  Denies any angina or heart failure symptoms    Medications / Drug list prior to admission:  No current facility-administered medications on file prior to encounter.      Current Outpatient Prescriptions on File Prior to Encounter   Medication Sig Dispense Refill   • levetiracetam (KEPPRA) 100 MG/ML Solution TAKE ONE & ONE-HALF TEASPOONFUL (7.5 ML) BY MOUTH TWICE DAILY 450 mL 5   • lorazepam (ATIVAN) 1 MG Tab Take 1/2-1 tablet PO PRN breakthrough seizures.  Do not exceed more than 2 tablets in 24 hours unless directed otherwise by physician. 10 Tab 0   • metoprolol (LOPRESSOR) 25 MG Tab Take 0.5 Tabs by mouth 2 Times a Day. 90 Tab 4   • haloperidol (HALDOL) 1 MG Tab Take 0.5 Tabs by mouth every evening. 45 Tab 4   • Aspirin 325 MG Tablet Delayed Response Take 1 Tab by mouth every day. 90 Tab 1   • Cholecalciferol (VITAMIN D3) 2000 UNITS Tab Take 2,000 Units by mouth every day. 30 Tab 1   • ferrous sulfate 325 (65 FE) MG tablet Take 1 Tab by mouth 2 times a day, with meals. 60 Tab 1       Current list of administered Medications:    Current Facility-Administered Medications:   •  senna-docusate (PERICOLACE or SENOKOT S) 8.6-50 MG per tablet 2 Tab, 2 Tab, Oral, BID, Stopped at 03/07/18 0900 **AND** polyethylene glycol/lytes (MIRALAX) PACKET 1 Packet, 1 Packet,  Oral, QDAY PRN **AND** magnesium hydroxide (MILK OF MAGNESIA) suspension 30 mL, 30 mL, Oral, QDAY PRN **AND** bisacodyl (DULCOLAX) suppository 10 mg, 10 mg, Rectal, QDAY PRN, Cally Jaeger M.D.  •  heparin injection 5,000 Units, 5,000 Units, Subcutaneous, Q8HRS, Cally Jaeger M.D., 5,000 Units at 03/08/18 1348  •  aspirin (ASA) tablet 325 mg, 325 mg, Oral, DAILY, Cally Jaeger M.D., 325 mg at 03/08/18 0906  •  haloperidol (HALDOL) tablet 0.5 mg, 0.5 mg, Oral, Q EVENING, Cally Jaeger M.D., 0.5 mg at 03/07/18 2246  •  LORazepam (ATIVAN) injection 4 mg, 4 mg, Intravenous, Q10 MIN PRN, Cally Jaeger M.D.  •  Respiratory Care per Protocol, , Nebulization, Continuous RT, Cally Jaeger M.D.  •  levETIRAcetam (KEPPRA) 750 mg in  mL IVPB, 750 mg, Intravenous, Q12HRS, Cally Jaeger M.D., Stopped at 03/08/18 1712    Past Medical History:   Diagnosis Date   • Arrhythmia     Pt states r/t caffeine   • Cataract     Bilat   • Dental disorder     dentures   • GERD (gastroesophageal reflux disease) 7/8/2013   • History of colostomy 10/26/2011   • History of CVA (cerebrovascular accident) 4/29/2015   • Hypertension    • Indigestion    • Multiple falls    • On home oxygen therapy    • Seizure disorder (CMS-HCC)     10 yrs ago  dilantin n o seizure 10-11 years   • Snoring        Past Surgical History:   Procedure Laterality Date   • HIP NAILING INTRAMEDULLARY Right 3/9/2016    Procedure: HIP NAILING INTRAMEDULLARY;  Surgeon: Freddy Gan M.D.;  Location: SURGERY [facility];  Service:    • CATARACT PHACO WITH IOL  8/26/2014    Performed by Abran Barber M.D. at [facility]   • CATARACT PHACO WITH IOL  8/12/2014    Performed by Abran Barber M.D. at SURGERY [facility] ORS   • COLECTOMY  11/21/2010    Performed by BRYCE HDEZ at SURGERY [facility] ORS   • COLOSTOMY  11/21/2010    Performed by BRYCE HDEZ at SURGERY [facility] ORS   • SIGMOIDOSCOPY FLEX   11/9/2010    Performed by MINA MAYER at ENDOSCOPY [facility] ORS   • ABDOMINAL AORTIC ANEURYSM  10/14/2009    Performed by PHOEBE CHEATHAM at SURGERY [facility] ORS   • HIP ORIF  7/21/2009    Performed by WAYNE HOLMAN at SURGERY [facility] ORS   • ABDOMINAL AORTIC ANEURYSM  2009   • AMPUTATION, BELOW THE KNEE Left 1980   • OTHER ABDOMINAL SURGERY      AAA repair 2009       Family History   Problem Relation Age of Onset   • Heart Attack Mother    • No Known Problems Father    • Hyperlipidemia Sister    • No Known Problems Brother    • No Known Problems Brother    • Heart Attack Brother    • No Known Problems Maternal Grandmother    • No Known Problems Maternal Grandfather    • No Known Problems Paternal Grandmother    • No Known Problems Paternal Grandfather    • Lung Disease Neg Hx    • Cancer Neg Hx    • Diabetes Neg Hx    • Hypertension Neg Hx    • Stroke Neg Hx        Social History     Social History   • Marital status:      Spouse name: N/A   • Number of children: N/A   • Years of education: N/A     Occupational History   • Not on file.     Social History Main Topics   • Smoking status: Former Smoker     Packs/day: 1.00     Years: 54.00     Types: Cigarettes     Start date: 9/24/1954     Quit date: 3/19/2011   • Smokeless tobacco: Never Used      Comment: Started smoking at 17   • Alcohol use No   • Drug use: No   • Sexual activity: No     Other Topics Concern   • Not on file     Social History Narrative   • No narrative on file       Allergies   Allergen Reactions   • Egg White Vomiting and Swelling   • Egg Yolk Vomiting and Swelling   • Chicken Allergy Vomiting and Swelling   • Omeprazole      Coughing and choking   • Prednisone      Mood changes   • Statins [Hmg-Coa-R Inhibitors] Shortness of Breath       Review of systems:  A detailed review of symptoms was reviewed with patient. This is reviewed in H&P and PMH. ALL OTHERS reviewed and negative    Physical exam:  Patient Vitals for the  past 24 hrs:   BP Temp Pulse Resp SpO2 Weight   03/08/18 1600 117/66 36.5 °C (97.7 °F) 63 18 95 % -   03/08/18 1200 109/70 36.8 °C (98.3 °F) 83 19 96 % -   03/08/18 0820 104/66 36.2 °C (97.2 °F) 73 18 92 % -   03/08/18 0300 108/71 37 °C (98.6 °F) 69 18 95 % -   03/07/18 2320 100/66 36.7 °C (98.1 °F) 77 18 95 % -   03/07/18 1903 114/70 36.6 °C (97.9 °F) 70 20 94 % 80.1 kg (176 lb 9.4 oz)       General: No acute distress.   HEENT: OP clear   Neck: No bruits No JVD.   CVS: RRR. S1 + S2. No M/R/G  Resp: CTAB. No wheezing or crackles/rhonchi.  Abdomen: Soft, NT, ND,  Skin: No rashes, erythema or wounds.   Neurological: grossly within normal range   Extremities: Pulse 2+ in b/l LE. No edema. No cyanosis.     Data:  Laboratory studies:  Recent Results (from the past 24 hour(s))   CBC with Differential    Collection Time: 03/08/18  3:29 AM   Result Value Ref Range    WBC 9.9 4.8 - 10.8 K/uL    RBC 4.33 (L) 4.70 - 6.10 M/uL    Hemoglobin 13.1 (L) 14.0 - 18.0 g/dL    Hematocrit 41.8 (L) 42.0 - 52.0 %    MCV 96.5 81.4 - 97.8 fL    MCH 30.3 27.0 - 33.0 pg    MCHC 31.3 (L) 33.7 - 35.3 g/dL    RDW 50.5 (H) 35.9 - 50.0 fL    Platelet Count 172 164 - 446 K/uL    MPV 9.4 9.0 - 12.9 fL    Neutrophils-Polys 63.30 44.00 - 72.00 %    Lymphocytes 20.40 (L) 22.00 - 41.00 %    Monocytes 5.40 0.00 - 13.40 %    Eosinophils 10.00 (H) 0.00 - 6.90 %    Basophils 0.70 0.00 - 1.80 %    Immature Granulocytes 0.20 0.00 - 0.90 %    Nucleated RBC 0.00 /100 WBC    Neutrophils (Absolute) 6.25 1.82 - 7.42 K/uL    Lymphs (Absolute) 2.01 1.00 - 4.80 K/uL    Monos (Absolute) 0.53 0.00 - 0.85 K/uL    Eos (Absolute) 0.99 (H) 0.00 - 0.51 K/uL    Baso (Absolute) 0.07 0.00 - 0.12 K/uL    Immature Granulocytes (abs) 0.02 0.00 - 0.11 K/uL    NRBC (Absolute) 0.00 K/uL   Comp Metabolic Panel (CMP)    Collection Time: 03/08/18  3:30 AM   Result Value Ref Range    Sodium 140 135 - 145 mmol/L    Potassium 4.0 3.6 - 5.5 mmol/L    Chloride 106 96 - 112 mmol/L    Co2 26  20 - 33 mmol/L    Anion Gap 8.0 0.0 - 11.9    Glucose 95 65 - 99 mg/dL    Bun 13 8 - 22 mg/dL    Creatinine 1.04 0.50 - 1.40 mg/dL    Calcium 10.0 8.5 - 10.5 mg/dL    AST(SGOT) 20 12 - 45 U/L    ALT(SGPT) 19 2 - 50 U/L    Alkaline Phosphatase 158 (H) 30 - 99 U/L    Total Bilirubin 0.6 0.1 - 1.5 mg/dL    Albumin 3.5 3.2 - 4.9 g/dL    Total Protein 6.3 6.0 - 8.2 g/dL    Globulin 2.8 1.9 - 3.5 g/dL    A-G Ratio 1.3 g/dL   Lipid Profile (Lipid Panel) Fasting    Collection Time: 18  3:30 AM   Result Value Ref Range    Cholesterol,Tot 157 100 - 199 mg/dL    Triglycerides 139 0 - 149 mg/dL    HDL 26 (A) >=40 mg/dL     (H) <100 mg/dL   ESTIMATED GFR    Collection Time: 18  3:30 AM   Result Value Ref Range    GFR If African American >60 >60 mL/min/1.73 m 2    GFR If Non African American >60 >60 mL/min/1.73 m 2   MAGNESIUM    Collection Time: 18  3:30 AM   Result Value Ref Range    Magnesium 2.0 1.5 - 2.5 mg/dL   EKG    Collection Time: 18  4:41 AM   Result Value Ref Range    Report       [facility]    Test Date:  2018  Pt Name:    ALYSSA ZAPATA               Department: [facility]  MRN:        [id number]                      Room:       [facility]  Gender:     Male                         Technician: LUIS MIGUEL  :        1940                   Requested By:SOLOMON SMITH  Order #:    [id number]                    Reading MD: Elizabeth Burton MD    Measurements  Intervals                                Axis  Rate:       77                           P:          67  NH:         180                          QRS:        43  QRSD:       126                          T:          102  QT:         392  QTc:        444    Interpretive Statements  SINUS RHYTHM  IVCD, CONSIDER ATYPICAL RBBB  PROBABLE INFERIOR INFARCT, AGE INDETERMINATE  T wave inversions in anterolateral leads  Compared to ECG 2015 09:16:39  T-wave inversions now present  Electronically Signed On 3-8-2018 6:03:17 PST by Elizabeth Burton  MD     TROPONIN    Collection Time: 18  8:24 AM   Result Value Ref Range    Troponin I 0.01 0.00 - 0.04 ng/mL   EKG    Collection Time: 18 11:56 AM   Result Value Ref Range    Report       [facility]    Test Date:  2018  Pt Name:    ALYSSA ZAPATA               Department: 171  MRN:        [id number]                      Room:       [facility]  Gender:     Male                         Technician: [facility]  :        1940                   Requested By:SOLOMON SMITH  Order #:    [id number]                    Reading MD: Robles Macias MD    Measurements  Intervals                                Axis  Rate:       76                           P:          52  GA:         184                          QRS:        61  QRSD:       134                          T:          106  QT:         388  QTc:        437    Interpretive Statements  SINUS RHYTHM  RIGHT BUNDLE BRANCH BLOCK  INFERIOR INFARCT, OLD  Non-specific ST and T wave changes  Compared to ECG 2018 04:41:15  No significant changes    Electronically Signed On 3-8-2018 15:28:50 PST by Robles Macias MD         Imaging:  CT-HEAD W/O   Final Result         1.  NO ACUTE ABNORMALITIES ARE NOTED ON CT SCAN OF THE HEAD.      2.  Right-sided cortical calcifications again noted with no significant change.      3.  Possible sinusitis.      Findings are consistent with atrophy.  Decreased attenuation in the periventricular white matter likely indicates microvascular ischemic disease.      ECHOCARDIOGRAM COMP W/O CONT    (Results Pending)       EKG : Reviewed    Echocardiograms in the past have shown shown some regional wall motion abnormalities suggestive of prior myocardial infarctions    Telemetry mainly sinus rhythm with right bundle branch block but he did have Wenckebach on telemetry today    All pertinent features of laboratory and imaging reviewed including primary images where applicable    Assessment / Plan:  Sarah Keane - no symptoms so can  certainly just monitor expectantly he doesn't have a clear indication for metoprolol so we can stop that    I informed him to call our office for any symptoms of bradycardia arrhythmia    Should he have tachycardia with his Vimpat this is likely acceptable can certainly check an EKG but it acceptable to have sinus tachycardia up to 120s    Reasonable to check echo last one was 2 and half years ago  Reasonable to use statin given his long-standing dyslipidemia with low HDL    Will sign off from CV perspective unless there is is a remarkable abnormality on his echo    It is my pleasure to participate in the care of Mr. Mora.  Please do not hesitate to contact me with questions or concerns.    Segundo Barreto MD PhD [facility]  Cardiologist [facility]    3/8/2018

## 2018-03-10 NOTE — PROGRESS NOTES
[facility] Cardiology Follow-up Consult Note  Date of note:    3/9/2018      Consulting Physician: No att. providers found    Patient ID:  Name:   Conner Mora   YOB: 1940  Age:   77 y.o.  male   MRN:   [id number]    Chief Complaint   Patient presents with   • Seizure     Pt bib ems from home. per family pt has been having focal seizures since 2030hrs. Pt took ativan with no effect. EMS gave 2mg versed IV enroute       Interim Events:  No sig eveent no further Wenckebach       ROS  No NV, No Bleeding, No dizziness   All other review of systems reviewed and negative.    Past medical, surgical, social, and family history reviewed and unchanged from admission except as noted in assessment and plan.    Medications: Reviewed in MAR  No current facility-administered medications for this encounter.     Current Outpatient Prescriptions:   •  levETIRAcetam (KEPPRA) 100 MG/ML Solution, Take 7.5 mL by mouth 2 times a day., Disp: 450 mL, Rfl: 0  •  ascorbic acid (ASCORBIC ACID) 500 MG Tab, Take 250 mg by mouth every day., Disp: , Rfl:   •  lorazepam (ATIVAN) 1 MG Tab, Take 1/2-1 tablet PO PRN breakthrough seizures.  Do not exceed more than 2 tablets in 24 hours unless directed otherwise by physician., Disp: 10 Tab, Rfl: 0  •  haloperidol (HALDOL) 1 MG Tab, Take 0.5 Tabs by mouth every evening., Disp: 45 Tab, Rfl: 4  •  Aspirin 325 MG Tablet Delayed Response, Take 1 Tab by mouth every day., Disp: 90 Tab, Rfl: 1  •  Cholecalciferol (VITAMIN D3) 2000 UNITS Tab, Take 2,000 Units by mouth every day., Disp: 30 Tab, Rfl: 1  •  ferrous sulfate 325 (65 FE) MG tablet, Take 1 Tab by mouth 2 times a day, with meals., Disp: 60 Tab, Rfl: 1  Allergies   Allergen Reactions   • Egg White Vomiting and Swelling   • Egg Yolk Vomiting and Swelling   • Chicken Allergy Vomiting and Swelling   • Omeprazole      Coughing and choking   • Prednisone      Mood changes   • Statins [Hmg-Coa-R Inhibitors] Shortness of Breath       Physical  "Exam  Body mass index is 23.3 kg/m². Blood pressure 113/79, pulse (!) 105, temperature 36.6 °C (97.8 °F), resp. rate 18, height 1.854 m (6' 1\"), weight 80.1 kg (176 lb 9.4 oz), SpO2 95 %. Vitals:    03/08/18 2330 03/09/18 0400 03/09/18 0800 03/09/18 1200   BP: 118/73 116/69 126/79 113/79   Pulse: 82 78 80 (!) 105   Resp: 20 18 18 18   Temp: 36.2 °C (97.2 °F) 36.6 °C (97.8 °F) 36.4 °C (97.6 °F) 36.6 °C (97.8 °F)   SpO2: 95% 95% 96% 95%   Weight:       Height:        Oxygen Therapy:  Pulse Oximetry: 95 %, O2 (LPM): 4, O2 Delivery: Silicone Nasal Cannula    General: Well appearing and in no apparent distress  Eyes: nl conjunctiva  ENT: OP clear  Neck: no JVD   Lungs: normal respiratory effort, CTAB  Heart: RRR, no murmurs, no rubs or gallops,   EXT no edema bilateral lower extremities. + pedal pulses. no cyanosis  Abdomen: soft, non tender, non distended,  Neurological: No focal sensory deficits  Psychiatric: Appropriate affect, A/O x 3  Skin: Warm extremities    Labs (personally reviewed and notable for):   Recent Results (from the past 24 hour(s))   ECHOCARDIOGRAM-COMP W/ CONT    Collection Time: 03/09/18 11:03 AM   Result Value Ref Range    Eject.Frac. MOD BP 66.11     Eject.Frac. MOD 4C 64.65     Eject.Frac. MOD 2C 66.96     Left Ventrical Ejection Fraction 60        Cardiac Imaging and Procedures Review:    EKG and telemetry tracings personally reviewed      CONCLUSIONS  Technically difficult study incomplete information is obtained.   Contrast was used to enhance visualization of the endocardial border.  Left ventricular ejection fraction is visually estimated to be 60%.  Moderate concentric left ventricular hypertrophy.  Grossly normal left ventricular systolic function.  Left ventricular ejection fraction is visually estimated to be 60%.  The aortic valve is not well visualized.  The mitral valve is not well visualized.  Normal pericardium without effusion.    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    Chronic respiratory failure with hypoxia (CMS-HCC) POA: Yes    Anemia POA: Yes    COPD (chronic obstructive pulmonary disease) (CMS-HCC) POA: Yes    History of CVA (cerebrovascular accident) POA: Yes    GERD (gastroesophageal reflux disease) POA: Yes    History of below knee amputation (CMS-HCC) POA: Yes  Resolved Problems:    Encephalopathy POA: Unknown    [place] Bach - no symptoms so can certainly just monitor expectantly he doesn't have a clear indication for metoprolol so we can stop that     I informed him to call our office for any symptoms of bradycardia arrhythmia     Should he have tachycardia with his Vimpat this is likely acceptable can certainly check an EKG but it acceptable to have sinus tachycardia up to 120s     Echo WNL  Reasonable to use statin given his long-standing dyslipidemia with low HDL     Safe for dc from cv perspective     It is my pleasure to participate in the care of Mr. Mora.  Please do not hesitate to contact me with questions or concerns.     Segundo Barreto MD PhD FACC  Cardiologist [facility]        Future Appointments  Date Time Provider Department Center   3/21/2018 9:40 AM NETTE Gomez [facility] None   3[facility]26[facility]2018 2:00 [facility]   4/16/2018 9:00 AM CORNELIO Silva [facility] None       It is my pleasure to participate in the care of Mr. Mora.  Please do not hesitate to contact me with questions or concerns.    Segundo Barreto MD PhD FACC  Cardiologist [facility]    3/9/2018

## 2018-03-19 PROBLEM — R56.9 SEIZURES (HCC): Status: RESOLVED | Noted: 2018-01-01 | Resolved: 2018-01-01

## 2018-03-19 PROBLEM — G40.909 NONINTRACTABLE EPILEPSY WITHOUT STATUS EPILEPTICUS (HCC): Status: ACTIVE | Noted: 2018-01-01

## 2018-03-19 NOTE — PROGRESS NOTES
Subjective:      Conner Mora is a 77 y.o. male who presents with Establish Care and Hospital Follow-up        CC: This is a previous patient of Dr. Johns and Dr. Genao here to establish care after recent seizures.    HPI Conner Mora      1. Chronic respiratory failure with hypoxia (CMS-[facility])  Patient states he continues to use oxygen only in the evening but it is helpful.    2. Pulmonary emphysema, unspecified emphysema type (CMS-HCC)  Patient reports history of COPD and is currently on albuterol only as needed. He has not felt he needed preventative inhalers. Usually uses albuterol when he is ill.    3. Seizure disorder (CMS-HCC)  Patient has history of seizure disorder and was matted to the hospital on March 6 after having seizures confirmed on EEG and followed by neurologist. He has a follow-up appointment with neurology within the month and reports no recent seizures with increase of Keppra.    4. Tachycardia  Patient was on a beta blocker up until his recent hospitalization but he showed AV block and the metoprolol was stopped. His pulse is elevated in the office but below 130. He states he is not having chest pain or shortness of breath.    5. 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)  Patient reports his colostomy is working well and he has not needed to see gastroenterology for this.    6. Nonintractable epilepsy without status epilepticus, unspecified epilepsy type (CMS-HCC)  Patient had recent seizure for which she was treated at the hospital.    7. Mood disorder ([facility])  Patient on Haldol states he has been on this for a few years and it works well for him. He has not seen psychiatry.    8. History of below knee amputation, left (CMS-HCC)  Patient has previous history of peripheral vascular disease secondary to left below-the-knee amputation and he currently uses a prostatic device and a cane for mobility. He reports no problems with his stump.  Current Outpatient Prescriptions   Medication Sig  Dispense Refill   • levETIRAcetam (KEPPRA) 100 MG/ML Solution Take 7.5 mL by mouth 2 times a day. 450 mL 0   • ascorbic acid (ASCORBIC ACID) 500 MG Tab Take 250 mg by mouth every day.     • haloperidol (HALDOL) 1 MG Tab Take 0.5 Tabs by mouth every evening. 45 Tab 4   • Aspirin 325 MG Tablet Delayed Response Take 1 Tab by mouth every day. 90 Tab 1   • Cholecalciferol (VITAMIN D3) 2000 UNITS Tab Take 2,000 Units by mouth every day. 30 Tab 1   • ferrous sulfate 325 (65 FE) MG tablet Take 1 Tab by mouth 2 times a day, with meals. 60 Tab 1     No current facility-administered medications for this visit.      Social History   Substance Use Topics   • Smoking status: Former Smoker     Packs/day: 1.00     Years: 54.00     Types: Cigarettes     Start date: 9/24/1954     Quit date: 3/19/2011   • Smokeless tobacco: Never Used      Comment: Started smoking at 17   • Alcohol use No     Family History   Problem Relation Age of Onset   • Heart Attack Mother    • No Known Problems Father    • Hyperlipidemia Sister    • No Known Problems Brother    • No Known Problems Brother    • Heart Attack Brother    • No Known Problems Maternal Grandmother    • No Known Problems Maternal Grandfather    • No Known Problems Paternal Grandmother    • No Known Problems Paternal Grandfather    • Lung Disease Neg Hx    • Cancer Neg Hx    • Diabetes Neg Hx    • Hypertension Neg Hx    • Stroke Neg Hx      Past Medical History:   Diagnosis Date   • Arrhythmia     Pt states r/t caffeine   • Cataract     Bilat   • Dental disorder     dentures   • GERD (gastroesophageal reflux disease) 7/8/2013   • History of colostomy 10/26/2011   • History of CVA (cerebrovascular accident) 4/29/2015   • Hypertension    • Indigestion    • Multiple falls    • On home oxygen therapy    • Seizure disorder (CMS-[facility])     10 yrs ago  dilantin n o seizure 10-11 years   • Snoring        Review of Systems   Musculoskeletal: Positive for joint pain.   All other systems reviewed  "and are negative.         Objective:     /70   Pulse (!) 110 Comment: walked long distance  Resp 16   Ht 1.854 m (6' 1\")   Wt 79.4 kg (175 lb)   SpO2 96%   BMI 23.09 kg/m²      Physical Exam   Constitutional: He is oriented to person, place, and time. He appears well-developed and well-nourished. No distress.   HENT:   Head: Normocephalic and atraumatic.   Right Ear: External ear normal.   Left Ear: External ear normal.   Nose: Nose normal.   Mouth/Throat: Oropharynx is clear and moist.   Eyes: Conjunctivae are normal. Right eye exhibits no discharge. Left eye exhibits no discharge.   Neck: Normal range of motion. Neck supple. No tracheal deviation present. No thyromegaly present.   Cardiovascular: Regular rhythm and normal heart sounds.  Tachycardia present.    No murmur heard.  Pulmonary/Chest: Effort normal and breath sounds normal. No respiratory distress. He has no wheezes. He has no rales.   Abdominal:   Functioning colostomy in place.   Musculoskeletal:   Right arm pain and weakness secondary to recent fracture. Patient has prostatic left leg.   Lymphadenopathy:     He has no cervical adenopathy.   Neurological: He is alert and oriented to person, place, and time. Coordination normal.   Skin: Skin is warm and dry. No rash noted. He is not diaphoretic. No erythema.   Birthmark of the facial area.   Psychiatric: He has a normal mood and affect. His behavior is normal. Judgment and thought content normal.   Nursing note and vitals reviewed.              Assessment/Plan:     1. Chronic respiratory failure with hypoxia (CMS-HCC)  Patient may continue with oxygen in the evening since he feels it is helpful.    2. Pulmonary emphysema, unspecified emphysema type (CMS-HCC)  Patient has no interest in seeing pulmonology and states he will use his albuterol as needed but rarely needs to use it.    3. Seizure disorder (CMS-HCC)  Patient will continue to follow with neurology and reports no seizures since " hospitalization.    4. Tachycardia  Heart rate is elevated now that he is off his beta blocker but I will let cardiology decide if he should go back on a beta blocker with his recent AV block found on EKG at the hospital.    5. Colostomy in place (CMS-[facility])  Patient states his colostomy is working well and does not need to see gastroenterology.    6. Nonintractable epilepsy without status epilepticus, unspecified epilepsy type (CMS-[facility])  Patient has follow-up with cardiology and will continue on his Keppra.    7. Mood disorder (CMS-[facility])  Patient does not wish to come off Haldol. I explained this usually comes through psychiatry but he states it has been prescribed to his PCP for the last few years.    8. History of below knee amputation, left (CMS-[facility])  Patient will continue to use his prostatic as well as cane with mobility.

## 2018-03-21 NOTE — PROGRESS NOTES
Chief Complaint   Patient presents with   • Tachycardia     Hospital F/V       Subjective:   Conner Mora is a 77 y.o. male who presents today for hospital follow up seizure.     He is new to our clinic, was seen by Dr. Barreto in the hospital. HX: seizures, CVA, GERD, and hypertension.    Patient was seen in the hospital on 3/6-8 for seizures. He was noted to have wenke bach on the monitor. Then resolved with discontinue of metoprolol. He is on Vimpat for his seizures.    Patient has been doing well at home. No seizure noted per wife.    He has had no episodes of chest pain, palpitations, dizziness/lightheadedness, shortness of breath, orthopnea, or peripheral edema.     Past Medical History:   Diagnosis Date   • Arrhythmia     Pt states r/t caffeine   • Cataract     Bilat   • Dental disorder     dentures   • GERD (gastroesophageal reflux disease) 7/8/2013   • History of colostomy 10/26/2011   • History of CVA (cerebrovascular accident) 4/29/2015   • Hypertension    • Indigestion    • Multiple falls    • On home oxygen therapy    • Seizure disorder (CMS-HCC)     10 yrs ago  dilantin n o seizure 10-11 years   • Snoring      Past Surgical History:   Procedure Laterality Date   • HIP NAILING INTRAMEDULLARY Right 3/9/2016    Procedure: HIP NAILING INTRAMEDULLARY;  Surgeon: Freddy Gan M.D.;  Location: [facility];  Service:    • CATARACT PHACO WITH IOL  8/26/2014    Performed by Abran Barber M.D. at [facility]   • CATARACT PHACO WITH IOL  8/12/2014    Performed by Abran Barber M.D. at [facility]   • COLECTOMY  11/21/2010    Performed by BRYCE HDEZ at SURGERY [facility]   • COLOSTOMY  11/21/2010    Performed by BRYCE HDEZ at [facility]   • SIGMOIDOSCOPY FLEX  11/9/2010    Performed by MINA MAYER at ENDOSCOPY [facility]   • ABDOMINAL AORTIC ANEURYSM  10/14/2009    Performed by PHOEBE CHEATHAM at [facility]   • HIP ORIF  7/21/2009    Performed by WAYNE HOLMAN at SURGERY [facility]   • ABDOMINAL AORTIC ANEURYSM  2009   • AMPUTATION, BELOW THE KNEE Left 1980   • OTHER ABDOMINAL SURGERY      AAA repair 2009     Family History   Problem Relation Age of Onset   • Heart Attack Mother    • No Known Problems Father    • Hyperlipidemia Sister    • No Known Problems Brother    • No Known Problems Brother    • Heart Attack Brother    • No Known Problems Maternal Grandmother    • No Known Problems Maternal Grandfather    • No Known Problems Paternal Grandmother    • No Known Problems Paternal Grandfather    • Lung Disease Neg Hx    • Cancer Neg Hx    • Diabetes Neg Hx    • Hypertension Neg Hx    • Stroke Neg Hx      Social History     Social History   • Marital status:      Spouse name: N/A   • Number of children: N/A   • Years of education: N/A     Occupational History   • Not on file.     Social History Main Topics   • Smoking status: Former Smoker     Packs/day: 1.00     Years: 54.00     Types: Cigarettes     Start date: [birth year]/24/1952     Quit date: 3/19/2011   • Smokeless tobacco: Never Used      Comment: Started smoking at 17   • Alcohol use No   • Drug use: No   • Sexual activity: No     Other Topics Concern   • Not on file     Social History Narrative   • No narrative on file     Allergies   Allergen Reactions   • Egg White Vomiting and Swelling   • Egg Yolk Vomiting and Swelling   • Chicken Allergy Vomiting and Swelling   • Omeprazole      Coughing and choking   • Prednisone      Mood changes   • Statins [Hmg-Coa-R Inhibitors] Shortness of Breath     Outpatient Encounter Prescriptions as of 3/21/2018   Medication Sig Dispense Refill   • levETIRAcetam (KEPPRA) 100 MG/ML Solution Take 7.5 mL by mouth 2 times a day. 450 mL 0   • ascorbic acid (ASCORBIC ACID) 500 MG Tab Take 250 mg by mouth every day.     • haloperidol (HALDOL) 1 MG Tab Take 0.5 Tabs by mouth every evening. 45 Tab 4   • Aspirin 325 MG  "Tablet Delayed Response Take 1 Tab by mouth every day. 90 Tab 1   • Cholecalciferol (VITAMIN D3) 2000 UNITS Tab Take 2,000 Units by mouth every day. 30 Tab 1   • ferrous sulfate 325 (65 FE) MG tablet Take 1 Tab by mouth 2 times a day, with meals. 60 Tab 1     No facility-administered encounter medications on file as of 3/21/2018.      Review of Systems   Constitutional: Positive for malaise/fatigue.   Eyes: Negative for blurred vision and double vision.   Respiratory: Negative for cough, shortness of breath and wheezing.    Cardiovascular: Negative for chest pain, palpitations, orthopnea and leg swelling.   Musculoskeletal: Positive for joint pain. Negative for falls.   Neurological: Negative for dizziness, focal weakness, loss of consciousness, weakness and headaches.   All other systems reviewed and are negative.       Objective:   /82   Pulse (!) 110   Ht 1.854 m (6' 1\")   Wt 79.1 kg (174 lb 6.4 oz)   SpO2 91%   BMI 23.01 kg/m²     Physical Exam   Constitutional: He is oriented to person, place, and time. He appears well-developed and well-nourished. No distress.   HENT:   Head: Normocephalic and atraumatic.   Eyes: Pupils are equal, round, and reactive to light.   Bruise on the forehead from recent syncope that he was hospitalized    Neck: No JVD present.   Cardiovascular: Regular rhythm and normal heart sounds.  Tachycardia present.    Pulmonary/Chest: Effort normal and breath sounds normal.   Abdominal: Soft. He exhibits no distension.   Colostomy in place.    Musculoskeletal: He exhibits no edema.   Right arm pain and weakness secondary to recent fracture. Patient has prostatic left leg.    Neurological: He is alert and oriented to person, place, and time.   Skin: Skin is warm and dry. He is not diaphoretic.   Psychiatric: He has a normal mood and affect. His behavior is normal. Judgment and thought content normal.       Echocardiography Laboratory  3/9/2018  Technically difficult study incomplete " information is obtained.   Contrast was used to enhance visualization of the endocardial border.  Left ventricular ejection fraction is visually estimated to be 60%.  Moderate concentric left ventricular hypertrophy.  Grossly normal left ventricular systolic function.  Left ventricular ejection fraction is visually estimated to be 60%.  The aortic valve is not well visualized.  The mitral valve is not well visualized.  Normal pericardium without effusion.    Myocardial Perfusion  1/31/2015   No evidence of ischemia.   Fixed defect in the mid and distal inferior lateral wall..   Normal left ventricular size, ejection fraction, and wall motion.    Assessment:     1. Tachycardia  EKG   2. History of CVA (cerebrovascular accident)         Medical Decision Making:  Today's Assessment / Status / Plan:     1. Tachycardia:  - EKG showed , rbbb. NM 2015 was negative  - monitor closely, below 120.    2. CVA:  - no new deficit noted   - continue asa and allergic to statin     Follow up in 6 months with Dr. Barreto, sooner as needed.     Collaborating Provider: Dr. Burton

## 2018-03-23 NOTE — TELEPHONE ENCOUNTER
Was able to get in contact with Pt. on second attempt. Pt. Wife stated that they did not want to reschedule and they wanted to stay with their current PCP Jorge Sandhu.

## 2018-04-12 NOTE — PROGRESS NOTES
Patient Conner Mora admitted for seizures on 3/6/18, and discharged on 3/9/18. [facility] Patient Advocate assisted with discharge orders including one PCP appointment with Dr. Jorge Snadhu and one cardiology appointment with MOSHE Gomez. Patient cancelled two appointments with Dr. Morillo and one with MOSHE Villagran. Patient uses a concentrator from [facility]. Patient is scheduled for a cardiology appointment on 4/16/18 and on 9/14/18, and a PCP appointment on 7/20/18.

## 2018-04-16 NOTE — PROGRESS NOTES
Subjective:      Conner Mora is a 77 y.o. male who presents with Seizure (6 month follow up)        Here with wife today.    HPI Transitioned off of Vimpat and onto Keppra.  He is taking leviteracetam 750mg BID in an oral suspension as he has a colostomy bag in place.    3/6/2018 he was hospitalized for seizures.  Keppra was increased from 500mg BID to 750mg BID.     CVA management:  Continues with aspirin 325mg and metoprolol low dose.  He was started on a beta-blocker and this cause a spell about one month ago. His resting pulse has been elevated.    Tripped and fell over his oxygen tubing in January and sustained a right shoulder fracture.    INTERPRETATION:    This scalp EEG is consistent with     1. Focal seizure from the right    2. Moderate focal cortical dysfunction       Vit D 5000IU per day  Current Outpatient Prescriptions   Medication Sig Dispense Refill   • levETIRAcetam (KEPPRA) 100 MG/ML Solution Take 7.5 mL by mouth 2 times a day. 450 mL 0   • ascorbic acid (ASCORBIC ACID) 500 MG Tab Take 250 mg by mouth every day.     • haloperidol (HALDOL) 1 MG Tab Take 0.5 Tabs by mouth every evening. 45 Tab 4   • Aspirin 325 MG Tablet Delayed Response Take 1 Tab by mouth every day. 90 Tab 1   • Cholecalciferol (VITAMIN D3) 2000 UNITS Tab Take 2,000 Units by mouth every day. 30 Tab 1   • ferrous sulfate 325 (65 FE) MG tablet Take 1 Tab by mouth 2 times a day, with meals. 60 Tab 1     No current facility-administered medications for this visit.        Review of Systems   Constitutional: Positive for malaise/fatigue.   HENT: Negative for hearing loss, nosebleeds and sore throat.         No recent head injury.   Eyes: Negative for double vision.        No new loss of vision.   Respiratory: Positive for cough.         No recent lung infections.   Cardiovascular: Negative for chest pain.   Gastrointestinal: Negative for abdominal pain, diarrhea, nausea and vomiting.   Genitourinary: Negative.    Musculoskeletal:  "Positive for joint pain (right shoulder).   Skin: Negative.    Neurological: Positive for focal weakness (chronic) and seizures. Negative for headaches.   Endo/Heme/Allergies:        No history of endocrine dysfunction.  No new problems.   Psychiatric/Behavioral: Negative for depression. The patient is not nervous/anxious.         No recent mood changes.          Objective:     /60   Pulse 100   Temp 36.4 °C (97.6 °F)   Ht 1.854 m (6' 1\")   Wt 78.6 kg (173 lb 4.5 oz)   BMI 22.86 kg/m²      Physical Exam   Constitutional: He is oriented to person, place, and time. He appears well-developed and well-nourished. No distress.   HENT:   Head: Normocephalic and atraumatic.   Nose: Nose normal.   No new hearing loss.  Large left facies Sturge Shabazz marking.    Eyes: EOM are normal.   No double vision or loss of vision.   Neck: Normal range of motion.   Cardiovascular: Normal rate, regular rhythm and normal heart sounds.    No recent chest pain.   Pulmonary/Chest: Effort normal.   Abdominal: Soft. There is no tenderness.   Genitourinary:   Genitourinary Comments: No recent infections.   Musculoskeletal: Normal range of motion.   left BKA amputation    Fractured right shoulder   Lymphadenopathy:     He has no cervical adenopathy.   Neurological: He is alert and oriented to person, place, and time. He has normal strength and normal reflexes. He displays no tremor. No cranial nerve deficit. He displays no seizure activity. Gait abnormal.   No observable changes in neurologic status.  See initial new patient examination for details.     Skin: Skin is warm and dry.   Psychiatric: His mood appears not anxious. His speech is slurred. He does not exhibit a depressed mood.   Very quiet, pleasant, hard of hearing  Appears well today.                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  Last GTC seizure was February 2016.  Had a " spell/seizure in March 2018 after transitioning off of Vimpat onto LEV 500mg BID.  Leviteracetam was increased to 750mg BID.     Right CVA:  Continue ASA 325mg qday.     Hallucinations/grandiose thinking:  Well-controlled with low dose Haldol per day.  Does experience daytime somnolence.     Doing very well in regards to his epilepsy management.    Recovering from a fall in January 2018, right shoulder fracture.     Will call with any concern for seizures and I would be inclined to quickly start Zonegran.  Would also consider Lamictal.     New Rx for ativan provided and discussed urgent and emergent usage.    Referral placed to psychiatry for management of Haldol and mood in general.     Return for follow-up in 4-6 months per patient request.   I spent 35+ minutes with this patient, over fifty percent was spent counseling patient on their condition, best management practices, reviewing test results and risks and benefits of treatment.      Obtain Keppra level and Vit D level.

## 2018-08-14 PROBLEM — Z87.898 HISTORY OF DELIRIUM: Status: ACTIVE | Noted: 2018-01-01

## 2018-08-14 NOTE — PROGRESS NOTES
INITIAL PSYCHIATRY EVALUATION      Chief Complaint   Patient presents with   • Medication Management         History Of Present Illness:  Conner Mora is a 77 y.o. old male with history of Sturge Shabazz Syndrome, seizure disorder, CVA, peripheral vascular disease s/p left below-knee amputation referred by YOBANI Davis for evaluation of sleep.  He was accompanied by his wife today.  He currently takes Haldol 0.5 mg at bedtime and was not sure for how long he has taken it.  His wife is able to tell that it was started during her hospital admission at [facility].  I was able to review his chart and during a hospitalization in 2015 he was delirious and experienced hallucinations and was started on Haldol.  He was discharged on Haldol and his previous neurologist decreased the dose from 1 mg to 0.5 mg at bedtime soon after the hospitalization.  He has stayed on the same dose since 2015 and has never tried to taper it off.  He currently denies any hallucinations.  He has experienced some visual hallucinations infrequently which has wife thinks is related to his seizure disorder.  He denies any current episodes of confusion or disorientation.  His wife has noticed that he sleeps too much with Haldol both at bedtime and an daytime.  He denies any current mood, anxiety or psychotic symptoms.  He has not noticed any side effects with Haldol.  He is compliant with his Keppra for seizure disorder and infrequently has breakthrough seizures.  His last seizure episode was in March 2018 and he was briefly hospitalized for the same.  He denies struggling with anger or irritability.  He denies having active or passive thoughts of wanting to hurt himself or others.  He denies any current psychosocial or relationship stressors.    Current psychiatric medications - Haldol 0.5 mg at bedtime (since 2015)    Past Psychiatric History:  Denies history of suicide attempt or prior inpatient psychiatry hospitalization.  Denies past  symptoms consistent with depression, anxiety, hypomania or althea.  Previous medication trials - None    Past Medical/Surgical History:  Past Medical History:   Diagnosis Date   • Arrhythmia     Pt states r/t caffeine   • Cataract     Bilat   • Dental disorder     dentures   • GERD (gastroesophageal reflux disease) 7/8/2013   • History of colostomy 10/26/2011   • History of CVA (cerebrovascular accident) 4/29/2015   • Hypertension    • Indigestion    • Multiple falls    • On home oxygen therapy    • Seizure (HCC)    • Seizure disorder (HCC)     10 yrs ago  dilantin n o seizure 10-11 years   • Snoring      Past Surgical History:   Procedure Laterality Date   • HIP NAILING INTRAMEDULLARY Right 3/9/2016    Procedure: HIP NAILING INTRAMEDULLARY;  Surgeon: Freddy Gan M.D.;  Location: [facility];  Service:    • CATARACT PHACO WITH IOL  8/26/2014    Performed by Abran Barber M.D. at [facility]   • CATARACT PHACO WITH IOL  8/12/2014    Performed by Abran Barber M.D. at [facility]   • COLECTOMY  11/21/2010    Performed by BRYCE HDEZ at SURGERY [facility]   • COLOSTOMY  11/21/2010    Performed by BRYCE HDEZ at [facility]   • SIGMOIDOSCOPY FLEX  11/9/2010    Performed by MINA MAYER at [facility]   • ABDOMINAL AORTIC ANEURYSM  10/14/2009    Performed by PHOEBE CHEATHAM at SURGERY [facility]   • HIP ORIF  7/21/2009    Performed by WAYNE HOLMAN at SURGERY [facility]   • ABDOMINAL AORTIC ANEURYSM  2009   • AMPUTATION, BELOW THE KNEE Left 1980   • OTHER ABDOMINAL SURGERY      AAA repair 2009       Family Psychiatric History:  Denies    Substance Use/Addiction History:  Alcohol - Denies  Nicotine - Former smoker  Illicit drugs - Denies    History of inpatient/outpatient rehab treatment: Denies    Social History:  He lives in [place] with his wife of 51 years.  His 50-year-old son has been living with them  "for about a year because of his health issues.  Their 24-year-old granddaughter is also staying with them while she goes to college.  He has 3 kids, 10 grandkids and 4 great grandkids.    Allergies:  Egg white; Egg yolk; Chicken allergy; Omeprazole; Prednisone; and Statins [hmg-coa-r inhibitors]    Medications:  Current Outpatient Prescriptions   Medication Sig Dispense Refill   • haloperidol (HALDOL) 0.5 MG Tab Take 1/2 tablet at bedtime x 1 month and then decrease to 1/2 tablet every other night x 1 month 45 Tab 0   • levETIRAcetam (KEPPRA) 100 MG/ML Solution Take 7.5 mL by mouth 2 times a day. 450 mL 11   • ascorbic acid (ASCORBIC ACID) 500 MG Tab Take 250 mg by mouth every day.     • Aspirin 325 MG Tablet Delayed Response Take 1 Tab by mouth every day. 90 Tab 1   • Cholecalciferol (VITAMIN D3) 2000 UNITS Tab Take 2,000 Units by mouth every day. 30 Tab 1   • ferrous sulfate 325 (65 FE) MG tablet Take 1 Tab by mouth 2 times a day, with meals. 60 Tab 1     No current facility-administered medications for this visit.        Review of Symptoms:  Constitutional - Positive for fatigue  Eyes - Negative for blurry vision  HEENT - Negative for sore throat  Respiratory - Negative for shortness of breath, cough  CVS - Negative for chest pain, palpitations  GI - Negative for nausea, vomiting, abdominal pain, diarrhea, constipation  Skin - Negative for rash  Musculoskeletal - Negative for back pain  Neurological - Negative for headaches  Psychiatric - Positive for excessive sleep    Physical Examination:  Vital signs: BP (!) 93/72   Pulse (!) 104   Ht 1.854 m (6' 1\")   Wt 73.5 kg (162 lb)   BMI 21.37 kg/m²     Musculoskeletal: Slow but stable gait. No abnormal movements.     Mental Status Evaluation:   General: Elderly white male with birth lizz on forehead, dressed in casual attire, good grooming and hygiene, in no apparent distress, calm and cooperative, good eye contact, psychomotor retardation - age related " "  Orientation: Alert and oriented to person, place and time  Recent and remote memory: Intact  Attention span and concentration: Intact  Speech: Spontaneous, normal rate, rhythm and tone  Thought Process: Linear, logical and goal directed  Thought Content: Denies suicidal or homicidal ideations, intent or plan  Perception: Denies auditory or visual hallucinations. No delusions noted  Associations: Intact  Language: Appropriate  Fund of knowledge and vocabulary: Adequate  Mood: \"good\"  Affect: Euthymic, mood congruent  Insight: Good  Judgment: Good    Depression screening:  Depression Screen (PHQ-2/PHQ-9) 12/19/2017 3/7/2018 3/8/2018   PHQ-2 Total Score - 0 0   PHQ-2 Total Score - - -   PHQ-2 Total Score 0 - -   PHQ-9 Total Score - 0 0     Interpretation of PHQ-9 Total Score   Score Severity   1-4 No Depression   5-9 Mild Depression   10-14 Moderate Depression   15-19 Moderately Severe Depression   20-27 Severe Depression    Medical Records/Labs/Diagnostic Tests Reviewed:  [facility] records - appropriate refills, no abuse suspected     Impression:  Elderly white male with history of seizure disorder who was started on Haldol during a hospitalization in 2015 for delirium management.  He is currently denying any active psychotic symptoms or episodes of confusion.    1.  History of delirium in 2015    Plan:  1. Taper down Haldol to 0.25 mg at bedtime for 1 month followed by 0.25 mg every other night for one month and then discontinue.  I have advised the patient and his wife to watch for any psychotic symptoms and to call my office if they notice any changes in his mood or behavior.    Return to clinic in 3 months or sooner if symptoms worsen    The proposed treatment plan was discussed with the patient who was provided the opportunity to ask questions and make suggestions regarding alternative treatment. Patient verbalized understanding and expressed agreement with the plan.     Thank you for allowing me to participate in " the care of this patient.    Autumn Jc M.D.  08/14/18    CC:   ROBERTA Silva.  ROBERTA Cabrera.    This note was created using voice recognition software (Dragon). The accuracy of the dictation is limited by the abilities of the software. I have reviewed the note prior to signing, however some errors in grammar and context are still possible. If you have any questions related to this note please do not hesitate to contact our office.

## 2018-08-14 NOTE — PATIENT INSTRUCTIONS
- Decrease Haldol to 0.25 mg (1/2 tablet ) at bedtime x 1 month and then decrease to 0.25 mg (1/2 tablet) every other night x 1 month and then discontinue

## 2018-09-14 NOTE — PATIENT INSTRUCTIONS
Please look into the following diets and incorporate them into your diet    FOR TREATMENT OR PREVENTION OF CORONARY ARTERY DISEASE    [place] - [facility]    Dr Christian - Asaf over Marissa (book and documentary)    Dr Milagro Krueger's Cardiologist    FOR TREATMENT OF BLOOD PRESSURE  DASH DIET - American Heart Association for treatment of HYPERTENSION    KEEP YOUR SODIUM EQUAL TO CALORIES AND NO MORE THAN DOUBLE THE CALORIES FOR A LOW SALT DIET    FOR TREATMENT OF BAD CHOLESTEROL/FATS  REDUCE PROCESSED SUGAR AS MUCH AS POSSIBLE  INCREASE WHOLE GRAINS/VEGETABLES    Lowering total cholesterol and LDL (bad) cholesterol:  - Eat leaner cuts of meat, or eliminate altogether if possible red meat, and frequently substitute fish or chicken.  - Limit saturated fat to no more than 7-10% of total calories no more than 10 g per day is recommended. Some sources of saturated fat include butter, animal fats, hydrogenated vegetable fats and oils, many desserts, whole milk dairy products.  - Replaced saturated fats with polyunsaturated fats and monounsaturated fats. Foods high in monounsaturated fat include nuts, although well, canola oil, avocados, and olives.  - Limit trans fat (processed foods) and replaced with fresh fruits and vegetables  - Recommend nonfat dairy products  - Increase substantially the amount of soluble fiber intake (legumes such as beans, fruit, whole grains).  - Consider nutritional supplements: plant sterile spreads such as Benecol, fish oil,  flaxseed oil, omega-3 acids capsules 1000 mg twice a day, or viscous fiber such as Metamucil  - Attain ideal weight and regular exercise (at least 30 minutes per day of walking)    Lowering triglycerides:  - Reduce intake of simple sugar: Desserts, candy, pastries, honey, sodas, sugared cereals, yogurt, Gatorade, sports bars, canned fruit, smoothies, fruit juice, coffee drinks  - Reduced intake of refined starches: Refined Pasta  - Reduce or  abstain from alcohol  - Increase omega-3 fatty acids: [place], Trout, Mackerel, Herring, [place] tuna and supplements  - Attain ideal weight and regular exercise (at least 30 minutes per day of walking)      Elevating HDL (good) cholesterol:  - Increase physical activity  - Seasoned foods with garlic and onions  - Increase omega-3 fatty acids and supplements as listed above  - Incorporating appropriate amounts of monounsaturated fats such as nuts, olive oil, canola oil, avocados, olives  - Stop smoking  - Attain ideal weight and regular exercise (at least 30 minutes per day of walking)

## 2018-09-14 NOTE — PROGRESS NOTES
Chief Complaint   Patient presents with   • Tachycardia       Subjective:   Conner Mora is a 77 y.o. male who presents today for follow-up of his history of Wenckebach    He has been doing well ultimately he did stop Vimpat because of his contributing to tachycardia interestingly his heart rates remained high over the past 6 months in sinus rhythm based on prior EKG    No further seizure    Past Medical History:   Diagnosis Date   • Arrhythmia     Pt states r/t caffeine   • Atrioventricular block, Mobitz type 1, Wenckebach 3/2018    • Cataract     Bilat   • Dental disorder     dentures   • GERD (gastroesophageal reflux disease) 7/8/2013   • History of colostomy 10/26/2011   • History of CVA (cerebrovascular accident) 4/29/2015   • Hypertension    • Indigestion    • Multiple falls    • On home oxygen therapy    • Seizure (HCC)    • Seizure disorder (HCC)     10 yrs ago  dilantin n o seizure 10-11 years   • Snoring      Past Surgical History:   Procedure Laterality Date   • HIP NAILING INTRAMEDULLARY Right 3/9/2016    Procedure: HIP NAILING INTRAMEDULLARY;  Surgeon: Freddy Gan M.D.;  Location: [facility];  Service:    • CATARACT PHACO WITH IOL  8/26/2014    Performed by Abran Barber M.D. at [facility]   • CATARACT PHACO WITH IOL  8/12/2014    Performed by Abran Barber M.D. at [facility]   • COLECTOMY  11/21/2010    Performed by BRYCE HDEZ at SURGERY [facility]   • COLOSTOMY  11/21/2010    Performed by BRYCE HDEZ at SUR[facility]ERY [facility]   • SIGMOIDOSCOPY FLEX  11/9/2010    Performed by MINA MAYER at [facility]OS[facility]   • ABDOMINAL AORTIC ANEURYSM  10/14/2009    Performed by PHOEBE CHEATHAM at SURGERY [facility]   • HIP ORIF  7/21/2009    Performed by WAYNE HOLMAN at SURGERY [facility]   • ABDOMINAL AORTIC ANEURYSM  2009   • AMPUTATION, BELOW THE KNEE Left 1980   • OTHER ABDOMINAL SURGERY      AAA  repair 2009     Family History   Problem Relation Age of Onset   • Heart Attack Mother    • No Known Problems Father    • Hyperlipidemia Sister    • No Known Problems Brother    • No Known Problems Brother    • Heart Attack Brother    • No Known Problems Maternal Grandmother    • No Known Problems Maternal Grandfather    • No Known Problems Paternal Grandmother    • No Known Problems Paternal Grandfather    • Lung Disease Neg Hx    • Cancer Neg Hx    • Diabetes Neg Hx    • Hypertension Neg Hx    • Stroke Neg Hx      Social History     Social History   • Marital status:      Spouse name: N/A   • Number of children: N/A   • Years of education: N/A     Occupational History   • Not on file.     Social History Main Topics   • Smoking status: Former Smoker     Packs/day: 1.00     Years: 54.00     Types: Cigarettes     Start date: 9/[birth year]/1954     Quit date: 3/19/2011   • Smokeless tobacco: Never Used      Comment: Started smoking at 17   • Alcohol use No   • Drug use: No   • Sexual activity: No     Other Topics Concern   • Not on file     Social History Narrative   • No narrative on file     Allergies   Allergen Reactions   • Egg White Vomiting and Swelling   • Egg Yolk Vomiting and Swelling   • Chicken Allergy Vomiting and Swelling   • Omeprazole      Coughing and choking   • Prednisone      Mood changes   • Statins [Hmg-Coa-R Inhibitors] Shortness of Breath     Outpatient Encounter Prescriptions as of 9/14/2018   Medication Sig Dispense Refill   • Omega-3 Fatty Acids (FISH OIL) 1000 MG Cap capsule Take 1 Cap by mouth 2 Times a Day.     • haloperidol (HALDOL) 0.5 MG Tab Take 1/2 tablet at bedtime x 1 month and then decrease to 1/2 tablet every other night x 1 month 45 Tab 0   • levETIRAcetam (KEPPRA) 100 MG/ML Solution Take 7.5 mL by mouth 2 times a day. 450 mL 11   • ascorbic acid (ASCORBIC ACID) 500 MG Tab Take 250 mg by mouth every day.     • Aspirin 325 MG Tablet Delayed Response Take 1 Tab by mouth every day. 90  "Tab 1   • Cholecalciferol (VITAMIN D3) 2000 UNITS Tab Take 2,000 Units by mouth every day. 30 Tab 1   • ferrous sulfate 325 (65 FE) MG tablet Take 1 Tab by mouth 2 times a day, with meals. 60 Tab 1     No facility-administered encounter medications on file as of 9/14/2018.      Review of Systems   Constitutional: Negative for chills and fever.   HENT: Negative for sore throat.    Eyes: Negative for blurred vision.   Respiratory: Negative for cough and shortness of breath.    Cardiovascular: Negative for chest pain, palpitations, claudication, leg swelling and PND.   Gastrointestinal: Negative for abdominal pain and nausea.   Musculoskeletal: Positive for joint pain. Negative for falls.   Skin: Negative for rash.   Neurological: Negative for dizziness, focal weakness and weakness.   Endo/Heme/Allergies: Does not bruise/bleed easily.        Objective:   /68   Pulse (!) 107   Ht 1.854 m (6' 1\")   Wt 74.4 kg (164 lb)   SpO2 90%   BMI 21.64 kg/m²     Physical Exam   Constitutional: No distress.   HENT:   Mouth/Throat: Oropharynx is clear and moist. No oropharyngeal exudate.   Eyes: No scleral icterus.   Neck: No JVD present.   Cardiovascular: Normal rate and normal heart sounds.  Exam reveals no gallop and no friction rub.    No murmur heard.  Pulmonary/Chest: No respiratory distress. He has no wheezes. He has no rales.   Abdominal: Soft. Bowel sounds are normal.   Musculoskeletal: He exhibits no edema.   Neurological: He is alert.   Skin: No rash noted. He is not diaphoretic.   Psychiatric: He has a normal mood and affect.     We reviewed his echocardiogram report from March which was normal    Assessment:     1. Peripheral vascular disease (HCC)     2. History of abdominal aortic aneurysm repair     3. Pure hypercholesterolemia with target low density lipoprotein (LDL) less than 130     4. History of CVA (cerebrovascular accident)     5. Atrioventricular block, Mobitz type 1, [place] 3/2018   "       Medical Decision Making:  Today's Assessment / Status / Plan:     It was my pleasure to meet with Mr. Mora.    He is accompanied by his wife    We did discuss given his dyslipidemia he could take the PCSK9 inhibitors but likely they are unaffordable in place of that he will least try fish oil    Mr. Mora does not require regular cardiology follow up, I have advised him to call our office or e-mail using my [employer] if needed.    It is my pleasure to participate in the care of Mr. Mora.  Please do not hesitate to contact me with questions or concerns.    Segundo Barreto MD PhD FACC  Cardiologist [facility]

## 2018-10-15 NOTE — PROGRESS NOTES
Subjective:      Conner Mora is a 78 y.o. male who presents with Follow-Up (Sturge-Shabazz disease, suspected)          HPI  Here with wife today.    Continues taking leviteracetam 750mg BID in an oral suspension as he has a colostomy bag in place.  He has had a few bouts of loose stool.     3/6/2018 he was hospitalized for seizures.  Keppra was increased from 500mg BID to 750mg BID.      CVA management:  Continues with aspirin 325mg and metoprolol low dose.  He was started on a beta-blocker and this cause a spell in the spring.       Tripped and fell over his oxygen tubing in January and sustained a right shoulder fracture.     INTERPRETATION:    This scalp EEG is consistent with     1. Focal seizure from the right    2. Moderate focal cortical dysfunction         Ref. Range 4/20/2018 12:46   Keppra Latest Ref Range: 12 - 46 ug/mL 25       Vit D 5000IU per day  Haldol was stopped on Saturday.    Current Outpatient Prescriptions   Medication Sig Dispense Refill   • Omega-3 Fatty Acids (FISH OIL) 1000 MG Cap capsule Take 1 Cap by mouth 2 Times a Day.     • levETIRAcetam (KEPPRA) 100 MG/ML Solution Take 7.5 mL by mouth 2 times a day. 450 mL 11   • ascorbic acid (ASCORBIC ACID) 500 MG Tab Take 250 mg by mouth every day.     • Aspirin 325 MG Tablet Delayed Response Take 1 Tab by mouth every day. 90 Tab 1   • Cholecalciferol (VITAMIN D3) 2000 UNITS Tab Take 2,000 Units by mouth every day. 30 Tab 1   • ferrous sulfate 325 (65 FE) MG tablet Take 1 Tab by mouth 2 times a day, with meals. 60 Tab 1     No current facility-administered medications for this visit.        Review of Systems   Constitutional: Negative.    HENT: Negative for hearing loss, nosebleeds and sore throat.         No recent head injury.   Eyes: Negative for double vision.        No new loss of vision.   Respiratory: Positive for cough (coughs when the environment is too hot) and sputum production.         No recent lung infections.   Cardiovascular:  "Negative for chest pain.   Gastrointestinal: Negative for abdominal pain, diarrhea, nausea and vomiting.   Genitourinary: Negative.    Musculoskeletal: Positive for joint pain (right shoulder).   Skin: Negative.    Neurological: Positive for focal weakness. Negative for headaches.   Endo/Heme/Allergies:        No history of endocrine dysfunction.  No new problems.   Psychiatric/Behavioral: Negative for depression. The patient is not nervous/anxious.         No recent mood changes.          Objective:     /76 (BP Location: Left arm, Patient Position: Sitting, BP Cuff Size: Adult)   Pulse 99   Temp 36.6 °C (97.9 °F) (Temporal)   Resp 18   Ht 1.854 m (6' 1\")   Wt 74.4 kg (164 lb 1.6 oz)   SpO2 90%   BMI 21.65 kg/m²      Physical Exam   Constitutional: He appears well-developed and well-nourished. No distress.   HENT:   Head: Normocephalic and atraumatic.   Nose: Nose normal.   Large left facies Sturge Shabazz marking.     Eyes:   No double vision or loss of vision.   Neck: Normal range of motion.   Cardiovascular: Normal rate and regular rhythm.    No recent chest pain.   Pulmonary/Chest: Effort normal and breath sounds normal.   Abdominal: Soft. There is no tenderness.   Genitourinary:   Genitourinary Comments: No recent infections.   Musculoskeletal: Normal range of motion.   left BKA amputation   Lymphadenopathy:     He has no cervical adenopathy.   Neurological: He is alert. He has normal strength and normal reflexes. He displays no tremor. No cranial nerve deficit. He displays no seizure activity. Gait normal.   No observable changes in neurologic status.  See initial new patient examination for details.     Skin: Skin is warm and dry. There is pallor.   Psychiatric: His speech is normal. His mood appears not anxious. He does not exhibit a depressed mood.   Very quiet, pleasant, hard of hearing  Appears well today.               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  Last GTC seizure was February 2016.  Had a spell/seizure in March 2018 after transitioning off of Vimpat onto LEV 500mg BID.  Leviteracetam was increased to 750mg BID.     Right CVA:  Continue ASA 325mg qday.     Hallucinations/grandiose thinking:  Well-controlled with low dose Haldol per day which was just discontinued on Saturday.  Does experience daytime somnolence.     Doing very well in regards to his epilepsy management.     Will call with any concern for seizures and I would be inclined to quickly start Zonegran.  Would also consider Lamictal.     Does have ativan provided and discussed urgent and emergent usage.     Return for follow-up in 4-6 months per patient request.

## 2018-11-13 PROBLEM — Z87.898 HISTORY OF DELIRIUM: Status: RESOLVED | Noted: 2018-01-01 | Resolved: 2018-01-01

## 2018-11-13 NOTE — PROGRESS NOTES
PSYCHIATRY FOLLOW-UP NOTE      Chief Complaint   Patient presents with   • Evaluation Of Medication Change         History Of Present Illness:  Conner Mora is a 78 y.o. old male with Sturge Shabazz Syndrome, seizure disorder, CVA, peripheral vascular disease s/p left below-knee amputation, delirium in 2015 comes in today for follow up, was last seen for an initial evaluation 4 months ago.  He was here with his wife.  He was able to taper down his dose of Haldol and stopped taking it about a month ago.  Neither he or his wife have noticed any changes in his mood or behavior his wife has noticed that he is sleeping less in the daytime now that he is not on Haldol anymore.  He denies noticing any psychotic symptoms without Haldol.  He denies any problems with anger or irritability without Haldol as well.  He denies any current symptoms of depression or anxiety.  He denies any problems with his memory or any periods of confusion since his last appointment here.  He denies having thoughts of wanting to hurt himself or others.    Social History:   He lives in [place] with his wife of 51+ years.  His 50-year-old son has been living with them for about a year because of his health issues.  Their 24-year-old granddaughter is also staying with them while she goes to college.  He has 3 kids, 10 grandkids and 4 great grandkids.    Substance Use:  Alcohol - Denies  Nicotine - Denies  Illicit drugs - Denies    Past Medication Trials:  Haldol 0.5 mg for delirium (effective)    Medications:  Current Outpatient Prescriptions   Medication Sig Dispense Refill   • Omega-3 Fatty Acids (FISH OIL) 1000 MG Cap capsule Take 1 Cap by mouth 2 Times a Day.     • levETIRAcetam (KEPPRA) 100 MG/ML Solution Take 7.5 mL by mouth 2 times a day. 450 mL 11   • ascorbic acid (ASCORBIC ACID) 500 MG Tab Take 250 mg by mouth every day.     • Aspirin 325 MG Tablet Delayed Response Take 1 Tab by mouth every day. 90 Tab 1   • Cholecalciferol (VITAMIN D3)  "2000 UNITS Tab Take 2,000 Units by mouth every day. 30 Tab 1   • ferrous sulfate 325 (65 FE) MG tablet Take 1 Tab by mouth 2 times a day, with meals. 60 Tab 1     No current facility-administered medications for this visit.        Review Of Systems:    Constitutional - Positive for fatigue  Respiratory - Negative for shortness of breath, cough  CVS - Negative for chest pain, palpitations  GI - Negative for nausea, vomiting, abdominal pain, diarrhea, constipation  Musculoskeletal - Negative for back pain  Neurological - Negative for headaches  Psychiatric - Negative for depression, anxiety, hallucinations     Physical Examination:  Vital signs: /88   Pulse (!) 118   Ht 1.854 m (6' 1\")   Wt 74.4 kg (164 lb)   BMI 21.64 kg/m²     Musculoskeletal: Normal gait. No abnormal movements.     Mental Status Evaluation:   General: Elderly white male with birth lizz on forehead, dressed in casual attire, good grooming and hygiene, in no apparent distress, calm and cooperative, good eye contact, no psychomotor agitation or retardation  Orientation: Alert and oriented to person, place and time  Recent and remote memory: Grossly intact  Attention span and concentration: Grossly intact  Speech: Spontaneous, normal rate, rhythm and tone  Thought Process: Linear, logical and goal directed  Thought Content: Denies suicidal or homicidal ideations, intent or plan  Perception: Denies auditory or visual hallucinations. No delusions noted  Associations: Intact  Language: Appropriate  Fund of knowledge and vocabulary: Grossly adequate  Mood: \"have been good\"  Affect: Euthymic, mood congruent  Insight: Good  Judgment: Good    Depression screening:  Depression Screen (PHQ-2/PHQ-9) 12/19/2017 3/7/2018 3/8/2018   PHQ-2 Total Score - 0 0   PHQ-2 Total Score - - -   PHQ-2 Total Score 0 - -   PHQ-9 Total Score - 0 0     Interpretation of PHQ-9 Total Score   Score Severity   1-4 No Depression   5-9 Mild Depression   10-14 Moderate " Depression   15-19 Moderately Severe Depression   20-27 Severe Depression    Medical Records/Labs/Diagnostic Tests Reviewed:  [place]  records -2 controlled medication prescriptions found in the last 1 year, no abuse suspected      Impression:  1.  History of delirium in 2015 - resolved     Plan:  He stopped taking his Haldol about a month ago like we had discussed and both he and his wife have not noticed any changes in his mood or behavior without the medication.  He does not need to be on any psychotropic medication but I have advised both of them to monitor his mood and if it declines in the future they will call and schedule a follow-up appointment with me.    Return to clinic on as needed basis if symptoms worsen    The proposed treatment plan was discussed with the patient who was provided the opportunity to ask questions and make suggestions regarding alternative treatment. Patient verbalized understanding and expressed agreement with the plan.       Autumn Jc M.D.  11/13/18    This note was created using voice recognition software (Dragon). The accuracy of the dictation is limited by the abilities of the software. I have reviewed the note prior to signing, however some errors in grammar and context are still possible. If you have any questions related to this note please do not hesitate to contact our office.

## 2018-12-11 NOTE — ED TRIAGE NOTES
Chief Complaint   Patient presents with   • Abdominal Pain   • Chills   • Sore Throat     Patient states about a week ago he started having flu like s/s. C/o N/V, has had no appetite, c/o sore throat, chills. Patient looks very pale/jaundice. Sepsis score 3, orders initiated, patient taken to room 14. Report to RN.

## 2018-12-12 PROBLEM — E87.6 HYPOKALEMIA: Status: ACTIVE | Noted: 2018-01-01

## 2018-12-12 PROBLEM — E87.29 HIGH ANION GAP METABOLIC ACIDOSIS: Status: RESOLVED | Noted: 2018-01-01 | Resolved: 2018-01-01

## 2018-12-12 PROBLEM — K85.90 ACUTE PANCREATITIS: Status: ACTIVE | Noted: 2018-01-01

## 2018-12-12 PROBLEM — R91.8 MASS OF UPPER LOBE OF RIGHT LUNG: Status: ACTIVE | Noted: 2018-01-01

## 2018-12-12 PROBLEM — R11.14 BILIOUS VOMITING WITH NAUSEA: Status: RESOLVED | Noted: 2018-01-01 | Resolved: 2018-01-01

## 2018-12-12 PROBLEM — K20.90 ESOPHAGITIS: Status: ACTIVE | Noted: 2018-01-01

## 2018-12-12 PROBLEM — J96.21 ACUTE ON CHRONIC RESPIRATORY FAILURE WITH HYPOXIA (HCC): Status: ACTIVE | Noted: 2018-01-01

## 2018-12-12 PROBLEM — I48.92 ATRIAL FLUTTER (HCC): Status: ACTIVE | Noted: 2018-01-01

## 2018-12-12 PROBLEM — J18.9 PNEUMONIA DUE TO INFECTIOUS ORGANISM: Status: ACTIVE | Noted: 2018-01-01

## 2018-12-12 PROBLEM — E87.20 LACTIC ACIDOSIS: Status: RESOLVED | Noted: 2018-01-01 | Resolved: 2018-01-01

## 2018-12-12 PROBLEM — R10.84 GENERALIZED ABDOMINAL PAIN: Status: ACTIVE | Noted: 2018-01-01

## 2018-12-12 PROBLEM — E87.20 LACTIC ACIDOSIS: Status: ACTIVE | Noted: 2018-01-01

## 2018-12-12 PROBLEM — K29.70 GASTRITIS: Status: ACTIVE | Noted: 2018-01-01

## 2018-12-12 PROBLEM — A41.9 SEPSIS (HCC): Status: ACTIVE | Noted: 2018-01-01

## 2018-12-12 PROBLEM — R79.89 TROPONIN LEVEL ELEVATED: Status: ACTIVE | Noted: 2018-01-01

## 2018-12-12 PROBLEM — R11.14 BILIOUS VOMITING WITH NAUSEA: Status: ACTIVE | Noted: 2018-01-01

## 2018-12-12 PROBLEM — E87.29 HIGH ANION GAP METABOLIC ACIDOSIS: Status: ACTIVE | Noted: 2018-01-01

## 2018-12-12 PROBLEM — K86.89 PANCREATIC MASS: Status: ACTIVE | Noted: 2018-01-01

## 2018-12-12 NOTE — ED PROVIDER NOTES
ED Provider Note    CHIEF COMPLAINT  Chief Complaint   Patient presents with   • Abdominal Pain   • Chills   • Sore Throat       HPI  Conner Mora is a 78 y.o. male here for evaluation of cough, congestion, and sepsis.  The patient states that he has had some intermittent chest pain with shortness of breath and cough over the last few days.  He has had intermittent low-grade fevers, but has not recorded his temperature.  He has no vomiting, no current chest pain, and no back pain.  He has no headache.    PAST MEDICAL HISTORY   has a past medical history of Arrhythmia; Atrioventricular block, Mobitz type 1, [place] 3/2018; Cataract; Dental disorder; GERD (gastroesophageal reflux disease) (7/8/2013); History of colostomy (10/26/2011); History of CVA (cerebrovascular accident) (4/29/2015); Hypertension; Indigestion; Multiple falls; On home oxygen therapy; Peripheral vascular disease (HCC); Seizure (HCC); Seizure disorder (HCC); and Snoring.    SOCIAL HISTORY  Social History     Social History Main Topics   • Smoking status: Former Smoker     Packs/day: 1.00     Years: 54.00     Types: Cigarettes     Start date: 9/24/1954     Quit date: 3/19/2011   • Smokeless tobacco: Never Used      Comment: Started smoking at 17   • Alcohol use No   • Drug use: No   • Sexual activity: No       Family History  No bleeding disorders    SURGICAL HISTORY   has a past surgical history that includes amputation, below the knee (Left, 1980); hip orif (7/21/2009); abdominal aortic aneurysm (10/14/2009); abdominal aortic aneurysm (2009); other abdominal surgery; sigmoidoscopy flex (11/9/2010); colectomy (11/21/2010); colostomy (11/21/2010); cataract phaco with iol (8/12/2014); cataract phaco with iol (8/26/2014); and hip nailing intramedullary (Right, 3/9/2016).    CURRENT MEDICATIONS  Home Medications     Reviewed by Yordy Menezes (Pharmacy Tech) on 12/11/18 at 8845  Med List Status: Complete   Medication Last Dose Status    ascorbic acid (ASCORBIC ACID) 500 MG Tab 12/11/2018 Active   Aspirin 325 MG Tablet Delayed Response 12/10/2018 Active   Cholecalciferol (VITAMIN D3) 2000 UNITS Tab 12/10/2018 Active   ferrous sulfate 325 (65 FE) MG tablet 12/11/2018 Active   levETIRAcetam (KEPPRA) 100 MG/ML Solution 12/11/2018 Active   Omega-3 Fatty Acids (FISH OIL) 1000 MG Cap capsule 12/11/2018 Active                ALLERGIES  Allergies   Allergen Reactions   • Egg White Vomiting and Swelling   • Egg Yolk Vomiting and Swelling   • Chicken Allergy Vomiting and Swelling   • Omeprazole      Coughing and choking   • Prednisone      Mood changes   • Statins [Hmg-Coa-R Inhibitors] Shortness of Breath       REVIEW OF SYSTEMS  See HPI for further details. Review of systems as above, otherwise all other systems are negative.     PHYSICAL EXAM  Constitutional: Elderly appearing, cachectic  HEENT: Normocephalic, atraumatic. Posterior pharynx with dry mucous membranes  Eyes:  EOMI. Normal sclera.  Neck: Supple, Full range of motion, nontender.  Chest/Pulmonary: Diminished breath sounds, equal expansion  Cardio: Tachycardic rate and rhythm with no murmur.   Abdomen: Soft, nontender. No peritoneal signs. No guarding. No palpable masses.  Musculoskeletal: No deformity, no edema, neurovascular intact.   Neuro: Clear speech, appropriate, cooperative, cranial nerves II-XII grossly intact.  Psych: Normal mood and affect    Results for orders placed or performed during the hospital encounter of 12/11/18   Lactic acid (lactate)   Result Value Ref Range    Lactic Acid 8.1 (HH) 0.5 - 2.0 mmol/L   CBC WITH DIFFERENTIAL   Result Value Ref Range    WBC 18.8 (H) 4.8 - 10.8 K/uL    RBC 4.28 (L) 4.70 - 6.10 M/uL    Hemoglobin 13.8 (L) 14.0 - 18.0 g/dL    Hematocrit 44.2 42.0 - 52.0 %    .3 (H) 81.4 - 97.8 fL    MCH 32.2 27.0 - 33.0 pg    MCHC 31.2 (L) 33.7 - 35.3 g/dL    RDW 53.1 (H) 35.9 - 50.0 fL    Platelet Count 533 (H) 164 - 446 K/uL    MPV 9.3 9.0 - 12.9 fL     Neutrophils-Polys 81.20 (H) 44.00 - 72.00 %    Lymphocytes 14.70 (L) 22.00 - 41.00 %    Monocytes 1.00 0.00 - 13.40 %    Eosinophils 1.50 0.00 - 6.90 %    Basophils 0.40 0.00 - 1.80 %    Immature Granulocytes 1.20 (H) 0.00 - 0.90 %    Nucleated RBC 0.00 /100 WBC    Neutrophils (Absolute) 15.27 (H) 1.82 - 7.42 K/uL    Lymphs (Absolute) 2.76 1.00 - 4.80 K/uL    Monos (Absolute) 0.18 0.00 - 0.85 K/uL    Eos (Absolute) 0.29 0.00 - 0.51 K/uL    Baso (Absolute) 0.08 0.00 - 0.12 K/uL    Immature Granulocytes (abs) 0.22 (H) 0.00 - 0.11 K/uL    NRBC (Absolute) 0.00 K/uL   COMP METABOLIC PANEL   Result Value Ref Range    Sodium 137 135 - 145 mmol/L    Potassium 3.4 (L) 3.6 - 5.5 mmol/L    Chloride 100 96 - 112 mmol/L    Co2 18 (L) 20 - 33 mmol/L    Anion Gap 19.0 (H) 0.0 - 11.9    Glucose 168 (H) 65 - 99 mg/dL    Bun 16 8 - 22 mg/dL    Creatinine 1.15 0.50 - 1.40 mg/dL    Calcium 10.9 (H) 8.5 - 10.5 mg/dL    AST(SGOT) 63 (H) 12 - 45 U/L    ALT(SGPT) 47 2 - 50 U/L    Alkaline Phosphatase 211 (H) 30 - 99 U/L    Total Bilirubin 1.2 0.1 - 1.5 mg/dL    Albumin 3.1 (L) 3.2 - 4.9 g/dL    Total Protein 6.5 6.0 - 8.2 g/dL    Globulin 3.4 1.9 - 3.5 g/dL    A-G Ratio 0.9 g/dL   ESTIMATED GFR   Result Value Ref Range    GFR If African American >60 >60 mL/min/1.73 m 2    GFR If Non African American >60 >60 mL/min/1.73 m 2   EKG (NOW)   Result Value Ref Range    Report       [facility]    Test Date:  2018  Pt Name:    ALYSSA ZAPATA               Department: ER  MRN:        [id number]                      Room:        14  Gender:     Male                         Technician: [id number]  :        [id number]                   Requested By:ER TRIAGE PROTOCOL  Order #:    [id number]                    Reading MD:    Measurements  Intervals                                Axis  Rate:       162                          P:          0  WV:         162                          QRS:        185  QRSD:       138                           T:          121  QT:         324  QTc:        533    Interpretive Statements  WIDE COMPLEX TACHYCARDIA  MULTIFORM VENTRICULAR PREMATURE COMPLEXES  NONSPECIFIC INTRAVENTRICULAR CONDUCTION DELAY  ARTIFACT IN LEAD(S) I,II,III,aVR,aVL,aVF,V1,V2,V3 AND BASELINE WANDER IN  LEAD(S) II,III,aVF,V1  Compared to ECG 03/21/2018 10:01:25  Wide-QRS tachycardia now present  Ventricular premature complex(es) now pre sent  Intraventricular conduction delay now present  Sinus tachycardia no longer present  Right-axis deviation no longer present  Right bundle-branch block no longer present  Myocardial infarct finding no longer present        DX-CHEST-PORTABLE (1 VIEW)   Final Result      1.  No pneumonia or pulmonary edema.   2.  Slight interval increase in size of ill-defined RIGHT mid to upper lung nodular opacity, concerning for mass.          PROCEDURES     MEDICAL RECORD  I have reviewed patient's medical record and pertinent results are listed above.    COURSE & MEDICAL DECISION MAKING  I have reviewed any medical record information, laboratory studies and radiographic results as noted above.    445 PM  Pt comfortable, heart rate in the 150s.  Fluids continued, will start abx etc. Wive is present.     520 PM  Dr. Li will see as consult.  He would like amio bolus, with drip.   He states atrial flutter on ekg, rather than svt.      600 PM  Pt with improving bp.  Fluids, abx continuing.  Pt will be seen by cards.  He is aware of having to stay.     644 PM  Pt continues to rest comfortably.  Pulse ox on non re breather mask is 90 %.      CRITICAL CARE  The very real possibility of a deterioration of this patient's condition required the highest level of my preparedness for sudden, emergent intervention.  I provided critical care services, which included medication orders, frequent reevaluations of the patient's condition and response to treatment, ordering and reviewing test results, and discussing the  case with various consultants.  The critical care time associated with the care of the patient was 35 minutes. Review chart for interventions. This time is exclusive of any other billable procedures.     Pt will be admitted to Dr. Cobian in guarded condition     FINAL IMPRESSION  Sepsis  Atrial flutter  Hypoxia   Critical care 35 minutes     Electronically signed by: Jose Nguyen, 12/11/2018 6:37 PM

## 2018-12-12 NOTE — ED NOTES
D5W started (see MAR).   Influenza swab obtained, green top obtained and tubed to lab.   Pt taken to CT.

## 2018-12-12 NOTE — DISCHARGE PLANNING
Care Transition Team Assessment    Information Source  Orientation : Oriented x 4  Information Given By: Patient  Informant's Name: Conner  Who is responsible for making decisions for patient? : Patient    Readmission Evaluation  Is this a readmission?: No    Elopement Risk  Legal Hold: No  Ambulatory or Self Mobile in Wheelchair: No-Not an Elopement Risk  Elopement Risk: Not at Risk for Elopement    Interdisciplinary Discharge Planning  Primary Care Physician: Jorge HILTON   Lives with - Patient's Self Care Capacity: Spouse  Patient or legal guardian wants to designate a caregiver (see row info): No  Support Systems: Spouse / Significant Other  Do You Take your Prescribed Medications Regularly: Yes  Mobility Issues:  (BKA left side w/ prosthetic since 1980 uses cane and FWW)  Assistance Needed:  (wife drives)  Durable Medical Equipment:  (cane, FWW, prosthetic left BKA, O2)  DME Provider / Phone: O2-Preferred    Discharge Preparedness  Prior Functional Level: Ambulatory, Independent with Activities of Daily Living, Independent with Medication Management    Functional Assesment  Prior Functional Level: Ambulatory, Independent with Activities of Daily Living, Independent with Medication Management    Finances  Prescription Coverage: Yes ([employer])              Advance Directive  Advance Directive?:  (none on file)                   Anticipated Discharge Information  Discharge Address: [address] [place] [address], [place] [address]  Discharge Contact Phone Number: [phone], [phone]

## 2018-12-12 NOTE — ASSESSMENT & PLAN NOTE
CTA chest negative for PE  Worsened respiratory failure due to alveolar hemorrhage and pneumothorax  Has probably improved as much as he will  Critical care following

## 2018-12-12 NOTE — WOUND TEAM
"[facility]  Inpatient Services  Initial Wound and Skin Care Evaluation    Admission Date: 12/11/2018     Consult Date: 12/12/2018 @ 0701   HPI, PMH, SH: Reviewed    Unit where seen by Wound Team: T629/00     WOUND CONSULT RELATED TO:  Abdomen, L Buttock and L knee     SUBJECTIVE:  \"My wife will be in this afternoon\"      Self Report / Pain Level:  Denies pain       OBJECTIVE:  Pt lying in bed with oxy mask in use. Pillows in use to offload pressure.    WOUND TYPE, LOCATION, CHARACTERISTICS (Pressure Injuries: location, stage, POA or date identified)      Wound 12/12/18 Partial Thickness Wound Abdomen LLQ (Active)   Site Assessment Red;Painful    Fabiola-wound Assessment Blanchable erythema    Margins Attached edges;Defined edges    Wound Length (cm) 1.6 cm    Wound Width (cm) 0.7 cm    Wound Depth (cm) 0.1 cm    Wound Surface Area (cm^2) 1.12 cm^2    Closure None    Drainage Amount None    Non-staged Wound Description Partial thickness    Treatments Cleansed;Site care    Cleansing Normal Saline Irrigation    Periwound Protectant Skin Protectant wipes to Periwound    Dressing Options Adhesive Foam    Dressing Cleansing/Solutions Not Applicable    Dressing Changed New    Dressing Status Clean;Dry;Intact    Dressing Change Frequency Every 72 hrs    NEXT Dressing Change  12/15/18    NEXT Weekly Photo (Inpatient Only) 12/19/18    WOUND NURSE ONLY - Odor None    WOUND NURSE ONLY - Exposed Structures None    WOUND NURSE ONLY - Tissue Type and Percentage 100% Red    WOUND NURSE ONLY - Time Spent with Patient (mins) 60          Wound 12/12/18 Skin Tear Buttocks Left Buttock (Active)   Site Assessment Red;Bleeding    Fabiola-wound Assessment Purple    Margins Attached edges;Defined edges    Wound Length (cm) 0.6 cm    Wound Width (cm) 2.2 cm    Wound Depth (cm) 0.1 cm    Wound Surface Area (cm^2) 1.32 cm^2    Closure None    Drainage Amount Scant    Drainage Description Serosanguineous    Non-staged Wound " "Description Partial thickness    Treatments Cleansed;Site care    Cleansing Normal Saline Irrigation    Periwound Protectant Not Applicable    Dressing Options Mepilex    Dressing Cleansing/Solutions Not Applicable    Dressing Changed New    Dressing Status Clean;Dry;Intact    Dressing Change Frequency Every 72 hrs    NEXT Dressing Change  12/15/18    NEXT Weekly Photo (Inpatient Only) 12/19/18    WOUND NURSE ONLY - Odor None    WOUND NURSE ONLY - Exposed Structures None    WOUND NURSE ONLY - Tissue Type and Percentage 100% Red          Wound 12/12/18 Knee Left distal  (Active)   Site Assessment Brown;Red    Fabiola-wound Assessment Blanchable erythema    Margins Attached edges;Defined edges    Wound Length (cm) 0.4 cm    Wound Width (cm) 0.5 cm    Wound Depth (cm) 0.1 cm    Wound Surface Area (cm^2) 0.2 cm^2    Closure None    Drainage Amount None    Non-staged Wound Description Partial thickness    Treatments Cleansed;Site care    Cleansing Normal Saline Irrigation    Periwound Protectant Not Applicable    Dressing Options Adhesive Foam    Dressing Cleansing/Solutions Not Applicable    Dressing Changed Reinforced    Dressing Status Clean;Dry;Intact    Dressing Change Frequency Every 72 hrs    NEXT Dressing Change  12/15/18    NEXT Weekly Photo (Inpatient Only) 12/19/18    WOUND NURSE ONLY - Odor None    WOUND NURSE ONLY - Exposed Structures None    WOUND NURSE ONLY - Tissue Type and Percentage 100% Red/light brown        Lab Values:    WBC:       WBC   Date/Time Value Ref Range Status   12/12/2018 05:20 AM 29.6 (H) 4.8 - 10.8 K/uL Final   10/26/2010 10:15 AM 9.3 4.0 - 10.5 x10E3/uL Final     AIC:      Lab Results   Component Value Date/Time    HBA1C 5.5 03/06/2018 11:36 PM         Culture:   NA    INTERVENTIONS BY WOUND TEAM:  This RN reviewed chart. Per chart review pt has an ileostomy to the RLQ and uses a 2.25\" 2-PC pouch and barrier with paste ring and ostomy belt. This RN confirmed with pt and supplies left at " bedside. Pt has had the ostomy for roughly 8 years and is independent with care. Pt reports his wife normally changes appliance. Appliance is intact, pt declined need to have it changed at this time. This RN then assessed wounds. Pts LLQ abdominal wound is a small partial thickness wound. The wound was cleansed and measured. Adhesive foam already in place. Pts left knee also assessed, cleansed and measured. Wound is superficial and appears to be related to friction from prosthetic. Pt was then turned to the left side. Pt sheets soiled with urine. Per pt he is not normally incontinent but accidentally spilt his urinal. Linens changed and pt cleansed. Sacrum CDI. Pt has discoloration to the left buttock with a small skin tear. The discoloration is quick to miguel a and the area is not overlying a bony prominence. Measurements obtained. Waffle overlay applied to bed. Discussed application of sacral mepilex vs. RIGOBERTO Barrier paste pending if patient is incontinent or not. No current advanced wound care needs identified at this time.     Dressing selection:  Sacral Mepilex or Adhesive foam         Interdisciplinary consultation: Patient, Bedside RN (Beba)    EVALUATION: Pt is an older gentleman with an ileostomy and a left BKA site.     Factors affecting wound healing: Age, Hx CVA, L BKA,   Goals: Steady decrease in wound area and depth weekly.    NURSING PLAN OF CARE ORDERS (X):    Dressing changes: See Dressing Care orders: X  Skin care: See Skin Care orders:   Rectal tube care: See Rectal Tube Care orders:   Other orders:    RSKIN: CURRENT (X) ORDERED (O):   Q shift Vinny:  X  Q shift pressure point assessments:  X  Pressure redistribution mattress X           ZOHREH          Bariatric ZOHREH         Bariatric foam           Heel float boots O         Float Heels off Bed with Pillows               Barrier wipes         Barrier Cream         Barrier paste          Sacral silicone dressing At bedside RN To place if pt remains  incontient        Silicone O2 tubing  NA on Oxy mask       Anchorfast         Cannula fixation Device (Tender )          Gray Foam Ear protectors Applied this visit        Trach with Optifoam split foam                 Waffle cushion        Waffle Overlay Applied this visit         Rectal tube or BMS         Antifungal tx      Interdry          Turn q 2 hours X, Pillow placed under right side       Up to chair X       Ambulate X     PT/OT        Dietician        Diabetes Education      PO X - Clears (pt reports no appetite)    TF     TPN     NPO   # days   Other        WOUND TEAM PLAN OF CARE (X):   NPWT change 3 x week:        Dressing changes by wound team:       Follow up as needed: X      Other (explain):     Anticipated discharge plans (X):   SNF:           Home Care:           Outpatient Wound Center:            Self Care:            Other: X, Pt lives with granddaughter and wife.

## 2018-12-12 NOTE — ED NOTES
Report given to Silvia LANDEROS bedside. Pt taken to [facility] [address]. Chart and belongings with patient. No acute signs distress present.

## 2018-12-12 NOTE — ASSESSMENT & PLAN NOTE
This is severe sepsis with the following associated acute organ dysfunction(s): acute respiratory failure.   Abdominal source and pneumonia  Completed antibiotic course  Cultures neg

## 2018-12-12 NOTE — PROGRESS NOTES
[facility] Hospitalist Progress Note    Date of Service: 2018    Chief Complaint  78 y.o. male admitted 2018 with sepsis afib RVR     Interval Problem Update    -120  Off amio drip now in SR  9 L oxymask  K 3.6   Day 2 of zosyn          Consultants/Specialty  CC  Cardiology  GI    Disposition  To be determined        Review of Systems   Constitutional: Positive for fever, malaise/fatigue and weight loss.   HENT: Negative for congestion, ear discharge and nosebleeds.    Eyes: Negative for blurred vision, pain, discharge and redness.   Respiratory: Negative for cough, hemoptysis, sputum production, shortness of breath and stridor.    Cardiovascular: Negative for chest pain, palpitations, orthopnea and leg swelling.   Gastrointestinal: Positive for abdominal pain. Negative for blood in stool, diarrhea, melena, nausea and vomiting.   Genitourinary: Negative for flank pain and hematuria.   Musculoskeletal: Positive for back pain. Negative for falls, joint pain and neck pain.   Skin: Negative.    Neurological: Negative for speech change, focal weakness, seizures, loss of consciousness, weakness and headaches.   Psychiatric/Behavioral: Negative for hallucinations and memory loss. The patient is not nervous/anxious.    All other systems reviewed and are negative.     Physical Exam  Laboratory/Imaging   Hemodynamics  Temp (24hrs), Av.7 °C (98.1 °F), Min:36.6 °C (97.9 °F), Max:36.9 °C (98.4 °F)   Temperature: 36.9 °C (98.4 °F)  Pulse  Av.1  Min: 71  Max: 169 Heart Rate (Monitored): 93  Blood Pressure : 101/59, NIBP: 153/72      Respiratory      Respiration: (!) 36, Pulse Oximetry: 95 %        RUL Breath Sounds: Diminished, RML Breath Sounds: Diminished, RLL Breath Sounds: Diminished, LISA Breath Sounds: Diminished, LLL Breath Sounds: Diminished    Fluids    Intake/Output Summary (Last 24 hours) at 18 1018  Last data filed at 18 0900   Gross per 24 hour   Intake          5023.39 ml   Output               730 ml   Net          4293.39 ml       Nutrition  Orders Placed This Encounter   Procedures   • Diet Order Clear Liquid     Standing Status:   Standing     Number of Occurrences:   1     Order Specific Question:   Diet:     Answer:   Clear Liquid [10]     Physical Exam   Constitutional: He is oriented to person, place, and time. He appears well-developed and well-nourished.   Thin male   HENT:   Head: Normocephalic and atraumatic.   Right Ear: External ear normal.   Left Ear: External ear normal.   Mouth/Throat: No oropharyngeal exudate.   Eyes: Conjunctivae are normal. Right eye exhibits no discharge. Left eye exhibits no discharge. No scleral icterus.   Neck: Neck supple. No JVD present. No tracheal deviation present.   Cardiovascular: Normal rate and regular rhythm.  Exam reveals no gallop and no friction rub.    No murmur heard.  Pulmonary/Chest: Effort normal and breath sounds normal. No stridor. No respiratory distress. He has no wheezes. He has no rales. He exhibits no tenderness.   Abdominal: Soft. He exhibits no distension. There is tenderness. There is no rebound and no guarding.   Stoma in place with liquid stool   Musculoskeletal: He exhibits no edema or tenderness.   Status post left BKA   Neurological: He is alert and oriented to person, place, and time. No cranial nerve deficit. He exhibits normal muscle tone.   Skin: Skin is warm and dry. He is not diaphoretic. No cyanosis. Nails show no clubbing.   Psychiatric: He has a normal mood and affect. His behavior is normal.   Nursing note and vitals reviewed.      Recent Labs      12/11/18   1553  12/12/18   0520   WBC  18.8*  29.6*   RBC  4.28*  3.49*   HEMOGLOBIN  13.8*  11.1*   HEMATOCRIT  44.2  35.0*   MCV  103.3*  100.3*   MCH  32.2  31.8   MCHC  31.2*  31.7*   RDW  53.1*  50.7*   PLATELETCT  533*  387   MPV  9.3  8.9*     Recent Labs      12/11/18   1553  12/12/18   0520   SODIUM  137  137   POTASSIUM  3.4*  3.6   CHLORIDE  100  105   CO2   18*  22   GLUCOSE  168*  162*   BUN  16  17   CREATININE  1.15  1.08   CALCIUM  10.9*  8.8                      Assessment/Plan     Sepsis (HCC)- (present on admission)   Assessment & Plan    This is severe sepsis with the following associated acute organ dysfunction(s): acute respiratory failure.   Likely source is pneumonia and possible intra-abdominal    Continue Zosyn and follow-up on final cultures     Mass of upper lobe of right lung- (present on admission)   Assessment & Plan    Reviewed CT findings with patient  He will need further diagnostic procedure when more medically stable     Pneumonia due to infectious organism- (present on admission)   Assessment & Plan    Continue Zosyn and follow-up on final cultures       Acute on chronic respiratory failure with hypoxia (HCC)- (present on admission)   Assessment & Plan    CTA negative for PE  Continue to pneumonia and sepsis    Continue current antibiotics oxygen RT protocol     Pancreatic mass- (present on admission)   Assessment & Plan    Follow-up on MRCP  Given pillar ductal dilation concern for sepsis will consult GI discussed with Dr. Arzola.  Continue IV Zosyn and follow-up on cultures     Atrial flutter (HCC)   Assessment & Plan    Resolved  Off amiodarone drip  Cardiology following     Gastritis- (present on admission)   Assessment & Plan    Started on Pepcid on admission  Consider IV Protonix will defer to GI     Troponin level elevated- (present on admission)   Assessment & Plan    Likely demand ischemia  Echo EF 65% unchanged from prior     Hypokalemia- (present on admission)   Assessment & Plan    Replete and monitor        Quality-Core Measures   Reviewed items::  Labs reviewed, Medications reviewed and Radiology images reviewed  Sandoval catheter::  No Sandoval  DVT prophylaxis pharmacological::  Enoxaparin (Lovenox)  Ulcer Prophylaxis::  Yes  Antibiotics:  Treating active infection/contamination beyond 24 hours perioperative coverage

## 2018-12-12 NOTE — H&P
Hospital Medicine History & Physical Note    Date of Service  12/11/2018    Primary Care Physician  ROBERTA Cabrera.    Consultants  Critical care Dr. Trevino    Code Status  Full code    Chief Complaint  Abdominal pain    History of Presenting Illness  78 y.o. male with a past medical history of seizure disorder, chronic respiratory failure on 2 L of nocturnal oxygen, peripheral vascular disease, GERD who presented 12/11/2018 with nausea, vomiting and abdominal pain for the past 2 weeks.  The patient epigastric and left lower quadrant abdominal pain which is sharp and constant rated at 7/10 intensity.  He denies any relieving or exacerbating factors.  He reports associated nausea and vomiting.  Patient has a history of colitis and is status post colostomy placement, he denies any high-volume output from his colostomy.  He denies fevers or chills.  Today he noticed sudden shortness of breath.  He denies any chest pain but endorses a productive cough with clear phlegm.  He denies any personal or family history of blood clots or MI.  He denies any swelling of his lower extremities.    EKG interpreted by me reveals atrial flutter with rapid ventricular response and aberrancy  Chest x-ray interpreted by me reveals right upper lung nodular opacity      Review of Systems  Review of Systems   Constitutional: Positive for chills, fever, malaise/fatigue and weight loss. Negative for diaphoresis.   HENT: Negative for hearing loss and sore throat.    Eyes: Negative for blurred vision.   Respiratory: Positive for cough, sputum production and shortness of breath. Negative for wheezing.    Cardiovascular: Negative for chest pain, palpitations and leg swelling.   Gastrointestinal: Positive for abdominal pain, nausea and vomiting. Negative for blood in stool and diarrhea.   Genitourinary: Negative for dysuria, flank pain and urgency.   Musculoskeletal: Negative for back pain, joint pain, myalgias and neck pain.   Skin: Negative  for rash.   Neurological: Positive for weakness. Negative for dizziness, focal weakness, seizures and headaches.   Endo/Heme/Allergies: Does not bruise/bleed easily.   Psychiatric/Behavioral: Negative for suicidal ideas.   All other systems reviewed and are negative.      Past Medical History   has a past medical history of Arrhythmia; Atrioventricular block, Mobitz type 1, Wenckebach 3/2018; Cataract; Dental disorder; GERD (gastroesophageal reflux disease) (7/8/2013); History of colostomy (10/26/2011); History of CVA (cerebrovascular accident) (4/29/2015); Hypertension; Indigestion; Multiple falls; On home oxygen therapy; Peripheral vascular disease (HCC); Seizure (HCC); Seizure disorder (HCC); and Snoring. He also has no past medical history of Alcohol abuse; Anxiety; Cancer (HCC); Chronic pain; Depression; Self-injurious behavior; or Suicide attempt (HCC).    Surgical History   has a past surgical history that includes amputation, below the knee (Left, 1980); hip orif (7/21/2009); abdominal aortic aneurysm (10/14/2009); abdominal aortic aneurysm (2009); other abdominal surgery; sigmoidoscopy flex (11/9/2010); colectomy (11/21/2010); colostomy (11/21/2010); cataract phaco with iol (8/12/2014); cataract phaco with iol (8/26/2014); and hip nailing intramedullary (Right, 3/9/2016).     Family History  family history includes Heart Attack in his brother and mother; Hyperlipidemia in his sister; No Known Problems in his brother, brother, father, maternal grandfather, maternal grandmother, paternal grandfather, and paternal grandmother.     Social History   reports that he quit smoking about 7 years ago. His smoking use included Cigarettes. He started smoking about 64 years ago. He has a 54.00 pack-year smoking history. He has never used smokeless tobacco. He reports that he does not drink alcohol or use drugs.    Allergies  Allergies   Allergen Reactions   • Egg White Vomiting and Swelling   • Egg Yolk Vomiting and  Swelling   • Chicken Allergy Vomiting and Swelling   • Omeprazole      Coughing and choking   • Prednisone      Mood changes   • Statins [Hmg-Coa-R Inhibitors] Shortness of Breath       Medications  Prior to Admission Medications   Prescriptions Last Dose Informant Patient Reported? Taking?   Aspirin 325 MG Tablet Delayed Response 12/10/2018 at PM Family Member No No   Sig: Take 1 Tab by mouth every day.   Cholecalciferol (VITAMIN D3) 2000 UNITS Tab 12/10/2018 at PM Family Member Yes No   Sig: Take 2,000 Units by mouth every day.   Omega-3 Fatty Acids (FISH OIL) 1000 MG Cap capsule 12/11/2018 at AM Family Member Yes No   Sig: Take 1 Cap by mouth 2 Times a Day.   ascorbic acid (ASCORBIC ACID) 500 MG Tab 12/11/2018 at AM Family Member Yes No   Sig: Take 250 mg by mouth every day.   ferrous sulfate 325 (65 FE) MG tablet 12/11/2018 at AM Family Member No No   Sig: Take 1 Tab by mouth 2 times a day, with meals.   levETIRAcetam (KEPPRA) 100 MG/ML Solution 12/11/2018 at AM Family Member No No   Sig: Take 7.5 mL by mouth 2 times a day.      Facility-Administered Medications: None       Physical Exam  Temp:  [36.6 °C (97.9 °F)] 36.6 °C (97.9 °F)  Pulse:  [] 113  Resp:  [18-34] 18  BP: (100-162)/(59-84) 101/59    Physical Exam   Constitutional: He is oriented to person, place, and time. He appears well-developed.   Toxic and ill appearing   HENT:   Head: Normocephalic and atraumatic.   Oral mucous membranes are dry   Eyes: Pupils are equal, round, and reactive to light. Conjunctivae are normal. No scleral icterus.   Neck: Normal range of motion. Neck supple.   Cardiovascular: Normal rate, regular rhythm and normal heart sounds.    Tachycardic and irregular   Pulmonary/Chest: He is in respiratory distress. He has no wheezes.   Increased effort  Bibasilar crackles   Abdominal: Soft. Bowel sounds are normal. He exhibits no distension. There is tenderness (Diffuse). There is no rebound.   Colostomy in place    Musculoskeletal: Normal range of motion. He exhibits no edema or tenderness.   Left prosthetic leg in place   Lymphadenopathy:     He has no cervical adenopathy.   Neurological: He is alert and oriented to person, place, and time. No cranial nerve deficit. Coordination normal.   Skin: Skin is warm. No rash noted.   Psychiatric: He has a normal mood and affect. His behavior is normal.   Nursing note and vitals reviewed.      Laboratory:  Recent Labs      12/11/18   1553   WBC  18.8*   RBC  4.28*   HEMOGLOBIN  13.8*   HEMATOCRIT  44.2   MCV  103.3*   MCH  32.2   MCHC  31.2*   RDW  53.1*   PLATELETCT  533*   MPV  9.3     Recent Labs      12/11/18   1553   SODIUM  137   POTASSIUM  3.4*   CHLORIDE  100   CO2  18*   GLUCOSE  168*   BUN  16   CREATININE  1.15   CALCIUM  10.9*     Recent Labs      12/11/18   1553   ALTSGPT  47   ASTSGOT  63*   ALKPHOSPHAT  211*   TBILIRUBIN  1.2   GLUCOSE  168*                 No results for input(s): TROPONINI in the last 72 hours.    Urinalysis:    No results found     Imaging:  CT-ABDOMEN-PELVIS WITH   Final Result         1. Stranding and fluid surrounding the entire pancreas with several lobulated peripancreatic fluid area surrounding the pancreas. This could be sequela of prior pancreatitis versus cystic pancreatic neoplasm.      More high density fluid extending from the pancreatic tail to the left paracolic gutter could relate to hemorrhage of one of these cystic lesions or sequela of acute on chronic pancreatitis.      Severely dilated CBD, measuring up to 1.6 cm. Soft tissue attenuation lesion in the distal CBD could be an obstructing mass or noncalcified stone.      Further evaluation with MRCP and MR pancreas is recommended.      2. Cholelithiasis.      3. Diffuse wall thickening of the stomach could be gastritis or reactive change secondary to the pancreatic process.      4. Layering stones/debris in the urinary bladder.      CT-CTA CHEST PULMONARY ARTERY W/ RECONS   Final  Result         1. No CT evidence of pulmonary embolism.      2. Lobulated 1.7 x 1.8 x 3.1 cm mass in the right upper lobe, concerning for lung malignancy. PET/CT, and/or tissue sampling is recommended.      3. Diffuse wall thickening throughout the esophagus could relate to esophagitis. Mildly prominent 1.4 cm paraesophageal lymph node adjacent to the distal esophagus. Questionable enlarged right paraesophageal lymph nodes more proximally.      4. Fluid opacification of the esophagus. There is debris completely filled the bilateral lower lobe airways with associated patchy bibasilar opacities. This is concerning for aspiration pneumonia due to retained fluid in the esophagus.      DX-CHEST-PORTABLE (1 VIEW)   Final Result      1.  No pneumonia or pulmonary edema.   2.  Slight interval increase in size of ill-defined RIGHT mid to upper lung nodular opacity, concerning for mass.      EC-ECHOCARDIOGRAM COMPLETE W/ CONT    (Results Pending)   [id number]    (Results Pending)         Assessment/Plan:  I anticipate this patient will require at least two midnights for appropriate medical management, necessitating inpatient admission.    Sepsis (HCC)- (present on admission)   Assessment & Plan    This is severe sepsis with the following associated acute organ dysfunction(s): acute respiratory failure.   Likely source is pneumonia and possible pancreatic abscess  Patient has been started on IV fluids per septic protocol  Lactate is elevated at 8, trend to resolution  Patient is started on IV Zosyn  Follow blood cultures       Mass of upper lobe of right lung- (present on admission)   Assessment & Plan    We will consider IR guided biopsy once patient's acute issues have resolved     Pneumonia due to infectious organism- (present on admission)   Assessment & Plan    Likely aspiration pneumonia  Patient has been started on IV Zosyn  Pro-calcitonin is elevated  RT protocol  Continue supplemental oxygen, wean as  tolerated  Follow blood cultures       Acute on chronic respiratory failure with hypoxia (HCC)- (present on admission)   Assessment & Plan    Secondary to pneumonia in the setting of COPD  No evidence of PE on CTA  Patient has been started on supplemental oxygen and IV antibiotics     Pancreatic mass- (present on admission)   Assessment & Plan    MRI abdomen for further evaluation  Pain control with oxycodone and IV morphine  We will consider consulting GI / Surgery     Atrial flutter (HCC)   Assessment & Plan    Patient has been started on amiodarone infusion  Defer management to cardiology     Esophagitis- (present on admission)   Assessment & Plan    Started on IV Pepcid     Gastritis- (present on admission)   Assessment & Plan    Started on IV Pepcid     Troponin level elevated- (present on admission)   Assessment & Plan    Likely secondary to demand ischemia in the setting of severe sepsis  Patient denies any active chest pain  Trend troponin     Hypokalemia- (present on admission)   Assessment & Plan    Replace with IV KCl     High anion gap metabolic acidosis- (present on admission)   Assessment & Plan    Secondary to dehydration and lactic acidosis  IV fluid hydration with LR     Bilious vomiting with nausea- (present on admission)   Assessment & Plan    IV Zofran as needed     Generalized abdominal pain- (present on admission)   Assessment & Plan    Pain control with oxycodone and IV morphine         VTE prophylaxis: Lovenox

## 2018-12-12 NOTE — PROGRESS NOTES
Monitor Summary:     Rhythm: Sinus Rhythm  Ectopy: Frequent PVCs  Rate:   Measurements: .14/.12/.36

## 2018-12-12 NOTE — CONSULTS
Critical Care Consultation    Date of consult: 12/11/2018    Referring Physician  No att. providers found    Reason for Consultation  Severe lactic acidosis and sepsis, pancreatic lesion on ct imaging    History of Presenting Illness  78 y.o. male who presented 12/11/2018 with shortness of breath and abdominal pain.  He has a past medical history of seizure disorder chronic, chronic respiratory failure on 2 L of home oxygen at night, peripheral vascular disease, and GERD.  He has been having nausea and vomiting for the past 2 weeks.  He says that his pain is worse on the lower left but he has diffuse generalized pain.  He has a history of colitis and he has a colostomy placement from several years ago.  He has not have been having high output.  His lactic acid is 8.1..  No hematemasis or gi bleed.  Has been having cough with productive sputum.    Code Status  Full Code    Review of Systems  Review of Systems   Constitutional: Positive for activity change, appetite change, chills, fatigue and fever. Negative for diaphoresis.        Ill appearing, in pain   HENT: Negative for congestion and trouble swallowing.    Eyes: Negative for photophobia and visual disturbance.   Respiratory: Positive for cough and shortness of breath. Negative for chest tightness and wheezing.    Cardiovascular: Negative for chest pain, palpitations and leg swelling.   Gastrointestinal: Positive for abdominal distention, abdominal pain, nausea and vomiting. Negative for blood in stool and diarrhea.   Endocrine: Negative for cold intolerance and heat intolerance.   Genitourinary: Negative for difficulty urinating and dysuria.   Musculoskeletal: Negative for arthralgias and back pain.   Skin: Negative for color change and rash.   Neurological: Negative for dizziness, seizures, speech difficulty, weakness and headaches.   Hematological: Negative for adenopathy. Does not bruise/bleed easily.   Psychiatric/Behavioral: Negative for agitation and  confusion.       Past Medical History   has a past medical history of Arrhythmia; Atrioventricular block, Mobitz type 1, [place] 3/2018; Cataract; Dental disorder; GERD (gastroesophageal reflux disease) (7/8/2013); History of colostomy (10/26/2011); History of CVA (cerebrovascular accident) (4/29/2015); Hypertension; Indigestion; Multiple falls; On home oxygen therapy; Peripheral vascular disease (HCC); Seizure (HCC); Seizure disorder (HCC); and Snoring. He also has no past medical history of Alcohol abuse; Anxiety; Cancer (HCC); Chronic pain; Depression; Self-injurious behavior; or Suicide attempt (HCC).    Surgical History   has a past surgical history that includes amputation, below the knee (Left, 1980); hip orif (7/21/2009); abdominal aortic aneurysm (10/14/2009); abdominal aortic aneurysm (2009); other abdominal surgery; sigmoidoscopy flex (11/9/2010); colectomy (11/21/2010); colostomy (11/21/2010); cataract phaco with iol (8/12/2014); cataract phaco with iol (8/26/2014); and hip nailing intramedullary (Right, 3/9/2016).    Family History  family history includes Heart Attack in his brother and mother; Hyperlipidemia in his sister; No Known Problems in his brother, brother, father, maternal grandfather, maternal grandmother, paternal grandfather, and paternal grandmother.    Social History   reports that he quit smoking about 7 years ago. His smoking use included Cigarettes. He started smoking about 64 years ago. He has a 54.00 pack-year smoking history. He has never used smokeless tobacco. He reports that he does not drink alcohol or use drugs.    Medications  Home Medications     Reviewed by Yordy Menezes (Pharmacy Tech) on 12/11/18 at 1717  Med List Status: Complete   Medication Last Dose Status   ascorbic acid (ASCORBIC ACID) 500 MG Tab 12/11/2018 Active   Aspirin 325 MG Tablet Delayed Response 12/10/2018 Active   Cholecalciferol (VITAMIN D3) 2000 UNITS Tab 12/10/2018 Active   ferrous sulfate  325 (65 FE) MG tablet 12/11/2018 Active   levETIRAcetam (KEPPRA) 100 MG/ML Solution 12/11/2018 Active   Omega-3 Fatty Acids (FISH OIL) 1000 MG Cap capsule 12/11/2018 Active              Current Facility-Administered Medications   Medication Dose Route Frequency Provider Last Rate Last Dose   • D5W infusion   Intravenous Continuous Jose Nguyen D.O. 30 mL/hr at 12/11/18 2132     • amiodarone (CORDARONE) 450 mg in D5W 250 mL Infusion  0.5-1 mg/min Intravenous Continuous Jose Nguyen D.O. 33 mL/hr at 12/11/18 1826 1 mg/min at 12/11/18 1826   • Respiratory Care per Protocol   Nebulization Continuous RT Juan Cobian M.D.       • NS (BOLUS) infusion 1,000 mL  1,000 mL Intravenous Once PRN Juan Cobian M.D.       • acetaminophen (TYLENOL) tablet 650 mg  650 mg Oral Q6HRS PRN Juan Cobian M.D.       • Pharmacy Consult Request ...Pain Management Review   Other PRN Juan Cobian M.D.        And   • oxyCODONE immediate-release (ROXICODONE) tablet 2.5 mg  2.5 mg Oral Q3HRS PRN Juan Cobian M.D.        And   • oxyCODONE immediate-release (ROXICODONE) tablet 5 mg  5 mg Oral Q3HRS PRN Juan Cobian M.D.        And   • morphine (pf) 10 mg/mL injection 2 mg  2 mg Intravenous Q3HRS PRN Juan Cobian M.D.       • lactated ringers infusion   Intravenous Continuous Juan Cobian M.D.       • [START ON 12/12/2018] piperacillin-tazobactam (ZOSYN) 3.375 g in  mL IVPB  3.375 g Intravenous Q8HRS Juan Cobian M.D.       • ferrous sulfate tablet 325 mg  325 mg Oral BID WITH MEALS Juan Cobian M.D.       • levETIRAcetam (KEPPRA) tablet 750 mg  750 mg Oral BID Juan Cobian M.D.         Current Outpatient Prescriptions   Medication Sig Dispense Refill   • Omega-3 Fatty Acids (FISH OIL) 1000 MG Cap capsule Take 1 Cap by mouth 2 Times a Day.     • levETIRAcetam (KEPPRA) 100 MG/ML Solution Take 7.5 mL by mouth 2 times a day. 450 mL 11   • ascorbic acid (ASCORBIC ACID) 500 MG Tab Take 250 mg by mouth every day.     • Aspirin 325 MG Tablet  Delayed Response Take 1 Tab by mouth every day. 90 Tab 1   • Cholecalciferol (VITAMIN D3) 2000 UNITS Tab Take 2,000 Units by mouth every day. 30 Tab 1   • ferrous sulfate 325 (65 FE) MG tablet Take 1 Tab by mouth 2 times a day, with meals. 60 Tab 1       Allergies  Allergies   Allergen Reactions   • Egg White Vomiting and Swelling   • Egg Yolk Vomiting and Swelling   • Chicken Allergy Vomiting and Swelling   • Omeprazole      Coughing and choking   • Prednisone      Mood changes   • Statins [Hmg-Coa-R Inhibitors] Shortness of Breath       Vital Signs last 24 hours  Temp:  [36.6 °C (97.9 °F)] 36.6 °C (97.9 °F)  Pulse:  [] 102  Resp:  [13-34] 13  BP: (100-162)/(59-84) 101/59    Physical Exam  Physical Exam   Constitutional: He is oriented to person, place, and time. He appears well-nourished. He appears distressed.   HENT:   Head: Normocephalic and atraumatic.   Right Ear: External ear normal.   Left Ear: External ear normal.   Mouth/Throat: No oropharyngeal exudate.   Eyes: Pupils are equal, round, and reactive to light. Conjunctivae and EOM are normal. Right eye exhibits no discharge. Left eye exhibits no discharge.   Neck: No JVD present. No tracheal deviation present.   Cardiovascular: Normal rate, regular rhythm and normal heart sounds.    No murmur heard.  Pulmonary/Chest: No stridor. He is in respiratory distress. He has no wheezes. He has no rales. He exhibits no tenderness.   Abdominal: He exhibits distension. He exhibits no mass. There is tenderness. There is guarding. There is no rebound.   Musculoskeletal: He exhibits edema. He exhibits no tenderness or deformity.   Neurological: He is alert and oriented to person, place, and time. He displays normal reflexes. No cranial nerve deficit. Coordination normal.   Skin: Skin is warm. No rash noted. He is diaphoretic. No erythema.   Psychiatric: He has a normal mood and affect. His behavior is normal.       Fluids    Intake/Output Summary (Last 24 hours) at  12/11/18 2216  Last data filed at 12/11/18 2110   Gross per 24 hour   Intake              500 ml   Output                0 ml   Net              500 ml       Laboratory  Recent Results (from the past 48 hour(s))   Lactic acid (lactate)    Collection Time: 12/11/18  3:53 PM   Result Value Ref Range    Lactic Acid 8.1 (HH) 0.5 - 2.0 mmol/L   CBC WITH DIFFERENTIAL    Collection Time: 12/11/18  3:53 PM   Result Value Ref Range    WBC 18.8 (H) 4.8 - 10.8 K/uL    RBC 4.28 (L) 4.70 - 6.10 M/uL    Hemoglobin 13.8 (L) 14.0 - 18.0 g/dL    Hematocrit 44.2 42.0 - 52.0 %    .3 (H) 81.4 - 97.8 fL    MCH 32.2 27.0 - 33.0 pg    MCHC 31.2 (L) 33.7 - 35.3 g/dL    RDW 53.1 (H) 35.9 - 50.0 fL    Platelet Count 533 (H) 164 - 446 K/uL    MPV 9.3 9.0 - 12.9 fL    Neutrophils-Polys 81.20 (H) 44.00 - 72.00 %    Lymphocytes 14.70 (L) 22.00 - 41.00 %    Monocytes 1.00 0.00 - 13.40 %    Eosinophils 1.50 0.00 - 6.90 %    Basophils 0.40 0.00 - 1.80 %    Immature Granulocytes 1.20 (H) 0.00 - 0.90 %    Nucleated RBC 0.00 /100 WBC    Neutrophils (Absolute) 15.27 (H) 1.82 - 7.42 K/uL    Lymphs (Absolute) 2.76 1.00 - 4.80 K/uL    Monos (Absolute) 0.18 0.00 - 0.85 K/uL    Eos (Absolute) 0.29 0.00 - 0.51 K/uL    Baso (Absolute) 0.08 0.00 - 0.12 K/uL    Immature Granulocytes (abs) 0.22 (H) 0.00 - 0.11 K/uL    NRBC (Absolute) 0.00 K/uL   COMP METABOLIC PANEL    Collection Time: 12/11/18  3:53 PM   Result Value Ref Range    Sodium 137 135 - 145 mmol/L    Potassium 3.4 (L) 3.6 - 5.5 mmol/L    Chloride 100 96 - 112 mmol/L    Co2 18 (L) 20 - 33 mmol/L    Anion Gap 19.0 (H) 0.0 - 11.9    Glucose 168 (H) 65 - 99 mg/dL    Bun 16 8 - 22 mg/dL    Creatinine 1.15 0.50 - 1.40 mg/dL    Calcium 10.9 (H) 8.5 - 10.5 mg/dL    AST(SGOT) 63 (H) 12 - 45 U/L    ALT(SGPT) 47 2 - 50 U/L    Alkaline Phosphatase 211 (H) 30 - 99 U/L    Total Bilirubin 1.2 0.1 - 1.5 mg/dL    Albumin 3.1 (L) 3.2 - 4.9 g/dL    Total Protein 6.5 6.0 - 8.2 g/dL    Globulin 3.4 1.9 - 3.5 g/dL     A-G Ratio 0.9 g/dL   ESTIMATED GFR    Collection Time: 18  3:53 PM   Result Value Ref Range    GFR If African American >60 >60 mL/min/1.73 m 2    GFR If Non African American >60 >60 mL/min/1.73 m 2   EKG (NOW)    Collection Time: 18  4:04 PM   Result Value Ref Range    Report       [facility] Emergency Dept.    Test Date:  2018  Pt Name:    ALYSSA ZAPATA               Department: ER  MRN:        [id number]                      Room:       [facility]  Gender:     Male                         Technician: [id number]  :        [id number]                   Requested By:ER TRIAGE PROTOCOL  Order #:    [id number]                    Reading MD:    Measurements  Intervals                                Axis  Rate:       162                          P:          0  OH:         162                          QRS:        185  QRSD:       138                          T:          121  QT:         324  QTc:        533    Interpretive Statements  WIDE COMPLEX TACHYCARDIA  MULTIFORM VENTRICULAR PREMATURE COMPLEXES  NONSPECIFIC INTRAVENTRICULAR CONDUCTION DELAY  ARTIFACT IN LEAD(S) I,II,III,aVR,aVL,aVF,V1,V2,V3 AND BASELINE WANDER IN  LEAD(S) II,III,aVF,V1  Compared to ECG 2018 10:01:25  Wide-QRS tachycardia now present  Ventricular premature complex(es) now pre sent  Intraventricular conduction delay now present  Sinus tachycardia no longer present  Right-axis deviation no longer present  Right bundle-branch block no longer present  Myocardial infarct finding no longer present     Lactic acid (lactate)    Collection Time: 18  7:45 PM   Result Value Ref Range    Lactic Acid 2.4 (H) 0.5 - 2.0 mmol/L   TROPONIN    Collection Time: 18  7:45 PM   Result Value Ref Range    Troponin I 0.09 (H) 0.00 - 0.04 ng/mL   Procalcitonin    Collection Time: 18  7:45 PM   Result Value Ref Range    Procalcitonin 28.02 (H) <0.25 ng/mL   URINALYSIS    Collection Time: 18  8:30 PM   Result Value  Ref Range    Color Yellow     Character Cloudy (A)     Specific Gravity 1.025 <1.035    Ph 5.0 5.0 - 8.0    Glucose Negative Negative mg/dL    Ketones Trace (A) Negative mg/dL    Protein 30 (A) Negative mg/dL    Bilirubin Negative Negative    Urobilinogen, Urine 0.2 Negative    Nitrite Negative Negative    Leukocyte Esterase Negative Negative    Occult Blood Negative Negative    Micro Urine Req Microscopic    URINE MICROSCOPIC (W/UA)    Collection Time: 12/11/18  8:30 PM   Result Value Ref Range    WBC 2-5 (A) /hpf    RBC 2-5 (A) /hpf    Bacteria Negative None /hpf    Epithelial Cells Negative /hpf    Ca Oxalate Crystal Few /hpf    Hyaline Cast 0-2 /lpf   Influenza A/B By PCR    Collection Time: 12/11/18  9:25 PM   Result Value Ref Range    Influenza virus A RNA Negative Negative    Influenza virus B, PCR Negative Negative       Imaging  CT-ABDOMEN-PELVIS WITH   Final Result         1. Stranding and fluid surrounding the entire pancreas with several lobulated peripancreatic fluid area surrounding the pancreas. This could be sequela of prior pancreatitis versus cystic pancreatic neoplasm.      More high density fluid extending from the pancreatic tail to the left paracolic gutter could relate to hemorrhage of one of these cystic lesions or sequela of acute on chronic pancreatitis.      Severely dilated CBD, measuring up to 1.6 cm. Soft tissue attenuation lesion in the distal CBD could be an obstructing mass or noncalcified stone.      Further evaluation with MRCP and MR pancreas is recommended.      2. Cholelithiasis.      3. Diffuse wall thickening of the stomach could be gastritis or reactive change secondary to the pancreatic process.      4. Layering stones/debris in the urinary bladder.      CT-CTA CHEST PULMONARY ARTERY W/ RECONS   Final Result         1. No CT evidence of pulmonary embolism.      2. Lobulated 1.7 x 1.8 x 3.1 cm mass in the right upper lobe, concerning for lung malignancy. PET/CT, and/or tissue  sampling is recommended.      3. Diffuse wall thickening throughout the esophagus could relate to esophagitis. Mildly prominent 1.4 cm paraesophageal lymph node adjacent to the distal esophagus. Questionable enlarged right paraesophageal lymph nodes more proximally.      4. Fluid opacification of the esophagus. There is debris completely filled the bilateral lower lobe airways with associated patchy bibasilar opacities. This is concerning for aspiration pneumonia due to retained fluid in the esophagus.      DX-CHEST-PORTABLE (1 VIEW)   Final Result      1.  No pneumonia or pulmonary edema.   2.  Slight interval increase in size of ill-defined RIGHT mid to upper lung nodular opacity, concerning for mass.      EC-ECHOCARDIOGRAM COMPLETE W/ CONT    (Results Pending)       Assessment/Plan  Troponin level elevated   Assessment & Plan    Secondary to demand ischemia     Lactic acidosis   Assessment & Plan    Secondary pancreatitis/sepsis  IVF resuscitation     Hypokalemia   Assessment & Plan    Supplement  ivf resuscitation  Keep > 4  Magnesium ordered     High anion gap metabolic acidosis   Assessment & Plan    Secondary to sepsis, pancreatitis  Aggressive ivf resuscitation       Bilious vomiting with nausea   Assessment & Plan    Ongoing 2 weeks  Likely aspiraiton source  zofran prn     Generalized abdominal pain   Assessment & Plan    Secondary to pancreatic changes, seen on ct imaging  Biliary duct dilation  Aggressive IVF resuscitation  MRCP pending  Lipase pending  zosyn     Pneumonia due to infectious organism   Assessment & Plan    Consolidations seen on ct imaing  Possible aspiration, especially givne vomiting  Blood culture pending     Acute on chronic respiratory failure with hypoxia (HCC)   Assessment & Plan    On 12 l oxy mask  Home o2 nocturnal  duonebs scheduled q 4 hours  Zosyn antibiotics  Consolidations  Bilateral lung bases and esophageal material, possible aspiration  Hold on steroids  Hx of copd      Sepsis (HCC)   Assessment & Plan    Severe sepsis with end organ damage  Lactic acid 9  Respiratory failure with severe hypoxia  Zosyn  Blood cx pending  Possible infection of pancreas  Pneumonia noted on ct imaging  Aspiration  On 12 L oxymask, sating 88-92%       Acute pancreatitis   Assessment & Plan    Pancreatic changes with inflammatory changes, bile duct dilation, cystic changes  Lipase pending  IVF resuscitation  LA 9  LR at 250 ml/hr         Discussed patient condition and risk of morbidity and/or mortality with Hospitalist, Family, RN, Pharmacy and Patient.      The patient remains critically ill.  Requiring up to 15 L o2 per oxymask with sao2 87-94%.  Also lactic acid severely elevated.  Requiring aggressive IVF resuscitation.  On amiodarone drip for aflutter with rvr.  There is high probability of clinical deterioration including need for mechanical ventilation and includes possible death without aggressive therapy.  Critical care time = 45 minutes in directly providing and coordinating critical care and extensive data review.  No time overlap and excludes procedures.

## 2018-12-12 NOTE — CARE PLAN
Problem: Safety  Goal: Will remain free from injury  Outcome: PROGRESSING AS EXPECTED  Bed in locked and in low position, lower bed rails raised, bed alarm in use, call light within reach, treaded slipper socks on patient and patient room near nursing station.   Patient communicates and demonstrates understanding that a nurse must be present during ambulation while patient in the ICU and how to use the call light when staff assistance is required.       Problem: Pain Management  Goal: Pain level will decrease to patient's comfort goal  Outcome: PROGRESSING SLOWER THAN EXPECTED  Assess pain every two hours. Administer prescribed pain medication per patient request. Reassess pain within two hours of pharmacological administration.   Intervention: Educate and implement non-pharmacologic comfort measures. Examples: relaxation, distration, play therapy, activity therapy, massage, etc.  Apply heat packs to abdomen.      Problem: Skin Integrity  Goal: Risk for impaired skin integrity will decrease  Outcome: PROGRESSING SLOWER THAN EXPECTED  Assess patient's skin at the beginning of the shift. Turn patient every two hours and monitor under all medical devices throughout entire shift. Maintain a clean, dry linen environment. Float heals and elbows. Provide appropriate would prevention interventions as they apply. Implement pertinent wound protocols and document them. Please see wound flow sheet for further details.

## 2018-12-12 NOTE — ASSESSMENT & PLAN NOTE
Query whether sepsis is present  Leukocytosis improved  Afebrile  Just completed a long course of Zosyn on 12/28  Stop meropenem and Zyvox for now and observe  Blood cultures are negative from 12/28

## 2018-12-12 NOTE — PROGRESS NOTES
Critical Care Progress Note    Date of admission  12/11/2018    Chief Complaint  78 y.o. male admitted 12/11/2018 with nausea/vomiting, abd pain, LA 8.1    Hospital Course    78 y.o. male who presented 12/11/2018 with shortness of breath and abdominal pain.  He has a past medical history of seizure disorder chronic, chronic respiratory failure on 2 L of home oxygen at night, peripheral vascular disease, and GERD.  He has been having nausea and vomiting for the past 2 weeks.  He says that his pain is worse on the lower left but he has diffuse generalized pain.  He has a history of colitis and he has a colostomy placement from several years ago.  He has not have been having high output.  His lactic acid is 8.1..  No hematemasis or gi bleed.  Has been having cough with productive sputum.      Interval Problem Update  Reviewed last 24 hour events:   Amio gtt overnight   /hr   Rec'd >2 L bolus; LA down 8 ->1.9   L BKA with prosthesis    MRCP today eval for pancreatic mass   9 lpm oxy mask; titrating    Home Oxygen dep    CTA negative   lovenox P   AF, CHADS 5, full anticoag when able   Replacing K   Zosyn    GI eval    Review of Systems  Review of Systems   Unable to perform ROS: Acuity of condition        Vital Signs for last 24 hours   Temp:  [36.6 °C (97.9 °F)-36.9 °C (98.4 °F)] 36.9 °C (98.4 °F)  Pulse:  [] 105  Resp:  [13-34] 29  BP: (100-162)/(59-84) 101/59    Hemodynamic parameters for last 24 hours       Respiratory       Physical Exam   Physical Exam   Constitutional: He is oriented to person, place, and time. He appears well-developed. He appears lethargic. He appears ill.   HENT:   Head: Atraumatic.   Eyes: Pupils are equal, round, and reactive to light. Right eye exhibits no discharge.   Neck: Neck supple. No tracheal deviation present.   Cardiovascular: An irregularly irregular rhythm present.   Pulmonary/Chest: He has rales.   Diminished, moderate respiratory distress,   Abdominal: Soft. He  exhibits distension.   Musculoskeletal: Normal range of motion. He exhibits no edema.   Neurological: He is oriented to person, place, and time. He appears lethargic. No cranial nerve deficit.   Skin: Skin is dry.       Medications  Current Facility-Administered Medications   Medication Dose Route Frequency Provider Last Rate Last Dose   • ondansetron (ZOFRAN) syringe/vial injection 4 mg  4 mg Intravenous Q6HRS PRN Adonay Queen R.N.   4 mg at 12/12/18 0033   • famotidine (PEPCID) injection 20 mg  20 mg Intravenous BID Juan Cobian M.D.   20 mg at 12/12/18 0457   • enoxaparin (LOVENOX) inj 40 mg  40 mg Subcutaneous DAILY Juan Cobian M.D.   40 mg at 12/12/18 0516   • D5W infusion   Intravenous Continuous Jose Nguyen, D.O. 30 mL/hr at 12/11/18 2132     • amiodarone (CORDARONE) 450 mg in D5W 250 mL Infusion  0.5-1 mg/min Intravenous Continuous Jose CAMP Palobrett, D.O. 17 mL/hr at 12/12/18 0511 0.5 mg/min at 12/12/18 0511   • Respiratory Care per Protocol   Nebulization Continuous RT Juan Cobian M.D.       • NS (BOLUS) infusion 1,000 mL  1,000 mL Intravenous Once PRN Juan Cobian M.D.       • acetaminophen (TYLENOL) tablet 650 mg  650 mg Oral Q6HRS PRN Juan Cobian M.D.       • Pharmacy Consult Request ...Pain Management Review   Other PRN Juan Cobian M.D.        And   • oxyCODONE immediate-release (ROXICODONE) tablet 2.5 mg  2.5 mg Oral Q3HRS PRN Juan Cobian M.D.        And   • oxyCODONE immediate-release (ROXICODONE) tablet 5 mg  5 mg Oral Q3HRS PRN Juan Cobian M.D.   5 mg at 12/12/18 0151    And   • morphine (pf) 10 mg/mL injection 2 mg  2 mg Intravenous Q3HRS PRN Juan Cobian M.D.   2 mg at 12/12/18 0752   • lactated ringers infusion   Intravenous Continuous Stalin Trevino M.D. 250 mL/hr at 12/12/18 0013     • piperacillin-tazobactam (ZOSYN) 3.375 g in  mL IVPB  3.375 g Intravenous Q8HRS Juan Cobian M.D. 25 mL/hr at 12/12/18 0500 3.375 g at 12/12/18 0500   • ferrous sulfate tablet 325 mg  325 mg  Oral BID WITH MEALS Juan Cobian M.D.   325 mg at 12/12/18 0751   • levETIRAcetam (KEPPRA) tablet 750 mg  750 mg Oral BID Juan Cobian M.D.   750 mg at 12/12/18 0517       Fluids    Intake/Output Summary (Last 24 hours) at 12/12/18 0825  Last data filed at 12/12/18 0600   Gross per 24 hour   Intake          5023.39 ml   Output              630 ml   Net          4393.39 ml       Laboratory      Recent Labs      12/11/18   1945  12/12/18   0520   TROPONINI  0.09*  0.11*     Recent Labs      12/11/18   1553  12/12/18   0520   SODIUM  137  137   POTASSIUM  3.4*  3.6   CHLORIDE  100  105   CO2  18*  22   BUN  16  17   CREATININE  1.15  1.08   CALCIUM  10.9*  8.8     Recent Labs      12/11/18   1553  12/12/18   0010  12/12/18   0520   ALTSGPT  47   --    --    ASTSGOT  63*   --    --    ALKPHOSPHAT  211*   --    --    TBILIRUBIN  1.2   --    --    LIPASE   --   45  263*   GLUCOSE  168*   --   162*     Recent Labs      12/11/18   1553  12/12/18   0520   WBC  18.8*  29.6*   NEUTSPOLYS  81.20*  93.00*   LYMPHOCYTES  14.70*  2.60*   MONOCYTES  1.00  0.00   EOSINOPHILS  1.50  0.00   BASOPHILS  0.40  0.00   ASTSGOT  63*   --    ALTSGPT  47   --    ALKPHOSPHAT  211*   --    TBILIRUBIN  1.2   --      Recent Labs      12/11/18   1553  12/12/18   0520   RBC  4.28*  3.49*   HEMOGLOBIN  13.8*  11.1*   HEMATOCRIT  44.2  35.0*   PLATELETCT  533*  387       Imaging  X-Ray:  I have personally reviewed the images and compared with prior images.    Assessment/Plan  Sepsis (HCC)- (present on admission)   Assessment & Plan    Severe sepsis with end organ damage  Lactic acid 9, trending down  Associated with respiratory failure with severe hypoxia  Continue Zosyn, report of gram-negative rods from blood cultures, final pending  Cover for cholangitis, aspiration pneumonitis, suspect ongoing gallstone pancreatitis  Titrated oxygen, high flow nasal cannula, monitor need for intubation       Mass of upper lobe of right lung- (present on  admission)   Assessment & Plan    Follow-up CT, plus minus PET     Pneumonia due to infectious organism- (present on admission)   Assessment & Plan    Consolidations seen on ct imaing  Possible aspiration, especially givne vomiting  Blood culture pending     Acute on chronic respiratory failure with hypoxia (HCC)- (present on admission)   Assessment & Plan    Home o2 nocturnal, history of COPD  Now exacerbated with sepsis/SIRS, gallstone pancreatitis,?  Pneumonia  High flow nasal cannula, respiratory protocols, may require intubation     Pancreatic mass- (present on admission)   Assessment & Plan    Suspect gallstone pancreatitis, rule out mass  Follow-up MRCP  GI consultation       Atrial flutter (HCC)   Assessment & Plan    On amiodarone drip.     Troponin level elevated- (present on admission)   Assessment & Plan    Secondary to demand ischemia     Hypokalemia- (present on admission)   Assessment & Plan    Supplement  ivf resuscitation  Keep > 4  Magnesium ordered     Generalized abdominal pain- (present on admission)   Assessment & Plan    Secondary to gallstone pancreatitis  Volume resuscitation  MRCP, GI consultation          VTE:  Lovenox  Ulcer: H2 Antagonist  Lines: None    I have performed a physical exam and reviewed and updated ROS and Plan today (12/12/2018). In review of yesterday's note (12/11/2018), there are no changes except as documented above.   GI is consulted, imaging reviewed, consistent with gallstone pancreatitis.  Continue volume resuscitation, empiric antimicrobial therapy given possibility of bacteremia with reported gram-negative rods and blood culture.  Continue respiratory support.  He may require intubation.  We will proceed with ERCP in the morning.  I updated his family at bedside.  Overall prognosis guarded  Discussed patient condition and risk of morbidity and/or mortality with Hospitalist, Family, RN, RT, Pharmacy and QA team  The patient remains critically ill.  Critical care  time = 33 minutes in directly providing and coordinating critical care and extensive data review.  No time overlap and excludes procedures.

## 2018-12-12 NOTE — CONSULTS
Reason for Consult:  Asked by Dr Nguyen to see this patient with  tachycardia  Patient's PCP: CORNELIO Cabrera    CC:   Chief Complaint   Patient presents with   • Abdominal Pain   • Chills   • Sore Throat       HPI:    78-year-old male patient presented with abdominal pain associated with nausea vomiting for 1 week, insidious onset, gradually worsening, patient is feeling tired.  In the emergency room patient found to have wide complex tachycardia.  He also complaints cough with some sputum production and chest congestion.  He was seen by us in the past for first degree AV block, metoprolol was stopped.      Medications / Drug list prior to admission:  No current facility-administered medications on file prior to encounter.      Current Outpatient Prescriptions on File Prior to Encounter   Medication Sig Dispense Refill   • Omega-3 Fatty Acids (FISH OIL) 1000 MG Cap capsule Take 1 Cap by mouth 2 Times a Day.     • levETIRAcetam (KEPPRA) 100 MG/ML Solution Take 7.5 mL by mouth 2 times a day. 450 mL 11   • ascorbic acid (ASCORBIC ACID) 500 MG Tab Take 250 mg by mouth every day.     • Aspirin 325 MG Tablet Delayed Response Take 1 Tab by mouth every day. 90 Tab 1   • Cholecalciferol (VITAMIN D3) 2000 UNITS Tab Take 2,000 Units by mouth every day. 30 Tab 1   • ferrous sulfate 325 (65 FE) MG tablet Take 1 Tab by mouth 2 times a day, with meals. 60 Tab 1       Current list of administered Medications:    Current Facility-Administered Medications:   •  vancomycin 1,700 mg in  mL IVPB, 25 mg/kg, Intravenous, Once, Jose Nguyen D.ORudy, Last Rate: 166.7 mL/hr at 12/11/18 1747, 1,700 mg at 12/11/18 1747  •  D5W infusion, , Intravenous, Continuous, Jose Nguyen D.O.  •  amiodarone (CORDARONE) 450 mg in D5W 250 mL Infusion, 0.5-1 mg/min, Intravenous, Continuous, Jose Nguyen D.O., Last Rate: 33 mL/hr at 12/11/18 1826, 1 mg/min at 12/11/18 1826    Current Outpatient Prescriptions:   •  Omega-3 Fatty  Acids (FISH OIL) 1000 MG Cap capsule, Take 1 Cap by mouth 2 Times a Day., Disp: , Rfl:   •  levETIRAcetam (KEPPRA) 100 MG/ML Solution, Take 7.5 mL by mouth 2 times a day., Disp: 450 mL, Rfl: 11  •  ascorbic acid (ASCORBIC ACID) 500 MG Tab, Take 250 mg by mouth every day., Disp: , Rfl:   •  Aspirin 325 MG Tablet Delayed Response, Take 1 Tab by mouth every day., Disp: 90 Tab, Rfl: 1  •  Cholecalciferol (VITAMIN D3) 2000 UNITS Tab, Take 2,000 Units by mouth every day., Disp: 30 Tab, Rfl: 1  •  ferrous sulfate 325 (65 FE) MG tablet, Take 1 Tab by mouth 2 times a day, with meals., Disp: 60 Tab, Rfl: 1    Past Medical History:   Diagnosis Date   • Arrhythmia     Pt states r/t caffeine   • Atrioventricular block, Mobitz type 1, Gissellbach 3/2018    • Cataract     Bilat   • Dental disorder     dentures   • GERD (gastroesophageal reflux disease) 7/8/2013   • History of colostomy 10/26/2011   • History of CVA (cerebrovascular accident) 4/29/2015   • Hypertension    • Indigestion    • Multiple falls    • On home oxygen therapy    • Peripheral vascular disease (HCC)    • Seizure (HCC)    • Seizure disorder (HCC)     10 yrs ago  dilantin n o seizure 10-11 years   • Snoring        Past Surgical History:   Procedure Laterality Date   • HIP NAILING INTRAMEDULLARY Right 3/9/2016    Procedure: HIP NAILING INTRAMEDULLARY;  Surgeon: Freddy Gan M.D.;  Location: [facility];  Service:    • CATARACT PHACO WITH IOL  8/26/2014    Performed by Abran Barber M.D. at [facility]   • CATARACT PHACO WITH IOL  8/12/2014    Performed by Abran Barber M.D. at [facility]   • COLECTOMY  11/21/2010    Performed by BRYCE HDEZ at SURGERY [facility]   • COLOSTOMY  11/21/2010    Performed by BRYCE HDEZ at SURGERY [facility]   • SIGMOIDOSCOPY FLEX  11/9/2010    Performed by MINA MAYER at ENDOSCOPY [facility] [place]   • ABDOMINAL AORTIC ANEURYSM  10/14/2009    Performed  by PHOEBE CHEATHAM at SURGERY [facility]   • HIP ORIF  7/21/2009    Performed by WAYNE HOLMAN at SURGERY [facility]   • ABDOMINAL AORTIC ANEURYSM  2009   • AMPUTATION, BELOW THE KNEE Left 1980   • OTHER ABDOMINAL SURGERY      AAA repair 2009       Family History   Problem Relation Age of Onset   • Heart Attack Mother    • No Known Problems Father    • Hyperlipidemia Sister    • No Known Problems Brother    • No Known Problems Brother    • Heart Attack Brother    • No Known Problems Maternal Grandmother    • No Known Problems Maternal Grandfather    • No Known Problems Paternal Grandmother    • No Known Problems Paternal Grandfather    • Lung Disease Neg Hx    • Cancer Neg Hx    • Diabetes Neg Hx    • Hypertension Neg Hx    • Stroke Neg Hx        Social History     Social History   • Marital status:      Spouse name: N/A   • Number of children: N/A   • Years of education: N/A     Occupational History   • Not on file.     Social History Main Topics   • Smoking status: Former Smoker     Packs/day: 1.00     Years: 54.00     Types: Cigarettes     Start date: 9/24/1954     Quit date: 3/19/2011   • Smokeless tobacco: Never Used      Comment: Started smoking at 17   • Alcohol use No   • Drug use: No   • Sexual activity: No     Other Topics Concern   • Not on file     Social History Narrative   • No narrative on file       ALLERGIES:  Allergies   Allergen Reactions   • Egg White Vomiting and Swelling   • Egg Yolk Vomiting and Swelling   • Chicken Allergy Vomiting and Swelling   • Omeprazole      Coughing and choking   • Prednisone      Mood changes   • Statins [Hmg-Coa-R Inhibitors] Shortness of Breath       Review of systems:  A detailed review of symptoms was reviewed with patient. This is reviewed in H&P and PMH. ALL OTHERS reviewed and negative    Physical exam:  Patient Vitals for the past 24 hrs:   BP Temp Temp src Pulse Resp SpO2 Height Weight   12/11/18 1900 - - - - (!) 26 97 % - -   12/11/18 1845  "- - - - (!) 22 97 % - -   12/11/18 1830 - - - - (!) 25 96 % - -   12/11/18 1826 101/59 - - (!) 121 - - - -   12/11/18 1815 - - - (!) 124 (!) 25 97 % - -   12/11/18 1800 - - - (!) 138 (!) 29 94 % - -   12/11/18 1745 - - - (!) 133 (!) 23 94 % - -   12/11/18 1730 - - - (!) 139 (!) 26 94 % - -   12/11/18 1715 - - - (!) 151 (!) 25 94 % - -   12/11/18 1700 - - - (!) 157 20 91 % - -   12/11/18 1651 100/59 - - - - - - -   12/11/18 1650 103/64 - - - - - - -   12/11/18 1645 - - - (!) 156 (!) 34 (!) 84 % - -   12/11/18 1630 - - - (!) 159 (!) 21 90 % - -   12/11/18 1615 - - - (!) 166 20 96 % - -   12/11/18 1600 - - - (!) 169 (!) 31 96 % - -   12/11/18 1521 (!) 162/84 36.6 °C (97.9 °F) Temporal 71 (!) 24 91 % 1.854 m (6' 1\") 68 kg (150 lb)       General: No acute distress.   EYES: no jaundice  HEENT: OP clear   Neck: No bruits No JVD.   CVS: Irregular. S1 + S2. No M/R/G  Resp: CTAB. No wheezing or crackles/rhonchi.  Abdomen: Soft, NT, mild tenderness left lower quadrant abdomen  Skin: Grossly nothing acute no obvious rashes  Neurological: Alert, Moves all extremities, no cranial nerve defects on limited exam  Extremities: Pulse 2+ in b/l LE.  no edema. No cyanosis.       Data:  Laboratory studies:  Recent Results (from the past 24 hour(s))   Lactic acid (lactate)    Collection Time: 12/11/18  3:53 PM   Result Value Ref Range    Lactic Acid 8.1 (HH) 0.5 - 2.0 mmol/L   CBC WITH DIFFERENTIAL    Collection Time: 12/11/18  3:53 PM   Result Value Ref Range    WBC 18.8 (H) 4.8 - 10.8 K/uL    RBC 4.28 (L) 4.70 - 6.10 M/uL    Hemoglobin 13.8 (L) 14.0 - 18.0 g/dL    Hematocrit 44.2 42.0 - 52.0 %    .3 (H) 81.4 - 97.8 fL    MCH 32.2 27.0 - 33.0 pg    MCHC 31.2 (L) 33.7 - 35.3 g/dL    RDW 53.1 (H) 35.9 - 50.0 fL    Platelet Count 533 (H) 164 - 446 K/uL    MPV 9.3 9.0 - 12.9 fL    Neutrophils-Polys 81.20 (H) 44.00 - 72.00 %    Lymphocytes 14.70 (L) 22.00 - 41.00 %    Monocytes 1.00 0.00 - 13.40 %    Eosinophils 1.50 0.00 - 6.90 %    " Basophils 0.40 0.00 - 1.80 %    Immature Granulocytes 1.20 (H) 0.00 - 0.90 %    Nucleated RBC 0.00 /100 WBC    Neutrophils (Absolute) 15.27 (H) 1.82 - 7.42 K/uL    Lymphs (Absolute) 2.76 1.00 - 4.80 K/uL    Monos (Absolute) 0.18 0.00 - 0.85 K/uL    Eos (Absolute) 0.29 0.00 - 0.51 K/uL    Baso (Absolute) 0.08 0.00 - 0.12 K/uL    Immature Granulocytes (abs) 0.22 (H) 0.00 - 0.11 K/uL    NRBC (Absolute) 0.00 K/uL   COMP METABOLIC PANEL    Collection Time: 18  3:53 PM   Result Value Ref Range    Sodium 137 135 - 145 mmol/L    Potassium 3.4 (L) 3.6 - 5.5 mmol/L    Chloride 100 96 - 112 mmol/L    Co2 18 (L) 20 - 33 mmol/L    Anion Gap 19.0 (H) 0.0 - 11.9    Glucose 168 (H) 65 - 99 mg/dL    Bun 16 8 - 22 mg/dL    Creatinine 1.15 0.50 - 1.40 mg/dL    Calcium 10.9 (H) 8.5 - 10.5 mg/dL    AST(SGOT) 63 (H) 12 - 45 U/L    ALT(SGPT) 47 2 - 50 U/L    Alkaline Phosphatase 211 (H) 30 - 99 U/L    Total Bilirubin 1.2 0.1 - 1.5 mg/dL    Albumin 3.1 (L) 3.2 - 4.9 g/dL    Total Protein 6.5 6.0 - 8.2 g/dL    Globulin 3.4 1.9 - 3.5 g/dL    A-G Ratio 0.9 g/dL   ESTIMATED GFR    Collection Time: 18  3:53 PM   Result Value Ref Range    GFR If African American >60 >60 mL/min/1.73 m 2    GFR If Non African American >60 >60 mL/min/1.73 m 2   EKG (NOW)    Collection Time: 18  4:04 PM   Result Value Ref Range    Report       [facility]    Test Date:  2018  Pt Name:    ALYSSA ZAPATA               Department: ER  MRN:        [id number]                      Room:        14  Gender:     Male                         Technician: [id number]  :        [id number]                   Requested By:ER TRIAGE PROTOCOL  Order #:    [id number]                    Reading MD:    Measurements  Intervals                                Axis  Rate:       162                          P:          0  MA:         162                          QRS:        185  QRSD:       138                          T:           121  QT:         324  QTc:        533    Interpretive Statements  WIDE COMPLEX TACHYCARDIA  MULTIFORM VENTRICULAR PREMATURE COMPLEXES  NONSPECIFIC INTRAVENTRICULAR CONDUCTION DELAY  ARTIFACT IN LEAD(S) I,II,III,aVR,aVL,aVF,V1,V2,V3 AND BASELINE WANDER IN  LEAD(S) II,III,aVF,V1  Compared to ECG 03/21/2018 10:01:25  Wide-QRS tachycardia now present  Ventricular premature complex(es) now pre sent  Intraventricular conduction delay now present  Sinus tachycardia no longer present  Right-axis deviation no longer present  Right bundle-branch block no longer present  Myocardial infarct finding no longer present         Imaging:  DX-CHEST-PORTABLE (1 VIEW)   Final Result      1.  No pneumonia or pulmonary edema.   2.  Slight interval increase in size of ill-defined RIGHT mid to upper lung nodular opacity, concerning for mass.        Echocardiogram:  Left ventricular ejection fraction is visually estimated to be 60%.  Moderate concentric left ventricular hypertrophy.  Grossly normal left ventricular systolic function.  Left ventricular ejection fraction is visually estimated to be 60%.  The aortic valve is not well visualized.  The mitral valve is not well visualized.  Normal pericardium without effusion.      EKG : personally reviewed by me    Atrial flutter with underlying right bundle branch block    All pertinent features of laboratory and imaging reviewed including primary images where applicable    Assessment / Plan:   1.  Atrial flutter [id number] score is 5  2.  Possible lung mass  3.  Abdominal pain with concern for sepsis, rule out diverticulitis  4.  History of CVA  5.  Hypertension    Patient blood pressure improved with fluid resuscitation.  Heart rate is controlled with IV amiodarone.  He probably has diverticulitis, continue IV amiodarone for now, once he is more stable will start beta-blocker for rate control.  Need anticoagulation when stable.    It is my pleasure to participate in the care of Mr. Mora.   Please do not hesitate to contact me with questions or concerns.    Tony Li    12/11/2018

## 2018-12-12 NOTE — ED NOTES
Admitting MD back to bedside to discuss results.   Dysphagia screening preformed, pt passed. Admitting MD aware. Water given to patient.

## 2018-12-12 NOTE — ASSESSMENT & PLAN NOTE
Query whether pneumonia is actually present  CT and x-ray changes may be due to hemorrhage associated with biopsy of the right upper lobe mass  Chest x-ray improved  Stop antibiotics and observe off antibiotics

## 2018-12-12 NOTE — PROGRESS NOTES
· 2 RN skin check complete with Day Shift RN.   · Devices in place:   ? Cardiac Monitor  ? Blood pressure cuff  ? Pulse ox probe  ? Ostomy Bag/Strap  ? Prosthetic Leg BKA  · Skin assessed under devices: Done.  · (PTA) pressure injuries present on LEFT BUTTOCK, LEFT LOWER ABDOMEN AND LEFT KNEE. Rest of skin is fragile with excoriations.  · The following interventions in place: patient is turning himself, mepilex applied to all affected areas, and mattress waffle in place.   · Wounds are photographed, uploaded and documented on.  · Wound and nutrition consults placed.

## 2018-12-12 NOTE — ASSESSMENT & PLAN NOTE
Imaging concerning for malignancy  Patient had CT-guided biopsy on 12/28/2018  Pathology results pending

## 2018-12-12 NOTE — PROGRESS NOTES
Bedside report received from TIGRE Urias. Medications and chart with patient. All questions answered by RN or found in chart.     Patient transferred safely by 2 ICU RNs on ER [facility], connected to transport monitor, transportable oxygen, and 3 IV pumps.

## 2018-12-12 NOTE — CARE PLAN
Problem: Respiratory:  Goal: Respiratory status will improve  Outcome: PROGRESSING AS EXPECTED  Pt transitioned from 10L oxymask to 3LNC. Baseline 02 is 2L. Pt does not feel SOB.     Problem: Skin Integrity  Goal: Risk for impaired skin integrity will decrease  Outcome: PROGRESSING SLOWER THAN EXPECTED  Assessed patient's skin with wound RN. Appropriate protocols put in place. Turning q2 hours.

## 2018-12-12 NOTE — ED NOTES
Repeat lactic acid drawn. Lactated ringers infusion started and amiodarone drip titrated (see MAR). Pt updated on room assignment. Pt refusing patient safekeeping container.

## 2018-12-12 NOTE — CONSULTS
[facility] Cardiology Follow-up Consult Note    Date of note:    12/12/2018      Consulting Physician: Aime Patterson M.D.      Chief Complaint   Patient presents with   • Abdominal Pain   • Chills   • Sore Throat     HPI: 78 y.o male who presented on 12/11 with sob, chest pain, nausea and abdominal pain. Was found to be in sepsis from aspiration pneumonia with lactic acidosis and elevated procalcitonin. EKG on admission showed atrial flutter and thus cardiology was consulted. Amiodarone infusion was started.    Interim Events:  - Patient reports feeling better than admission  - On review of telemetry, patient not in aflutter since moving to ICU at abut 10pm last night  - Repeat EKG showed sinus rhythm   - Amiodarone infusion stopped  - Avoid becky blocking agents given h/o Mobitz 1  - No plan for rate control or anticoagulation at the moment  - continue to monitor with telemetry      ROS  No NV, No Bleeding, No dizziness   All other review of systems reviewed and negative.    Past medical, surgical, social, and family history reviewed and unchanged from admission except as noted in assessment and plan.    Medications: Reviewed in MAR  Current Facility-Administered Medications   Medication Dose Frequency Provider Last Rate Last Dose   • ondansetron (ZOFRAN) syringe/vial injection 4 mg  4 mg Q6HRS PRN Adonay Queen R.N.   4 mg at 12/12/18 0033   • famotidine (PEPCID) injection 20 mg  20 mg BID Juan Cobian M.D.   20 mg at 12/12/18 0457   • enoxaparin (LOVENOX) inj 40 mg  40 mg DAILY Juan Cobian M.D.   40 mg at 12/12/18 0516   • Respiratory Care per Protocol   Continuous RT Juan Cobian M.D.       • NS (BOLUS) infusion 1,000 mL  1,000 mL Once PRN Juan Cobian M.D.       • acetaminophen (TYLENOL) tablet 650 mg  650 mg Q6HRS PRN Juan Cobian M.D.       • Pharmacy Consult Request ...Pain Management Review   PRN Juan Cobian M.D.        And   • oxyCODONE immediate-release (ROXICODONE) tablet 2.5 mg  2.5 mg Q3HRS PRN  "Juan Cobian M.D.        And   • oxyCODONE immediate-release (ROXICODONE) tablet 5 mg  5 mg Q3HRS PRN Juan Cobian M.D.   5 mg at 12/12/18 0151    And   • morphine (pf) 10 mg/mL injection 2 mg  2 mg Q3HRS PRN Juan Cobian M.D.   2 mg at 12/12/18 0752   • lactated ringers infusion   Continuous Stalin Trevino M.D. 100 mL/hr at 12/12/18 1055     • piperacillin-tazobactam (ZOSYN) 3.375 g in  mL IVPB  3.375 g Q8HRS Juan Cobian M.D.   Stopped at 12/12/18 0900   • ferrous sulfate tablet 325 mg  325 mg BID WITH MEALS Juan Cobian M.D.   325 mg at 12/12/18 0751   • levETIRAcetam (KEPPRA) tablet 750 mg  750 mg BID Juan Cobian M.D.   750 mg at 12/12/18 0517     Last reviewed on 12/11/2018  5:17 PM by Jennifer Stanford, DerrickT  Allergies   Allergen Reactions   • Egg White Vomiting and Swelling   • Egg Yolk Vomiting and Swelling   • Chicken Allergy Vomiting and Swelling   • Omeprazole      Coughing and choking   • Prednisone      Mood changes   • Statins [Hmg-Coa-R Inhibitors] Shortness of Breath       Physical Exam  Body mass index is 19.87 kg/m². Blood pressure 101/59, pulse 92, temperature 36.9 °C (98.4 °F), temperature source Temporal, resp. rate 19, height 1.854 m (6' 1\"), weight 68.3 kg (150 lb 9.2 oz), SpO2 97 %. Vitals:    12/12/18 0800 12/12/18 0900 12/12/18 1000 12/12/18 1100   BP:       Pulse: (!) 105 (!) 109 (!) 102 92   Resp: (!) 29 (!) 36 (!) 26 19   Temp: 36.9 °C (98.4 °F)      TempSrc: Temporal      SpO2: 95% 95% 97% 97%   Weight:       Height:        Oxygen Therapy:  Pulse Oximetry: 97 %, O2 (LPM): 2, O2 Delivery: Silicone Nasal Cannula    General: no apparent distress  HEENT: NA, AT, conjunctiva normal no, JVD   Lungs: Decreased breath sounds on the right  Heart: RRR, no murmurs, no rubs or gallops,   EXT: s/p L BKA  Abdomen: soft, tender in LLQ, colostomy bag seen  Neurological: A/O x 3. No focal sensory deficits  Skin: Warm extremities    Labs (personally reviewed and notable for):   Recent Results " (from the past 24 hour(s))   Lactic acid (lactate)    Collection Time: 12/11/18  3:53 PM   Result Value Ref Range    Lactic Acid 8.1 (HH) 0.5 - 2.0 mmol/L   CBC WITH DIFFERENTIAL    Collection Time: 12/11/18  3:53 PM   Result Value Ref Range    WBC 18.8 (H) 4.8 - 10.8 K/uL    RBC 4.28 (L) 4.70 - 6.10 M/uL    Hemoglobin 13.8 (L) 14.0 - 18.0 g/dL    Hematocrit 44.2 42.0 - 52.0 %    .3 (H) 81.4 - 97.8 fL    MCH 32.2 27.0 - 33.0 pg    MCHC 31.2 (L) 33.7 - 35.3 g/dL    RDW 53.1 (H) 35.9 - 50.0 fL    Platelet Count 533 (H) 164 - 446 K/uL    MPV 9.3 9.0 - 12.9 fL    Neutrophils-Polys 81.20 (H) 44.00 - 72.00 %    Lymphocytes 14.70 (L) 22.00 - 41.00 %    Monocytes 1.00 0.00 - 13.40 %    Eosinophils 1.50 0.00 - 6.90 %    Basophils 0.40 0.00 - 1.80 %    Immature Granulocytes 1.20 (H) 0.00 - 0.90 %    Nucleated RBC 0.00 /100 WBC    Neutrophils (Absolute) 15.27 (H) 1.82 - 7.42 K/uL    Lymphs (Absolute) 2.76 1.00 - 4.80 K/uL    Monos (Absolute) 0.18 0.00 - 0.85 K/uL    Eos (Absolute) 0.29 0.00 - 0.51 K/uL    Baso (Absolute) 0.08 0.00 - 0.12 K/uL    Immature Granulocytes (abs) 0.22 (H) 0.00 - 0.11 K/uL    NRBC (Absolute) 0.00 K/uL   COMP METABOLIC PANEL    Collection Time: 12/11/18  3:53 PM   Result Value Ref Range    Sodium 137 135 - 145 mmol/L    Potassium 3.4 (L) 3.6 - 5.5 mmol/L    Chloride 100 96 - 112 mmol/L    Co2 18 (L) 20 - 33 mmol/L    Anion Gap 19.0 (H) 0.0 - 11.9    Glucose 168 (H) 65 - 99 mg/dL    Bun 16 8 - 22 mg/dL    Creatinine 1.15 0.50 - 1.40 mg/dL    Calcium 10.9 (H) 8.5 - 10.5 mg/dL    AST(SGOT) 63 (H) 12 - 45 U/L    ALT(SGPT) 47 2 - 50 U/L    Alkaline Phosphatase 211 (H) 30 - 99 U/L    Total Bilirubin 1.2 0.1 - 1.5 mg/dL    Albumin 3.1 (L) 3.2 - 4.9 g/dL    Total Protein 6.5 6.0 - 8.2 g/dL    Globulin 3.4 1.9 - 3.5 g/dL    A-G Ratio 0.9 g/dL   BLOOD CULTURE    Collection Time: 12/11/18  3:53 PM   Result Value Ref Range    Significant Indicator NEG     Source BLD     Site PERIPHERAL     Blood Culture        No Growth    Note: Blood cultures are incubated for 5 days and  are monitored continuously.Positive blood cultures  are called to the RN and reported as soon as  they are identified.     ESTIMATED GFR    Collection Time: 18  3:53 PM   Result Value Ref Range    GFR If African American >60 >60 mL/min/1.73 m 2    GFR If Non African American >60 >60 mL/min/1.73 m 2   EKG (NOW)    Collection Time: 18  4:04 PM   Result Value Ref Range    Report       [facility] Emergency Dept[facility]    Test Date:  2018  Pt Name:    ALYSSA ZAPATA               Department: ER  MRN:        [id number]                      Room:       [facility]  Gender:     Male                         Technician: [id number]  :        [id number]                   Requested By:ER TRIAGE PROTOCOL  Order #:    [id number]                    Reading MD:    Measurements  Intervals                                Axis  Rate:       162                          P:          0  ME:         162                          QRS:        185  QRSD:       138                          T:          121  QT:         324  QTc:        533    Interpretive Statements  WIDE COMPLEX TACHYCARDIA  MULTIFORM VENTRICULAR PREMATURE COMPLEXES  NONSPECIFIC INTRAVENTRICULAR CONDUCTION DELAY  ARTIFACT IN LEAD(S) I,II,III,aVR,aVL,aVF,V1,V2,V3 AND BASELINE WANDER IN  LEAD(S) II,III,aVF,V1  Compared to ECG 2018 10:01:25  Wide-QRS tachycardia now present  Ventricular premature complex(es) now pre sent  Intraventricular conduction delay now present  Sinus tachycardia no longer present  Right-axis deviation no longer present  Right bundle-branch block no longer present  Myocardial infarct finding no longer present     BLOOD CULTURE    Collection Time: 18  4:20 PM   Result Value Ref Range    Significant Indicator NEG     Source BLD     Site PERIPHERAL     Blood Culture       No Growth    Note: Blood cultures are incubated for 5 days and  are monitored  continuously.Positive blood cultures  are called to the RN and reported as soon as  they are identified.     Lactic acid (lactate)    Collection Time: 12/11/18  7:45 PM   Result Value Ref Range    Lactic Acid 2.4 (H) 0.5 - 2.0 mmol/L   TROPONIN    Collection Time: 12/11/18  7:45 PM   Result Value Ref Range    Troponin I 0.09 (H) 0.00 - 0.04 ng/mL   Procalcitonin    Collection Time: 12/11/18  7:45 PM   Result Value Ref Range    Procalcitonin 28.02 (H) <0.25 ng/mL   URINALYSIS    Collection Time: 12/11/18  8:30 PM   Result Value Ref Range    Color Yellow     Character Cloudy (A)     Specific Gravity 1.025 <1.035    Ph 5.0 5.0 - 8.0    Glucose Negative Negative mg/dL    Ketones Trace (A) Negative mg/dL    Protein 30 (A) Negative mg/dL    Bilirubin Negative Negative    Urobilinogen, Urine 0.2 Negative    Nitrite Negative Negative    Leukocyte Esterase Negative Negative    Occult Blood Negative Negative    Micro Urine Req Microscopic    URINE MICROSCOPIC (W/UA)    Collection Time: 12/11/18  8:30 PM   Result Value Ref Range    WBC 2-5 (A) /hpf    RBC 2-5 (A) /hpf    Bacteria Negative None /hpf    Epithelial Cells Negative /hpf    Ca Oxalate Crystal Few /hpf    Hyaline Cast 0-2 /lpf   Influenza A/B By PCR    Collection Time: 12/11/18  9:25 PM   Result Value Ref Range    Influenza virus A RNA Negative Negative    Influenza virus B, PCR Negative Negative   Lactic Acid Every four hours after STAT order    Collection Time: 12/12/18 12:10 AM   Result Value Ref Range    Lactic Acid 3.8 (H) 0.5 - 2.0 mmol/L   LIPASE    Collection Time: 12/12/18 12:10 AM   Result Value Ref Range    Lipase 45 11 - 82 U/L   CBC with Differential    Collection Time: 12/12/18  5:20 AM   Result Value Ref Range    WBC 29.6 (H) 4.8 - 10.8 K/uL    RBC 3.49 (L) 4.70 - 6.10 M/uL    Hemoglobin 11.1 (L) 14.0 - 18.0 g/dL    Hematocrit 35.0 (L) 42.0 - 52.0 %    .3 (H) 81.4 - 97.8 fL    MCH 31.8 27.0 - 33.0 pg    MCHC 31.7 (L) 33.7 - 35.3 g/dL    RDW 50.7  (H) 35.9 - 50.0 fL    Platelet Count 387 164 - 446 K/uL    MPV 8.9 (L) 9.0 - 12.9 fL    Nucleated RBC 0.00 /100 WBC    NRBC (Absolute) 0.00 K/uL    Neutrophils-Polys 93.00 (H) 44.00 - 72.00 %    Lymphocytes 2.60 (L) 22.00 - 41.00 %    Monocytes 0.00 0.00 - 13.40 %    Eosinophils 0.00 0.00 - 6.90 %    Basophils 0.00 0.00 - 1.80 %    Neutrophils (Absolute) 28.83 (H) 1.82 - 7.42 K/uL    Lymphs (Absolute) 0.77 (L) 1.00 - 4.80 K/uL    Monos (Absolute) 0.00 0.00 - 0.85 K/uL    Eos (Absolute) 0.00 0.00 - 0.51 K/uL    Baso (Absolute) 0.00 0.00 - 0.12 K/uL    Anisocytosis 1+     Macrocytosis 1+    Basic Metabolic Panel (BMP)    Collection Time: 12/12/18  5:20 AM   Result Value Ref Range    Sodium 137 135 - 145 mmol/L    Potassium 3.6 3.6 - 5.5 mmol/L    Chloride 105 96 - 112 mmol/L    Co2 22 20 - 33 mmol/L    Glucose 162 (H) 65 - 99 mg/dL    Bun 17 8 - 22 mg/dL    Creatinine 1.08 0.50 - 1.40 mg/dL    Calcium 8.8 8.5 - 10.5 mg/dL    Anion Gap 10.0 0.0 - 11.9   Lactic Acid Every four hours after STAT order    Collection Time: 12/12/18  5:20 AM   Result Value Ref Range    Lactic Acid 1.9 0.5 - 2.0 mmol/L   LIPASE    Collection Time: 12/12/18  5:20 AM   Result Value Ref Range    Lipase 263 (H) 11 - 82 U/L   TROPONIN    Collection Time: 12/12/18  5:20 AM   Result Value Ref Range    Troponin I 0.11 (H) 0.00 - 0.04 ng/mL   ESTIMATED GFR    Collection Time: 12/12/18  5:20 AM   Result Value Ref Range    GFR If African American >60 >60 mL/min/1.73 m 2    GFR If Non African American >60 >60 mL/min/1.73 m 2   DIFFERENTIAL MANUAL    Collection Time: 12/12/18  5:20 AM   Result Value Ref Range    Bands-Stabs 4.40 0.00 - 10.00 %    Manual Diff Status PERFORMED    PERIPHERAL SMEAR REVIEW    Collection Time: 12/12/18  5:20 AM   Result Value Ref Range    Peripheral Smear Review see below    PLATELET ESTIMATE    Collection Time: 12/12/18  5:20 AM   Result Value Ref Range    Plt Estimation Normal    MORPHOLOGY    Collection Time: 12/12/18  5:20  AM   Result Value Ref Range    RBC Morphology Present     Toxic Gran Slight    EKG    Collection Time: 18  7:51 AM   Result Value Ref Range    Report       [facility]    Test Date:  2018  Pt Name:    ALYSSA ZAPATA               Department: 161  MRN:        [id number]                      Room:       [facility]  Gender:     Male                         Technician: TG  :        1940                   Requested By:NERI QUINTANILLA  Order #:    [id number]                    Reading MD:    Measurements  Intervals                                Axis  Rate:       103                          P:          58  RI:         140                          QRS:        34  QRSD:       124                          T:          98  QT:         372  QTc:        487    Interpretive Statements  SINUS TACHYCARDIA  IVCD, CONSIDER ATYPICAL RBBB  PROBABLE INFERIOR INFARCT, AGE INDETERMINATE  Compared to ECG 2018 16:57:29  Supraventricular tachycardia no longer present  Incomplete right bundle-branch block no longer present  Myocardial infarct finding still present     EC-ECHOCARDIOGRAM COMPLETE W/ CONT    Collection Time: 18 10:07 AM   Result Value Ref Range    Eject.Frac. MOD BP 70.79     Eject.Frac. MOD 4C 79.83     Eject.Frac. MOD 2C 56.36     Left Ventrical Ejection Fraction 65    LACTIC ACID    Collection Time: 18 10:50 AM   Result Value Ref Range    Lactic Acid 1.5 0.5 - 2.0 mmol/L   HEPATIC FUNCTION PANEL    Collection Time: 18 10:50 AM   Result Value Ref Range    Alkaline Phosphatase 325 (H) 30 - 99 U/L    AST(SGOT) 76 (H) 12 - 45 U/L    ALT(SGPT) 54 (H) 2 - 50 U/L    Total Bilirubin 4.0 (H) 0.1 - 1.5 mg/dL    Direct Bilirubin 3.1 (H) 0.1 - 0.5 mg/dL    Indirect Bilirubin 0.9 0.0 - 1.0 mg/dL    Albumin 2.1 (L) 3.2 - 4.9 g/dL    Total Protein 4.4 (L) 6.0 - 8.2 g/dL       Cardiac Imaging and Procedures Review:    EKG and telemetry tracings personally reviewed      ASSESSMENT & PLAN    # Atrial  flutter on admission  - likely 2/2 underlying sepsis  - out of atrial flutter since 10pm last night  - Repeat EKG this am showed sinus rhythm   - Amiodarone infusion stopped  - Avoid becky blocking agents given h/o Mobitz 1  - No plan for rate control or anticoagulation at the moment  - continue to monitor with telemetry  - we will continue to follow    It is my pleasure to participate in the care of Mr. Mora.  Please do not hesitate to contact me with questions or concerns.

## 2018-12-12 NOTE — PROGRESS NOTES
· 2 RN skin check complete with TIGRE Kim.   · Devices in place:   · Cardiac Monitor  · Blood pressure cuff  · Pulse ox probe  · Ostomy Bag/Strap  · Prosthetic Leg BKA  · Skin assessed under devices: Done.  · (PTA) pressure injuries present on LEFT BUTTOCK, LEFT LOWER ABDOMEN AND LEFT KNEE. Rest of skin is fragile with excoriations.  · The following interventions in place: patient is turning himself, mepilex applied to all affected areas, and mattress waffle in place.   · Wounds are photographed, uploaded and documented on.  · Wound and nutrition consults placed.

## 2018-12-12 NOTE — ED NOTES
Lab called with critical result of Lactic at 8.1. Critical lab result read back to Lab.   Dr. HENRY notified of critical lab result at 3433.  Critical lab result read back by Dr. HENRY.

## 2018-12-12 NOTE — CARE PLAN
Problem: Nutritional:  Goal: Achieve adequate nutritional intake  Patient tolerating PO diet with supplements and consuming ~50%.  Outcome: NOT MET

## 2018-12-12 NOTE — ASSESSMENT & PLAN NOTE
Changes secondary to acute pancreatitis and possible hemorrhagic transformation    GI following with plans for ERCP  Continue antibiotics IV fluids and supportive care

## 2018-12-12 NOTE — DIETARY
"Nutrition services: Day 1 of admit.  Conner Mora is a 78 y.o. male with admitting DX of Atrial fibrillation with RVR (HCC), CAP (community acquired pneumonia), Sepsis (HCC)    Consult received for wounds.  Pt also noted poor PO intake per nutritional admit screen.    Assessment:  Height: 185.4 cm (6' 1\")  Weight: 68.3 kg (150 lb 9.2 oz)  Body mass index is 19.87 kg/m².     Diet/Intake: NPO    Evaluation:   1. Per Wound Team 12/12: Partial Thickness Wound Abdomen LLQ, Skin Tear Left Buttock, Wound Knee Left distal.  \"This RN reviewed chart. Per chart review pt has an ileostomy to the RLQ and uses a 2.25\" 2-PC pouch and barrier with paste ring and ostomy belt. This RN confirmed with pt and supplies left at bedside. Pt has had the ostomy for roughly 8 years and is independent with care. Pt reports his wife normally changes appliance. Appliance is intact, pt declined need to have it changed at this time. This RN then assessed wounds. Pts LLQ abdominal wound is a small partial thickness wound. The wound was cleansed and measured. Adhesive foam already in place. Pts left knee also assessed, cleansed and measured. Wound is superficial and appears to be related to friction from prosthetic. Pt was then turned to the left side. Pt sheets soiled with urine. Per pt he is not normally incontinent but accidentally spilt his urinal. Linens changed and pt cleansed. Sacrum CDI. Pt has discoloration to the left buttock with a small skin tear. The discoloration is quick to miguel a and the area is not overlying a bony prominence. Measurements obtained. Waffle overlay applied to bed. Discussed application of sacral mepilex vs. RIGOBERTO Barrier paste pending if patient is incontinent or not. No current advanced wound care needs identified at this time.\"   2. No indication for wound vitamin therapy per RD judgment.  3. Per Rounds, pt reported abdominal pain, N/v, and poor appetite. Lipase 263. ?Pancreatic mass/abscess. Plan MRI and/or " MRCP. Expect GI consult. ?Palliative Care as well.  4. Await diet/overall POC for RD interventions.    Recommendations/Plan:  1. Diet per MD as clinically appropriate (add nutrition supplements).  2. Document PO intake in ADLs to provide interdisciplinary communication across all shifts.   3. Monitor weight.  4. Nutrition Representative will see pt for ongoing meal and snack preferences as diet permits.    RD following.

## 2018-12-13 PROBLEM — R13.11 ORAL PHASE DYSPHAGIA: Status: ACTIVE | Noted: 2018-01-01

## 2018-12-13 PROBLEM — K85.10 ACUTE BILIARY PANCREATITIS: Status: ACTIVE | Noted: 2018-01-01

## 2018-12-13 PROBLEM — E87.6 HYPOKALEMIA: Status: RESOLVED | Noted: 2018-01-01 | Resolved: 2018-01-01

## 2018-12-13 NOTE — THERAPY
"Speech Language Therapy Clinical Swallow Evaluation completed.    Functional Status: Patient AAO to self, month, and year. He was pleasantly confused and stated he was in [place]. Vocal quality minimally wet prior to PO intake and cough nonproductive. Oral motor examination revealed reduced coordination and strength of oral structures. PO trials were administered and consisted of ice chips, NTL, and puree. Swallow trigger min delayed and laryngeal elevation weak to palpation. Overt s/sx of aspiration appreciated 100% of the time across consistencies tested and characterized by: increase in wet vocal quality and audible breath sounds, slight increase in work of breathing, and intermittent non-productive cough. No further trials were tested due to serious concern for aspiration.     Recommendations - Diet: At this time, patient is presenting with s/sx concerning for aspiration and would recommend continue NPO with strong consideration for cortrak placement.                             Strategies: To be determined                             Medication Administration: Via Gastric Tube     Plan of Care: Will benefit from Speech Therapy 3 times per week    Post-Acute Therapy: Recommend inpatient transitional care services for continued speech therapy services. Please consider physiatry (PM&R) consult.     See \"Rehab Therapy-Acute\" Patient Summary Report for complete documentation. Thank you for the consult.         "

## 2018-12-13 NOTE — PROGRESS NOTES
Critical Care Progress Note    Date of admission  12/11/2018    Chief Complaint  78 y.o. male admitted 12/11/2018 with nausea/vomiting, abd pain, LA 8.1    Hospital Course    78 y.o. male who presented 12/11/2018 with shortness of breath and abdominal pain.  He has a past medical history of seizure disorder chronic, chronic respiratory failure on 2 L of home oxygen at night, peripheral vascular disease, and GERD.  He has been having nausea and vomiting for the past 2 weeks.  He says that his pain is worse on the lower left but he has diffuse generalized pain.  He has a history of colitis and he has a colostomy placement from several years ago.  He has not have been having high output.  His lactic acid is 8.1..  No hematemasis or gi bleed.  Has been having cough with productive sputum.      Interval Problem Update  Reviewed last 24 hour events:    Confused   Failed SLP swallow eval; NPO   SR   LR 20 KCL at 150   Afebrile   Ostomy with o/p   4 lpm    No CXR   lovenox P   Pepcid   Zosyn, GNR bacteremia - OK for C3/flagyl    ERCP today    Yesterday   Amio gtt overnight   /hr   Rec'd >2 L bolus; LA down 8 ->1.9   L BKA with prosthesis    MRCP today eval for pancreatic mass   9 lpm oxy mask; titrating    Home Oxygen dep    CTA negative   lovenox P   AF, CHADS 5, full anticoag when able   Replacing K   Zosyn    GI eval    Review of Systems  Review of Systems   Unable to perform ROS: Acuity of condition        Vital Signs for last 24 hours   Temp:  [36.4 °C (97.5 °F)-37.3 °C (99.1 °F)] 37 °C (98.6 °F)  Pulse:  [] 103  Resp:  [18-36] 27    Hemodynamic parameters for last 24 hours       Respiratory       Physical Exam   Physical Exam   Constitutional: He is oriented to person, place, and time. He appears well-developed. He appears lethargic. He appears ill.   HENT:   Head: Atraumatic.   Eyes: Pupils are equal, round, and reactive to light. Right eye exhibits no discharge.   Neck: Neck supple. No tracheal deviation  present.   Cardiovascular: An irregularly irregular rhythm present.   Pulmonary/Chest: He has rales.   Diminished, moderate respiratory distress,   Abdominal: Soft. He exhibits distension.   Musculoskeletal: Normal range of motion. He exhibits no edema.   Neurological: He is oriented to person, place, and time. He appears lethargic. No cranial nerve deficit.   Skin: Skin is dry.       Medications  Current Facility-Administered Medications   Medication Dose Route Frequency Provider Last Rate Last Dose   • ondansetron (ZOFRAN) syringe/vial injection 4 mg  4 mg Intravenous Q6HRS PRN Adonay Queen R.N.   4 mg at 12/12/18 0033   • famotidine (PEPCID) injection 20 mg  20 mg Intravenous BID Juan Cobian M.D.   20 mg at 12/13/18 0524   • enoxaparin (LOVENOX) inj 40 mg  40 mg Subcutaneous DAILY Francis Person M.D.   Stopped at 12/13/18 0600   • potassium chloride 20 mEq in LR 1,000 mL infusion   Intravenous Continuous Jason Niño M.D. 150 mL/hr at 12/13/18 0216     • Respiratory Care per Protocol   Nebulization Continuous RT Juan Cobian M.D.       • NS (BOLUS) infusion 1,000 mL  1,000 mL Intravenous Once PRN Juan Cobian M.D.   Stopped at 12/12/18 1440   • acetaminophen (TYLENOL) tablet 650 mg  650 mg Oral Q6HRS PRN Juan Cobian M.D.       • Pharmacy Consult Request ...Pain Management Review   Other PRN Juan Cobian M.D.        And   • oxyCODONE immediate-release (ROXICODONE) tablet 2.5 mg  2.5 mg Oral Q3HRS PRN Juan Cobian M.D.        And   • oxyCODONE immediate-release (ROXICODONE) tablet 5 mg  5 mg Oral Q3HRS PRN Juan Cobian M.D.   5 mg at 12/12/18 1708    And   • morphine (pf) 10 mg/mL injection 2 mg  2 mg Intravenous Q3HRS PRN Juan Cobian M.D.   2 mg at 12/12/18 0752   • piperacillin-tazobactam (ZOSYN) 3.375 g in  mL IVPB  3.375 g Intravenous Q8HRS Juan Cobian M.D. 25 mL/hr at 12/13/18 0523 3.375 g at 12/13/18 0523   • ferrous sulfate tablet 325 mg  325 mg Oral BID WITH MEALS Juan Cobian M.D.   325  mg at 12/12/18 1708   • levETIRAcetam (KEPPRA) tablet 750 mg  750 mg Oral BID Juan Cobian M.D.   750 mg at 12/13/18 0524       Fluids    Intake/Output Summary (Last 24 hours) at 12/13/18 0749  Last data filed at 12/13/18 0600   Gross per 24 hour   Intake          2950.72 ml   Output              985 ml   Net          1965.72 ml       Laboratory      Recent Labs      12/11/18   1945  12/12/18   0520   TROPONINI  0.09*  0.11*     Recent Labs      12/11/18   1553  12/12/18   0520  12/13/18   0535   SODIUM  137  137  135   POTASSIUM  3.4*  3.6  4.8   CHLORIDE  100  105  106   CO2  18*  22  25   BUN  16  17  16   CREATININE  1.15  1.08  0.93   MAGNESIUM   --    --   1.7   PHOSPHORUS   --    --   2.6   CALCIUM  10.9*  8.8  9.1     Recent Labs      12/11/18   1553  12/12/18   0010  12/12/18   0520  12/12/18   1050  12/13/18   0535   ALTSGPT  47   --    --   54*  37   ASTSGOT  63*   --    --   76*  32   ALKPHOSPHAT  211*   --    --   325*  275*   TBILIRUBIN  1.2   --    --   4.0*  1.7*   DBILIRUBIN   --    --    --   3.1*   --    LIPASE   --   45  263*   --    --    GLUCOSE  168*   --   162*   --   87     Recent Labs      12/11/18   1553  12/12/18   0520  12/12/18   1050  12/13/18   0535   WBC  18.8*  29.6*   --   15.8*   NEUTSPOLYS  81.20*  93.00*   --   97.30*   LYMPHOCYTES  14.70*  2.60*   --   0.90*   MONOCYTES  1.00  0.00   --   0.90   EOSINOPHILS  1.50  0.00   --   0.90   BASOPHILS  0.40  0.00   --   0.00   ASTSGOT  63*   --   76*  32   ALTSGPT  47   --   54*  37   ALKPHOSPHAT  211*   --   325*  275*   TBILIRUBIN  1.2   --   4.0*  1.7*     Recent Labs      12/11/18   1553  12/12/18   0520  12/13/18   0535   RBC  4.28*  3.49*  2.75*   HEMOGLOBIN  13.8*  11.1*  8.7*   HEMATOCRIT  44.2  35.0*  27.5*   PLATELETCT  533*  387  290       Imaging  X-Ray:  I have personally reviewed the images and compared with prior images.    Assessment/Plan  Sepsis (HCC)- (present on admission)   Assessment & Plan    Severe sepsis with end  organ damage  Lactic acid 9, trending down  Associated with respiratory failure with severe hypoxia  Continue Zosyn, report of gram-negative rods from blood cultures, final pending  Cover for cholangitis, aspiration pneumonitis, suspect ongoing gallstone pancreatitis  Titrated oxygen, high flow nasal cannula, monitor need for intubation       Mass of upper lobe of right lung- (present on admission)   Assessment & Plan    Follow-up CT, plus minus PET     Pneumonia due to infectious organism- (present on admission)   Assessment & Plan    Consolidations seen on ct imaing  Possible aspiration, especially givne vomiting  Blood culture pending     Acute on chronic respiratory failure with hypoxia (HCC)- (present on admission)   Assessment & Plan    Home o2 nocturnal, history of COPD  Now exacerbated with sepsis/SIRS, gallstone pancreatitis,?  Pneumonia  High flow nasal cannula, respiratory protocols, may require intubation     Pancreatic mass- (present on admission)   Assessment & Plan    Suspect gallstone pancreatitis, rule out mass  Follow-up MRCP  GI consultation       Atrial flutter (HCC)   Assessment & Plan    On amiodarone drip.     Troponin level elevated- (present on admission)   Assessment & Plan    Secondary to demand ischemia     Hypokalemia- (present on admission)   Assessment & Plan    Supplement  ivf resuscitation  Keep > 4  Magnesium ordered     Generalized abdominal pain- (present on admission)   Assessment & Plan    Secondary to gallstone pancreatitis  Volume resuscitation  MRCP, GI consultation          VTE:  Lovenox  Ulcer: H2 Antagonist  Lines: None    I have performed a physical exam and reviewed and updated ROS and Plan today (12/13/2018). In review of yesterday's note (12/12/2018), there are no changes except as documented above.   He remains critically ill.  Follow closely in ICU with gallstone pancreatitis.  ERCP today.  He is received volume resuscitation, ongoing IV antibiotics for gram-negative  bacteremia.  He has high risk for further deterioration.  He has been assessed and reassessed.  Discussed patient condition and risk of morbidity and/or mortality with Hospitalist, Family, RN, RT, Pharmacy and QA team  The patient remains critically ill.  Critical care time = 31 minutes in directly providing and coordinating critical care and extensive data review.  No time overlap and excludes procedures.

## 2018-12-13 NOTE — CARE PLAN
Problem: Respiratory:  Goal: Respiratory status will improve  Outcome: PROGRESSING AS EXPECTED  Pt remains on 4LNC. Wet cough; unable to clear secretions. Needs reinforcement with using IS; A+Ox 2-3    Problem: Urinary Elimination:  Goal: Ability to reestablish a normal urinary elimination pattern will improve  Outcome: NOT MET  Pt more confused today, A&Ox 2-3 and now incontinent at times.

## 2018-12-13 NOTE — PROGRESS NOTES
· 2 RN skin check complete with Day Shift RN.   · Devices in place:   ? Cardiac Monitor  ? Blood pressure cuff  ? Pulse ox probe  ? Ostomy Bag/Strap  ? Prosthetic Leg BKA  · Skin assessed under devices: Done.  · (PTA) pressure injuries present on LEFT LOWER ABDOMEN AND LEFT KNEE  · Skin Tear pressure present on LEFT BUTTOCK,  · Rest of skin is fragile with excoriations.  · The following interventions in place: patient is turning himself, mepilex applied to all affected areas, and mattress waffle in place.   · Wounds are photographed, uploaded and documented on.  · Wound and nutrition consults placed.

## 2018-12-13 NOTE — CONSULTS
GI Consult    CC: productive cough    HPI:  78-year-old male with a past history of GERD, PVD, HTN, seizures, chronic home O2 use, S/P colostomy (unknown reason but he has history of colitis), a flutter came in due to cough and congestion, admitted for sepsis secondary to pneumonia as well as Afib with RVR.  Patient has been having productive cough, SOB, chest pain nausea/vomiting, and abdominal pain mostly in the LLQ for the past 2 weeks.  Denies upper and lower GI bleed, no increased output from colostomy.  Patient states he has had a 20 pound weight loss over the past 4 weeks.    CT abdomen done to evaluate abdominal pain which showed stranding and fluid surrounding the pancreas consistent with pancreatitis.  Dilation of the common bile duct up to 1.6 cm.  Cholelithiasis.  There is no evidence of a pancreatic mass on imaging.        MEDICATIONS:    Current Facility-Administered Medications:   •  ondansetron (ZOFRAN) syringe/vial injection 4 mg, 4 mg, Intravenous, Q6HRS PRN, Adonay Queen R.N., 4 mg at 12/12/18 0033  •  famotidine (PEPCID) injection 20 mg, 20 mg, Intravenous, BID, Juan Cobian M.D., 20 mg at 12/12/18 0457  •  enoxaparin (LOVENOX) inj 40 mg, 40 mg, Subcutaneous, DAILY, Juan Cobian M.D., 40 mg at 12/12/18 0516  •  potassium chloride 20 mEq in LR 1,000 mL infusion, , Intravenous, Continuous, Jason Niño M.D.  •  Respiratory Care per Protocol, , Nebulization, Continuous RT, Juan Cobian M.D.  •  NS (BOLUS) infusion 1,000 mL, 1,000 mL, Intravenous, Once PRN, Juan Cobian M.D.  •  acetaminophen (TYLENOL) tablet 650 mg, 650 mg, Oral, Q6HRS PRN, Juan Cobian M.D.  •  Notify provider if pain remains uncontrolled, , , CONTINUOUS **AND** Use the numeric rating scale (NRS-11) on regular floors and Critical-Care Pain Observation Tool (CPOT) on ICUs/Trauma to assess pain, , , CONTINUOUS **AND** Pulse Ox (Oximetry), , , CONTINUOUS **AND** Pharmacy Consult Request ...Pain Management Review, ,  Other, PRN **AND** If patient difficult to arouse and/or has respiratory depression, stop any opiates that are currently infusing and call a Rapid Response., , , CONTINUOUS **AND** oxyCODONE immediate-release (ROXICODONE) tablet 2.5 mg, 2.5 mg, Oral, Q3HRS PRN **AND** oxyCODONE immediate-release (ROXICODONE) tablet 5 mg, 5 mg, Oral, Q3HRS PRN, 5 mg at 12/12/18 0151 **AND** morphine (pf) 10 mg/mL injection 2 mg, 2 mg, Intravenous, Q3HRS PRN, Juan Cobian M.D., 2 mg at 12/12/18 0752  •  lactated ringers infusion, , Intravenous, Continuous, Aime Patterson M.D., Last Rate: 100 mL/hr at 12/12/18 1055  •  piperacillin-tazobactam (ZOSYN) 3.375 g in  mL IVPB, 3.375 g, Intravenous, Q8HRS, Juan Cobian M.D., Last Rate: 25 mL/hr at 12/12/18 1243, 3.375 g at 12/12/18 1243  •  ferrous sulfate tablet 325 mg, 325 mg, Oral, BID WITH MEALS, Juan Cobian M.D., 325 mg at 12/12/18 0751  •  levETIRAcetam (KEPPRA) tablet 750 mg, 750 mg, Oral, BID, Juan Cobian M.D., 750 mg at 12/12/18 0517    ALLERGIES:   Mr. Mora  is allergic to egg white; egg yolk; chicken allergy; omeprazole; prednisone; and statins [hmg-coa-r inhibitors].     SURGERIES:  Mr. Mora   has a past surgical history that includes amputation, below the knee (Left, 1980); hip orif (7/21/2009); abdominal aortic aneurysm (10/14/2009); abdominal aortic aneurysm (2009); other abdominal surgery; sigmoidoscopy flex (11/9/2010); colectomy (11/21/2010); colostomy (11/21/2010); cataract phaco with iol (8/12/2014); cataract phaco with iol (8/26/2014); and hip nailing intramedullary (Right, 3/9/2016).     MEDICAL ILLNESSES:  Mr. Mora   has a past medical history of Arrhythmia; Atrioventricular block, Mobitz type 1, Wenckebach 3/2018; Cataract; Dental disorder; Generalized abdominal pain (12/12/2018); GERD (gastroesophageal reflux disease) (7/8/2013); History of colostomy (10/26/2011); History of CVA (cerebrovascular accident) (4/29/2015); Hypertension; Indigestion;  "Multiple falls; On home oxygen therapy; Peripheral vascular disease (HCC); Seizure (HCC); Seizure disorder (HCC); and Snoring. He also has no past medical history of Alcohol abuse; Anxiety; Cancer (HCC); Depression; Self-injurious behavior; or Suicide attempt (HCC).     SOCIAL HISTORY:  Mr. Mora   reports that he quit smoking about 7 years ago. His smoking use included Cigarettes. He started smoking about 64 years ago. He has a 54.00 pack-year smoking history. He has never used smokeless tobacco. He reports that he does not drink alcohol or use drugs.     FAMILY HISTORY:  Mr.'s Mora  family history includes Heart Attack in his brother and mother; Hyperlipidemia in his sister; No Known Problems in his brother, brother, father, maternal grandfather, maternal grandmother, paternal grandfather, and paternal grandmother.      ROS:    Constitutional: Fever, chills, fatigue, change in appetite, weight loss to note some 3 discharge  Eyes: Patient had no visual changes or vision loss.  ENT: Patient denies any sore throat or allergic rhinitis.  Respiratory: Cough, SOB the assessment plan, sputum production.  No hemoptysis  Cardiovascular: No chest pain or palpitations  GI: Abdominal pain and distention, nausea/vomiting  : Patient denies any urinary urgency, frequency, or dysuria. Patient has noted no hematuria.  Neurologic: Patient has had no focal numbness or weakness.   Hematologic: No bleeding  Skin: Patient denies any unusual rashes or skin lesions.  Allergic/immunologic: Patient denies any difficulty with hayfever or other environmental allergens. Patient denies any food allergies.       PHYSICAL EXAM:    /59   Pulse (!) 108   Temp 37.2 °C (99 °F) (Temporal)   Resp 20   Ht 1.854 m (6' 1\")   Wt 68.3 kg (150 lb 9.2 oz)   SpO2 97%   BMI 19.87 kg/m²      General: NAD  HEENT: NC, AT, PERRL, EOMI, lids and conjunctiva are clear, moist oral mucosa. No JVD  Respit: Decreased breath sounds throughout, no " wheezing or crackles slightly  Cardiac: Tachycardia, regular rhythm to, no murmurs, S1 S2  Abdomen: Distended, bowel sounds present, tenderness and guarding in the RLQ, no rebound  Extremities: No clubbing, cyanosis, bilateral lower extremity edema  Skin: warm  Neurologic: AOx3, No focal neurologic abnormality noted.    Lab Results   Component Value Date/Time    CHOLSTRLTOT 157 03/08/2018 03:30 AM     (H) 03/08/2018 03:30 AM    HDL 26 (A) 03/08/2018 03:30 AM    TRIGLYCERIDE 139 03/08/2018 03:30 AM       Lab Results   Component Value Date/Time    SODIUM 137 12/12/2018 05:20 AM    POTASSIUM 3.6 12/12/2018 05:20 AM    CHLORIDE 105 12/12/2018 05:20 AM    CO2 22 12/12/2018 05:20 AM    GLUCOSE 162 (H) 12/12/2018 05:20 AM    BUN 17 12/12/2018 05:20 AM    CREATININE 1.08 12/12/2018 05:20 AM    CREATININE 1.20 04/21/2011 11:30 AM    BUNCREATRAT 12 04/21/2011 11:30 AM    GLOMRATE 47 (L) 10/26/2010 10:15 AM     Lab Results   Component Value Date/Time    ALKPHOSPHAT 325 (H) 12/12/2018 10:50 AM    ASTSGOT 76 (H) 12/12/2018 10:50 AM    ALTSGPT 54 (H) 12/12/2018 10:50 AM    TBILIRUBIN 4.0 (H) 12/12/2018 10:50 AM      Lab Results   Component Value Date/Time    BNPBTYPENAT 10 03/24/2015 07:13 AM       Patient Active Problem List    Diagnosis Date Noted   • Sepsis (HCC) 12/12/2018     Priority: High   • Pancreatic mass 12/12/2018     Priority: Medium   • Acute on chronic respiratory failure with hypoxia ([facility]) 12/12/2018     Priority: Medium   • Pneumonia due to infectious organism 12/12/2018     Priority: Medium   • Mass of upper lobe of right lung 12/12/2018     Priority: Medium   • Anemia 03/10/2016     Priority: Medium   • Chronic respiratory failure with hypoxia ([facility]) 09/11/2015     Priority: Medium   • Sturge-Shabazz disease, 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     Priority: Medium   • 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) 07/08/2013     Priority: Medium   • History of CVA (cerebrovascular accident) 04/29/2015     Priority: Low   • COPD  (chronic obstructive pulmonary disease) ([facility]) 02/02/2015     Priority: Low   • Employs prosthetic leg 07/24/2014     Priority: Low   • History of Crohn's disease [facility] 07/08/2013     Priority: Low   • Generalized abdominal pain 12/12/2018   • Hypokalemia 12/12/2018   • Troponin level elevated 12/12/2018   • Gastritis 12/12/2018   • Esophagitis 12/12/2018   • Atrial flutter ([facility]) 12/12/2018   • Atrioventricular block, Mobitz type 1, Wenckebach 3/2018    • Nonintractable epilepsy without status epilepticus ([facility]) 03/19/2018   • Hypovitaminosis D 09/13/2016   • Risk for falls 09/13/2016   • Pure hypercholesterolemia with target low density lipoprotein (LDL) less than 130 07/22/2016   • Tachycardia 04/28/2015   • History of below knee amputation ([facility]) 07/24/2014   • GERD (gastroesophageal reflux disease) 07/08/2013   • Murmur, cardiac 05/28/2013   • Peripheral vascular disease ([facility]) 11/21/2012   • History of abdominal aortic aneurysm repair 11/21/2012   • Seizure disorder ([facility]) 08/03/2009         ASSESSMENT & PLAN:    Pancreatitis  CT abdomen shows stranding and fluid surrounding the pancreas, necrotic parenchyma consistent with pancreatitis.  There appears to be a stone in the common bile duct, which is dilated at 1.6 cm.  Imaging also showed cholelithiasis.  Lipase at elevated 263.  Etiology likely gallstone pancreatitis.  Patient denies EtOH use.  Patient received adequate hydration in the ED, continue Ringer's lactate at 150cc/hr with 20 meq of K.  Adequate pain management.  Continue n.p.o. status.  Plan for ERCP tomorrow, hold enoxaparin in the morning.  Will likely need surgery consult for cholecystectomy later on.    Transaminitis  Secondary to common biliary duct obstruction.  Plan above.    Sepsis secondary to pneumonia  A. fib with RVR  Lung Mass    Thank you for allowing us to participate in the care of this patient.

## 2018-12-13 NOTE — PROGRESS NOTES
[facility]ist Progress Note    Date of Service: 2018    Chief Complaint  78 y.o. male admitted 2018 with sepsis afib RVR     Interval Problem Update    Confused oriented x2-3  Failed SLP  SR   LR at 150  -50  Afebrile  ERCP today  4 L NC              Consultants/Specialty  CC  Cardiology  GI    Disposition  To be determined        Review of Systems   Constitutional: Positive for malaise/fatigue. Negative for fever.   HENT: Negative for congestion, ear discharge and nosebleeds.    Eyes: Negative for blurred vision, pain, discharge and redness.   Respiratory: Positive for cough. Negative for hemoptysis, sputum production, shortness of breath and stridor.    Cardiovascular: Negative for chest pain, palpitations, orthopnea and leg swelling.   Gastrointestinal: Positive for abdominal pain and nausea. Negative for blood in stool, diarrhea, melena and vomiting.   Genitourinary: Negative for dysuria, flank pain and hematuria.   Musculoskeletal: Negative for back pain, falls, joint pain and neck pain.   Skin: Negative.    Neurological: Negative for speech change, focal weakness, seizures, loss of consciousness, weakness and headaches.   Psychiatric/Behavioral: Negative for hallucinations and substance abuse.   All other systems reviewed and are negative.     Physical Exam  Laboratory/Imaging   Hemodynamics  Temp (24hrs), Av.9 °C (98.4 °F), Min:36.4 °C (97.5 °F), Max:37.3 °C (99.1 °F)   Temperature: 36.9 °C (98.4 °F)  Pulse  Av.9  Min: 71  Max: 169 Heart Rate (Monitored): 97  NIBP: 109/48      Respiratory      Respiration: (!) 29, Pulse Oximetry: 97 %        RUL Breath Sounds: Crackles, RML Breath Sounds: Diminished, RLL Breath Sounds: Diminished, LISA Breath Sounds: Crackles, LLL Breath Sounds: Diminished    Fluids    Intake/Output Summary (Last 24 hours) at 18 0954  Last data filed at 18 0800   Gross per 24 hour   Intake          3175.72 ml   Output             1085 ml    Net          2090.72 ml       Nutrition  Orders Placed This Encounter   Procedures   • Diet NPO     Standing Status:   Standing     Number of Occurrences:   1     Order Specific Question:   Restrict to:     Answer:   Ice Chips [2]     Physical Exam   Constitutional: He appears well-developed and well-nourished.   Thin male   HENT:   Head: Normocephalic and atraumatic.   Mouth/Throat: Oropharynx is clear and moist.   Eyes: Conjunctivae are normal. Right eye exhibits no discharge. Left eye exhibits no discharge.   Neck: Neck supple. No JVD present. No tracheal deviation present.   Cardiovascular: Normal rate, regular rhythm and normal heart sounds.    Pulmonary/Chest: Effort normal. No respiratory distress. He has rales. He exhibits no tenderness.   Abdominal: Soft. Bowel sounds are normal. He exhibits no distension. There is tenderness. There is no rebound.   Musculoskeletal: He exhibits no edema or tenderness.   Neurological: He is alert. No cranial nerve deficit. He exhibits normal muscle tone.   Oriented x2   Skin: Skin is warm and dry. No rash noted. He is not diaphoretic. No cyanosis. Nails show no clubbing.   Psychiatric: He has a normal mood and affect. Judgment normal. He exhibits abnormal recent memory.   Nursing note and vitals reviewed.      Recent Labs      12/11/18   1553  12/12/18   0520  12/13/18   0535   WBC  18.8*  29.6*  15.8*   RBC  4.28*  3.49*  2.75*   HEMOGLOBIN  13.8*  11.1*  8.7*   HEMATOCRIT  44.2  35.0*  27.5*   MCV  103.3*  100.3*  100.0*   MCH  32.2  31.8  31.6   MCHC  31.2*  31.7*  31.6*   RDW  53.1*  50.7*  51.9*   PLATELETCT  533*  387  290   MPV  9.3  8.9*  8.9*     Recent Labs      12/11/18   1553  12/12/18   0520  12/13/18   0535   SODIUM  137  137  135   POTASSIUM  3.4*  3.6  4.8   CHLORIDE  100  105  106   CO2  18*  22  25   GLUCOSE  168*  162*  87   BUN  16  17  16   CREATININE  1.15  1.08  0.93   CALCIUM  10.9*  8.8  9.1                      Assessment/Plan     Sepsis (HCC)-  (present on admission)   Assessment & Plan    This is severe sepsis with the following associated acute organ dysfunction(s): acute respiratory failure.   Likely source is pneumonia and  intra-abdominal    Ceftriaxone and Flagyl and follow-up on final cultures     Mass of upper lobe of right lung- (present on admission)   Assessment & Plan    Reviewed CT findings with patient's wife  He will likely need CT-guided biopsy when he has recovered from this acute illness     Pneumonia due to infectious organism- (present on admission)   Assessment & Plan    We will de-escalate antibiotics to ceftriaxone and Flagyl and follow-up on final cultures and sensitivities       Acute on chronic respiratory failure with hypoxia (HCC)- (present on admission)   Assessment & Plan    CTA negative for PE  Secondary to pneumonia and sepsis    We will de-escalate to ceftriaxone and Flagyl  Continue RT protocol     Pancreatic mass- (present on admission)   Assessment & Plan    Changes secondary to acute pancreatitis and possible hemorrhagic transformation    GI following with plans for ERCP  Continue antibiotics IV fluids and supportive care     Oral phase dysphagia   Assessment & Plan    Failed swallow eval  Place core track and start tube feeding after ERCP     Atrial flutter (HCC)   Assessment & Plan    Resolved  Off amiodarone drip  Evaluated by cardiology     Esophagitis- (present on admission)   Assessment & Plan    On Pepcid follow-up on ERCP     Gastritis- (present on admission)   Assessment & Plan    Started on Pepcid on admission    Follow-up on endoscopy results     Troponin level elevated- (present on admission)   Assessment & Plan    Likely demand ischemia  Echo EF 65% unchanged from prior       Quality-Core Measures   Reviewed items::  Labs reviewed, Medications reviewed and Radiology images reviewed  Sandoval catheter::  No Sandoval  DVT prophylaxis pharmacological::  Enoxaparin (Lovenox)  Ulcer Prophylaxis::  Yes  Antibiotics:   Treating active infection/contamination beyond 24 hours perioperative coverage        Plan of care reviewed with patient 's wife and discussed with nursing staff and critical care

## 2018-12-13 NOTE — PROGRESS NOTES
[facility] Cardiology Follow-up Consult Note    Date of note:    12/12/2018      Consulting Physician: Aime Patterson M.D.      Chief Complaint   Patient presents with   • Abdominal Pain   • Chills   • Sore Throat     HPI: 78 y.o male who presented on 12/11 with sob, chest pain, nausea and abdominal pain. Was found to be in sepsis from aspiration pneumonia with lactic acidosis and elevated procalcitonin. EKG on admission showed atrial flutter and thus cardiology was consulted. Amiodarone infusion was started.    Interim Events:  - Patient reports feeling better than admission  - Sinus rhythm to sinus tachycardia overnight  - No more episodes of a flutter  - Avoid becky blocking agents given h/o Mobitz 1  - continue to monitor with telemetry  - plan for MRCP per GI today    ROS  No NV, No Bleeding, No dizziness   All other review of systems reviewed and negative.    Past medical, surgical, social, and family history reviewed and unchanged from admission except as noted in assessment and plan.    Medications: Reviewed in MAR  Current Facility-Administered Medications   Medication Dose Frequency Provider Last Rate Last Dose   • cefTRIAXone (ROCEPHIN) 2 g in  mL IVPB  2 g Q24HR Jason Niño M.D.       • metroNIDAZOLE (FLAGYL) IVPB 500 mg  500 mg Q8HRS Jason Niño M.D.       • ondansetron (ZOFRAN) syringe/vial injection 4 mg  4 mg Q6HRS PRN Adonay Queen R.N.   4 mg at 12/12/18 0033   • famotidine (PEPCID) injection 20 mg  20 mg BID Juan Cobian M.D.   20 mg at 12/13/18 0524   • enoxaparin (LOVENOX) inj 40 mg  40 mg DAILY Francis Person M.D.   Stopped at 12/13/18 0600   • potassium chloride 20 mEq in LR 1,000 mL infusion   Continuous Jason Niño M.D. 150 mL/hr at 12/13/18 0216     • Respiratory Care per Protocol   Continuous RT Juan Cobian M.D.       • NS (BOLUS) infusion 1,000 mL  1,000 mL Once PRN Juan Cobian M.D.   Stopped at 12/12/18 1440   • acetaminophen (TYLENOL) tablet 650 mg  650 mg  "Q6HRS PRN Juan Cobian M.D.       • Pharmacy Consult Request ...Pain Management Review   PRN Juan Cobian M.D.        And   • oxyCODONE immediate-release (ROXICODONE) tablet 2.5 mg  2.5 mg Q3HRS PRN Juan Cobian M.D.        And   • oxyCODONE immediate-release (ROXICODONE) tablet 5 mg  5 mg Q3HRS PRN Juan Cobian M.D.   5 mg at 12/12/18 1708    And   • morphine (pf) 10 mg/mL injection 2 mg  2 mg Q3HRS PRN Juan Cobian M.D.   2 mg at 12/12/18 0752   • ferrous sulfate tablet 325 mg  325 mg BID WITH MEALS Juan Cobian M.D.   Stopped at 12/13/18 0730   • levETIRAcetam (KEPPRA) tablet 750 mg  750 mg BID Juan Cobian M.D.   750 mg at 12/13/18 0524     Last reviewed on 12/11/2018  5:17 PM by Jennifer Stanford, T  Allergies   Allergen Reactions   • Egg White Vomiting and Swelling   • Egg Yolk Vomiting and Swelling   • Chicken Allergy Vomiting and Swelling   • Omeprazole      Coughing and choking   • Prednisone      Mood changes   • Statins [Hmg-Coa-R Inhibitors] Shortness of Breath       Physical Exam  Body mass index is 19.75 kg/m². Blood pressure 101/59, pulse 96, temperature 36.9 °C (98.5 °F), temperature source Temporal, resp. rate (!) 28, height 1.854 m (6' 1\"), weight 67.9 kg (149 lb 11.1 oz), SpO2 96 %.   Vitals:    12/13/18 0600 12/13/18 0800 12/13/18 0900 12/13/18 1000   BP:       Pulse: (!) 103 99 96 96   Resp: (!) 27 (!) 29 (!) 29 (!) 28   Temp:  36.9 °C (98.4 °F)  36.9 °C (98.5 °F)   TempSrc:  Temporal  Temporal   SpO2: 96% 97% 98% 96%   Weight:       Height:        Oxygen Therapy:  Pulse Oximetry: 96 %, O2 (LPM): 4, O2 Delivery: Silicone Nasal Cannula    General: no apparent distress  HEENT: NA, AT, conjunctiva normal no, JVD   Lungs: Decreased breath sounds on the right  Heart: RRR, no murmurs, no rubs or gallops,   EXT: s/p L BKA  Abdomen: soft, tender in LLQ, colostomy bag seen  Neurological: A/O x 3. No focal sensory deficits  Skin: Warm extremities    Labs (personally reviewed and notable for):   Recent " Results (from the past 24 hour(s))   LACTIC ACID    Collection Time: 12/12/18  2:38 PM   Result Value Ref Range    Lactic Acid 1.5 0.5 - 2.0 mmol/L   CBC WITH DIFFERENTIAL    Collection Time: 12/13/18  5:35 AM   Result Value Ref Range    WBC 15.8 (H) 4.8 - 10.8 K/uL    RBC 2.75 (L) 4.70 - 6.10 M/uL    Hemoglobin 8.7 (L) 14.0 - 18.0 g/dL    Hematocrit 27.5 (L) 42.0 - 52.0 %    .0 (H) 81.4 - 97.8 fL    MCH 31.6 27.0 - 33.0 pg    MCHC 31.6 (L) 33.7 - 35.3 g/dL    RDW 51.9 (H) 35.9 - 50.0 fL    Platelet Count 290 164 - 446 K/uL    MPV 8.9 (L) 9.0 - 12.9 fL    Nucleated RBC 0.00 /100 WBC    NRBC (Absolute) 0.00 K/uL    Neutrophils-Polys 97.30 (H) 44.00 - 72.00 %    Lymphocytes 0.90 (L) 22.00 - 41.00 %    Monocytes 0.90 0.00 - 13.40 %    Eosinophils 0.90 0.00 - 6.90 %    Basophils 0.00 0.00 - 1.80 %    Neutrophils (Absolute) 15.37 (H) 1.82 - 7.42 K/uL    Lymphs (Absolute) 0.14 (L) 1.00 - 4.80 K/uL    Monos (Absolute) 0.14 0.00 - 0.85 K/uL    Eos (Absolute) 0.14 0.00 - 0.51 K/uL    Baso (Absolute) 0.00 0.00 - 0.12 K/uL   COMP METABOLIC PANEL    Collection Time: 12/13/18  5:35 AM   Result Value Ref Range    Sodium 135 135 - 145 mmol/L    Potassium 4.8 3.6 - 5.5 mmol/L    Chloride 106 96 - 112 mmol/L    Co2 25 20 - 33 mmol/L    Anion Gap 4.0 0.0 - 11.9    Glucose 87 65 - 99 mg/dL    Bun 16 8 - 22 mg/dL    Creatinine 0.93 0.50 - 1.40 mg/dL    Calcium 9.1 8.5 - 10.5 mg/dL    AST(SGOT) 32 12 - 45 U/L    ALT(SGPT) 37 2 - 50 U/L    Alkaline Phosphatase 275 (H) 30 - 99 U/L    Total Bilirubin 1.7 (H) 0.1 - 1.5 mg/dL    Albumin 1.9 (L) 3.2 - 4.9 g/dL    Total Protein 4.2 (L) 6.0 - 8.2 g/dL    Globulin 2.3 1.9 - 3.5 g/dL    A-G Ratio 0.8 g/dL   MAGNESIUM    Collection Time: 12/13/18  5:35 AM   Result Value Ref Range    Magnesium 1.7 1.5 - 2.5 mg/dL   PHOSPHORUS    Collection Time: 12/13/18  5:35 AM   Result Value Ref Range    Phosphorus 2.6 2.5 - 4.5 mg/dL   ESTIMATED GFR    Collection Time: 12/13/18  5:35 AM   Result Value  Ref Range    GFR If African American >60 >60 mL/min/1.73 m 2    GFR If Non African American >60 >60 mL/min/1.73 m 2   DIFFERENTIAL MANUAL    Collection Time: 12/13/18  5:35 AM   Result Value Ref Range    Manual Diff Status PERFORMED    PERIPHERAL SMEAR REVIEW    Collection Time: 12/13/18  5:35 AM   Result Value Ref Range    Peripheral Smear Review see below    PLATELET ESTIMATE    Collection Time: 12/13/18  5:35 AM   Result Value Ref Range    Plt Estimation Normal    MORPHOLOGY    Collection Time: 12/13/18  5:35 AM   Result Value Ref Range    RBC Morphology Normal        Cardiac Imaging and Procedures Review:    EKG and telemetry tracings personally reviewed      ASSESSMENT & PLAN    # Atrial flutter on admission  - likely 2/2 underlying sepsis  - out of atrial flutter since 10pm on day of admission  - Amiodarone infusion stopped 12/12  - Currently in sinus rhythm  - Avoid becky blocking agents given h/o Mobitz 1  - No plan for rate control or anticoagulation at the moment  - continue to monitor with telemetry  - cardiology to sign off- please call with any questions    It is my pleasure to participate in the care of Mr. Mora.  Please do not hesitate to contact me with questions or concerns.

## 2018-12-13 NOTE — PROGRESS NOTES
Monitor Summary:     Rhythm: Sinus Rhythm. Sinus Tachycardia  Ectopy: Occasional PVCs  Rate:   Measurements: .16/.12/.32

## 2018-12-13 NOTE — CARE PLAN
Problem: Respiratory:  Goal: Respiratory status will improve  Outcome: PROGRESSING SLOWER THAN EXPECTED  Patient required increase in O2 over night.  Intervention: Administer and titrate oxygen therapy  Done.       Problem: Skin Integrity  Goal: Risk for impaired skin integrity will decrease  Outcome: PROGRESSING AS EXPECTED  Assess patient's skin at the beginning of the shift. Turn patient every two hours and monitor under all medical devices throughout entire shift. Maintain a clean, dry linen environment. Float heals and elbows. Provide appropriate would prevention interventions as they apply. Implement pertinent wound protocols and document them. Please see wound flow sheet for further details.

## 2018-12-13 NOTE — PROGRESS NOTES
Monitor Summary: SR/ST    Rate     UT: 0.16  QRS: 0.10  QT: 0.38      Chart/skin check complete.

## 2018-12-13 NOTE — ASSESSMENT & PLAN NOTE
S/P CT-guided biopsy on 12/28 - pathology pending    Shows poorly differentiated lung cancer  Pt too debilitated now to be considered for treatment

## 2018-12-14 NOTE — PROGRESS NOTES
[facility] Cardiology Follow-up Consult Note    Date of note:    12/14/2018      Consulting Physician: Aime Patterson M.D.      Chief Complaint   Patient presents with   • Abdominal Pain   • Chills   • Sore Throat       Interim Events:  - S/p ERCP yesterday with stent for bilary decompression  - intermittently confused per reports. States he feels better now, no longer having abdominal pain      ROS  No NV, No Bleeding, No dizziness   All other review of systems reviewed and negative.    Past medical, surgical, social, and family history reviewed and unchanged from admission except as noted in assessment and plan.    Medications: Reviewed in MAR  Current Facility-Administered Medications   Medication Dose Frequency Provider Last Rate Last Dose   • levETIRAcetam (KEPPRA) tablet 750 mg  750 mg BID Stalin Trevino M.D.   750 mg at 12/14/18 0607   • enoxaparin (LOVENOX) inj 40 mg  40 mg DAILY Jason Niño M.D.       • cefTRIAXone (ROCEPHIN) 2 g in  mL IVPB  2 g Q24HR Jason Niño M.D.   Stopped at 12/13/18 1347   • metroNIDAZOLE (FLAGYL) IVPB 500 mg  500 mg Q8HRS Jason Niño M.D.   Stopped at 12/14/18 0554   • Pharmacy Consult: Enteral tube feeding - review meds/change route/product selection   PRN Aime Patterson M.D.       • acetaminophen (TYLENOL) tablet 650 mg  650 mg Q6HRS PRN Aime Patterson M.D.       • Pharmacy Consult Request ...Pain Management Review   PRN Aime Patterson M.D.        And   • oxyCODONE immediate-release (ROXICODONE) tablet 2.5 mg  2.5 mg Q3HRS PRN Aime Patterson M.D.        And   • oxyCODONE immediate-release (ROXICODONE) tablet 5 mg  5 mg Q3HRS PRN Aime Patterson M.D.        And   • morphine (pf) 10 mg/mL injection 2 mg  2 mg Q3HRS PRN Aime Patterson M.D.       • famotidine (PEPCID) tablet 20 mg  20 mg BID Aime Patterson M.D.   20 mg at 12/14/18 0454   • ferrous sulfate (FEOSOL) 220 (44 Fe) MG/5ML solution 325 mg  325 mg BID WITH MEALS  "Aime Patterson M.D.   325 mg at 12/14/18 0931   • ondansetron (ZOFRAN) syringe/vial injection 4 mg  4 mg Q6HRS PRN Adonay Queen R.N.   4 mg at 12/12/18 0033   • potassium chloride 20 mEq in LR 1,000 mL infusion   Continuous Jason Niño M.D.   Stopped at 12/13/18 2045   • Respiratory Care per Protocol   Continuous RT Juan Cobian M.D.       • NS (BOLUS) infusion 1,000 mL  1,000 mL Once PRN Juan Cobian M.D.   Stopped at 12/12/18 1440     Last reviewed on 12/11/2018  5:17 PM by Jennifer Stanford, DerrickT  Allergies   Allergen Reactions   • Egg White Vomiting and Swelling   • Egg Yolk Vomiting and Swelling   • Chicken Allergy Vomiting and Swelling   • Omeprazole      Coughing and choking   • Prednisone      Mood changes   • Statins [Hmg-Coa-R Inhibitors] Shortness of Breath       Physical Exam  Body mass index is 23.39 kg/m². Blood pressure (!) 99/54, pulse 79, temperature 36.8 °C (98.2 °F), temperature source Temporal, resp. rate (!) 22, height 1.854 m (6' 0.99\"), weight 80.4 kg (177 lb 4 oz), SpO2 94 %. Vitals:    12/14/18 0800 12/14/18 0900 12/14/18 0951 12/14/18 1000   BP:       Pulse: 74 67 69 79   Resp: (!) 23 (!) 23 18 (!) 22   Temp: 37 °C (98.6 °F)   36.8 °C (98.2 °F)   TempSrc: Temporal   Temporal   SpO2: 95% 96% 95% 94%   Weight:       Height:        Oxygen Therapy:  Pulse Oximetry: 94 %, O2 (LPM): 4, O2 Delivery: Oxymask     General: NAD  HEENT: NC, AT, PERRL, EOMI, lids and conjunctiva are clear, moist oral mucosa. No JVD  Respit: Decreased breath sounds throughout, no wheezing or crackles slightly  Cardiac: RRR, no murmurs, S1 S2  Abdomen: Distended, bowel sounds present, non-tender, no rebound or guarding  Extremities: No clubbing, cyanosis, bilateral lower extremity edema  Skin: warm  Neurologic: AOx3, No focal neurologic abnormality noted.    Labs (personally reviewed and notable for):   Recent Results (from the past 24 hour(s))   VITAMIN B12    Collection Time: 12/14/18  6:02 AM   Result " Value Ref Range    Vitamin B12 -True Cobalamin >1500 (H) 211 - 911 pg/mL   COMP METABOLIC PANEL    Collection Time: 12/14/18  6:02 AM   Result Value Ref Range    Sodium 136 135 - 145 mmol/L    Potassium 4.3 3.6 - 5.5 mmol/L    Chloride 105 96 - 112 mmol/L    Co2 23 20 - 33 mmol/L    Anion Gap 8.0 0.0 - 11.9    Glucose 118 (H) 65 - 99 mg/dL    Bun 20 8 - 22 mg/dL    Creatinine 0.89 0.50 - 1.40 mg/dL    Calcium 9.5 8.5 - 10.5 mg/dL    AST(SGOT) 15 12 - 45 U/L    ALT(SGPT) 25 2 - 50 U/L    Alkaline Phosphatase 214 (H) 30 - 99 U/L    Total Bilirubin 0.8 0.1 - 1.5 mg/dL    Albumin 2.2 (L) 3.2 - 4.9 g/dL    Total Protein 4.8 (L) 6.0 - 8.2 g/dL    Globulin 2.6 1.9 - 3.5 g/dL    A-G Ratio 0.8 g/dL   CBC WITH DIFFERENTIAL    Collection Time: 12/14/18  6:02 AM   Result Value Ref Range    WBC 14.4 (H) 4.8 - 10.8 K/uL    RBC 2.92 (L) 4.70 - 6.10 M/uL    Hemoglobin 9.4 (L) 14.0 - 18.0 g/dL    Hematocrit 29.3 (L) 42.0 - 52.0 %    .3 (H) 81.4 - 97.8 fL    MCH 32.2 27.0 - 33.0 pg    MCHC 32.1 (L) 33.7 - 35.3 g/dL    RDW 51.8 (H) 35.9 - 50.0 fL    Platelet Count 308 164 - 446 K/uL    MPV 9.3 9.0 - 12.9 fL    Neutrophils-Polys 91.60 (H) 44.00 - 72.00 %    Lymphocytes 4.80 (L) 22.00 - 41.00 %    Monocytes 1.90 0.00 - 13.40 %    Eosinophils 0.00 0.00 - 6.90 %    Basophils 0.90 0.00 - 1.80 %    Immature Granulocytes 0.80 0.00 - 0.90 %    Nucleated RBC 0.00 /100 WBC    Neutrophils (Absolute) 13.18 (H) 1.82 - 7.42 K/uL    Lymphs (Absolute) 0.69 (L) 1.00 - 4.80 K/uL    Monos (Absolute) 0.27 0.00 - 0.85 K/uL    Eos (Absolute) 0.00 0.00 - 0.51 K/uL    Baso (Absolute) 0.13 (H) 0.00 - 0.12 K/uL    Immature Granulocytes (abs) 0.12 (H) 0.00 - 0.11 K/uL    NRBC (Absolute) 0.00 K/uL   MAGNESIUM    Collection Time: 12/14/18  6:02 AM   Result Value Ref Range    Magnesium 2.0 1.5 - 2.5 mg/dL   PHOSPHORUS    Collection Time: 12/14/18  6:02 AM   Result Value Ref Range    Phosphorus 3.5 2.5 - 4.5 mg/dL   LIPASE    Collection Time: 12/14/18  6:02  AM   Result Value Ref Range    Lipase 7 (L) 11 - 82 U/L   CRP Quantitive (Non-Cardiac)    Collection Time: 12/14/18  6:02 AM   Result Value Ref Range    Stat C-Reactive Protein 28.44 (H) 0.00 - 0.75 mg/dL   Prealbumin    Collection Time: 12/14/18  6:02 AM   Result Value Ref Range    Pre-Albumin 4.0 (L) 18.0 - 38.0 mg/dL   ESTIMATED GFR    Collection Time: 12/14/18  6:02 AM   Result Value Ref Range    GFR If African American >60 >60 mL/min/1.73 m 2    GFR If Non African American >60 >60 mL/min/1.73 m 2       Cardiac Imaging and Procedures Review:    EKG and telemetry tracings personally reviewed      ASSESSMENT & PLAN    Gallstone Pancreatitis  Imaging consistent with gallstone pancreatitis  Patient received adequate hydration in the ED, continue Ringer's lactate at 150cc/hr with 20 meq of K.  Adequate pain management.   S/p ERCP 12/13 which showed large [id number] stone causing obstruction. Stent placed for biliary decompression and symptomatic relief with plan for electrohydraulic lithotripsy in 6-8 weeks  Will likely need surgery consult for cholecystectomy later on.  T bili trended down to normal     Transaminitis  Secondary to common biliary duct obstruction.  Plan above.     Sepsis secondary to pneumonia  A. fib with RVR  Lung Mass     It is my pleasure to participate in the care of Mr. Mora. WILL SIGN OFF. Please do not hesitate to contact me with questions or concerns.

## 2018-12-14 NOTE — CARE PLAN
Problem: Safety  Goal: Will remain free from falls  Outcome: PROGRESSING AS EXPECTED  Pt remains free from falls at this time.  HD fall risk assessed.  Fall precautions in place.     Problem: Knowledge Deficit  Goal: Knowledge of the prescribed therapeutic regimen will improve  Outcome: PROGRESSING AS EXPECTED  Pt's wife able to verbalize understanding of prescribed therapeutic regimen.

## 2018-12-14 NOTE — DIETARY
"Nutrition Services:  Nutrition Support Assessment:  Day 3 of admit.  Conner Mora is a 78 y.o. male with admitting DX of Atrial fibrillation with RVR, CAP (community acquired pneumonia), Sepsis.     Current problem list:  1. Sepsis  2. Pancreatic mass  3. Acute on chronic respiratory failure with hypoxia  4. Pneumonia due to infectious organism  5. Mass of upper lobe of right lung  6. Acute biliary pancreatitis  7. Oral phase dysphagia  8. Generalized abdominal pain  9. Troponin level elevated  10. Gastritis  11. Esophagitis  12. Atrial flutter     Assessment:  Estimated Nutritional Needs based on:   Height: 185.4 cm (6' 0.99\")  Weight: 80.4 kg (177 lb 4 oz)  Weight to Use in Calculations: 68.3 kg (150 lb 9.2 oz) - suspected dry wt from admit  Ideal Body Weight: 83.5 kg (184 lb)  Percent Ideal Body Weight: 81.8  Adjusted Body mass index for left BKA is 20.9 kg/m².     Calculation/Equation: MSJ x 1.2 = 1762 kcals/day  Total Calories / day: 1762 - 2049  (Calories / k - 30)  Total Grams Protein / day: 82 - 96  (Grams Protein / k.2 - 1.4)     Evaluation:   1. Pt admitted with n/v x 1 week, decreased appetite.  2. Hx of colitis, s/p colostomy placement.  3. Hx of left BKA.  4. ERCP and biliary stent placement .  5. Swallow evaluation by SLP ; recommended NPO with nutrition via Cortrak.  6. Cortrak placed and verified; tip projects over the junction of the second and third portions of duodenum.  7. Pt seen by wound team  for partial thickness wound to LLQ of abdomen, skin tear to left buttock, wound to left distal knee, all POA.  8. Glucose 118, Alk phos 214, Albumin 2.2, Pre-Albumin 4.0, Lipase 7, CRP 28.44  9. Pertinent meds:  Rocephin, Pepcid, Ferrous sulfate, Flagyl  10. Pt is not intubated or sedated at this time.  11. Specialized tube feeding formula with Omega-3 fatty acids indicated in pt with sepsis.     Recommendations/Plan:  1. Start Diabetisource AC @ 25 mL/hr and advance per " protocol to goal rate of 65 mL/hr, providing 1872 kcals, 94 grams of protein and 1279 mL of free water per day.  2. Fluids per MD.  3. RD to monitor wt and lab trends.  4. PO diet per SLP/MD when safe/appropriate and pt can adequately consume.

## 2018-12-14 NOTE — CONSULTS
Consults   INFECTIOUS DISEASES INPATIENT CONSULT NOTE     Date of Service: 12/14/2018    Consult Requested By: Aime Patterson M.D.    Reason for Consultation: Bacteremia and pneumonia     History of Present Illness:   Conner Mora is a 78 y.o.male admitted 12/11/2018. Pt has a past medical history of seizure disorder on Keppra, respiratory disorder on 2 L nocturnal oxygen, PAD, he has a colostomy and is status post colectomy in 2010.  Presented to the ED complaining of nausea, vomiting and abdominal pain times 2-weeks.  He also reported cough and increasing shortness of breath.  He was diagnosed with pneumonia based on CT chest.  He then developed pancreatitis secondary to gallstones and is status post ERCP. ID consulted for new bacteremia with Prevotella oralis.     Today he is resting comfortable, he has been afebrile this admission, denies pain, denies cough or shortness of breath.  Per nursing he has no sputum production and poor cough.  No significant change in stool output.     Review Of Systems:  Review of Systems   Constitutional: Positive for malaise/fatigue. Negative for chills and fever.   Respiratory: Negative for cough, hemoptysis, sputum production and shortness of breath.    Gastrointestinal: Negative for abdominal pain, diarrhea, nausea and vomiting.   Musculoskeletal: Negative for joint pain and myalgias.   Skin: Negative for rash.       PMH:   Past Medical History:   Diagnosis Date   • Arrhythmia     Pt states r/t caffeine   • Atrioventricular block, Mobitz type 1, Wenckebach 3/2018    • Cataract     Bilat   • Dental disorder     dentures   • Generalized abdominal pain 12/12/2018   • GERD (gastroesophageal reflux disease) 7/8/2013   • History of colostomy 10/26/2011   • History of CVA (cerebrovascular accident) 4/29/2015   • Hypertension    • Indigestion    • Multiple falls    • On home oxygen therapy    • Peripheral vascular disease (HCC)    • Seizure (HCC)    • Seizure disorder (HCC)      10 yrs ago  dilantin n o seizure 10-11 years   • Snoring        PSH:  Past Surgical History:   Procedure Laterality Date   • ERCP IN OR N/A 12/13/2018    Procedure: ERCP IN OR;  Surgeon: Júnior Arzola M.D.;  Location: SURGERY [facility];  Service: Gastroenterology   • HIP NAILING INTRAMEDULLARY Right 3/9/2016    Procedure: HIP NAILING INTRAMEDULLARY;  Surgeon: Freddy Gan M.D.;  Location: SURGERY [facility];  Service:    • CATARACT PHACO WITH IOL  8/26/2014    Performed by Abran Barber M.D. at [facility]   • CATARACT PHACO WITH IOL  8/12/2014    Performed by Abran Barber M.D. at [facility]   • COLECTOMY  11/21/2010    Performed by BRYCE HDEZ at SURGERY [facility]   • COLOSTOMY  11/21/2010    Performed by BRYCE HDEZ at SURGERY [facility]   • SIGMOIDOSCOPY FLEX  11/9/2010    Performed by MINA MAYER at [facility]   • ABDOMINAL AORTIC ANEURYSM  10/14/2009    Performed by PHOEBE CHEATHAM at SURGERY [facility]   • HIP ORIF  7/21/2009    Performed by WAYNE HOLMAN at SURGERY [facility]   • ABDOMINAL AORTIC ANEURYSM  2009   • AMPUTATION, BELOW THE KNEE Left 1980   • OTHER ABDOMINAL SURGERY      AAA repair 2009       FAMILY HX:  Family History   Problem Relation Age of Onset   • Heart Attack Mother    • No Known Problems Father    • Hyperlipidemia Sister    • No Known Problems Brother    • No Known Problems Brother    • Heart Attack Brother    • No Known Problems Maternal Grandmother    • No Known Problems Maternal Grandfather    • No Known Problems Paternal Grandmother    • No Known Problems Paternal Grandfather    • Lung Disease Neg Hx    • Cancer Neg Hx    • Diabetes Neg Hx    • Hypertension Neg Hx    • Stroke Neg Hx        SOCIAL HX:  Social History     Social History   • Marital status:      Spouse name: N/A   • Number of children: N/A   • Years of education: N/A     Occupational History   •  Not on file.     Social History Main Topics   • Smoking status: Former Smoker     Packs/day: 1.00     Years: 54.00     Types: Cigarettes     Start date: [birth year]24/1954     Quit date: 3/19/2011   • Smokeless tobacco: Never Used      Comment: Started smoking at 17   • Alcohol use No   • Drug use: No   • Sexual activity: No     Other Topics Concern   • Not on file     Social History Narrative   • No narrative on file     History   Smoking Status   • Former Smoker   • Packs/day: 1.00   • Years: 54.00   • Types: Cigarettes   • Start date: [birth year]/24/1954   • Quit date: 3/19/2011   Smokeless Tobacco   • Never Used     Comment: Started smoking at 17     History   Alcohol Use No       Allergies/Intolerances:  Allergies   Allergen Reactions   • Egg White Vomiting and Swelling   • Egg Yolk Vomiting and Swelling   • Chicken Allergy Vomiting and Swelling   • Omeprazole      Coughing and choking   • Prednisone      Mood changes   • Statins [Hmg-Coa-R Inhibitors] Shortness of Breath       History reviewed with the patient.     Other Current Medications:    Current Facility-Administered Medications:   •  levETIRAcetam (KEPPRA) tablet 750 mg, 750 mg, Oral, BID, Stalin Trevino M.D., 750 mg at 12/14/18 0607  •  cefTRIAXone (ROCEPHIN) 2 g in  mL IVPB, 2 g, Intravenous, Q24HR, Jason Niño M.D., Stopped at 12/13/18 1347  •  metroNIDAZOLE (FLAGYL) IVPB 500 mg, 500 mg, Intravenous, Q8HRS, Jason Niño M.D., Stopped at 12/14/18 0554  •  Pharmacy Consult: Enteral tube feeding - review meds/change route/product selection, , Other, PRN, Aime Patterson M.D.  •  acetaminophen (TYLENOL) tablet 650 mg, 650 mg, Feeding Tube, Q6HRS PRN, Aime Patterson M.D.  •  Notify provider if pain remains uncontrolled, , , CONTINUOUS **AND** Use the numeric rating scale (NRS-11) on regular floors and Critical-Care Pain Observation Tool (CPOT) on ICUs/Trauma to assess pain, , , CONTINUOUS **AND** Pulse Ox (Oximetry), , , CONTINUOUS **AND** Pharmacy  "Consult Request ...Pain Management Review, , Other, PRN **AND** If patient difficult to arouse and/or has respiratory depression, stop any opiates that are currently infusing and call a Rapid Response., , , CONTINUOUS **AND** oxyCODONE immediate-release (ROXICODONE) tablet 2.5 mg, 2.5 mg, Feeding Tube, Q3HRS PRN **AND** oxyCODONE immediate-release (ROXICODONE) tablet 5 mg, 5 mg, Feeding Tube, Q3HRS PRN **AND** morphine (pf) 10 mg/mL injection 2 mg, 2 mg, Intravenous, Q3HRS PRN, Aime Patterson M.D.  •  famotidine (PEPCID) tablet 20 mg, 20 mg, Per NG Tube, BID, Aime Patterson M.D., 20 mg at 18 0454  •  ferrous sulfate (FEOSOL) 220 (44 Fe) MG/5ML solution 325 mg, 325 mg, Oral, BID WITH MEALS, Aime Patetrson M.D., Stopped at 18 1815  •  ondansetron (ZOFRAN) syringe/vial injection 4 mg, 4 mg, Intravenous, Q6HRS PRN, Adonay Queen R.N., 4 mg at 18 0033  •  potassium chloride 20 mEq in LR 1,000 mL infusion, , Intravenous, Continuous, Jason Niño M.D., Stopped at 18 2045  •  Respiratory Care per Protocol, , Nebulization, Continuous RT, Juan Cobian M.D.  •  NS (BOLUS) infusion 1,000 mL, 1,000 mL, Intravenous, Once PRN, Juan Cobian M.D., Stopped at 18 1440  [unfilled]    Most Recent Vital Signs:  BP (!) 99/54   Pulse 73   Temp 36.7 °C (98.1 °F) (Temporal)   Resp 20   Ht 1.854 m (6' 1\")   Wt 80.4 kg (177 lb 4 oz)   SpO2 97%   BMI 23.39 kg/m²   Temp  Av.9 °C (98.4 °F)  Min: 36.4 °C (97.5 °F)  Max: 37.3 °C (99.2 °F)    Physical Exam:  General: well-appearing, no acute distress, somnolent  HEENT: sclera anicteric, PERRL, EOMI, MMM, no oral lesions  Neck: supple, no lymphadenopathy  Chest: Crackles bilaterally, to auscultation only  no r/r/w, normal work of breathing, wearing nonrebreather mask.  Cardiac: RRR, normal S1 S2, no m/r/g   Abdomen: + bowel sounds, soft, non-tender, non-distended, no HSM, colostomy bag   Extremities: WWP, no edema, 2+ " "pulses  Skin: no rashes or worrisome lesions, purplish lesion on right forehead and eye  Neuro: Alert and oriented times 3, non-focal exam, speech fluent, moves all extremities    Pertinent Lab Results:  Recent Labs      12/12/18   0520  12/13/18   0535  12/14/18   0602   WBC  29.6*  15.8*  14.4*      Recent Labs      12/12/18   0520  12/13/18   0535  12/14/18   0602   HEMOGLOBIN  11.1*  8.7*  9.4*   HEMATOCRIT  35.0*  27.5*  29.3*   MCV  100.3*  100.0*  100.3*   MCH  31.8  31.6  32.2   MACROCYTOSIS  1+   --    --    ANISOCYTOSIS  1+   --    --    PLATELETCT  387  290  308         Recent Labs      12/11/18   1553  12/12/18   0520  12/13/18   0535   SODIUM  137  137  135   POTASSIUM  3.4*  3.6  4.8   CHLORIDE  100  105  106   CO2  18*  22  25   CREATININE  1.15  1.08  0.93        Recent Labs      12/11/18   1553  12/12/18   1050  12/13/18   0535   ALBUMIN  3.1*  2.1*  1.9*        Pertinent Micro:  Results     Procedure Component Value Units Date/Time    BLOOD CULTURE [[id number]]     Order Status:  No result Specimen:  Blood from Peripheral     BLOOD CULTURE [[id number]]     Order Status:  No result Specimen:  Blood from Peripheral     BLOOD CULTURE [[id number]]  (Abnormal) Collected:  12/11/18 1553    Order Status:  Completed Specimen:  Blood from Peripheral Updated:  12/13/18 1324     Significant Indicator POS (POS)     Source BLD     Site PERIPHERAL     Blood Culture Growth detected by Bactec instrument. 12/12/2018  15:36 (A)      Prevotella oralis (A)    Narrative:       CALL  [place]  161 tel. [phone],  CALLED  161 tel. [phone] 12/12/2018, 15:37, RB PERF. RESULTS CALLED  TO:[phone] RN  Per Hospital Policy: Only change Specimen Src: to \"Line\" if  specified by physician order.    BLOOD CULTURE [[id number]]  (Abnormal) Collected:  12/11/18 1620    Order Status:  Completed Specimen:  Blood from Peripheral Updated:  12/13/18 1324     Significant Indicator POS (POS)     Source BLD     Site PERIPHERAL     Blood Culture " "Growth detected by Bactec instrument. 12/12/2018  17:04 (A)      Prevotella oralis (A)    Narrative:       CALL  [place]  161 tel. [phone],  CALLED  161 tel. [phone] 12/12/2018, 17:05, RB PERF. RESULTS CALLED  TO:[phone] RN  Per Hospital Policy: Only change Specimen Src: to \"Line\" if  specified by physician order.    URINE CULTURE(NEW) [[id number]] Collected:  12/11/18 2030    Order Status:  Completed Specimen:  Urine Updated:  12/13/18 0826     Significant Indicator NEG     Source UR     Site --     Urine Culture No growth at 48 hours    Narrative:       Indication for culture:->Emergency Room Patient    Influenza A/B By PCR [[id number]] Collected:  12/11/18 2125    Order Status:  Completed Updated:  12/11/18 2214     Influenza virus A RNA Negative     Influenza virus B, PCR Negative    URINALYSIS [[id number]]  (Abnormal) Collected:  12/11/18 2030    Order Status:  Completed Specimen:  Urine Updated:  12/11/18 2110     Color Yellow     Character Cloudy (A)     Specific Gravity 1.025     Ph 5.0     Glucose Negative mg/dL      Ketones Trace (A) mg/dL      Protein 30 (A) mg/dL      Bilirubin Negative     Urobilinogen, Urine 0.2     Nitrite Negative     Leukocyte Esterase Negative     Occult Blood Negative     Micro Urine Req Microscopic    Narrative:       Indication for culture:->Emergency Room Patient        Blood Culture   Date Value Ref Range Status   12/11/2018 (A)  Final    Growth detected by Bactec instrument. 12/12/2018  17:04   12/11/2018 Prevotella oralis (A)  Final        Studies:  Ct-abdomen-pelvis With    Result Date: 12/11/2018 12/11/2018 9:22 PM HISTORY/REASON FOR EXAM:  Abd pain, diverticulitis suspected Nausea, vomiting. TECHNIQUE/EXAM DESCRIPTION:   CT scan of the abdomen and pelvis with contrast. Contrast-enhanced helical scanning was obtained from the diaphragmatic domes through the pubic symphysis following the bolus administration of nonionic contrast without complication. 100 mL of Omnipaque 350 " nonionic contrast was administered without complication. Low dose optimization technique was utilized for this CT exam including automated exposure control and adjustment of the mA and/or kV according to patient size. COMPARISON: CT chest 12/11/2018. FINDINGS: Lung bases: Please see dedicated CT chest report Abdomen: The liver is unremarkable. The spleen is unremarkable. There is stranding and fluid surrounding the entire pancreas, most in the pancreatic tail. There is high density fluid extending from the pancreatic tail to the left paracolic gutter. There are multiple lobulated peripancreatic fluid area surrounding the  pancreas The gallbladder demonstrates multiple layering gallstones. Very dilated CBD, measuring up to 1.6 cm. There is a soft tissue attenuation lesion in the distal CBD (image 39 series 7) The adrenal glands are normal in size. The kidneys enhance symmetrically. There is a 7.5 cm cyst in the upper pole right kidney. No hydronephrosis. Tiny nonobstructive calculus in the lower pole left kidney. Moderate atherosclerotic disease of the abdominal aorta and its branches. Aortobiiliac graft There is no lymphadenopathy. No bowel wall thickening or bowel dilatation. Diffuse wall thickening of the stomach. Right lower quadrant ostomy. Pelvis: Limited visualization of the pelvis due to bilateral femur hardwares. There are small urinary bladder diverticula. Layering stones/debris in the urinary bladder. Moderate amount of high density fluid in the pelvis, likely hemorrhagic fluid No aggressive bone lesions are seen. ___________________________________     1. Stranding and fluid surrounding the entire pancreas with several lobulated peripancreatic fluid area surrounding the pancreas. This could be sequela of prior pancreatitis versus cystic pancreatic neoplasm. More high density fluid extending from the pancreatic tail to the left paracolic gutter could relate to hemorrhage of one of these cystic lesions or  sequela of acute on chronic pancreatitis. Severely dilated CBD, measuring up to 1.6 cm. Soft tissue attenuation lesion in the distal CBD could be an obstructing mass or noncalcified stone. Further evaluation with MRCP and MR pancreas is recommended. 2. Cholelithiasis. 3. Diffuse wall thickening of the stomach could be gastritis or reactive change secondary to the pancreatic process. 4. Layering stones/debris in the urinary bladder.    Dx-chest-portable (1 View)    Result Date: 12/14/2018 12/14/2018 3:45 AM HISTORY/REASON FOR EXAM:  Shortness of Breath. TECHNIQUE/EXAM DESCRIPTION AND NUMBER OF VIEWS: Single portable view of the chest. COMPARISON: 12/11/2018 FINDINGS: LUNGS: Unchanged right upper lobe mass. Small bilateral pleural effusions, left slightly worse than the right. Retrocardiac opacity, atelectasis versus consolidation. PNEUMOTHORAX: None. LINES AND TUBES: Enteric tube tip is distal to the GE junction, outside the field-of-view. MEDIASTINUM: Stable cardiac silhouette. Atherosclerosis. MUSCULOSKELETAL STRUCTURES: Unchanged.     1. New retrocardiac atelectasis versus consolidation. 2. Small bilateral pleural effusions and bibasilar atelectasis, left worse than right. 3. Stable right upper lobe mass. 4. Enteric tube tip is distal to the GE junction.    Dx-chest-portable (1 View)    Result Date: 12/11/2018 12/11/2018 4:35 PM HISTORY/REASON FOR EXAM:  Loss of appetite, sore throat, chills.  Nausea and vomiting. TECHNIQUE/EXAM DESCRIPTION AND NUMBER OF VIEWS: Single portable view of the chest. COMPARISON: 1/30/2018 FINDINGS: Cardiac mediastinal contour is unchanged. Ill-defined opacity again seen in the RIGHT mid to upper lung, slightly more apparent than prior exam. No pleural fluid collection or pneumothorax. Severe degenerative change of both shoulders.     1.  No pneumonia or pulmonary edema. 2.  Slight interval increase in size of ill-defined RIGHT mid to upper lung nodular opacity, concerning for  mass.    [id number] Ducts    Result Date: 12/13/2018 12/13/2018 3:05 PM HISTORY/REASON FOR EXAM:  Main OR ERCP TECHNIQUE/EXAM DESCRIPTION AND NUMBER OF VIEWS: Fluoroscopic unit obligated to the operating room for greater than one hour for ERCP procedure.  6 fluoroscopic images provided. COMPARISON: None FINDINGS: Initial cannulation the common bile duct which is dilated and shows filling defect consistent with stones which appear to be removed on later images. 6 seconds fluoroscopy time utilized.     Limited images obtained during ERCP procedure showing apparent removal of common bile duct stones.    Mr-abdomen-with & W/o    Result Date: 12/13/2018 12/12/2018 3:47 PM HISTORY/REASON FOR EXAM:  Neoplasm: pancreas, suspected; Obstruction of bile duct. TECHNIQUE/EXAM DESCRIPTION: MRI of the pancreas with dynamic IV gadolinium enhancement. MR imaging of the pancreas was performed.  MR images of the pancreas were obtained with coronal and axial single-shot fast spin-echo T2, fat-suppressed axial FRFSE T2, axial in-phase and out-of-phase FSPGR T1, axial DWI with a b-value of 600, 3D MRCP,  precontrast fat-suppressed FSPGR T1, dynamic gadolinium enhanced axial fat-suppressed T1 FSPGR in the arterial dominant, portal venous, 2-minute, and 4-minute delayed phases, with delayed coronal fat-suppressed T1 FSPGR sequence. . The study was performed on a [employer] Signa 1.5 [place] MRI scanner. 7 mL Gadavist contrast was administered intravenously. COMPARISON: CT abdomen and pelvis 12/11/2018 FINDINGS: Motion artifact limits exam. Small bilateral pleural effusions with associated consolidation. Liver shows mildly nodular margins.  No enhancing mass demonstrated.  Prominent intrahepatic biliary ducts. Spleen is unremarkable. Small amount of peritoneal fluid present. RIGHT kidney cysts with largest at the upper pole measuring 7.7 cm.  LEFT kidney is unremarkable.  No enhancing renal mass. Gallbladder shows multiple dependent signal  voids consistent with stones. Common hepatic duct is dilated measuring 17 mm.,  Bile duct measures 14 mm.  Large ovoid signal voids within the distal common bile duct consistent with stones. Pancreatic duct is not significantly dilated. Complex fluid collection tracks along the pancreatic head and body, as seen on prior CT.  Edema in the pancreatic tail with fluid extending into the LEFT pararenal and perisplenic space.  T1 hyperintense material present. Multilevel Schmorl's nodes in the thoracolumbar spine, with reactive endplate changes.  Apparent edema within the L3 vertebral body.     1.  Marked biliary dilation with large common bile duct stones. 2.  Stones also present in the gallbladder. 3.  Complex peripancreatic fluid collection, likely pseudocyst, with apparent inflammatory changes in the pancreatic tail suggesting superimposed acute pancreatitis with fluid extending into the LEFT pararenal space, likely hemorrhagic pancreatitis.  Hemorrhage within cystic lesion at the dorsal aspect of pancreatic tail also noted.  Pancreatic mass is difficult to exclude. 4.  Small volume ascites. 5.  Small bilateral pleural effusions with associated consolidation. 6.  RIGHT kidney cysts. 7.  Possible subacute compression fracture of L3.     Ct-cta Chest Pulmonary Artery W/ Recons    Result Date: 12/11/2018 12/11/2018 9:22 PM HISTORY/REASON FOR EXAM:  PE suspected, high pretest prob; Rule out PE. Also comment on right upper lobe opacity TECHNIQUE/EXAM DESCRIPTION: CT angiogram scan for pulmonary embolism with contrast, with reconstructions. 1.25 mm helical sections were obtained from the lung apices through the lung bases following the rapid bolus administration of 100 mL of Omnipaque 350 nonionic contrast. Thin-section overlapping reconstruction interval was utilized. Coronal reconstructions were generated from the axial data. MIP post processing was performed and utilized for the interpretation. Low dose optimization  technique was utilized for this CT exam including automated exposure control and adjustment of the mA and/or kV according to patient size. COMPARISON: 3/24/2015 FINDINGS: Pulmonary Embolism: No. Main Pulmonary Arteries: No. Segmental branches: No. Subsegmental branches: No. Additional Comments: None. Lungs: Lobulated 1.7 x 1.8 x 3.1 cm mass in the right upper lobe. Diffuse emphysematous change. Patchy bibasilar opacities. Airway: There is debris completely filling the bilateral lower lobe airways. Pleura: No pleural effusion or pneumothorax. Nodes: No enlarged mediastinal or hilar lymph nodes. Additional findings: Thoracic aorta and great vessels:  No aneurysm. Moderate atherosclerotic disease Heart and pericardium: Moderate coronary artery calcification. No pericardial effusion. Thoracic spine:  No fracture or malalignment. No compression deformity. Chest wall:   Unremarkable. Diffuse wall thickening throughout the esophagus. There is retaining fluid in the esophagus. There is a 1.4 cm paraesophageal lymph node. Questionable enlarged right paraesophageal lymph node more superiorly (image 93 series 4). Visualized upper abdomen: Please see dedicated report     1. No CT evidence of pulmonary embolism. 2. Lobulated 1.7 x 1.8 x 3.1 cm mass in the right upper lobe, concerning for lung malignancy. PET/CT, and/or tissue sampling is recommended. 3. Diffuse wall thickening throughout the esophagus could relate to esophagitis. Mildly prominent 1.4 cm paraesophageal lymph node adjacent to the distal esophagus. Questionable enlarged right paraesophageal lymph nodes more proximally. 4. Fluid opacification of the esophagus. There is debris completely filled the bilateral lower lobe airways with associated patchy bibasilar opacities. This is concerning for aspiration pneumonia due to retained fluid in the esophagus.    Ec-echocardiogram Complete W/ Cont    Result Date: 12/12/2018  Transthoracic Echo Report Echocardiography  Laboratory CONCLUSIONS Left ventricular ejection fraction is visually estimated to be 65%. Hypokinesis of the apical septal wall. Grossly normal right ventricular size and systolic function. No significant valve disease or flow abnormalities. Compared to the images of the prior study done 3/9/2018 -  there has been no significant change. ALYSSA ZAPATA Exam Date:         2018                    09:09 Exam Location:     Inpatient Priority:          Routine Ordering Physician:        SARAH MARIN  ([id number]) Referring Physician:       TOMAS Cole Sonographer:               GREG Orellana RDCS Age:    78     Gender:    M MRN:    [id number] :    [id number] BSA:    1.9    Ht (in):    73     Wt (lb):    150 Exam Type:     Complete Indications:     Atrial Flutter ICD Codes:       427.32 CPT Codes:       49204,  BP:   101    /   59     HR: Technical Quality:       Technically difficult study -                          adequate information is obtained MEASUREMENTS  (Male / Female) Normal Values 2D ECHO Estimated LV Ejection Fraction    65 %                  LV Ejection Fraction MOD BP       70.8 %                >= 55  % LV Ejection Fraction MOD 4C       79.8 %                LV Ejection Fraction MOD 2C       56.4 %                IVC Diameter                      1.2 cm                DOPPLER AV Peak Velocity                  1.2 m/s               AV Peak Gradient                  6 mmHg                LVOT Peak Velocity                1.2 m/s               * Indicates values subject to auto-interpretation LV EF:  65    % FINDINGS Left Ventricle Normal left ventricular systolic function. Left ventricular ejection fraction is visually estimated to be 65%. Hypokinesis of the apical septal wall. Diastolic function is difficult to assess with tachycardia. Contrast was used to enhance visualization of the endocardial border. 3ML of contrast was  administered. Existing IV was used. Located at the left ac fossa. Right Ventricle Right ventricle not well visualized. Grossly normal right ventricular size and systolic function. Right Atrium The right atrium is normal in size.  Normal inferior vena cava size and inspiratory collapse. Left Atrium The left atrium is normal in size.  Left atrial volume index is 27  mL/sq m. Mitral Valve Structurally normal mitral valve without significant stenosis.  Trace tricuspid regurgitation. Aortic Valve The aortic valve is not well visualized but appears to be opening well with no aortic insufficiency. Tricuspid Valve The tricuspid valve is not well visualized. Unable to estimate pulmonary artery pressure due to an inadequate tricuspid regurgitant jet. Pulmonic Valve The pulmonic valve is not well visualized. Pericardium Normal pericardium without effusion. Aorta Ascending aorta not well visualized. Segundo Barreto MD (Electronically Signed) Final Date:     12 December 2018                 11:30    Dx-abdomen For Tube Placement    Result Date: 12/14/2018 12/13/2018 11:29 PM HISTORY/REASON FOR EXAM:  Line evaluation. TECHNIQUE/EXAM DESCRIPTION AND NUMBER OF VIEWS:  1 view(s) of the abdomen. COMPARISON:  No recent studies available for comparison. FINDINGS: Enteric tube has been placed. The tip projects over the junction of the second and third portions of duodenum. The bowel gas pattern is nonspecific.  Several dilated small bowel loops.     Enteric tube tip projects over the junction of the second and third portions of duodenum.      ASSESSMENT:     Conner Mora is a 78 y.o.male admitted 12/11/2018. Pt has a past medical history of seizure disorder on Keppra, respiratory disorder on 2 L nocturnal oxygen, PAD, he has a colostomy and is status post colectomy in 2010.  Presented to the ED complaining of nausea, vomiting and abdominal pain times 2-weeks.  He also reported cough and increasing shortness of breath.  He  was diagnosed with pneumonia based on CT chest.  He then developed pancreatitis secondary to gallstones and is status post ERCP. ID consulted for new bacteremia with Prevotella oralis.     Sepsis 2/2 bacteremia, likely cholangitis and pneumonia    Bacteremia with Prevotella oralis 2/2 on 12/11   -Prevotella oralis is a anaerobic GNR and a common oral bacteria often associated with gum disease, suspect aspiration pneumonia with seeding of the blood    Pancreatitis secondary to large gallstone, lipase 263 on 12/12  -MRI on 1212 with marked biliary dilation, stones noted, peripancreatic fluid collection, likely pseudocyst with inflammatory changes suggesting acute pancreatitis  -ERCP on 12/13 with 35 x 18 mm stone obstructing the distal bile duct as well as multiple smaller stones, stone was not removed due to large size plan for lithotripsy at a later date  -Biliary stent placed for deep compression, improvement in alk phos and T bili    Pneumonia, bilateral consolidations and associated pleural effusions  -CT on 12/11 debris in the bilateral lower lobe airways and associated bibasilar opacity, fluid retained in the esophagus- concern for aspiration pneumonia  -Decrease in O2 requirement, currently on 4 L mask    Esophagitis- noted on CT     Encephalopathy    A flutter with RVR-received amiodarone drip, now resolved    Lung mass  Lobulated 1.7 x 1.8 x 3.1 cm mass in the right upper lobe-concern for malignancy    Leukocytosis, peaked at 29.6 on 12/12  -Secondary to infection and procedures-improving    PLAN:       --- Continue CTX 2 g q. 24 and flagyl 500 mg 3 times daily  --- Obtain repeat blood cultures 2 sets - ordered   --- may need thoracentesis if becomes febrile or worsening respiratory status   --- Will likely need swallow study for potential aspiration and dietary recommendations  --- Noted plan for lithotripsy and stone removal in the near future  --- Trend LFTs        Plan of care discussed with MALIA LAIRD  Bel Patterson M.D.. Will continue to follow    Virginia Padilla M.D.

## 2018-12-14 NOTE — PROGRESS NOTES
Cortrak Placement    Tube Team verified patient name and medical record number prior to tube placement.  Cortrak tube (43 inches, 8 Barbadian) placed at 85 cm in left nare.  Per Cortrak picture, tube appears to be in the small bowel.  Nursing Instructions: Awaiting KUB to confirm placement before use for medications or feeding. Once placement confirmed, flush tube with 30 ml of water, and then remove and save stylet, in patient medication drawer.

## 2018-12-14 NOTE — PROGRESS NOTES
[facility] Hospitalist Progress Note    Date of Service: 2018    Chief Complaint  78 y.o. male admitted 2018 with sepsis afib RVR     Interval Problem Update      SR 60-70  Afebrile   4L oxymask  On TF at 25  ERCP done with stenting and residual large CBD stone  Bilirubin 0.8  Lipase 7                Consultants/Specialty  CC  Cardiology  GI    Disposition  To be determined        Review of Systems   Constitutional: Positive for malaise/fatigue. Negative for fever.   HENT: Negative for congestion and nosebleeds.         Dry mouth   Eyes: Negative for discharge and redness.   Respiratory: Positive for cough. Negative for sputum production and wheezing.    Cardiovascular: Negative for chest pain and palpitations.   Gastrointestinal: Positive for abdominal pain. Negative for blood in stool, melena, nausea and vomiting.   Musculoskeletal: Negative for back pain.   Neurological: Negative for speech change, focal weakness and seizures.   Psychiatric/Behavioral: Negative.    All other systems reviewed and are negative.     Physical Exam  Laboratory/Imaging   Hemodynamics  Temp (24hrs), Av.9 °C (98.4 °F), Min:36.6 °C (97.9 °F), Max:37.3 °C (99.2 °F)   Temperature: 36.8 °C (98.2 °F)  Pulse  Av.5  Min: 63  Max: 169 Heart Rate (Monitored): 79  Blood Pressure : (!) 99/54, NIBP: 111/56      Respiratory      Respiration: (!) 22, Pulse Oximetry: 94 %, O2 Daily Delivery Respiratory : OxyMask     PEP/CPT Method: Positive Airway Pressure Device, Work Of Breathing / Effort: Mild  RUL Breath Sounds: Diminished, RML Breath Sounds: Diminished, RLL Breath Sounds: Diminished, ILSA Breath Sounds: Diminished, LLL Breath Sounds: Diminished    Fluids    Intake/Output Summary (Last 24 hours) at 18 1106  Last data filed at 18 0800   Gross per 24 hour   Intake             1455 ml   Output              850 ml   Net              605 ml       Nutrition  Orders Placed This Encounter   Procedures   • Diet NPO      Standing Status:   Standing     Number of Occurrences:   1     Order Specific Question:   Restrict to:     Answer:   Ice Chips [2]     Physical Exam   Constitutional: He appears well-developed and well-nourished.   Thin male   HENT:   Head: Normocephalic and atraumatic.   Mouth/Throat: Oropharynx is clear and moist.   Eyes: Conjunctivae are normal. Right eye exhibits no discharge. Left eye exhibits no discharge.   Neck: Neck supple. No JVD present. No tracheal deviation present.   Cardiovascular: Normal rate, regular rhythm and normal heart sounds.    Pulmonary/Chest: Effort normal. No respiratory distress. He has rales. He exhibits no tenderness.   Abdominal: Soft. Bowel sounds are normal. He exhibits no distension. There is tenderness. There is no rebound.   Musculoskeletal: He exhibits no edema or tenderness.   Neurological: He is alert. No cranial nerve deficit. He exhibits normal muscle tone.   Oriented x2   Skin: Skin is warm and dry. No rash noted. He is not diaphoretic. No cyanosis. Nails show no clubbing.   Psychiatric: He has a normal mood and affect. Judgment normal. He exhibits abnormal recent memory.   Nursing note and vitals reviewed.      Recent Labs      12/12/18   0520  12/13/18   0535  12/14/18   0602   WBC  29.6*  15.8*  14.4*   RBC  3.49*  2.75*  2.92*   HEMOGLOBIN  11.1*  8.7*  9.4*   HEMATOCRIT  35.0*  27.5*  29.3*   MCV  100.3*  100.0*  100.3*   MCH  31.8  31.6  32.2   MCHC  31.7*  31.6*  32.1*   RDW  50.7*  51.9*  51.8*   PLATELETCT  387  290  308   MPV  8.9*  8.9*  9.3     Recent Labs      12/12/18   0520  12/13/18   0535  12/14/18   0602   SODIUM  137  135  136   POTASSIUM  3.6  4.8  4.3   CHLORIDE  105  106  105   CO2  22  25  23   GLUCOSE  162*  87  118*   BUN  17  16  20   CREATININE  1.08  0.93  0.89   CALCIUM  8.8  9.1  9.5                      Assessment/Plan     Sepsis (HCC)- (present on admission)   Assessment & Plan    This is severe sepsis with the following associated acute organ  dysfunction(s): acute respiratory failure.   Likely source is pneumonia and  intra-abdominal    Sepsis resolved continue current antibiotics and follow-up on repeat cultures     Mass of upper lobe of right lung- (present on admission)   Assessment & Plan    Reviewed CT findings with patient and wife and discussed need for CT-guided biopsy when he has recovered from this acute illness       Pneumonia due to infectious organism- (present on admission)   Assessment & Plan    Pneumonia due to GNB anaerobes Prevotella   With bacteremia    Repeat blood cultures  Continue ceftriaxone and Flagyl  ID consult discussed with Dr. Padilla who will see him       Acute on chronic respiratory failure with hypoxia (HCC)- (present on admission)   Assessment & Plan    CTA negative for PE  Secondary to pneumonia and sepsis    Continue ceftriaxone and Flagyl  RT protocol and aspiration precautions     Oral phase dysphagia   Assessment & Plan    Failed swallow eval  Continue tube feeding until cleared by speech therapy     Acute biliary pancreatitis   Assessment & Plan    Status post ERCP with sphincterotomy and stenting on 12/13/2018  Continue supportive care  Patient has retained large CBD stone for which she will need follow-up as outpatient for repeat ERCP with lithotripsy  Surgical consult for evaluation for lap cholecystectomy discussed with Dr. Carter with tentative plan for surgery tomorrow  Reviewed plan of care with patient and wife at bedside     Atrial flutter (HCC)   Assessment & Plan    Resolved  Off amiodarone drip  Evaluated by cardiology with no recommendations for anticoagulation     Esophagitis- (present on admission)   Assessment & Plan    Continue Pepcid     Troponin level elevated- (present on admission)   Assessment & Plan    Likely demand ischemia  Echo EF 65% unchanged from prior       Quality-Core Measures   Reviewed items::  Labs reviewed, Medications reviewed and Radiology images reviewed  Sandoval catheter::   No Sandoval  DVT prophylaxis pharmacological::  Enoxaparin (Lovenox)  Ulcer Prophylaxis::  Yes  Antibiotics:  Treating active infection/contamination beyond 24 hours perioperative coverage        Plan of care reviewed with patient and wife  And discussed with nursing staff pharmacist and Dr. Bazan

## 2018-12-14 NOTE — OP REPORT
DATE OF SERVICE:  12/13/2018    PROCEDURE PERFORMED:  Endoscopic retrograde cholangiography with   sphincterotomy and placement of 10-Welsh 9 cm straight plastic biliary stent.    PHYSICIAN:  Júnior Arzola MD    ANESTHESIOLOGIST:  Brittany Ortiz MD    CONSENT:  Procedure risks and benefits were reviewed thoroughly with the   patient and the patient's wife.  Risks including but not limited to bleeding,   perforation, side effects of medication were all informed.  The patient voiced   understanding and agreed to proceed.  Additional risks inherent to ERCP that   being mild, moderate, severe pancreatitis that could lead to postprocedural   pain, prolonged hospitalization, intensive care unit stay, and/or death were   also reviewed.  The patient voiced understanding and agreed to proceed.    MEDICATIONS:  General anesthesia.    DESCRIPTION OF PROCEDURE:  The patient was placed in a left lateral decubitus   position after intubation and sedation.  A bite block was inserted in the   mouth and a side-viewing duodenoscope was passed carefully and easily under   direct visualization into the esophagus.  There was erosive esophagitis   appreciated from the mid to distal esophagus.  No evidence for any active   bleeding was appreciated, or any obvious high risk lesions were appreciated.    The scope was advanced into the stomach.  Views of the stomach were otherwise   normal, but limited due to side viewing capability.  The scope was advanced to   the pyloric valve into the duodenal bulb into the second portion of the   duodenum and brought in a shortened position.  The ampulla was visualized.  A   CleverCut sphincterotome with a 0.035 wire was utilized to cannulate the   ampulla and within a matter of seconds, bile duct cannulation was appreciated   by aspiration of bile and then confirmed by contrast injection.  At no time   was the pancreatic duct cannulated or pancreatogram performed.  Contrast   injection revealed a  35x18 mm stone, rectangular, occupying most of the distal   bile duct in addition to multiple smaller stones.  Due to the size of the   stone and need for electrohydraulic lithotripsy and the patient's recent   gallstone pancreatitis and likely prolonged procedure, we proceeded with   sphincterotomy of 9 mm and placement of a 10-Libyan 9 cm straight plastic   biliary stent for decompression, which allowed for adequate drainage of the   biliary system.  The stomach was suctioned, desufflated, the scope was   removed.    COMPLICATIONS:  None.    BLOOD LOSS:  None.    SPECIMENS:  None.    RECOMMENDATIONS:  The patient will need repeat ERCP in 6-8 weeks' time with   electrohydraulic lithotripsy to break up this large stone and remove the stone   fragments.  The above plan will be reviewed thoroughly with the patient and   the patient's wife.  All questions will be answered to their satisfaction.    Total time of the procedure was 15-20 minutes.       ____________________________________     MD MOHAN Faustin / DEBORAH    DD:  12/13/2018 16:05:36  DT:  12/13/2018 16:17:16    D#:  [id number]  Job#:  [id number]

## 2018-12-14 NOTE — ASSESSMENT & PLAN NOTE
Continue tube feeding  Speech therapy re-eval: pt doing quite poorly  Cont TF's while in house  DC on modified diet when Hospice arrnaged

## 2018-12-14 NOTE — PROGRESS NOTES
Critical Care Progress Note    Date of admission  12/11/2018    Chief Complaint  78 y.o. male admitted 12/11/2018 with nausea/vomiting, abd pain, LA 8.1, AF/RVR    Hospital Course    78 y.o. male who presented 12/11/2018 with shortness of breath and abdominal pain.  He has a past medical history of seizure disorder chronic, chronic respiratory failure on 2 L of home oxygen at night, peripheral vascular disease, and GERD.  He has been having nausea and vomiting for the past 2 weeks.  He says that his pain is worse on the lower left but he has diffuse generalized pain.  He has a history of colitis and he has a colostomy placement from several years ago.  He has not have been having high output.  His lactic acid is 8.1..  No hematemasis or gi bleed.  Has been having cough with productive sputum.      Interval Problem Update  Reviewed last 24 hour events:    Biliary Stent yesterday, ERCP, large stone   , 4 lpm oxygen   pepcid   Resume lovenox   C3/flagyl,    ID consulted    Yesterday   Confused   Failed SLP swallow eval; NPO   SR   LR 20 KCL at 150   Afebrile   Ostomy with o/p   4 lpm    No CXR   lovenox P   Pepcid   Zosyn, GNR bacteremia - OK for C3/flagyl    ERCP today    Review of Systems  Review of Systems   Unable to perform ROS: Acuity of condition        Vital Signs for last 24 hours   Temp:  [36.6 °C (97.9 °F)-37.3 °C (99.2 °F)] 36.7 °C (98.1 °F)  Pulse:  [] 77  Resp:  [14-29] 14  BP: (99)/(54) 99/54    Hemodynamic parameters for last 24 hours       Respiratory       Physical Exam   Physical Exam   Constitutional: He is oriented to person, place, and time. He appears well-developed. He appears lethargic. He appears ill.   HENT:   Head: Atraumatic.   Eyes: Pupils are equal, round, and reactive to light. Right eye exhibits no discharge.   Neck: Neck supple. No tracheal deviation present.   Cardiovascular: An irregularly irregular rhythm present.   Pulmonary/Chest: He has rales.   Diminished, moderate  respiratory distress,   Abdominal: Soft. He exhibits distension.   Musculoskeletal: Normal range of motion. He exhibits no edema.   Neurological: He is oriented to person, place, and time. He appears lethargic. No cranial nerve deficit.   Skin: Skin is dry.       Medications  Current Facility-Administered Medications   Medication Dose Route Frequency Provider Last Rate Last Dose   • levETIRAcetam (KEPPRA) tablet 750 mg  750 mg Oral BID Stalin Trevino M.D.   750 mg at 12/14/18 0607   • cefTRIAXone (ROCEPHIN) 2 g in  mL IVPB  2 g Intravenous Q24HR Jason Niño M.D.   Stopped at 12/13/18 1347   • metroNIDAZOLE (FLAGYL) IVPB 500 mg  500 mg Intravenous Q8HRS Jason Niño M.D.   Stopped at 12/14/18 0554   • Pharmacy Consult: Enteral tube feeding - review meds/change route/product selection   Other PRN Aime Patterson M.D.       • acetaminophen (TYLENOL) tablet 650 mg  650 mg Feeding Tube Q6HRS PRN Aime Patterson M.D.       • Pharmacy Consult Request ...Pain Management Review   Other PRN Aime Patterson M.D.        And   • oxyCODONE immediate-release (ROXICODONE) tablet 2.5 mg  2.5 mg Feeding Tube Q3HRS PRN Aime Patterson M.D.        And   • oxyCODONE immediate-release (ROXICODONE) tablet 5 mg  5 mg Feeding Tube Q3HRS PRN Aime Patterson M.D.        And   • morphine (pf) 10 mg/mL injection 2 mg  2 mg Intravenous Q3HRS PRN Aime Patterson M.D.       • famotidine (PEPCID) tablet 20 mg  20 mg Per NG Tube BID Aime Patterson M.D.   20 mg at 12/14/18 0454   • ferrous sulfate (FEOSOL) 220 (44 Fe) MG/5ML solution 325 mg  325 mg Oral BID WITH MEALS Aime Patterson M.D.   Stopped at 12/13/18 1815   • ondansetron (ZOFRAN) syringe/vial injection 4 mg  4 mg Intravenous Q6HRS PRN Adonay Queen R.N.   4 mg at 12/12/18 0033   • potassium chloride 20 mEq in LR 1,000 mL infusion   Intravenous Continuous Jason Niño M.D.   Stopped at 12/13/18 2045   • Respiratory Care per  Protocol   Nebulization Continuous RT Juan Cobian M.D.       • NS (BOLUS) infusion 1,000 mL  1,000 mL Intravenous Once PRN Juan Cobian M.D.   Stopped at 12/12/18 1440       Fluids    Intake/Output Summary (Last 24 hours) at 12/14/18 0825  Last data filed at 12/14/18 0600   Gross per 24 hour   Intake             1675 ml   Output              850 ml   Net              825 ml       Laboratory      Recent Labs      12/11/18   1945  12/12/18   0520   TROPONINI  0.09*  0.11*     Recent Labs      12/12/18   0520  12/13/18   0535  12/14/18   0602   SODIUM  137  135  136   POTASSIUM  3.6  4.8  4.3   CHLORIDE  105  106  105   CO2  22  25  23   BUN  17  16  20   CREATININE  1.08  0.93  0.89   MAGNESIUM   --   1.7  2.0   PHOSPHORUS   --   2.6  3.5   CALCIUM  8.8  9.1  9.5     Recent Labs      12/12/18   0010  12/12/18   0520  12/12/18   1050  12/13/18   0535  12/14/18   0602   ALTSGPT   --    --   54*  37  25   ASTSGOT   --    --   76*  32  15   ALKPHOSPHAT   --    --   325*  275*  214*   TBILIRUBIN   --    --   4.0*  1.7*  0.8   DBILIRUBIN   --    --   3.1*   --    --    LIPASE  45  263*   --    --   7*   PREALBUMIN   --    --    --    --   4.0*   GLUCOSE   --   162*   --   87  118*     Recent Labs      12/12/18   0520  12/12/18   1050  12/13/18   0535  12/14/18   0602   WBC  29.6*   --   15.8*  14.4*   NEUTSPOLYS  93.00*   --   97.30*  91.60*   LYMPHOCYTES  2.60*   --   0.90*  4.80*   MONOCYTES  0.00   --   0.90  1.90   EOSINOPHILS  0.00   --   0.90  0.00   BASOPHILS  0.00   --   0.00  0.90   ASTSGOT   --   76*  32  15   ALTSGPT   --   54*  37  25   ALKPHOSPHAT   --   325*  275*  214*   TBILIRUBIN   --   4.0*  1.7*  0.8     Recent Labs      12/12/18   0520  12/13/18   0535  12/14/18   0602   RBC  3.49*  2.75*  2.92*   HEMOGLOBIN  11.1*  8.7*  9.4*   HEMATOCRIT  35.0*  27.5*  29.3*   PLATELETCT  387  290  308       Imaging  X-Ray:  I have personally reviewed the images and compared with prior  images.    Assessment/Plan  Sepsis (HCC)- (present on admission)   Assessment & Plan    Severe sepsis with end organ damage  Lactic acid 9, trending down  Associated with respiratory failure with severe hypoxia  Continue Zosyn, report of gram-negative rods from blood cultures, final pending  Cover for cholangitis, aspiration pneumonitis, suspect ongoing gallstone pancreatitis  Titrated oxygen, high flow nasal cannula, monitor need for intubation       Mass of upper lobe of right lung- (present on admission)   Assessment & Plan    Follow-up CT, plus minus PET     Pneumonia due to infectious organism- (present on admission)   Assessment & Plan    Consolidations seen on ct imaing  Possible aspiration, especially givne vomiting  Blood culture pending     Acute on chronic respiratory failure with hypoxia (HCC)- (present on admission)   Assessment & Plan    Home o2 nocturnal, history of COPD  Now exacerbated with sepsis/SIRS, gallstone pancreatitis,?  Pneumonia  High flow nasal cannula, respiratory protocols, may require intubation     Pancreatic mass- (present on admission)   Assessment & Plan    Suspect gallstone pancreatitis, rule out mass  Follow-up MRCP  GI consultation       Atrial flutter (HCC)   Assessment & Plan    On amiodarone drip.     Troponin level elevated- (present on admission)   Assessment & Plan    Secondary to demand ischemia     Generalized abdominal pain- (present on admission)   Assessment & Plan    Secondary to gallstone pancreatitis  Volume resuscitation  MRCP, GI consultation          VTE:  Lovenox  Ulcer: H2 Antagonist  Lines: None    I have performed a physical exam and reviewed and updated ROS and Plan today (12/14/2018). In review of yesterday's note (12/13/2018), there are no changes except as documented above.   He remains critically ill.  ERCP and biliary stent.  He will require lithotripsy in the future.  He is received volume resuscitation for gallstone pancreatitis.  Okay to resume VT E  prophylaxis with Lovenox and monitor for bleeding.  He will be monitored closely in ICU today with multiorgan dysfunction and high risk for further deterioration.  Discussed patient condition and risk of morbidity and/or mortality with Hospitalist, Family, RN, RT, Pharmacy and QA team  The patient remains critically ill.  Critical care time = 32 minutes in directly providing and coordinating critical care and extensive data review.  No time overlap and excludes procedures.

## 2018-12-14 NOTE — PROGRESS NOTES
Patient returned from OR s/p ERCP and biliary stent plcmt. VSS: 4L oxymask. No new areas of concern for skin breakdown.    OR team stated they could not place a coretrack. Pt remains NPO after failed swallow eval.     Mental status unchanged, A&O 2-3. Denies pain. Will continue to monitor.

## 2018-12-15 NOTE — CARE PLAN
Problem: Bowel/Gastric:  Goal: Normal bowel function is maintained or improved  Outcome: PROGRESSING AS EXPECTED  Ostomy present with consistent output.     Problem: Pain Management  Goal: Pain level will decrease to patient's comfort goal  Outcome: PROGRESSING AS EXPECTED  Pt mobilized to edge of bed.

## 2018-12-15 NOTE — PROGRESS NOTES
Critical Care Progress Note    Date of admission  12/11/2018    Chief Complaint  78 y.o. male admitted 12/11/2018 with nausea/vomiting, abd pain, LA 8.1, AF/RVR    Hospital Course    78 y.o. male who presented 12/11/2018 with shortness of breath and abdominal pain.  He has a past medical history of seizure disorder chronic, chronic respiratory failure on 2 L of home oxygen at night, peripheral vascular disease, and GERD.  He has been having nausea and vomiting for the past 2 weeks.  He says that his pain is worse on the lower left but he has diffuse generalized pain.  He has a history of colitis and he has a colostomy placement from several years ago.  He has not have been having high output.  His lactic acid is 8.1..  No hematemasis or gi bleed.  Has been having cough with productive sputum.      Interval Problem Update  Reviewed last 24 hour events:    A/o x 3, mild confusion   increaseing FiO2 requirement   Afebrile   Ostomy with good UOP   CXR inc L effusion/atx   PEP, IS, HFNC today 60L, 60%   C3/flagyl; BCs repeat neg so far   Labs P   Lasix today    Yesterday   Biliary Stent yesterday, ERCP, large stone   , 4 lpm oxygen   pepcid   Resume lovenox   C3/flagyl,    ID consulted  Review of Systems  Review of Systems   Unable to perform ROS: Acuity of condition        Vital Signs for last 24 hours   Temp:  [36.6 °C (97.9 °F)-37 °C (98.6 °F)] 36.7 °C (98 °F)  Pulse:  [55-87] 59  Resp:  [12-24] 15    Hemodynamic parameters for last 24 hours       Respiratory       Physical Exam   Physical Exam   Constitutional: He is oriented to person, place, and time. He appears well-developed. He appears lethargic. He appears ill.   HENT:   Head: Atraumatic.   Eyes: Pupils are equal, round, and reactive to light. Right eye exhibits no discharge.   Neck: Neck supple. No tracheal deviation present.   Cardiovascular: An irregularly irregular rhythm present.   Pulmonary/Chest: He has rales.   Diminished, moderate respiratory  distress,   Abdominal: Soft. He exhibits distension.   Musculoskeletal: Normal range of motion. He exhibits no edema.   Neurological: He is oriented to person, place, and time. He appears lethargic. No cranial nerve deficit.   Skin: Skin is dry.       Medications  Current Facility-Administered Medications   Medication Dose Route Frequency Provider Last Rate Last Dose   • enoxaparin (LOVENOX) inj 40 mg  40 mg Subcutaneous DAILY Jason Niño M.D.   Stopped at 12/15/18 0600   • ferrous sulfate (FEOSOL) 220 (44 Fe) MG/5ML solution 325 mg  325 mg Feeding Tube BID WITH MEALS Jason Niño M.D.   Stopped at 12/15/18 0730   • levETIRAcetam (KEPPRA) tablet 750 mg  750 mg Per NG Tube BID Jason Niño M.D.   750 mg at 12/15/18 0509   • cefTRIAXone (ROCEPHIN) 2 g in  mL IVPB  2 g Intravenous Q24HR Jason Niño M.D.   Stopped at 12/14/18 1512   • metroNIDAZOLE (FLAGYL) IVPB 500 mg  500 mg Intravenous Q8HRS Jason Niño M.D.   Stopped at 12/15/18 0609   • Pharmacy Consult: Enteral tube feeding - review meds/change route/product selection   Other PRN Aime Patterson M.D.       • acetaminophen (TYLENOL) tablet 650 mg  650 mg Feeding Tube Q6HRS PRN Aime Patterson M.D.       • Pharmacy Consult Request ...Pain Management Review   Other PRN Aime Patterson M.D.        And   • oxyCODONE immediate-release (ROXICODONE) tablet 2.5 mg  2.5 mg Feeding Tube Q3HRS PRN Aime Patterson M.D.        And   • oxyCODONE immediate-release (ROXICODONE) tablet 5 mg  5 mg Feeding Tube Q3HRS PRN Aime Patterson M.D.        And   • morphine (pf) 10 mg/mL injection 2 mg  2 mg Intravenous Q3HRS PRN Aime Patterson M.D.       • famotidine (PEPCID) tablet 20 mg  20 mg Per NG Tube BID Aime Patterson M.D.   20 mg at 12/15/18 0509   • ondansetron (ZOFRAN) syringe/vial injection 4 mg  4 mg Intravenous Q6HRS PRN Adonay Queen R.N.   4 mg at 12/12/18 0033   • Respiratory Care per Protocol   Nebulization  Continuous RT Juan Cobian M.D.       • NS (BOLUS) infusion 1,000 mL  1,000 mL Intravenous Once PRN Juan Cobian M.D.   Stopped at 12/12/18 1440       Fluids    Intake/Output Summary (Last 24 hours) at 12/15/18 0738  Last data filed at 12/15/18 0200   Gross per 24 hour   Intake             1200 ml   Output              800 ml   Net              400 ml       Laboratory          Recent Labs      12/13/18   0535  12/14/18   0602   SODIUM  135  136   POTASSIUM  4.8  4.3   CHLORIDE  106  105   CO2  25  23   BUN  16  20   CREATININE  0.93  0.89   MAGNESIUM  1.7  2.0   PHOSPHORUS  2.6  3.5   CALCIUM  9.1  9.5     Recent Labs      12/12/18   1050  12/13/18   0535  12/14/18   0602   ALTSGPT  54*  37  25   ASTSGOT  76*  32  15   ALKPHOSPHAT  325*  275*  214*   TBILIRUBIN  4.0*  1.7*  0.8   DBILIRUBIN  3.1*   --    --    LIPASE   --    --   7*   PREALBUMIN   --    --   4.0*   GLUCOSE   --   87  118*     Recent Labs      12/12/18   1050  12/13/18   0535  12/14/18   0602   WBC   --   15.8*  14.4*   NEUTSPOLYS   --   97.30*  91.60*   LYMPHOCYTES   --   0.90*  4.80*   MONOCYTES   --   0.90  1.90   EOSINOPHILS   --   0.90  0.00   BASOPHILS   --   0.00  0.90   ASTSGOT  76*  32  15   ALTSGPT  54*  37  25   ALKPHOSPHAT  325*  275*  214*   TBILIRUBIN  4.0*  1.7*  0.8     Recent Labs      12/13/18   0535  12/14/18   0602   RBC  2.75*  2.92*   HEMOGLOBIN  8.7*  9.4*   HEMATOCRIT  27.5*  29.3*   PLATELETCT  290  308       Imaging  X-Ray:  I have personally reviewed the images and compared with prior images.    Assessment/Plan  Sepsis (HCC)- (present on admission)   Assessment & Plan    Severe sepsis with end organ damage  Lactic acid 9, trending down  Associated with respiratory failure with severe hypoxia  Continue Zosyn, report of gram-negative rods from blood cultures, final pending  Cover for cholangitis, aspiration pneumonitis, suspect ongoing gallstone pancreatitis  Titrated oxygen, high flow nasal cannula, monitor need for  intubation       Mass of upper lobe of right lung- (present on admission)   Assessment & Plan    Follow-up CT, plus minus PET     Pneumonia due to infectious organism- (present on admission)   Assessment & Plan    Consolidations seen on ct imaing  Possible aspiration, especially givne vomiting  Blood culture pending     Acute on chronic respiratory failure with hypoxia (HCC)- (present on admission)   Assessment & Plan    Home o2 nocturnal, history of COPD  Now exacerbated with sepsis/SIRS, gallstone pancreatitis,?  Pneumonia  High flow nasal cannula, respiratory protocols, may require intubation     Atrial flutter (HCC)   Assessment & Plan    On amiodarone drip.     Troponin level elevated- (present on admission)   Assessment & Plan    Secondary to demand ischemia     Generalized abdominal pain- (present on admission)   Assessment & Plan    Secondary to gallstone pancreatitis  Volume resuscitation  MRCP, GI consultation          VTE:  Lovenox  Ulcer: H2 Antagonist  Lines: None    I have performed a physical exam and reviewed and updated ROS and Plan today (12/15/2018). In review of yesterday's note (12/14/2018), there are no changes except as documented above.   He remains critically ill.  ERCP and biliary stent.  He will require lithotripsy in the future.  Increasing FiO2 requirement, increasing left pleural effusion on chest imaging, risk for infection.  Monitor closely in ICU with high risk for further deterioration.  He may require intubation  Discussed patient condition and risk of morbidity and/or mortality with Hospitalist, Family, RN, RT, Pharmacy and QA team  The patient remains critically ill.  Critical care time = 34 minutes in directly providing and coordinating critical care and extensive data review.  No time overlap and excludes procedures.

## 2018-12-15 NOTE — PROGRESS NOTES
Infectious Disease Progress Note    Author: Virginia Padilla M.D. Date & Time of service: 12/15/2018  8:00 AM    Chief Complaint:  Bacteremia and pneumonia     Interval History:  12/15 AF, O2 15 L oxygen mask, increase, Labs and micro results reviewed. Imaging reviewed, chest x-ray slightly worse on the left. Medications reviewed.       Review of Systems:  Review of Systems   Constitutional: Negative for chills and fever.   Respiratory: Negative for cough, sputum production and shortness of breath.    Cardiovascular: Negative for chest pain.   Gastrointestinal: Negative for abdominal pain, diarrhea, nausea and vomiting.   Musculoskeletal: Negative for back pain and myalgias.   Skin: Negative for rash.       Hemodynamics:  Temp (24hrs), Av.7 °C (98 °F), Min:36.6 °C (97.9 °F), Max:36.8 °C (98.2 °F)  Temperature: 36.7 °C (98 °F)  Pulse  Av.6  Min: 55  Max: 169Heart Rate (Monitored): 75  NIBP: 111/51       Physical Exam:  Physical Exam   Constitutional: He appears well-developed and well-nourished.   Eyes: Pupils are equal, round, and reactive to light. Conjunctivae are normal.   Cardiovascular: Normal rate, regular rhythm and normal heart sounds.    Pulmonary/Chest: Effort normal and breath sounds normal. No respiratory distress. He has no wheezes. He has no rales.   Abdominal: Soft. Bowel sounds are normal. He exhibits no distension. There is no tenderness. There is no rebound.   Musculoskeletal: He exhibits no edema.   Neurological: He is alert.   Skin: Skin is warm and dry.   Thick hyperpigmented skin lesion on right upper face and eyelid, chronic       Meds:    Current Facility-Administered Medications:   •  enoxaparin (LOVENOX) injection  •  ferrous sulfate  •  levETIRAcetam  •  cefTRIAXone (ROCEPHIN) IV  •  metroNIDAZOLE (FLAGYL) IV  •  Pharmacy  •  acetaminophen  •  Notify provider if pain remains uncontrolled **AND** Use the numeric rating scale (NRS-11) on regular floors and Critical-Care Pain  Observation Tool (CPOT) on ICUs/Trauma to assess pain **AND** Pulse Ox (Oximetry) **AND** Pharmacy Consult Request **AND** If patient difficult to arouse and/or has respiratory depression, stop any opiates that are currently infusing and call a Rapid Response. **AND** oxyCODONE immediate-release **AND** oxyCODONE immediate-release **AND** morphine injection  •  famotidine  •  ondansetron  •  Respiratory Care per Protocol  •  NS    Labs:  Recent Labs      12/13/18   0535  12/14/18   0602   WBC  15.8*  14.4*   RBC  2.75*  2.92*   HEMOGLOBIN  8.7*  9.4*   HEMATOCRIT  27.5*  29.3*   MCV  100.0*  100.3*   MCH  31.6  32.2   RDW  51.9*  51.8*   PLATELETCT  290  308   MPV  8.9*  9.3   NEUTSPOLYS  97.30*  91.60*   LYMPHOCYTES  0.90*  4.80*   MONOCYTES  0.90  1.90   EOSINOPHILS  0.90  0.00   BASOPHILS  0.00  0.90   RBCMORPHOLO  Normal   --      Recent Labs      12/13/18   0535  12/14/18   0602   SODIUM  135  136   POTASSIUM  4.8  4.3   CHLORIDE  106  105   CO2  25  23   GLUCOSE  87  118*   BUN  16  20     Recent Labs      12/12/18   1050  12/13/18   0535  12/14/18   0602   ALBUMIN  2.1*  1.9*  2.2*   TBILIRUBIN  4.0*  1.7*  0.8   ALKPHOSPHAT  325*  275*  214*   TOTPROTEIN  4.4*  4.2*  4.8*   ALTSGPT  54*  37  25   ASTSGOT  76*  32  15   CREATININE   --   0.93  0.89       Imaging:  Ct-abdomen-pelvis With    Result Date: 12/11/2018 12/11/2018 9:22 PM HISTORY/REASON FOR EXAM:  Abd pain, diverticulitis suspected Nausea, vomiting. TECHNIQUE/EXAM DESCRIPTION:   CT scan of the abdomen and pelvis with contrast. Contrast-enhanced helical scanning was obtained from the diaphragmatic domes through the pubic symphysis following the bolus administration of nonionic contrast without complication. 100 mL of Omnipaque 350 nonionic contrast was administered without complication. Low dose optimization technique was utilized for this CT exam including automated exposure control and adjustment of the mA and/or kV according to patient size.  COMPARISON: CT chest 12/11/2018. FINDINGS: Lung bases: Please see dedicated CT chest report Abdomen: The liver is unremarkable. The spleen is unremarkable. There is stranding and fluid surrounding the entire pancreas, most in the pancreatic tail. There is high density fluid extending from the pancreatic tail to the left paracolic gutter. There are multiple lobulated peripancreatic fluid area surrounding the  pancreas The gallbladder demonstrates multiple layering gallstones. Very dilated CBD, measuring up to 1.6 cm. There is a soft tissue attenuation lesion in the distal CBD (image 39 series 7) The adrenal glands are normal in size. The kidneys enhance symmetrically. There is a 7.5 cm cyst in the upper pole right kidney. No hydronephrosis. Tiny nonobstructive calculus in the lower pole left kidney. Moderate atherosclerotic disease of the abdominal aorta and its branches. Aortobiiliac graft There is no lymphadenopathy. No bowel wall thickening or bowel dilatation. Diffuse wall thickening of the stomach. Right lower quadrant ostomy. Pelvis: Limited visualization of the pelvis due to bilateral femur hardwares. There are small urinary bladder diverticula. Layering stones/debris in the urinary bladder. Moderate amount of high density fluid in the pelvis, likely hemorrhagic fluid No aggressive bone lesions are seen. ___________________________________     1. Stranding and fluid surrounding the entire pancreas with several lobulated peripancreatic fluid area surrounding the pancreas. This could be sequela of prior pancreatitis versus cystic pancreatic neoplasm. More high density fluid extending from the pancreatic tail to the left paracolic gutter could relate to hemorrhage of one of these cystic lesions or sequela of acute on chronic pancreatitis. Severely dilated CBD, measuring up to 1.6 cm. Soft tissue attenuation lesion in the distal CBD could be an obstructing mass or noncalcified stone. Further evaluation with MRCP  and MR pancreas is recommended. 2. Cholelithiasis. 3. Diffuse wall thickening of the stomach could be gastritis or reactive change secondary to the pancreatic process. 4. Layering stones/debris in the urinary bladder.    Dx-chest-portable (1 View)    Result Date: 12/14/2018 12/14/2018 3:45 AM HISTORY/REASON FOR EXAM:  Shortness of Breath. TECHNIQUE/EXAM DESCRIPTION AND NUMBER OF VIEWS: Single portable view of the chest. COMPARISON: 12/11/2018 FINDINGS: LUNGS: Unchanged right upper lobe mass. Small bilateral pleural effusions, left slightly worse than the right. Retrocardiac opacity, atelectasis versus consolidation. PNEUMOTHORAX: None. LINES AND TUBES: Enteric tube tip is distal to the GE junction, outside the field-of-view. MEDIASTINUM: Stable cardiac silhouette. Atherosclerosis. MUSCULOSKELETAL STRUCTURES: Unchanged.     1. New retrocardiac atelectasis versus consolidation. 2. Small bilateral pleural effusions and bibasilar atelectasis, left worse than right. 3. Stable right upper lobe mass. 4. Enteric tube tip is distal to the GE junction.    Dx-chest-portable (1 View)    Result Date: 12/11/2018 12/11/2018 4:35 PM HISTORY/REASON FOR EXAM:  Loss of appetite, sore throat, chills.  Nausea and vomiting. TECHNIQUE/EXAM DESCRIPTION AND NUMBER OF VIEWS: Single portable view of the chest. COMPARISON: 1/30/2018 FINDINGS: Cardiac mediastinal contour is unchanged. Ill-defined opacity again seen in the RIGHT mid to upper lung, slightly more apparent than prior exam. No pleural fluid collection or pneumothorax. Severe degenerative change of both shoulders.     1.  No pneumonia or pulmonary edema. 2.  Slight interval increase in size of ill-defined RIGHT mid to upper lung nodular opacity, concerning for mass.    Uy-oorn-canrhnb Ducts    Result Date: 12/13/2018 12/13/2018 3:05 PM HISTORY/REASON FOR EXAM:  Main OR ERCP TECHNIQUE/EXAM DESCRIPTION AND NUMBER OF VIEWS: Fluoroscopic unit obligated to the operating room for  greater than one hour for ERCP procedure.  6 fluoroscopic images provided. COMPARISON: None FINDINGS: Initial cannulation the common bile duct which is dilated and shows filling defect consistent with stones which appear to be removed on later images. 6 seconds fluoroscopy time utilized.     Limited images obtained during ERCP procedure showing apparent removal of common bile duct stones.    Mr-abdomen-with & W/o    Result Date: 12/13/2018 12/12/2018 3:47 PM HISTORY/REASON FOR EXAM:  Neoplasm: pancreas, suspected; Obstruction of bile duct. TECHNIQUE/EXAM DESCRIPTION: MRI of the pancreas with dynamic IV gadolinium enhancement. MR imaging of the pancreas was performed.  MR images of the pancreas were obtained with coronal and axial single-shot fast spin-echo T2, fat-suppressed axial FRFSE T2, axial in-phase and out-of-phase FSPGR T1, axial DWI with a b-value of 600, 3D MRCP,  precontrast fat-suppressed FSPGR T1, dynamic gadolinium enhanced axial fat-suppressed T1 FSPGR in the arterial dominant, portal venous, 2-minute, and 4-minute delayed phases, with delayed coronal fat-suppressed T1 FSPGR sequence. . The study was performed on a [employer] 1.5 [place] MRI scanner. 7 mL Gadavist contrast was administered intravenously. COMPARISON: CT abdomen and pelvis 12/11/2018 FINDINGS: Motion artifact limits exam. Small bilateral pleural effusions with associated consolidation. Liver shows mildly nodular margins.  No enhancing mass demonstrated.  Prominent intrahepatic biliary ducts. Spleen is unremarkable. Small amount of peritoneal fluid present. RIGHT kidney cysts with largest at the upper pole measuring 7.7 cm.  LEFT kidney is unremarkable.  No enhancing renal mass. Gallbladder shows multiple dependent signal voids consistent with stones. Common hepatic duct is dilated measuring 17 mm.,  Bile duct measures 14 mm.  Large ovoid signal voids within the distal common bile duct consistent with stones. Pancreatic duct is not  significantly dilated. Complex fluid collection tracks along the pancreatic head and body, as seen on prior CT.  Edema in the pancreatic tail with fluid extending into the LEFT pararenal and perisplenic space.  T1 hyperintense material present. Multilevel Schmorl's nodes in the thoracolumbar spine, with reactive endplate changes.  Apparent edema within the L3 vertebral body.     1.  Marked biliary dilation with large common bile duct stones. 2.  Stones also present in the gallbladder. 3.  Complex peripancreatic fluid collection, likely pseudocyst, with apparent inflammatory changes in the pancreatic tail suggesting superimposed acute pancreatitis with fluid extending into the LEFT pararenal space, likely hemorrhagic pancreatitis.  Hemorrhage within cystic lesion at the dorsal aspect of pancreatic tail also noted.  Pancreatic mass is difficult to exclude. 4.  Small volume ascites. 5.  Small bilateral pleural effusions with associated consolidation. 6.  RIGHT kidney cysts. 7.  Possible subacute compression fracture of L3.     Ct-cta Chest Pulmonary Artery W/ Recons    Result Date: 12/11/2018 12/11/2018 9:22 PM HISTORY/REASON FOR EXAM:  PE suspected, high pretest prob; Rule out PE. Also comment on right upper lobe opacity TECHNIQUE/EXAM DESCRIPTION: CT angiogram scan for pulmonary embolism with contrast, with reconstructions. 1.25 mm helical sections were obtained from the lung apices through the lung bases following the rapid bolus administration of 100 mL of Omnipaque 350 nonionic contrast. Thin-section overlapping reconstruction interval was utilized. Coronal reconstructions were generated from the axial data. MIP post processing was performed and utilized for the interpretation. Low dose optimization technique was utilized for this CT exam including automated exposure control and adjustment of the mA and/or kV according to patient size. COMPARISON: 3/24/2015 FINDINGS: Pulmonary Embolism: No. Main Pulmonary  Arteries: No. Segmental branches: No. Subsegmental branches: No. Additional Comments: None. Lungs: Lobulated 1.7 x 1.8 x 3.1 cm mass in the right upper lobe. Diffuse emphysematous change. Patchy bibasilar opacities. Airway: There is debris completely filling the bilateral lower lobe airways. Pleura: No pleural effusion or pneumothorax. Nodes: No enlarged mediastinal or hilar lymph nodes. Additional findings: Thoracic aorta and great vessels:  No aneurysm. Moderate atherosclerotic disease Heart and pericardium: Moderate coronary artery calcification. No pericardial effusion. Thoracic spine:  No fracture or malalignment. No compression deformity. Chest wall:   Unremarkable. Diffuse wall thickening throughout the esophagus. There is retaining fluid in the esophagus. There is a 1.4 cm paraesophageal lymph node. Questionable enlarged right paraesophageal lymph node more superiorly (image 93 series 4). Visualized upper abdomen: Please see dedicated report     1. No CT evidence of pulmonary embolism. 2. Lobulated 1.7 x 1.8 x 3.1 cm mass in the right upper lobe, concerning for lung malignancy. PET/CT, and/or tissue sampling is recommended. 3. Diffuse wall thickening throughout the esophagus could relate to esophagitis. Mildly prominent 1.4 cm paraesophageal lymph node adjacent to the distal esophagus. Questionable enlarged right paraesophageal lymph nodes more proximally. 4. Fluid opacification of the esophagus. There is debris completely filled the bilateral lower lobe airways with associated patchy bibasilar opacities. This is concerning for aspiration pneumonia due to retained fluid in the esophagus.    Ec-echocardiogram Complete W/ Cont    Result Date: 12/12/2018  Transthoracic Echo Report Echocardiography Laboratory CONCLUSIONS Left ventricular ejection fraction is visually estimated to be 65%. Hypokinesis of the apical septal wall. Grossly normal right ventricular size and systolic function. No significant valve  disease or flow abnormalities. Compared to the images of the prior study done 3/9/2018 -  there has been no significant change. ALYSSA ZAPATA Exam Date:         2018                    09:09 Exam Location:     Inpatient Priority:          Routine Ordering Physician:        SARAH MARIN  ([id number]) Referring Physician:       [id number] English Sonographer:               GREG Orellana RDCS Age:    78     Gender:    M MRN:    [id number] :    [id number] BSA:    1.9    Ht (in):    73     Wt (lb):    150 Exam Type:     Complete Indications:     Atrial Flutter ICD Codes:       427.32 CPT Codes:       19562,  BP:   101    /   59     HR: Technical Quality:       Technically difficult study -                          adequate information is obtained MEASUREMENTS  (Male / Female) Normal Values 2D ECHO Estimated LV Ejection Fraction    65 %                  LV Ejection Fraction MOD BP       70.8 %                >= 55  % LV Ejection Fraction MOD 4C       79.8 %                LV Ejection Fraction MOD 2C       56.4 %                IVC Diameter                      1.2 cm                DOPPLER AV Peak Velocity                  1.2 m/s               AV Peak Gradient                  6 mmHg                LVOT Peak Velocity                1.2 m/s               * Indicates values subject to auto-interpretation LV EF:  65    % FINDINGS Left Ventricle Normal left ventricular systolic function. Left ventricular ejection fraction is visually estimated to be 65%. Hypokinesis of the apical septal wall. Diastolic function is difficult to assess with tachycardia. Contrast was used to enhance visualization of the endocardial border. 3ML of contrast was administered. Existing IV was used. Located at the left ac fossa. Right Ventricle Right ventricle not well visualized. Grossly normal right ventricular size and systolic function. Right Atrium The right atrium is  "normal in size.  Normal inferior vena cava size and inspiratory collapse. Left Atrium The left atrium is normal in size.  Left atrial volume index is 27  mL/sq m. Mitral Valve Structurally normal mitral valve without significant stenosis.  Trace tricuspid regurgitation. Aortic Valve The aortic valve is not well visualized but appears to be opening well with no aortic insufficiency. Tricuspid Valve The tricuspid valve is not well visualized. Unable to estimate pulmonary artery pressure due to an inadequate tricuspid regurgitant jet. Pulmonic Valve The pulmonic valve is not well visualized. Pericardium Normal pericardium without effusion. Aorta Ascending aorta not well visualized. Segundo Barreto MD (Electronically Signed) Final Date:     12 December 2018                 11:30    Dx-abdomen For Tube Placement    Result Date: 12/14/2018 12/13/2018 11:29 PM HISTORY/REASON FOR EXAM:  Line evaluation. TECHNIQUE/EXAM DESCRIPTION AND NUMBER OF VIEWS:  1 view(s) of the abdomen. COMPARISON:  No recent studies available for comparison. FINDINGS: Enteric tube has been placed. The tip projects over the junction of the second and third portions of duodenum. The bowel gas pattern is nonspecific.  Several dilated small bowel loops.     Enteric tube tip projects over the junction of the second and third portions of duodenum.      Micro:  Results     Procedure Component Value Units Date/Time    BLOOD CULTURE [[id number]] Collected:  12/14/18 1307    Order Status:  Completed Specimen:  Blood from Peripheral Updated:  12/14/18 2249    Narrative:       Collected By:[id number] DERIK FORMAN  Per Hospital Policy: Only change Specimen Src: to \"Line\" if  specified by physician order.    BLOOD CULTURE [[id number]] Collected:  12/14/18 1436    Order Status:  Completed Specimen:  Blood from Peripheral Updated:  12/14/18 2249    Narrative:       Collected By:[id number] DERIK FORMAN  Per Hospital Policy: Only change Specimen Src: to " "\"Line\" if  specified by physician order.    BLOOD CULTURE [[id number]]  (Abnormal) Collected:  12/11/18 1553    Order Status:  Completed Specimen:  Blood from Peripheral Updated:  12/13/18 1324     Significant Indicator POS (POS)     Source BLD     Site PERIPHERAL     Blood Culture Growth detected by Bactec instrument. 12/12/2018  15:36 (A)      Prevotella oralis (A)    Narrative:       CALL  [place]  161 tel. [phone],  CALLED  161 tel. [phone] 12/12/2018, 15:37, RB PERF. RESULTS CALLED  TO:[id number] RN  Per Hospital Policy: Only change Specimen Src: to \"Line\" if  specified by physician order.    BLOOD CULTURE [[id number]]  (Abnormal) Collected:  12/11/18 1620    Order Status:  Completed Specimen:  Blood from Peripheral Updated:  12/13/18 1324     Significant Indicator POS (POS)     Source BLD     Site PERIPHERAL     Blood Culture Growth detected by Bactec instrument. 12/12/2018  17:04 (A)      Prevotella oralis (A)    Narrative:       CALL  [place]  161 tel. [phone],  CALLED  161 tel. [phone] 12/12/2018, 17:05, RB PERF. RESULTS CALLED  TO:[phone] RN  Per Hospital Policy: Only change Specimen Src: to \"Line\" if  specified by physician order.    URINE CULTURE(NEW) [[id number]] Collected:  12/11/18 2030    Order Status:  Completed Specimen:  Urine Updated:  12/13/18 0826     Significant Indicator NEG     Source UR     Site --     Urine Culture No growth at 48 hours    Narrative:       Indication for culture:->Emergency Room Patient    Influenza A/B By PCR [[id number]] Collected:  12/11/18 2125    Order Status:  Completed Updated:  12/11/18 2214     Influenza virus A RNA Negative     Influenza virus B, PCR Negative    URINALYSIS [[id number]]  (Abnormal) Collected:  12/11/18 2030    Order Status:  Completed Specimen:  Urine Updated:  12/11/18 2110     Color Yellow     Character Cloudy (A)     Specific Gravity 1.025     Ph 5.0     Glucose Negative mg/dL      Ketones Trace (A) mg/dL      Protein 30 (A) mg/dL      Bilirubin " Negative     Urobilinogen, Urine 0.2     Nitrite Negative     Leukocyte Esterase Negative     Occult Blood Negative     Micro Urine Req Microscopic    Narrative:       Indication for culture:->Emergency Room Patient          Assessment:  Active Hospital Problems    Diagnosis   • Sepsis (HCC) [A41.9]   • Acute on chronic respiratory failure with hypoxia (HCC) [J96.21]   • Pneumonia due to infectious organism [J18.9]   • Mass of upper lobe of right lung [R91.8]   • Acute biliary pancreatitis [K85.10]   • Oral phase dysphagia [R13.11]   • Generalized abdominal pain [R10.84]   • Troponin level elevated [R74.8]   • Esophagitis [K20.9]   • Atrial flutter (HCC) [I48.92]       ASSESSMENT:      Conner Mora is a 78 y.o.male admitted 12/11/2018. Pt has a past medical history of seizure disorder on Keppra, respiratory disorder on 2 L nocturnal oxygen, PAD, he has a colostomy and is status post colectomy in 2010.  Presented to the ED complaining of nausea, vomiting and abdominal pain times 2-weeks.  He also reported cough and increasing shortness of breath.  He was diagnosed with pneumonia based on CT chest.  He has pancreatitis thought secondary to gallstones and is status post ERCP. ID consulted for new bacteremia with Prevotella oralis.     He is currently being treated with ceftriaxone 2 g daily and Flagyl 500 mg 3 times daily.      Sepsis multifactorial, 2/2 bacteremia, possible cholangitis and pneumonia     Bacteremia with Prevotella oralis 2/2 on 12/11   -Prevotella oralis is a anaerobic GNR and a common oral bacteria often associated with gum disease, suspect aspiration pneumonia with seeding of the blood  -Repeat/clearance blood cultures pending     Pancreatitis secondary to large gallstone, lipase 263 on 12/12  -MRI on 1212 with marked biliary dilation, stones noted, peripancreatic fluid collection, likely pseudocyst with inflammatory changes suggesting acute pancreatitis  -ERCP on 12/13 with 35 x 18 mm stone  obstructing the distal bile duct as well as multiple smaller stones, stone was not removed due to large size plan for lithotripsy at a later date  -Biliary stent placed for deep compression, improvement in alk phos and T bili     Pneumonia, bilateral consolidations and associated small pleural effusions, new retrocardiac atelectasis versus consolidation on 12/14 CXR  -CT on 12/11 debris in the bilateral lower lobe airways and associated bibasilar opacity, fluid retained in the esophagus- concern for aspiration pneumonia  -Increase in O2 requirement, 4 L mask -> 8 L mask      Esophagitis- noted on CT      Encephalopathy-unsure what his baseline is     A flutter with RVR-received amiodarone drip, now resolved     Lung mass  Lobulated 1.7 x 1.8 x 3.1 cm mass in the right upper lobe-concern for malignancy     Leukocytosis, peaked at 29.6 on 12/12  -Secondary to infection and procedures-improving    Transaminitis, improving       PLAN:         --- Continue CTX 2 g q. 24 and flagyl 500 mg 3 times daily  --- F/up repeat blood cultures 2 sets from 12/14-  NGTD   --- If patient undergoes bronchoscopy due to worsening respiratory status please obtain BAL specimens for culture  --- may need thoracentesis if becomes febrile or worsening respiratory status   --- Will likely need swallow study for potential aspiration and dietary recommendations  --- Noted plan for lithotripsy and stone removal in the near future  --- Trend LFTs           Plan of care discussed with MALIA Patterson M.D.. Will continue to follow     Virginia Padilla MD

## 2018-12-15 NOTE — ASSESSMENT & PLAN NOTE
Status post ERCP with sphincterotomy and stenting on 12/13/2018 12/18/18 CT Abd showing increase in size of pancreatic pseudocyst  Patient has retained large CBD stone  Moving to comfort care

## 2018-12-15 NOTE — PROGRESS NOTES
[facility] Hospitalist Progress Note    Date of Service: 12/15/2018    Chief Complaint  78 y.o. male admitted 2018 with sepsis afib RVR     Interval Problem Update      Increased oxygen requirement  AOx3  SB-SR 40-60   SBP   afebrile  IS 1000   HFNC 60L 60%  Day 5 ceft/flagyl  Repeat blood cx neg  +7L   WBC increased 20.9            Consultants/Specialty  CC  Cardiology  GI    Disposition  To be determined        Review of Systems   Constitutional: Positive for malaise/fatigue. Negative for chills and fever.   HENT: Negative for nosebleeds.             Eyes: Negative for blurred vision, discharge and redness.   Respiratory: Positive for cough and shortness of breath. Negative for sputum production, wheezing and stridor.    Cardiovascular: Positive for orthopnea. Negative for chest pain and palpitations.   Gastrointestinal: Negative for abdominal pain, melena, nausea and vomiting.   Genitourinary: Negative for flank pain and hematuria.   Musculoskeletal: Negative for back pain and joint pain.   Skin: Negative for rash.   Neurological: Negative for dizziness, speech change, focal weakness and seizures.   Psychiatric/Behavioral: Negative.    All other systems reviewed and are negative.     Physical Exam  Laboratory/Imaging   Hemodynamics  Temp (24hrs), Av.7 °C (98 °F), Min:36.6 °C (97.9 °F), Max:36.8 °C (98.2 °F)   Temperature: 36.7 °C (98 °F)  Pulse  Av.5  Min: 54  Max: 169 Heart Rate (Monitored): 63  NIBP: 113/51      Respiratory      Respiration: 12, Pulse Oximetry: 91 %, O2 Daily Delivery Respiratory : Highflow Nasal Cannula     PEP/CPT Method: Positive Airway Pressure Device, Work Of Breathing / Effort: Mild  RUL Breath Sounds: Diminished, RML Breath Sounds: Diminished, RLL Breath Sounds: Diminished, LISA Breath Sounds: Diminished, LLL Breath Sounds: Diminished    Fluids    Intake/Output Summary (Last 24 hours) at 12/15/18 1017  Last data filed at 12/15/18 1000   Gross per 24 hour   Intake              1170 ml   Output             1150 ml   Net               20 ml       Nutrition  Orders Placed This Encounter   Procedures   • Diet NPO     Standing Status:   Standing     Number of Occurrences:   8     Order Specific Question:   Restrict to:     Answer:   Sips with Medications [3]     Physical Exam   Constitutional: He is oriented to person, place, and time. He appears well-developed and well-nourished.   HENT:   Head: Normocephalic and atraumatic.   Mouth/Throat: Oropharynx is clear and moist.   cortrak in place    Eyes: Pupils are equal, round, and reactive to light. Right eye exhibits no discharge. Left eye exhibits no discharge.   Neck: Neck supple. No JVD present. No tracheal deviation present.   Cardiovascular: Normal rate, regular rhythm and normal heart sounds.    Pulmonary/Chest: Effort normal. No stridor. No respiratory distress. He has decreased breath sounds in the right lower field and the left lower field. He has rales. He exhibits no tenderness.   Abdominal: Soft. Bowel sounds are normal. He exhibits no distension. There is no tenderness. There is no rebound and no guarding.   Musculoskeletal: He exhibits edema. He exhibits no tenderness.   Neurological: He is alert and oriented to person, place, and time. No cranial nerve deficit. He exhibits normal muscle tone.   Skin: Skin is warm and dry. No rash noted. He is not diaphoretic. No cyanosis. Nails show no clubbing.   Psychiatric: He has a normal mood and affect. Judgment normal.   Nursing note and vitals reviewed.      Recent Labs      12/13/18   0535  12/14/18   0602   WBC  15.8*  14.4*   RBC  2.75*  2.92*   HEMOGLOBIN  8.7*  9.4*   HEMATOCRIT  27.5*  29.3*   MCV  100.0*  100.3*   MCH  31.6  32.2   MCHC  31.6*  32.1*   RDW  51.9*  51.8*   PLATELETCT  290  308   MPV  8.9*  9.3     Recent Labs      12/13/18   0535  12/14/18   0602   SODIUM  135  136   POTASSIUM  4.8  4.3   CHLORIDE  106  105   CO2  25  23   GLUCOSE  87  118*   BUN  16  20    CREATININE  0.93  0.89   CALCIUM  9.1  9.5                      Assessment/Plan     Sepsis (HCC)- (present on admission)   Assessment & Plan    This is severe sepsis with the following associated acute organ dysfunction(s): acute respiratory failure.   Likely source is pneumonia and  intra-abdominal    Continue current antibiotics and supportive care     Mass of upper lobe of right lung- (present on admission)   Assessment & Plan    He will need CT-guided biopsy when he has recovered from this acute illness       Pneumonia due to infectious organism- (present on admission)   Assessment & Plan    Pneumonia due to GNB anaerobes Prevotella   With bacteremia    Continue ceftriaxone and Flagyl  ID following  Repeat cultures from 12/14/2018 are negative continue to follow       Acute on chronic respiratory failure with hypoxia (HCC)- (present on admission)   Assessment & Plan    CTA negative for PE  Secondary to pneumonia and sepsis    With increased oxygen requirements and leukocytosis  Chest x-ray with worsening left lower lobe consolidation/possible effusion  Continue ceftriaxone and Flagyl  Discussed with Dr. Bazan plan for ultrasound and thoracentesis if enough fluid noted    Will give 1 dose of Lasix     Oral phase dysphagia   Assessment & Plan    Failed swallow eval  Continue tube feeding until cleared by speech therapy     Acute biliary pancreatitis   Assessment & Plan    Status post ERCP with sphincterotomy and stenting on 12/13/2018  Continue supportive care  Patient has retained large CBD stone for which she will need follow-up as outpatient for repeat ERCP with lithotripsy    Discussed with Dr. Carter given increased oxygen requirements will postpone lap gregory     Atrial flutter (HCC)   Assessment & Plan    Resolved  Off amiodarone drip  Evaluated by cardiology with no recommendations for anticoagulation     Esophagitis- (present on admission)   Assessment & Plan    Continue Pepcid     Troponin level  elevated- (present on admission)   Assessment & Plan    Likely demand ischemia  Echo EF 65% unchanged from prior       Quality-Core Measures   Reviewed items::  Labs reviewed, Medications reviewed and Radiology images reviewed  Sandoval catheter::  No Sandoval  DVT prophylaxis pharmacological::  Enoxaparin (Lovenox)  Ulcer Prophylaxis::  Yes  Antibiotics:  Treating active infection/contamination beyond 24 hours perioperative coverage

## 2018-12-16 NOTE — PROGRESS NOTES
Critical Care Progress Note    Date of admission  12/11/2018    Chief Complaint  78 y.o. male admitted 12/11/2018 with nausea/vomiting, abd pain, LA 8.1, AF/RVR    Hospital Course    78 y.o. male who presented 12/11/2018 with shortness of breath and abdominal pain.  He has a past medical history of seizure disorder chronic, chronic respiratory failure on 2 L of home oxygen at night, peripheral vascular disease, and GERD.  He has been having nausea and vomiting for the past 2 weeks.  He says that his pain is worse on the lower left but he has diffuse generalized pain.  He has a history of colitis and he has a colostomy placement from several years ago.  He has not have been having high output.  His lactic acid is 8.1..  No hematemasis or gi bleed.  Has been having cough with productive sputum.      Interval Problem Update  Reviewed last 24 hour events:    Biliary stent placed 12/11   More disoriented today   SR, PVCs   Afebrile   alta TF at goal   Colostomy with o/p   Good UOP   EOB   HFNC 50, 60%, PEP,    CXR Pending   abx day 6 - C3/flagyl; cx's neg repeat   BUN up to 26; AP down slowly    Yesterday   A/o x 3, mild confusion   increaseing FiO2 requirement   Afebrile   Ostomy with good UOP   CXR inc L effusion/atx   PEP, IS, HFNC today 60L, 60%   C3/flagyl; BCs repeat neg so far   Labs P   Lasix today  Review of Systems  Review of Systems   Unable to perform ROS: Acuity of condition        Vital Signs for last 24 hours   Temp:  [36.5 °C (97.7 °F)-37 °C (98.6 °F)] 36.5 °C (97.7 °F)  Pulse:  [55-96] 90  Resp:  [12-35] 21    Hemodynamic parameters for last 24 hours       Respiratory       Physical Exam   Physical Exam   Constitutional: He is oriented to person, place, and time. He appears well-developed. He appears lethargic. He appears ill.   HENT:   Head: Atraumatic.   Eyes: Pupils are equal, round, and reactive to light. Right eye exhibits no discharge.   Neck: Neck supple. No tracheal deviation present.    Cardiovascular: An irregularly irregular rhythm present.   Pulmonary/Chest: He has rales.   Diminished, moderate respiratory distress,   Abdominal: Soft. He exhibits distension.   Musculoskeletal: Normal range of motion. He exhibits no edema.   Neurological: He is oriented to person, place, and time. He appears lethargic. No cranial nerve deficit.   Skin: Skin is dry.       Medications  Current Facility-Administered Medications   Medication Dose Route Frequency Provider Last Rate Last Dose   • ipratropium-albuterol (DUONEB) nebulizer solution  3 mL Nebulization Q4H PRN (RT) Aime Patterson M.D.   3 mL at 12/15/18 1510   • enoxaparin (LOVENOX) inj 40 mg  40 mg Subcutaneous DAILY Jason Niño M.D.   40 mg at 12/16/18 0537   • ferrous sulfate (FEOSOL) 220 (44 Fe) MG/5ML solution 325 mg  325 mg Feeding Tube BID WITH MEALS Jason Niño M.D.   325 mg at 12/16/18 0744   • levETIRAcetam (KEPPRA) tablet 750 mg  750 mg Per NG Tube BID Jason Niño M.D.   750 mg at 12/16/18 0537   • cefTRIAXone (ROCEPHIN) 2 g in  mL IVPB  2 g Intravenous Q24HR Jason Niño M.D.   Stopped at 12/15/18 1710   • metroNIDAZOLE (FLAGYL) IVPB 500 mg  500 mg Intravenous Q8HRS Jason Niño M.D.   Stopped at 12/16/18 0637   • Pharmacy Consult: Enteral tube feeding - review meds/change route/product selection   Other PRN Aime Patterson M.D.       • acetaminophen (TYLENOL) tablet 650 mg  650 mg Feeding Tube Q6HRS PRN Aime Patterson M.D.       • Pharmacy Consult Request ...Pain Management Review   Other PRN Aime Patterson M.D.        And   • oxyCODONE immediate-release (ROXICODONE) tablet 2.5 mg  2.5 mg Feeding Tube Q3HRS PRN Aime Patterson M.D.        And   • oxyCODONE immediate-release (ROXICODONE) tablet 5 mg  5 mg Feeding Tube Q3HRS PRN Aime Patterson M.D.   5 mg at 12/16/18 0537    And   • morphine (pf) 10 mg/mL injection 2 mg  2 mg Intravenous Q3HRS PRN Aime Patterson M.D.       •  famotidine (PEPCID) tablet 20 mg  20 mg Per NG Tube BID Aime Patterson M.D.   20 mg at 12/16/18 0537   • ondansetron (ZOFRAN) syringe/vial injection 4 mg  4 mg Intravenous Q6HRS PRN Adonay Queen R.N.   4 mg at 12/12/18 0033   • Respiratory Care per Protocol   Nebulization Continuous RT Juan Cobian M.D.       • NS (BOLUS) infusion 1,000 mL  1,000 mL Intravenous Once PRN Juan Cobian M.D.   Stopped at 12/12/18 1440       Fluids    Intake/Output Summary (Last 24 hours) at 12/16/18 0900  Last data filed at 12/16/18 0800   Gross per 24 hour   Intake             1370 ml   Output             2945 ml   Net            -1575 ml       Laboratory          Recent Labs      12/14/18   0602  12/15/18   1412   SODIUM  136  136   POTASSIUM  4.3  4.1   CHLORIDE  105  104   CO2  23  24   BUN  20  26*   CREATININE  0.89  0.78   MAGNESIUM  2.0   --    PHOSPHORUS  3.5   --    CALCIUM  9.5  9.9     Recent Labs      12/14/18   0602  12/15/18   1412   ALTSGPT  25  27   ASTSGOT  15  26   ALKPHOSPHAT  214*  200*   TBILIRUBIN  0.8  0.8   LIPASE  7*   --    PREALBUMIN  4.0*   --    GLUCOSE  118*  122*     Recent Labs      12/14/18   0602  12/15/18   1412  12/16/18   0332   WBC  14.4*  20.9*  17.6*   NEUTSPOLYS  91.60*  88.60*  82.80*   LYMPHOCYTES  4.80*  5.00*  7.30*   MONOCYTES  1.90  4.60  7.70   EOSINOPHILS  0.00  0.00  0.20   BASOPHILS  0.90  0.10  0.40   ASTSGOT  15  26   --    ALTSGPT  25  27   --    ALKPHOSPHAT  214*  200*   --    TBILIRUBIN  0.8  0.8   --      Recent Labs      12/14/18   0602  12/15/18   1412  12/16/18   0332   RBC  2.92*  3.24*  2.94*   HEMOGLOBIN  9.4*  10.5*  9.3*   HEMATOCRIT  29.3*  31.7*  29.7*   PLATELETCT  308  394  392       Imaging  X-Ray:  I have personally reviewed the images and compared with prior images.    Assessment/Plan  Sepsis (HCC)- (present on admission)   Assessment & Plan    Severe sepsis with end organ damage  Lactic acid 9, trending down  Associated with respiratory failure with  severe hypoxia  Continue Zosyn, report of gram-negative rods from blood cultures, final pending  Cover for cholangitis, aspiration pneumonitis, suspect ongoing gallstone pancreatitis  Titrated oxygen, high flow nasal cannula, monitor need for intubation       Mass of upper lobe of right lung- (present on admission)   Assessment & Plan    Follow-up CT, plus minus PET     Pneumonia due to infectious organism- (present on admission)   Assessment & Plan    Consolidations seen on ct imaing  Possible aspiration, especially givne vomiting  Blood culture pending     Acute on chronic respiratory failure with hypoxia (HCC)- (present on admission)   Assessment & Plan    Home o2 nocturnal, history of COPD  Now exacerbated with sepsis/SIRS, gallstone pancreatitis,?  Pneumonia  High flow nasal cannula, respiratory protocols, may require intubation     Atrial flutter (HCC)   Assessment & Plan    On amiodarone drip.     Troponin level elevated- (present on admission)   Assessment & Plan    Secondary to demand ischemia     Generalized abdominal pain- (present on admission)   Assessment & Plan    Secondary to gallstone pancreatitis  Volume resuscitation  MRCP, GI consultation          VTE:  Lovenox  Ulcer: H2 Antagonist  Lines: None    I have performed a physical exam and reviewed and updated ROS and Plan today (12/16/2018). In review of yesterday's note (12/15/2018), there are no changes except as documented above.   He remains critically ill.  ERCP and biliary stent.  He will require lithotripsy in the future.  Increasing FiO2 requirement, increasing left pleural effusion on chest imaging, risk for infection.  Monitor closely in ICU with high risk for further deterioration.  He may require intubation; f/u cxr today  Discussed patient condition and risk of morbidity and/or mortality with Hospitalist, Family, RN, RT, Pharmacy and QA team  The patient remains critically ill.  Critical care time = 31 minutes in directly providing and  coordinating critical care and extensive data review.  No time overlap and excludes procedures.

## 2018-12-16 NOTE — CARE PLAN
Problem: Oxygenation:  Goal: Maintain adequate oxygenation dependent on patient condition     Intervention: Manage oxygen therapy by monitoring pulse oximetry and/or ABG values  HHFNC 50L/60%

## 2018-12-16 NOTE — PROGRESS NOTES
Surgical progress note  I was asked to consult on the patient for possible cholecystectomy after ERCP demonstrated a retained large stone with plans for delayed lithotripsy.  After I met the patient yesterday he was having worsening respiratory distress and possible lung malignancy.  Patient now has increasing O2 requirements and imaging worrisome for aspiration pneumonia.  As his current medical condition is worsened and he has no evidence of hyperbilirubinemia or cholecystitis I think that it is in his best interest to delay cholecystectomy at this time.  He did have imaging consistent with pancreatitis which could be contributing to his poor pulmonary function however, I do not recommend surgical intervention for pancreatitis in an unstable patient.  Please reconsult as necessary.  Franc Carter MD PhD  [facility]  Colon and Rectal Surgeon  [phone]

## 2018-12-16 NOTE — FLOWSHEET NOTE
12/16/18 0213   Events/Summary/Plan   Events/Summary/Plan FiO2 titrated to 60%- 50L   Interdisciplinary Plan of Care-Goals (Indications)   Bronchopulmonary Hygiene Indications Difficulty with Secretion Clearance   Interdisciplinary Plan of Care-Outcomes    Bronchopulmonary Hygiene Outcome Optimal Hydration with Moderate or Less Sputum Production   Education   Education Yes - Pt. / Family has been Instructed in use of Respiratory Equipment   Heated Hi Flow Nasal Cannula   Heated Hi Flow Nasal Cannula (HHFNC) Yes   FiO2 (HHFNC) 60   Flowrate (HHFNC) 50   Humidifier Temperature (HHFNC) 31   Respiratory WDL   Respiratory (WDL) X   Chest Exam   Work Of Breathing / Effort Moderate   Respiration (!) 21   Pulse 96   Heart Rate (Monitored) 97   Breath Sounds   Pre/Post Intervention Pre Intervention Assessment   RUL Breath Sounds Diminished   RML Breath Sounds Diminished   RLL Breath Sounds Diminished   LISA Breath Sounds Diminished   LLL Breath Sounds Diminished   Secretions   Cough Non Productive   Sputum Amount Unable to Evaluate   Sputum Color Unable to Evaluate   Sputum Consistency Unable to Evaluate   Oximetry   Continuous Oximetry Yes   Oxygen   Pulse Oximetry 94 %   O2 (LPM) 50   FiO2% 60 %   O2 Daily Delivery Respiratory  Highflow Nasal Cannula

## 2018-12-16 NOTE — CARE PLAN
Problem: Oxygenation:  Goal: Maintain adequate oxygenation dependent on patient condition    Intervention: Manage oxygen therapy by monitoring pulse oximetry and/or ABG values  HHFNC 60L/100%

## 2018-12-16 NOTE — CARE PLAN
Problem: Safety  Goal: Will remain free from injury  Outcome: PROGRESSING AS EXPECTED      Problem: Fluid Volume:  Goal: Will maintain balanced intake and output    Intervention: Monitor, educate, and encourage compliance with therapeutic intake of liquids  Lasix administered. I/O monitored

## 2018-12-16 NOTE — PROGRESS NOTES
[facility] Hospitalist Progress Note    Date of Service: 2018    Chief Complaint  78 y.o. male admitted 2018 with sepsis afib RVR     Interval Problem Update      Aox2  SR with PVC's 70-90  -110  Afebrile  TF at goal    HFNC 50L 60%  IS 700ml  Day 6 of ceft/flagyl  Blood cx  neg  Wife at bedside    CXR improved LLL effusion/consolidation  Urine output 2.39 L -1.8 L over past 24 hours +6.1 L since admit            Consultants/Specialty  CC  Cardiology  GI  Surgery    Disposition  To be determined        Review of Systems   Constitutional: Positive for malaise/fatigue. Negative for fever.   HENT: Negative for congestion, ear discharge and nosebleeds.    Eyes: Negative for blurred vision, pain, discharge and redness.   Respiratory: Positive for cough, sputum production and shortness of breath. Negative for hemoptysis and stridor.    Cardiovascular: Negative for chest pain, palpitations, orthopnea and leg swelling.   Gastrointestinal: Positive for abdominal pain. Negative for blood in stool, diarrhea, melena, nausea and vomiting.   Genitourinary: Negative for dysuria, flank pain and hematuria.   Musculoskeletal: Negative for back pain, falls, joint pain and neck pain.   Skin: Negative.    Neurological: Negative for speech change, focal weakness, seizures, loss of consciousness, weakness and headaches.   Psychiatric/Behavioral: Negative for hallucinations, memory loss and substance abuse. The patient is not nervous/anxious.    All other systems reviewed and are negative.     Physical Exam  Laboratory/Imaging   Hemodynamics  Temp (24hrs), Av.7 °C (98.1 °F), Min:36.2 °C (97.1 °F), Max:37 °C (98.6 °F)   Temperature: 36.2 °C (97.1 °F)  Pulse  Av.3  Min: 54  Max: 169 Heart Rate (Monitored): 84  NIBP: (!) 93/50      Respiratory      Respiration: 16, Pulse Oximetry: 96 %, O2 Daily Delivery Respiratory : Highflow Nasal Cannula     Given By::  (aerogen), PEP/CPT Method: Positive Airway Pressure  Device, Work Of Breathing / Effort: Mild  RUL Breath Sounds: Clear, RML Breath Sounds: Diminished, RLL Breath Sounds: Diminished, LISA Breath Sounds: Diminished;Clear, LLL Breath Sounds: Diminished    Fluids    Intake/Output Summary (Last 24 hours) at 12/16/18 1017  Last data filed at 12/16/18 1000   Gross per 24 hour   Intake             1330 ml   Output             2795 ml   Net            -1465 ml       Nutrition  Orders Placed This Encounter   Procedures   • Diet NPO     Standing Status:   Standing     Number of Occurrences:   8     Order Specific Question:   Restrict to:     Answer:   Sips with Medications [3]     Physical Exam   Constitutional: He appears well-developed and well-nourished.   HENT:   Head: Normocephalic and atraumatic.   Right Ear: External ear normal.   Left Ear: External ear normal.   Mouth/Throat: No oropharyngeal exudate.   cortrak in place    Eyes: Conjunctivae are normal. Right eye exhibits no discharge. Left eye exhibits no discharge. No scleral icterus.   Neck: Neck supple. No JVD present. No tracheal deviation present.   Cardiovascular: Normal rate and regular rhythm.  Exam reveals no gallop and no friction rub.    No murmur heard.  Pulmonary/Chest: Effort normal. No stridor. No respiratory distress. He has decreased breath sounds. He has no wheezes. He has rhonchi. He has rales. He exhibits no tenderness.   Abdominal: Soft. Bowel sounds are normal. He exhibits no distension. There is tenderness (mild mid abdominal). There is no rebound.   Stoma in place with liquid stool   Musculoskeletal: He exhibits edema. He exhibits no tenderness.   Neurological: He is alert. No cranial nerve deficit. He exhibits normal muscle tone.   Skin: Skin is warm and dry. He is not diaphoretic. No cyanosis. Nails show no clubbing.   Psychiatric: He is slowed.   Nursing note and vitals reviewed.      Recent Labs      12/14/18   0602  12/15/18   1412  12/16/18   0332   WBC  14.4*  20.9*  17.6*   RBC  2.92*   3.24*  2.94*   HEMOGLOBIN  9.4*  10.5*  9.3*   HEMATOCRIT  29.3*  31.7*  29.7*   MCV  100.3*  97.8  101.0*   MCH  32.2  32.4  31.6   MCHC  32.1*  33.1*  31.3*   RDW  51.8*  49.7  51.0*   PLATELETCT  308  394  392   MPV  9.3  9.4  9.5     Recent Labs      12/14/18   0602  12/15/18   1412   SODIUM  136  136   POTASSIUM  4.3  4.1   CHLORIDE  105  104   CO2  23  24   GLUCOSE  118*  122*   BUN  20  26*   CREATININE  0.89  0.78   CALCIUM  9.5  9.9                      Assessment/Plan     Sepsis (HCC)- (present on admission)   Assessment & Plan    This is severe sepsis with the following associated acute organ dysfunction(s): acute respiratory failure.   Likely source is pneumonia and  intra-abdominal    Continue current antibiotics and supportive care     Mass of upper lobe of right lung- (present on admission)   Assessment & Plan    He will need CT-guided biopsy when he has recovered from this acute illness       Pneumonia due to infectious organism- (present on admission)   Assessment & Plan    Pneumonia due to GNB anaerobes Prevotella   With bacteremia    Continue ceftriaxone and Flagyl  ID following discussed with Dr. Padilla  Repeat blood cultures from 12/14/2018 remain negative       Acute on chronic respiratory failure with hypoxia (HCC)- (present on admission)   Assessment & Plan    Continue ceftriaxone and Flagyl  CTA negative for PE  Secondary to pneumonia and sepsis    Continue current antibiotics  We will give IV Lasix today and monitor intake and output  Continue close clinical monitoring and wean off oxygen as tolerated         Oral phase dysphagia   Assessment & Plan    Continue tube feeding  Continue speech therapy     Acute biliary pancreatitis   Assessment & Plan    Status post ERCP with sphincterotomy and stenting on 12/13/2018  Continue supportive care  Patient has retained large CBD stone for which she will need follow-up as outpatient for repeat ERCP with lithotripsy    LFTs improved  Evaluated by  surgery lap gregory on hold given high oxygen requirements     Atrial flutter (HCC)   Assessment & Plan    Resolved  Off amiodarone drip  Evaluated by cardiology with no recommendations for anticoagulation     Esophagitis- (present on admission)   Assessment & Plan    Continue Pepcid     Troponin level elevated- (present on admission)   Assessment & Plan    Likely demand ischemia  Echo EF 65% unchanged from prior       Quality-Core Measures   Reviewed items::  Labs reviewed, Medications reviewed and Radiology images reviewed  Sandoval catheter::  No Sandoval  DVT prophylaxis pharmacological::  Enoxaparin (Lovenox)  Ulcer Prophylaxis::  Yes  Antibiotics:  Treating active infection/contamination beyond 24 hours perioperative coverage

## 2018-12-16 NOTE — CARE PLAN
Problem: Venous Thromboembolism (VTW)/Deep Vein Thrombosis (DVT) Prevention:  Goal: Patient will participate in Venous Thrombosis (VTE)/Deep Vein Thrombosis (DVT)Prevention Measures  Outcome: PROGRESSING AS EXPECTED  Assessed and monitored for anticoagulation medication complication/ contraindications. None apparent at this time.   Pt is able to preform active ROM. Mobilized to EOB. Educated pt about importance of Q 2 turning and assisted pt to turn q 2 hrs,  foot exercises every hour while awake.       Problem: Urinary Elimination:  Goal: Ability to reestablish a normal urinary elimination pattern will improve  Outcome: PROGRESSING AS EXPECTED  Voiding in urinal without any issues.

## 2018-12-16 NOTE — PROGRESS NOTES
Received bedside report, assumed care of pt.  Pt resting comfortably in bed on high flow NC. No family at bedside. Bed alarm on and in lowest position. Updated whiteboard in room and discussed today's POC.

## 2018-12-16 NOTE — PROGRESS NOTES
12 hour chart check and skin    12 hour monitor summery:    Rhythm: SR 70-90  16.10.36  Ectopy: PVCs noted (occ- frequent)

## 2018-12-16 NOTE — PROGRESS NOTES
Infectious Disease Progress Note    Author: Virginia Padilla M.D. Date & Time of service: 2018  7:58 AM    Chief Complaint:  Bacteremia and pneumonia      Interval History:  12/15 AF, O2 15 L oxygen mask, increase, Labs and micro results reviewed. Imaging reviewed, chest x-ray slightly worse on the left. Medications reviewed.    AF, O2 50 L High flow NC, Labs and micro results reviewed. Imaging reviewed. Medications reviewed.  No secretions per nurse.  No change in stool output in colostomy.     Review of Systems:  Review of Systems   Constitutional: Positive for malaise/fatigue. Negative for chills and fever.   Respiratory: Negative for cough, sputum production and shortness of breath.    Gastrointestinal: Negative for abdominal pain, diarrhea, nausea and vomiting.   Musculoskeletal: Negative for back pain.   Skin: Negative for itching and rash.   Neurological: Positive for weakness.       Hemodynamics:  Temp (24hrs), Av.8 °C (98.3 °F), Min:36.5 °C (97.7 °F), Max:37 °C (98.6 °F)  Temperature: 36.5 °C (97.7 °F)  Pulse  Av.4  Min: 54  Max: 169Heart Rate (Monitored): 79  NIBP: (!) 97/52       Physical Exam:  Physical Exam   Constitutional: He appears well-developed and well-nourished.   Eyes: Pupils are equal, round, and reactive to light. EOM are normal.   Cardiovascular: Normal rate, regular rhythm and normal heart sounds.    Pulmonary/Chest: Effort normal. No respiratory distress.   Diminished breath sounds bilaterally absent breath sounds on right left with gurgling   Abdominal: Soft. Bowel sounds are normal. He exhibits no distension. There is no tenderness. There is no rebound and no guarding.   Musculoskeletal: He exhibits no edema.   Neurological: He is alert.   Oriented to name and place did not know year   Skin: Skin is warm and dry. No rash noted.   Hyperpigmented, thick lesion on right upper face and eyelid, chronic   Psychiatric: He has a normal mood and affect.        Meds:    Current Facility-Administered Medications:   •  ipratropium-albuterol  •  enoxaparin (LOVENOX) injection  •  ferrous sulfate  •  levETIRAcetam  •  cefTRIAXone (ROCEPHIN) IV  •  metroNIDAZOLE (FLAGYL) IV  •  Pharmacy  •  acetaminophen  •  Notify provider if pain remains uncontrolled **AND** Use the numeric rating scale (NRS-11) on regular floors and Critical-Care Pain Observation Tool (CPOT) on ICUs/Trauma to assess pain **AND** Pulse Ox (Oximetry) **AND** Pharmacy Consult Request **AND** If patient difficult to arouse and/or has respiratory depression, stop any opiates that are currently infusing and call a Rapid Response. **AND** oxyCODONE immediate-release **AND** oxyCODONE immediate-release **AND** morphine injection  •  famotidine  •  ondansetron  •  Respiratory Care per Protocol  •  NS    Labs:  Recent Labs      12/14/18   0602  12/15/18   1412  12/16/18   0332   WBC  14.4*  20.9*  17.6*   RBC  2.92*  3.24*  2.94*   HEMOGLOBIN  9.4*  10.5*  9.3*   HEMATOCRIT  29.3*  31.7*  29.7*   MCV  100.3*  97.8  101.0*   MCH  32.2  32.4  31.6   RDW  51.8*  49.7  51.0*   PLATELETCT  308  394  392   MPV  9.3  9.4  9.5   NEUTSPOLYS  91.60*  88.60*  82.80*   LYMPHOCYTES  4.80*  5.00*  7.30*   MONOCYTES  1.90  4.60  7.70   EOSINOPHILS  0.00  0.00  0.20   BASOPHILS  0.90  0.10  0.40     Recent Labs      12/14/18   0602  12/15/18   1412   SODIUM  136  136   POTASSIUM  4.3  4.1   CHLORIDE  105  104   CO2  23  24   GLUCOSE  118*  122*   BUN  20  26*     Recent Labs      12/14/18   0602  12/15/18   1412   ALBUMIN  2.2*  2.5*   TBILIRUBIN  0.8  0.8   ALKPHOSPHAT  214*  200*   TOTPROTEIN  4.8*  5.2*   ALTSGPT  25  27   ASTSGOT  15  26   CREATININE  0.89  0.78       Imaging:  Ct-abdomen-pelvis With    Result Date: 12/11/2018 12/11/2018 9:22 PM HISTORY/REASON FOR EXAM:  Abd pain, diverticulitis suspected Nausea, vomiting. TECHNIQUE/EXAM DESCRIPTION:   CT scan of the abdomen and pelvis with contrast. Contrast-enhanced  helical scanning was obtained from the diaphragmatic domes through the pubic symphysis following the bolus administration of nonionic contrast without complication. 100 mL of Omnipaque 350 nonionic contrast was administered without complication. Low dose optimization technique was utilized for this CT exam including automated exposure control and adjustment of the mA and/or kV according to patient size. COMPARISON: CT chest 12/11/2018. FINDINGS: Lung bases: Please see dedicated CT chest report Abdomen: The liver is unremarkable. The spleen is unremarkable. There is stranding and fluid surrounding the entire pancreas, most in the pancreatic tail. There is high density fluid extending from the pancreatic tail to the left paracolic gutter. There are multiple lobulated peripancreatic fluid area surrounding the  pancreas The gallbladder demonstrates multiple layering gallstones. Very dilated CBD, measuring up to 1.6 cm. There is a soft tissue attenuation lesion in the distal CBD (image 39 series 7) The adrenal glands are normal in size. The kidneys enhance symmetrically. There is a 7.5 cm cyst in the upper pole right kidney. No hydronephrosis. Tiny nonobstructive calculus in the lower pole left kidney. Moderate atherosclerotic disease of the abdominal aorta and its branches. Aortobiiliac graft There is no lymphadenopathy. No bowel wall thickening or bowel dilatation. Diffuse wall thickening of the stomach. Right lower quadrant ostomy. Pelvis: Limited visualization of the pelvis due to bilateral femur hardwares. There are small urinary bladder diverticula. Layering stones/debris in the urinary bladder. Moderate amount of high density fluid in the pelvis, likely hemorrhagic fluid No aggressive bone lesions are seen. ___________________________________     1. Stranding and fluid surrounding the entire pancreas with several lobulated peripancreatic fluid area surrounding the pancreas. This could be sequela of prior  pancreatitis versus cystic pancreatic neoplasm. More high density fluid extending from the pancreatic tail to the left paracolic gutter could relate to hemorrhage of one of these cystic lesions or sequela of acute on chronic pancreatitis. Severely dilated CBD, measuring up to 1.6 cm. Soft tissue attenuation lesion in the distal CBD could be an obstructing mass or noncalcified stone. Further evaluation with MRCP and MR pancreas is recommended. 2. Cholelithiasis. 3. Diffuse wall thickening of the stomach could be gastritis or reactive change secondary to the pancreatic process. 4. Layering stones/debris in the urinary bladder.    Dx-chest-portable (1 View)    Result Date: 12/15/2018  12/15/2018 8:50 AM HISTORY/REASON FOR EXAM:  Shortness of Breath TECHNIQUE/EXAM DESCRIPTION AND NUMBER OF VIEWS: Single portable view of the chest. COMPARISON:  CXR 12/14/2018. CT for PE 12/11/2018. FINDINGS: Feeding tube is present. The heart is at the upper limits of normal in size. There is persistent atelectasis or pneumonia in the left lower lobe with small left pleural effusion. Round lobulated opacity or mass in the right upper lobe is present. Minimal interstitial edema is present in the right lower lobe.     1.  Persistent atelectasis or pneumonia in the left lower lobe with small left pleural effusion. 2.  Minor residual edema and effusion in the right lower lobe. 3.  Lobulated pulmonary nodules in the right midlung again noted. Differential diagnosis includes lung carcinoma.    Dx-chest-portable (1 View)    Result Date: 12/14/2018 12/14/2018 3:45 AM HISTORY/REASON FOR EXAM:  Shortness of Breath. TECHNIQUE/EXAM DESCRIPTION AND NUMBER OF VIEWS: Single portable view of the chest. COMPARISON: 12/11/2018 FINDINGS: LUNGS: Unchanged right upper lobe mass. Small bilateral pleural effusions, left slightly worse than the right. Retrocardiac opacity, atelectasis versus consolidation. PNEUMOTHORAX: None. LINES AND TUBES: Enteric tube tip  is distal to the GE junction, outside the field-of-view. MEDIASTINUM: Stable cardiac silhouette. Atherosclerosis. MUSCULOSKELETAL STRUCTURES: Unchanged.     1. New retrocardiac atelectasis versus consolidation. 2. Small bilateral pleural effusions and bibasilar atelectasis, left worse than right. 3. Stable right upper lobe mass. 4. Enteric tube tip is distal to the GE junction.    Dx-chest-portable (1 View)    Result Date: 12/11/2018 12/11/2018 4:35 PM HISTORY/REASON FOR EXAM:  Loss of appetite, sore throat, chills.  Nausea and vomiting. TECHNIQUE/EXAM DESCRIPTION AND NUMBER OF VIEWS: Single portable view of the chest. COMPARISON: 1/30/2018 FINDINGS: Cardiac mediastinal contour is unchanged. Ill-defined opacity again seen in the RIGHT mid to upper lung, slightly more apparent than prior exam. No pleural fluid collection or pneumothorax. Severe degenerative change of both shoulders.     1.  No pneumonia or pulmonary edema. 2.  Slight interval increase in size of ill-defined RIGHT mid to upper lung nodular opacity, concerning for mass.    Mo-lvbe-sbjvifx Ducts    Result Date: 12/13/2018 12/13/2018 3:05 PM HISTORY/REASON FOR EXAM:  Main OR ERCP TECHNIQUE/EXAM DESCRIPTION AND NUMBER OF VIEWS: Fluoroscopic unit obligated to the operating room for greater than one hour for ERCP procedure.  6 fluoroscopic images provided. COMPARISON: None FINDINGS: Initial cannulation the common bile duct which is dilated and shows filling defect consistent with stones which appear to be removed on later images. 6 seconds fluoroscopy time utilized.     Limited images obtained during ERCP procedure showing apparent removal of common bile duct stones.    Mr-abdomen-with & W/o    Result Date: 12/13/2018 12/12/2018 3:47 PM HISTORY/REASON FOR EXAM:  Neoplasm: pancreas, suspected; Obstruction of bile duct. TECHNIQUE/EXAM DESCRIPTION: MRI of the pancreas with dynamic IV gadolinium enhancement. MR imaging of the pancreas was performed.  MR  images of the pancreas were obtained with coronal and axial single-shot fast spin-echo T2, fat-suppressed axial FRFSE T2, axial in-phase and out-of-phase FSPGR T1, axial DWI with a b-value of 600, 3D MRCP,  precontrast fat-suppressed FSPGR T1, dynamic gadolinium enhanced axial fat-suppressed T1 FSPGR in the arterial dominant, portal venous, 2-minute, and 4-minute delayed phases, with delayed coronal fat-suppressed T1 FSPGR sequence. . The study was performed on a [employer] Signa 1.5 [place] MRI scanner. 7 mL Gadavist contrast was administered intravenously. COMPARISON: CT abdomen and pelvis 12/11/2018 FINDINGS: Motion artifact limits exam. Small bilateral pleural effusions with associated consolidation. Liver shows mildly nodular margins.  No enhancing mass demonstrated.  Prominent intrahepatic biliary ducts. Spleen is unremarkable. Small amount of peritoneal fluid present. RIGHT kidney cysts with largest at the upper pole measuring 7.7 cm.  LEFT kidney is unremarkable.  No enhancing renal mass. Gallbladder shows multiple dependent signal voids consistent with stones. Common hepatic duct is dilated measuring 17 mm.,  Bile duct measures 14 mm.  Large ovoid signal voids within the distal common bile duct consistent with stones. Pancreatic duct is not significantly dilated. Complex fluid collection tracks along the pancreatic head and body, as seen on prior CT.  Edema in the pancreatic tail with fluid extending into the LEFT pararenal and perisplenic space.  T1 hyperintense material present. Multilevel Schmorl's nodes in the thoracolumbar spine, with reactive endplate changes.  Apparent edema within the L3 vertebral body.     1.  Marked biliary dilation with large common bile duct stones. 2.  Stones also present in the gallbladder. 3.  Complex peripancreatic fluid collection, likely pseudocyst, with apparent inflammatory changes in the pancreatic tail suggesting superimposed acute pancreatitis with fluid extending into the  LEFT pararenal space, likely hemorrhagic pancreatitis.  Hemorrhage within cystic lesion at the dorsal aspect of pancreatic tail also noted.  Pancreatic mass is difficult to exclude. 4.  Small volume ascites. 5.  Small bilateral pleural effusions with associated consolidation. 6.  RIGHT kidney cysts. 7.  Possible subacute compression fracture of L3.     Ct-cta Chest Pulmonary Artery W/ Recons    Result Date: 12/11/2018 12/11/2018 9:22 PM HISTORY/REASON FOR EXAM:  PE suspected, high pretest prob; Rule out PE. Also comment on right upper lobe opacity TECHNIQUE/EXAM DESCRIPTION: CT angiogram scan for pulmonary embolism with contrast, with reconstructions. 1.25 mm helical sections were obtained from the lung apices through the lung bases following the rapid bolus administration of 100 mL of Omnipaque 350 nonionic contrast. Thin-section overlapping reconstruction interval was utilized. Coronal reconstructions were generated from the axial data. MIP post processing was performed and utilized for the interpretation. Low dose optimization technique was utilized for this CT exam including automated exposure control and adjustment of the mA and/or kV according to patient size. COMPARISON: 3/24/2015 FINDINGS: Pulmonary Embolism: No. Main Pulmonary Arteries: No. Segmental branches: No. Subsegmental branches: No. Additional Comments: None. Lungs: Lobulated 1.7 x 1.8 x 3.1 cm mass in the right upper lobe. Diffuse emphysematous change. Patchy bibasilar opacities. Airway: There is debris completely filling the bilateral lower lobe airways. Pleura: No pleural effusion or pneumothorax. Nodes: No enlarged mediastinal or hilar lymph nodes. Additional findings: Thoracic aorta and great vessels:  No aneurysm. Moderate atherosclerotic disease Heart and pericardium: Moderate coronary artery calcification. No pericardial effusion. Thoracic spine:  No fracture or malalignment. No compression deformity. Chest wall:   Unremarkable. Diffuse  wall thickening throughout the esophagus. There is retaining fluid in the esophagus. There is a 1.4 cm paraesophageal lymph node. Questionable enlarged right paraesophageal lymph node more superiorly (image 93 series 4). Visualized upper abdomen: Please see dedicated report     1. No CT evidence of pulmonary embolism. 2. Lobulated 1.7 x 1.8 x 3.1 cm mass in the right upper lobe, concerning for lung malignancy. PET/CT, and/or tissue sampling is recommended. 3. Diffuse wall thickening throughout the esophagus could relate to esophagitis. Mildly prominent 1.4 cm paraesophageal lymph node adjacent to the distal esophagus. Questionable enlarged right paraesophageal lymph nodes more proximally. 4. Fluid opacification of the esophagus. There is debris completely filled the bilateral lower lobe airways with associated patchy bibasilar opacities. This is concerning for aspiration pneumonia due to retained fluid in the esophagus.    Ec-echocardiogram Complete W/ Cont    Result Date: 2018  Transthoracic Echo Report Echocardiography Laboratory CONCLUSIONS Left ventricular ejection fraction is visually estimated to be 65%. Hypokinesis of the apical septal wall. Grossly normal right ventricular size and systolic function. No significant valve disease or flow abnormalities. Compared to the images of the prior study done 3/9/2018 -  there has been no significant change. ALYSSA ZAPATA Exam Date:         2018                    09:09 Exam Location:     Inpatient Priority:          Routine Ordering Physician:        SARAH MARIN  ([id number]) Referring Physician:       TOMAS Cole Sonographer:               GREG Orellana RDCS Age:    78     Gender:    M MRN:    [id number] :    1940 BSA:    1.9    Ht (in):    73     Wt (lb):    150 Exam Type:     Complete Indications:     Atrial Flutter ICD Codes:       427.32 CPT Codes:       28387,  BP:    101    /   59     HR: Technical Quality:       Technically difficult study -                          adequate information is obtained MEASUREMENTS  (Male / Female) Normal Values 2D ECHO Estimated LV Ejection Fraction    65 %                  LV Ejection Fraction MOD BP       70.8 %                >= 55  % LV Ejection Fraction MOD 4C       79.8 %                LV Ejection Fraction MOD 2C       56.4 %                IVC Diameter                      1.2 cm                DOPPLER AV Peak Velocity                  1.2 m/s               AV Peak Gradient                  6 mmHg                LVOT Peak Velocity                1.2 m/s               * Indicates values subject to auto-interpretation LV EF:  65    % FINDINGS Left Ventricle Normal left ventricular systolic function. Left ventricular ejection fraction is visually estimated to be 65%. Hypokinesis of the apical septal wall. Diastolic function is difficult to assess with tachycardia. Contrast was used to enhance visualization of the endocardial border. 3ML of contrast was administered. Existing IV was used. Located at the left ac fossa. Right Ventricle Right ventricle not well visualized. Grossly normal right ventricular size and systolic function. Right Atrium The right atrium is normal in size.  Normal inferior vena cava size and inspiratory collapse. Left Atrium The left atrium is normal in size.  Left atrial volume index is 27  mL/sq m. Mitral Valve Structurally normal mitral valve without significant stenosis.  Trace tricuspid regurgitation. Aortic Valve The aortic valve is not well visualized but appears to be opening well with no aortic insufficiency. Tricuspid Valve The tricuspid valve is not well visualized. Unable to estimate pulmonary artery pressure due to an inadequate tricuspid regurgitant jet. Pulmonic Valve The pulmonic valve is not well visualized. Pericardium Normal pericardium without effusion. Aorta Ascending aorta not well visualized.  "Segundo Barreto MD (Electronically Signed) Final Date:     12 December 2018                 11:30    Dx-abdomen For Tube Placement    Result Date: 12/14/2018 12/13/2018 11:29 PM HISTORY/REASON FOR EXAM:  Line evaluation. TECHNIQUE/EXAM DESCRIPTION AND NUMBER OF VIEWS:  1 view(s) of the abdomen. COMPARISON:  No recent studies available for comparison. FINDINGS: Enteric tube has been placed. The tip projects over the junction of the second and third portions of duodenum. The bowel gas pattern is nonspecific.  Several dilated small bowel loops.     Enteric tube tip projects over the junction of the second and third portions of duodenum.      Micro:  Results     Procedure Component Value Units Date/Time    BLOOD CULTURE [[id number]] Collected:  12/14/18 1436    Order Status:  Completed Specimen:  Blood from Peripheral Updated:  12/15/18 0900     Significant Indicator NEG     Source BLD     Site PERIPHERAL     Blood Culture No Growth    Note: Blood cultures are incubated for 5 days and  are monitored continuously.Positive blood cultures  are called to the RN and reported as soon as  they are identified.      Narrative:       Collected By:[id number] DERIK FORMAN  Per Hospital Policy: Only change Specimen Src: to \"Line\" if  specified by physician order.    BLOOD CULTURE [[id number]] Collected:  12/14/18 1307    Order Status:  Completed Specimen:  Blood from Peripheral Updated:  12/15/18 0900     Significant Indicator NEG     Source BLD     Site PERIPHERAL     Blood Culture No Growth    Note: Blood cultures are incubated for 5 days and  are monitored continuously.Positive blood cultures  are called to the RN and reported as soon as  they are identified.      Narrative:       Collected By:[id number] DERIK FORMAN  Per Hospital Policy: Only change Specimen Src: to \"Line\" if  specified by physician order.    BLOOD CULTURE [[id number]]  (Abnormal) Collected:  12/11/18 1553    Order Status:  Completed Specimen:  Blood from " "Peripheral Updated:  12/13/18 1324     Significant Indicator POS (POS)     Source BLD     Site PERIPHERAL     Blood Culture Growth detected by Bactec instrument. 12/12/2018  15:36 (A)      Prevotella oralis (A)    Narrative:       CALL  [place]  161 tel. [phone],  CALLED  161 tel. [phone] 12/12/2018, 15:37, RB PERF. RESULTS CALLED  TO:[id number] RN  Per Hospital Policy: Only change Specimen Src: to \"Line\" if  specified by physician order.    BLOOD CULTURE [[id number]]  (Abnormal) Collected:  12/11/18 1620    Order Status:  Completed Specimen:  Blood from Peripheral Updated:  12/13/18 1324     Significant Indicator POS (POS)     Source BLD     Site PERIPHERAL     Blood Culture Growth detected by Bactec instrument. 12/12/2018  17:04 (A)      Prevotella oralis (A)    Narrative:       CALL  [place]  161 tel. [phone],  CALLED  161 tel. [phone] 12/12/2018, 17:05, RB PERF. RESULTS CALLED  TO:[id number] RN  Per Hospital Policy: Only change Specimen Src: to \"Line\" if  specified by physician order.    URINE CULTURE(NEW) [[id number]] Collected:  12/11/18 2030    Order Status:  Completed Specimen:  Urine Updated:  12/13/18 0826     Significant Indicator NEG     Source UR     Site --     Urine Culture No growth at 48 hours    Narrative:       Indication for culture:->Emergency Room Patient    Influenza A/B By PCR [[id number]] Collected:  12/11/18 2125    Order Status:  Completed Updated:  12/11/18 2214     Influenza virus A RNA Negative     Influenza virus B, PCR Negative    URINALYSIS [[id number]]  (Abnormal) Collected:  12/11/18 2030    Order Status:  Completed Specimen:  Urine Updated:  12/11/18 2110     Color Yellow     Character Cloudy (A)     Specific Gravity 1.025     Ph 5.0     Glucose Negative mg/dL      Ketones Trace (A) mg/dL      Protein 30 (A) mg/dL      Bilirubin Negative     Urobilinogen, Urine 0.2     Nitrite Negative     Leukocyte Esterase Negative     Occult Blood Negative     Micro Urine Req Microscopic    " Narrative:       Indication for culture:->Emergency Room Patient          Assessment:  Active Hospital Problems    Diagnosis   • Sepsis (HCC) [A41.9]   • Acute on chronic respiratory failure with hypoxia (HCC) [J96.21]   • Pneumonia due to infectious organism [J18.9]   • Mass of upper lobe of right lung [R91.8]   • Acute biliary pancreatitis [K85.10]   • Oral phase dysphagia [R13.11]   • Generalized abdominal pain [R10.84]   • Troponin level elevated [R74.8]   • Esophagitis [K20.9]   • Atrial flutter (HCC) [I48.92]        ASSESSMENT:      Conner Mora is a 78 y.o.male admitted 12/11/2018. Pt has a past medical history of seizure disorder on Keppra, respiratory disorder on 2 L nocturnal oxygen, PAD, he has a colostomy and is status post colectomy in 2010.  Presented to the ED complaining of nausea, vomiting and abdominal pain times 2-weeks.  He also reported cough and increasing shortness of breath.  He was diagnosed with pneumonia based on CT chest.  He has pancreatitis thought secondary to gallstones and is status post ERCP. ID consulted for new bacteremia with Prevotella oralis. He is currently being treated with ceftriaxone 2 g daily and Flagyl 500 mg 3 times daily.        Sepsis multifactorial, 2/2 bacteremia, possible cholangitis and pneumonia     Bacteremia with Prevotella oralis 2/2 on 12/11   -Prevotella oralis is a anaerobic GNR and a common oral bacteria often associated with gum disease, suspect aspiration pneumonia with seeding of the blood  -12/13 blood cultures NGTD      Pancreatitis secondary to large gallstone, lipase 263 on 12/12  -MRI on 1212 with marked biliary dilation, stones noted, peripancreatic fluid collection, likely pseudocyst with inflammatory changes suggesting acute pancreatitis  -ERCP on 12/13 with 35 x 18 mm stone obstructing the distal bile duct as well as multiple smaller stones, stone was not removed due to large size plan for lithotripsy at a later date  -Biliary stent  placed for deep compression, improvement in alk phos and T bili     Pneumonia  -CT on 12/11 debris in the bilateral lower lobe airways and associated bibasilar opacity, fluid retained in the esophagus- concern for aspiration pneumonia  -Increase in O2 requirements and worsening chest x-ray over the last few days      Lung mass  Lobulated 1.7 x 1.8 x 3.1 cm mass in the right upper lobe-concern for malignancy     Leukocytosis, peaked at 29.6 on 12/12, improved somewhat   -Secondary to infection and procedures-improving     Esophagitis- noted on CT      Encephalopathy-unsure what his baseline is     A flutter with RVR-received amiodarone drip, now resolved    Transaminitis, improving        PLAN:         --- Continue CTX 2 g q. 24 and flagyl 500 mg 3 times daily, his increase in O2 requirements more likely due to fluid and inflammatory changes rather than untreated infection, likely lung malignancy may also be contributing  --- F/up repeat blood cultures 2 sets from 12/14-  NGTD   --- Patient with increasing oxygen requirements and worsening checks x-ray , recommend bronchoscopy if feasible, please obtain BAL specimens for culture  --- may need thoracentesis   --- Will likely need swallow study for potential aspiration and dietary recommendations  --- Noted plan for lithotripsy and stone removal in the near future  --- Trend LFTs           Plan of care discussed with MALIA Patterson M.D.. Will continue to follow     Virginia Padilla MD

## 2018-12-17 NOTE — PROGRESS NOTES
[facility] Hospitalist Progress Note    Date of Service: 2018    Chief Complaint  78 y.o. male admitted 2018 with sepsis afib RVR     Interval Problem Update          Remains on high flow nasal cannula 40 L 60% FiO2  Sinus rhythm-sinus tachycardia   SBP 90-1 17  Complains of nausea  Intermittent confusion  Urine output 1945 -1.2 L over past 24 hours +4.2 L since admit  Wife at bedside  WBC increased to 24K    Consultants/Specialty  CC  Cardiology  GI  Surgery    Disposition  To be determined        Review of Systems   Constitutional: Positive for malaise/fatigue. Negative for fever.   HENT: Negative.    Eyes: Negative for discharge and redness.   Respiratory: Positive for cough, sputum production and shortness of breath.    Cardiovascular: Positive for leg swelling. Negative for chest pain and palpitations.   Gastrointestinal: Positive for abdominal pain and nausea. Negative for blood in stool and vomiting.   Genitourinary: Negative for flank pain and hematuria.   Musculoskeletal: Negative for back pain and joint pain.   Skin: Negative for itching and rash.   Neurological: Negative for speech change and focal weakness.   Psychiatric/Behavioral: Negative for hallucinations. The patient is not nervous/anxious.    All other systems reviewed and are negative.     Physical Exam  Laboratory/Imaging   Hemodynamics  Temp (24hrs), Av.4 °C (97.6 °F), Min:36 °C (96.8 °F), Max:36.7 °C (98.1 °F)   Temperature: 36.1 °C (96.9 °F)  Pulse  Av.5  Min: 54  Max: 169 Heart Rate (Monitored): 99  NIBP: 106/65      Respiratory      Respiration: (!) 22, Pulse Oximetry: 96 %, O2 Daily Delivery Respiratory : Highflow Nasal Cannula     PEP/CPT Method: Positive Airway Pressure Device, Work Of Breathing / Effort: Mild  RUL Breath Sounds: Diminished, RML Breath Sounds: Diminished, RLL Breath Sounds: Diminished, LISA Breath Sounds: Diminished, LLL Breath Sounds: Diminished    Fluids    Intake/Output Summary (Last 24 hours) at  12/17/18 1007  Last data filed at 12/17/18 1000   Gross per 24 hour   Intake          2073.33 ml   Output             3415 ml   Net         -1341.67 ml       Nutrition  Orders Placed This Encounter   Procedures   • Diet NPO     Standing Status:   Standing     Number of Occurrences:   8     Order Specific Question:   Restrict to:     Answer:   Sips with Medications [3]     Physical Exam   Constitutional: He appears well-developed and well-nourished.   HENT:   Head: Normocephalic and atraumatic.   Mouth/Throat: Oropharynx is clear and moist.   cortrak in place    Eyes: Conjunctivae are normal. Right eye exhibits no discharge. Left eye exhibits no discharge.   Neck: Neck supple. No JVD present. No tracheal deviation present.   Cardiovascular: Normal rate, regular rhythm and normal heart sounds.    No murmur heard.  Pulmonary/Chest: Effort normal. No respiratory distress. He has decreased breath sounds in the left lower field. He has rales. He exhibits no tenderness.   Abdominal: Soft. Bowel sounds are normal. He exhibits no distension. There is tenderness. There is no rebound and no guarding.   Musculoskeletal: He exhibits edema. He exhibits no tenderness.   Neurological: He is alert. No cranial nerve deficit. He exhibits normal muscle tone.   Oriented x2   Skin: Skin is warm and dry. No rash noted. He is not diaphoretic. No cyanosis. Nails show no clubbing.   Psychiatric: He has a normal mood and affect. Judgment normal. He exhibits abnormal recent memory.   Nursing note and vitals reviewed.      Recent Labs      12/15/18   1412  12/16/18   0332  12/17/18   0330   WBC  20.9*  17.6*  24.3*   RBC  3.24*  2.94*  3.48*   HEMOGLOBIN  10.5*  9.3*  11.2*   HEMATOCRIT  31.7*  29.7*  35.5*   MCV  97.8  101.0*  99.7*   MCH  32.4  31.6  32.2   MCHC  33.1*  31.3*  32.3*   RDW  49.7  51.0*  51.3*   PLATELETCT  394  392  372   MPV  9.4  9.5  9.2     Recent Labs      12/15/18   1412  12/16/18   0954   SODIUM  136  138   POTASSIUM   4.1  3.9   CHLORIDE  104  104   CO2  24  30   GLUCOSE  122*  113*   BUN  26*  29*   CREATININE  0.78  0.71   CALCIUM  9.9  8.7                      Assessment/Plan     Sepsis (HCC)- (present on admission)   Assessment & Plan    This is severe sepsis with the following associated acute organ dysfunction(s): acute respiratory failure.   Likely source is pneumonia and  intra-abdominal    Continue antibiotics and supportive care     Mass of upper lobe of right lung- (present on admission)   Assessment & Plan    He will need CT-guided biopsy when he has recovered from this acute illness    Discussed with patient and wife again today       Pneumonia due to infectious organism- (present on admission)   Assessment & Plan    Pneumonia due to GNB anaerobes Prevotella   With bacteremia likely secondary to aspiration    Discussed with Dr. Adams antibiotics changed to Zosyn given nausea which could be related to his Flagyl       Acute on chronic respiratory failure with hypoxia (HCC)- (present on admission)   Assessment & Plan      CTA negative for PE  Secondary to pneumonia and sepsis    Will start scheduled Lasix 40 mg daily and monitor intake and output and electrolytes  Antibiotics changed to Zosyn    Repeat chest x-ray in a.m.     Anemia- (present on admission)   Assessment & Plan    No signs of active bleeding monitor     Oral phase dysphagia   Assessment & Plan    Continue tube feeding  Continue speech therapy     Acute biliary pancreatitis   Assessment & Plan    Status post ERCP with sphincterotomy and stenting on 12/13/2018  Continue supportive care  Patient has retained large CBD stone for which she will need follow-up as outpatient for repeat ERCP with lithotripsy    Evaluated by Dr. Carter from surgery lap gregory canceled given his acute respiratory failure and high oxygen requirements    Continue supportive care     Atrial flutter (HCC)   Assessment & Plan    Resolved  Off amiodarone drip  Evaluated by cardiology  with no recommendations for anticoagulation     Esophagitis- (present on admission)   Assessment & Plan    Continue Pepcid     Troponin level elevated- (present on admission)   Assessment & Plan    Likely demand ischemia  Echo EF 65% unchanged from prior       Quality-Core Measures   Reviewed items::  Labs reviewed, Medications reviewed and Radiology images reviewed  Sandoval catheter::  No Sandoval  DVT prophylaxis pharmacological::  Enoxaparin (Lovenox)  Ulcer Prophylaxis::  Yes  Antibiotics:  Treating active infection/contamination beyond 24 hours perioperative coverage        Plan of care reviewed with patient and wife at bedside discussed treatment options and goals of care he remains full code at this time  Discussed with Dr. Horn nursing staff and pharmacist

## 2018-12-17 NOTE — CARE PLAN
Problem: Nutritional:  Goal: Nutrition support tolerated and meeting greater than 85% of estimated needs  Outcome: MET Date Met: 12/17/18

## 2018-12-17 NOTE — PROGRESS NOTES
Infectious Disease Progress Note    Author: Gifty Adams M.D. Date & Time of service: 2018  12:25 PM    Chief Complaint:  Sepsis and pneumonia      Interval History:  12/15 AF, O2 15 L oxygen mask, increase, Labs and micro results reviewed. Imaging reviewed, chest x-ray slightly worse on the left. Medications reviewed.    AF, O2 50 L High flow NC, Labs and micro results reviewed. Imaging reviewed.   AF WBC 24 c/o N/V today       Medications reviewed.  No secretions per nurse.  No change in stool output in colostomy.     Review of Systems:  Review of Systems   Constitutional: Positive for malaise/fatigue. Negative for chills and fever.   Respiratory: Negative for cough, sputum production and shortness of breath.    Gastrointestinal: Positive for abdominal pain, nausea and vomiting. Negative for diarrhea.   Musculoskeletal: Negative for back pain.   Skin: Negative for itching and rash.   Neurological: Positive for weakness.       Hemodynamics:  Temp (24hrs), Av.5 °C (97.7 °F), Min:36 °C (96.8 °F), Max:37 °C (98.6 °F)  Temperature: 37 °C (98.6 °F)  Pulse  Av.9  Min: 54  Max: 169Heart Rate (Monitored): (!) 117  NIBP: 100/74       Physical Exam:  Physical Exam   Constitutional: He appears well-developed and well-nourished.   HENT:   Head: Normocephalic and atraumatic.   Eyes: Pupils are equal, round, and reactive to light. EOM are normal.   Cardiovascular: Normal rate and regular rhythm.    Pulmonary/Chest: No respiratory distress. He has no wheezes.   Diminished breath sounds bilaterally   Mild tachypnea   Abdominal: Soft. Bowel sounds are normal. He exhibits no distension. There is tenderness. There is no guarding.   ostomy   Musculoskeletal: He exhibits no edema.   Neurological: He is alert.   Oriented to name   Skin: Skin is warm and dry. No rash noted.   Hyperpigmented, macular lesion on right upper face and eyelid, chronic   Psychiatric: He has a normal mood and affect.   Nursing  note and vitals reviewed.      Meds:    Current Facility-Administered Medications:   •  furosemide  •  potassium bicarbonate  •  piperacillin-tazobactam **AND** piperacillin-tazobactam  •  ipratropium-albuterol  •  enoxaparin (LOVENOX) injection  •  ferrous sulfate  •  levETIRAcetam  •  Pharmacy  •  acetaminophen  •  Notify provider if pain remains uncontrolled **AND** Use the numeric rating scale (NRS-11) on regular floors and Critical-Care Pain Observation Tool (CPOT) on ICUs/Trauma to assess pain **AND** Pulse Ox (Oximetry) **AND** Pharmacy Consult Request **AND** If patient difficult to arouse and/or has respiratory depression, stop any opiates that are currently infusing and call a Rapid Response. **AND** oxyCODONE immediate-release **AND** oxyCODONE immediate-release **AND** morphine injection  •  famotidine  •  ondansetron  •  Respiratory Care per Protocol  •  NS    Labs:  Recent Labs      12/15/18   1412  12/16/18   0332  12/17/18   0330   WBC  20.9*  17.6*  24.3*   RBC  3.24*  2.94*  3.48*   HEMOGLOBIN  10.5*  9.3*  11.2*   HEMATOCRIT  31.7*  29.7*  35.5*   MCV  97.8  101.0*  99.7*   MCH  32.4  31.6  32.2   RDW  49.7  51.0*  51.3*   PLATELETCT  394  392  372   MPV  9.4  9.5  9.2   NEUTSPOLYS  88.60*  82.80*  83.10*   LYMPHOCYTES  5.00*  7.30*  7.40*   MONOCYTES  4.60  7.70  6.00   EOSINOPHILS  0.00  0.20  1.60   BASOPHILS  0.10  0.40  0.70     Recent Labs      12/15/18   1412  12/16/18   0954  12/17/18   0930   SODIUM  136  138  136   POTASSIUM  4.1  3.9  4.0   CHLORIDE  104  104  99   CO2  24  30  33   GLUCOSE  122*  113*  125*   BUN  26*  29*  28*     Recent Labs      12/15/18   1412  12/16/18   0954  12/17/18   0930   ALBUMIN  2.5*  2.2*  2.4*   TBILIRUBIN  0.8  0.7  0.7   ALKPHOSPHAT  200*  152*  143*   TOTPROTEIN  5.2*  4.4*  4.9*   ALTSGPT  27  32  31   ASTSGOT  26  29  22   CREATININE  0.78  0.71  0.72       Imaging:  Ct-abdomen-pelvis With    Result Date: 12/11/2018 12/11/2018 9:22 PM  HISTORY/REASON FOR EXAM:  Abd pain, diverticulitis suspected Nausea, vomiting. TECHNIQUE/EXAM DESCRIPTION:   CT scan of the abdomen and pelvis with contrast. Contrast-enhanced helical scanning was obtained from the diaphragmatic domes through the pubic symphysis following the bolus administration of nonionic contrast without complication. 100 mL of Omnipaque 350 nonionic contrast was administered without complication. Low dose optimization technique was utilized for this CT exam including automated exposure control and adjustment of the mA and/or kV according to patient size. COMPARISON: CT chest 12/11/2018. FINDINGS: Lung bases: Please see dedicated CT chest report Abdomen: The liver is unremarkable. The spleen is unremarkable. There is stranding and fluid surrounding the entire pancreas, most in the pancreatic tail. There is high density fluid extending from the pancreatic tail to the left paracolic gutter. There are multiple lobulated peripancreatic fluid area surrounding the  pancreas The gallbladder demonstrates multiple layering gallstones. Very dilated CBD, measuring up to 1.6 cm. There is a soft tissue attenuation lesion in the distal CBD (image 39 series 7) The adrenal glands are normal in size. The kidneys enhance symmetrically. There is a 7.5 cm cyst in the upper pole right kidney. No hydronephrosis. Tiny nonobstructive calculus in the lower pole left kidney. Moderate atherosclerotic disease of the abdominal aorta and its branches. Aortobiiliac graft There is no lymphadenopathy. No bowel wall thickening or bowel dilatation. Diffuse wall thickening of the stomach. Right lower quadrant ostomy. Pelvis: Limited visualization of the pelvis due to bilateral femur hardwares. There are small urinary bladder diverticula. Layering stones/debris in the urinary bladder. Moderate amount of high density fluid in the pelvis, likely hemorrhagic fluid No aggressive bone lesions are seen.  ___________________________________     1. Stranding and fluid surrounding the entire pancreas with several lobulated peripancreatic fluid area surrounding the pancreas. This could be sequela of prior pancreatitis versus cystic pancreatic neoplasm. More high density fluid extending from the pancreatic tail to the left paracolic gutter could relate to hemorrhage of one of these cystic lesions or sequela of acute on chronic pancreatitis. Severely dilated CBD, measuring up to 1.6 cm. Soft tissue attenuation lesion in the distal CBD could be an obstructing mass or noncalcified stone. Further evaluation with MRCP and MR pancreas is recommended. 2. Cholelithiasis. 3. Diffuse wall thickening of the stomach could be gastritis or reactive change secondary to the pancreatic process. 4. Layering stones/debris in the urinary bladder.    Dx-chest-portable (1 View)    Result Date: 12/15/2018  12/15/2018 8:50 AM HISTORY/REASON FOR EXAM:  Shortness of Breath TECHNIQUE/EXAM DESCRIPTION AND NUMBER OF VIEWS: Single portable view of the chest. COMPARISON:  CXR 12/14/2018. CT for PE 12/11/2018. FINDINGS: Feeding tube is present. The heart is at the upper limits of normal in size. There is persistent atelectasis or pneumonia in the left lower lobe with small left pleural effusion. Round lobulated opacity or mass in the right upper lobe is present. Minimal interstitial edema is present in the right lower lobe.     1.  Persistent atelectasis or pneumonia in the left lower lobe with small left pleural effusion. 2.  Minor residual edema and effusion in the right lower lobe. 3.  Lobulated pulmonary nodules in the right midlung again noted. Differential diagnosis includes lung carcinoma.    Dx-chest-portable (1 View)    Result Date: 12/14/2018 12/14/2018 3:45 AM HISTORY/REASON FOR EXAM:  Shortness of Breath. TECHNIQUE/EXAM DESCRIPTION AND NUMBER OF VIEWS: Single portable view of the chest. COMPARISON: 12/11/2018 FINDINGS: LUNGS: Unchanged  right upper lobe mass. Small bilateral pleural effusions, left slightly worse than the right. Retrocardiac opacity, atelectasis versus consolidation. PNEUMOTHORAX: None. LINES AND TUBES: Enteric tube tip is distal to the GE junction, outside the field-of-view. MEDIASTINUM: Stable cardiac silhouette. Atherosclerosis. MUSCULOSKELETAL STRUCTURES: Unchanged.     1. New retrocardiac atelectasis versus consolidation. 2. Small bilateral pleural effusions and bibasilar atelectasis, left worse than right. 3. Stable right upper lobe mass. 4. Enteric tube tip is distal to the GE junction.    Dx-chest-portable (1 View)    Result Date: 12/11/2018 12/11/2018 4:35 PM HISTORY/REASON FOR EXAM:  Loss of appetite, sore throat, chills.  Nausea and vomiting. TECHNIQUE/EXAM DESCRIPTION AND NUMBER OF VIEWS: Single portable view of the chest. COMPARISON: 1/30/2018 FINDINGS: Cardiac mediastinal contour is unchanged. Ill-defined opacity again seen in the RIGHT mid to upper lung, slightly more apparent than prior exam. No pleural fluid collection or pneumothorax. Severe degenerative change of both shoulders.     1.  No pneumonia or pulmonary edema. 2.  Slight interval increase in size of ill-defined RIGHT mid to upper lung nodular opacity, concerning for mass.    Gk-htqe-cbtkbut Ducts    Result Date: 12/13/2018 12/13/2018 3:05 PM HISTORY/REASON FOR EXAM:  Main OR ERCP TECHNIQUE/EXAM DESCRIPTION AND NUMBER OF VIEWS: Fluoroscopic unit obligated to the operating room for greater than one hour for ERCP procedure.  6 fluoroscopic images provided. COMPARISON: None FINDINGS: Initial cannulation the common bile duct which is dilated and shows filling defect consistent with stones which appear to be removed on later images. 6 seconds fluoroscopy time utilized.     Limited images obtained during ERCP procedure showing apparent removal of common bile duct stones.    Mr-abdomen-with & W/o    Result Date: 12/13/2018 12/12/2018 3:47 PM HISTORY/REASON  FOR EXAM:  Neoplasm: pancreas, suspected; Obstruction of bile duct. TECHNIQUE/EXAM DESCRIPTION: MRI of the pancreas with dynamic IV gadolinium enhancement. MR imaging of the pancreas was performed.  MR images of the pancreas were obtained with coronal and axial single-shot fast spin-echo T2, fat-suppressed axial FRFSE T2, axial in-phase and out-of-phase FSPGR T1, axial DWI with a b-value of 600, 3D MRCP,  precontrast fat-suppressed FSPGR T1, dynamic gadolinium enhanced axial fat-suppressed T1 FSPGR in the arterial dominant, portal venous, 2-minute, and 4-minute delayed phases, with delayed coronal fat-suppressed T1 FSPGR sequence. . The study was performed on a [employer] 1.5 [place] MRI scanner. 7 mL Gadavist contrast was administered intravenously. COMPARISON: CT abdomen and pelvis 12/11/2018 FINDINGS: Motion artifact limits exam. Small bilateral pleural effusions with associated consolidation. Liver shows mildly nodular margins.  No enhancing mass demonstrated.  Prominent intrahepatic biliary ducts. Spleen is unremarkable. Small amount of peritoneal fluid present. RIGHT kidney cysts with largest at the upper pole measuring 7.7 cm.  LEFT kidney is unremarkable.  No enhancing renal mass. Gallbladder shows multiple dependent signal voids consistent with stones. Common hepatic duct is dilated measuring 17 mm.,  Bile duct measures 14 mm.  Large ovoid signal voids within the distal common bile duct consistent with stones. Pancreatic duct is not significantly dilated. Complex fluid collection tracks along the pancreatic head and body, as seen on prior CT.  Edema in the pancreatic tail with fluid extending into the LEFT pararenal and perisplenic space.  T1 hyperintense material present. Multilevel Schmorl's nodes in the thoracolumbar spine, with reactive endplate changes.  Apparent edema within the L3 vertebral body.     1.  Marked biliary dilation with large common bile duct stones. 2.  Stones also present in the  gallbladder. 3.  Complex peripancreatic fluid collection, likely pseudocyst, with apparent inflammatory changes in the pancreatic tail suggesting superimposed acute pancreatitis with fluid extending into the LEFT pararenal space, likely hemorrhagic pancreatitis.  Hemorrhage within cystic lesion at the dorsal aspect of pancreatic tail also noted.  Pancreatic mass is difficult to exclude. 4.  Small volume ascites. 5.  Small bilateral pleural effusions with associated consolidation. 6.  RIGHT kidney cysts. 7.  Possible subacute compression fracture of L3.     Ct-cta Chest Pulmonary Artery W/ Recons    Result Date: 12/11/2018 12/11/2018 9:22 PM HISTORY/REASON FOR EXAM:  PE suspected, high pretest prob; Rule out PE. Also comment on right upper lobe opacity TECHNIQUE/EXAM DESCRIPTION: CT angiogram scan for pulmonary embolism with contrast, with reconstructions. 1.25 mm helical sections were obtained from the lung apices through the lung bases following the rapid bolus administration of 100 mL of Omnipaque 350 nonionic contrast. Thin-section overlapping reconstruction interval was utilized. Coronal reconstructions were generated from the axial data. MIP post processing was performed and utilized for the interpretation. Low dose optimization technique was utilized for this CT exam including automated exposure control and adjustment of the mA and/or kV according to patient size. COMPARISON: 3/24/2015 FINDINGS: Pulmonary Embolism: No. Main Pulmonary Arteries: No. Segmental branches: No. Subsegmental branches: No. Additional Comments: None. Lungs: Lobulated 1.7 x 1.8 x 3.1 cm mass in the right upper lobe. Diffuse emphysematous change. Patchy bibasilar opacities. Airway: There is debris completely filling the bilateral lower lobe airways. Pleura: No pleural effusion or pneumothorax. Nodes: No enlarged mediastinal or hilar lymph nodes. Additional findings: Thoracic aorta and great vessels:  No aneurysm. Moderate atherosclerotic  disease Heart and pericardium: Moderate coronary artery calcification. No pericardial effusion. Thoracic spine:  No fracture or malalignment. No compression deformity. Chest wall:   Unremarkable. Diffuse wall thickening throughout the esophagus. There is retaining fluid in the esophagus. There is a 1.4 cm paraesophageal lymph node. Questionable enlarged right paraesophageal lymph node more superiorly (image 93 series 4). Visualized upper abdomen: Please see dedicated report     1. No CT evidence of pulmonary embolism. 2. Lobulated 1.7 x 1.8 x 3.1 cm mass in the right upper lobe, concerning for lung malignancy. PET/CT, and/or tissue sampling is recommended. 3. Diffuse wall thickening throughout the esophagus could relate to esophagitis. Mildly prominent 1.4 cm paraesophageal lymph node adjacent to the distal esophagus. Questionable enlarged right paraesophageal lymph nodes more proximally. 4. Fluid opacification of the esophagus. There is debris completely filled the bilateral lower lobe airways with associated patchy bibasilar opacities. This is concerning for aspiration pneumonia due to retained fluid in the esophagus.    Ec-echocardiogram Complete W/ Cont    Result Date: 12/12/2018  Transthoracic Echo Report Echocardiography Laboratory CONCLUSIONS Left ventricular ejection fraction is visually estimated to be 65%. Hypokinesis of the apical septal wall. Grossly normal right ventricular size and systolic function. No significant valve disease or flow abnormalities. Compared to the images of the prior study done 3/9/2018 -  there has been no significant changeLV EF:  65    % FINDINGS Left Ventricle Normal left ventricular systolic function. Left ventricular ejection fraction is visually estimated to be 65%. Hypokinesis of the apical septal wall. Diastolic function is difficult to assess with tachycardia. Contrast was used to enhance visualization of the endocardial border. 3ML of contrast was administered. Existing  IV was used. Located at the left ac fossa. Right Ventricle Right ventricle not well visualized. Grossly normal right ventricular size and systolic function. Right Atrium The right atrium is normal in size.  Normal inferior vena cava size and inspiratory collapse. Left Atrium The left atrium is normal in size.  Left atrial volume index is 27  mL/sq m. Mitral Valve Structurally normal mitral valve without significant stenosis.  Trace tricuspid regurgitation. Aortic Valve The aortic valve is not well visualized but appears to be opening well with no aortic insufficiency. Tricuspid Valve The tricuspid valve is not well visualized. Unable to estimate pulmonary artery pressure due to an inadequate tricuspid regurgitant jet. Pulmonic Valve The pulmonic valve is not well visualized. Pericardium Normal pericardium without effusion. Aorta Ascending aorta not well visualized. Segundo Barreto MD (Electronically Signed) Final Date:     12 December 2018                 11:30    Dx-abdomen For Tube Placement    Result Date: 12/14/2018 12/13/2018 11:29 PM HISTORY/REASON FOR EXAM:  Line evaluation. TECHNIQUE/EXAM DESCRIPTION AND NUMBER OF VIEWS:  1 view(s) of the abdomen. COMPARISON:  No recent studies available for comparison. FINDINGS: Enteric tube has been placed. The tip projects over the junction of the second and third portions of duodenum. The bowel gas pattern is nonspecific.  Several dilated small bowel loops.     Enteric tube tip projects over the junction of the second and third portions of duodenum.      Micro:  Results     Procedure Component Value Units Date/Time    BLOOD CULTURE [[id number]]  (Abnormal)  (Susceptibility) Collected:  12/11/18 1553    Order Status:  Completed Specimen:  Blood from Peripheral Updated:  12/17/18 1140     Significant Indicator POS (POS)     Source BLD     Site PERIPHERAL     Blood Culture Growth detected by Bactec instrument. 12/12/2018  15:36 (A)      Prevotella oralis (A)       "Klebsiella pneumoniae (A)    Narrative:       CALL  [place]  161 tel. [phone],  CALLED  161 tel. [phone] 12/16/2018, 12:55, RB PERF. RESULTS CALLED TO: RN  [id number] [Working up Aerobic Lactose ]  Per Hospital Policy: Only change Specimen Src: to \"Line\" if  specified by physician order.    Culture & Susceptibility     KLEBSIELLA PNEUMONIAE     Antibiotic Sensitivity Microscan Unit Status    Ampicillin Resistant >16 mcg/mL Final    Method: SENSITIVITY, AMERICO    Cefepime Sensitive <=8 mcg/mL Final    Method: SENSITIVITY, AMERICO    Cefotaxime Sensitive <=2 mcg/mL Final    Method: SENSITIVITY, AMERICO    Cefotetan Sensitive <=16 mcg/mL Final    Method: SENSITIVITY, AMERICO    Ceftazidime Sensitive <=1 mcg/mL Final    Method: SENSITIVITY, AMERICO    Ceftriaxone Sensitive <=8 mcg/mL Final    Method: SENSITIVITY, AMERICO    Cefuroxime Sensitive <=4 mcg/mL Final    Method: SENSITIVITY, AMERICO    Ciprofloxacin Sensitive <=1 mcg/mL Final    Method: SENSITIVITY, AMERICO    Ertapenem Sensitive <=1 mcg/mL Final    Method: SENSITIVITY, AMERICO    Gentamicin Sensitive <=4 mcg/mL Final    Method: SENSITIVITY, AMERICO    Pip/Tazobactam Sensitive <=16 mcg/mL Final    Method: SENSITIVITY, AMERICO    Tigecycline Sensitive <=2 mcg/mL Final    Method: SENSITIVITY, AMERICO    Tobramycin Sensitive <=4 mcg/mL Final    Method: SENSITIVITY, AMERICO    Trimeth/Sulfa Sensitive <=2/38 mcg/mL Final    Method: SENSITIVITY, AMERICO                       BLOOD CULTURE [[id number]] Collected:  12/14/18 1436    Order Status:  Completed Specimen:  Blood from Peripheral Updated:  12/15/18 0900     Significant Indicator NEG     Source BLD     Site PERIPHERAL     Blood Culture No Growth    Note: Blood cultures are incubated for 5 days and  are monitored continuously.Positive blood cultures  are called to the RN and reported as soon as  they are identified.      Narrative:       Collected By:[id number] DERIK FORMAN  Per Hospital Policy: Only change Specimen Src: to \"Line\" if  specified by physician " "order.    BLOOD CULTURE [[id number]] Collected:  12/14/18 1307    Order Status:  Completed Specimen:  Blood from Peripheral Updated:  12/15/18 0900     Significant Indicator NEG     Source BLD     Site PERIPHERAL     Blood Culture No Growth    Note: Blood cultures are incubated for 5 days and  are monitored continuously.Positive blood cultures  are called to the RN and reported as soon as  they are identified.      Narrative:       Collected By:[id number] DERIK FORMAN  Per Hospital Policy: Only change Specimen Src: to \"Line\" if  specified by physician order.    BLOOD CULTURE [[id number]]  (Abnormal) Collected:  12/11/18 1620    Order Status:  Completed Specimen:  Blood from Peripheral Updated:  12/13/18 1324     Significant Indicator POS (POS)     Source BLD     Site PERIPHERAL     Blood Culture Growth detected by Bactec instrument. 12/12/2018  17:04 (A)      Prevotella oralis (A)    Narrative:       CALL  [place]  161 tel. [phone],  CALLED  161 tel. [phone] 12/12/2018, 17:05, RB PERF. RESULTS CALLED  TO:[id number] RN  Per Hospital Policy: Only change Specimen Src: to \"Line\" if  specified by physician order.    URINE CULTURE(NEW) [[id number]] Collected:  12/11/18 2030    Order Status:  Completed Specimen:  Urine Updated:  12/13/18 0826     Significant Indicator NEG     Source UR     Site --     Urine Culture No growth at 48 hours    Narrative:       Indication for culture:->Emergency Room Patient    Influenza A/B By PCR [[id number]] Collected:  12/11/18 2125    Order Status:  Completed Updated:  12/11/18 2214     Influenza virus A RNA Negative     Influenza virus B, PCR Negative    URINALYSIS [[id number]]  (Abnormal) Collected:  12/11/18 2030    Order Status:  Completed Specimen:  Urine Updated:  12/11/18 2110     Color Yellow     Character Cloudy (A)     Specific Gravity 1.025     Ph 5.0     Glucose Negative mg/dL      Ketones Trace (A) mg/dL      Protein 30 (A) mg/dL      Bilirubin Negative     Urobilinogen, Urine 0.2 "     Nitrite Negative     Leukocyte Esterase Negative     Occult Blood Negative     Micro Urine Req Microscopic    Narrative:       Indication for culture:->Emergency Room Patient          Assessment:  Active Hospital Problems    Diagnosis   • Sepsis (HCC) [A41.9]   • Acute on chronic respiratory failure with hypoxia (HCC) [J96.21]   • Pneumonia due to infectious organism [J18.9]   • Mass of upper lobe of right lung [R91.8]   • Acute biliary pancreatitis [K85.10]   • Oral phase dysphagia [R13.11]   • Generalized abdominal pain [R10.84]   • Troponin level elevated [R74.8]   • Esophagitis [K20.9]   • Atrial flutter (HCC) [I48.92]        ASSESSMENT:      Conner Mora is a 78 y.o.male admitted 12/11/2018. Pt has a past medical history of seizure disorder on Keppra, respiratory disorder on 2 L nocturnal oxygen, PAD, he has a colostomy and is status post colectomy in 2010.  Presented to the ED complaining of nausea, vomiting and abdominal pain times 2-weeks.  He also reported cough and increasing shortness of breath.  He was diagnosed with pneumonia based on CT chest.  He has pancreatitis thought secondary to gallstones and is status post ERCP. ID consulted for new bacteremia with Prevotella oralis. He is currently being treated with ceftriaxone 2 g daily and Flagyl 500 mg 3 times daily.      PLAN:      Sepsis   Cholangitis and pneumonia  Afebrile  Persistent leukocytosis  Initial demand ischemia and AMS  Bcxs 12/11 + Prevotella oralis and Klebsiella  Bcxs12/14 blood cultures NGTD   Anticipate IV abx through 12/28 followed by PO due to stent below  Repeat CT if WBC does not improve     Pancreatitis secondary to large gallstone  Lipase elevated  MRI on 1212 with marked biliary dilation, stones noted, peripancreatic fluid collection, likely pseudocyst with inflammatory changes suggesting acute pancreatitis  S/p ERCP on 12/13 with 35 x 18 mm stone obstructing the distal bile duct as well as multiple smaller stones,  stone was not removed due to large size plan for lithotripsy at a later date  Biliary stent placed for deep compression, improvement in alk phos and T bili  CT as above     Pneumonia  CT on 12/11 debris in the bilateral lower lobe airways and associated bibasilar opacity, fluid retained in the esophagus- concern for aspiration pneumonia  Persistent O2 requirements   Pulm toilet  Abx as above      Lung mass  Lobulated 1.7 x 1.8 x 3.1 cm mass in the right upper lobe-concern for malignancy  Biopsy once may stable     Nausea and vomiting  Multifactorial  DC Flagyl  Start Zosyn  Improved LFTS and lipase at last check     Esophagitis- noted on CT               Plan of care discussed with MALIA Patterson M.D.. Will continue to follow

## 2018-12-17 NOTE — PROGRESS NOTES
Critical Care Progress Note    Date of admission  12/11/2018    Chief Complaint  78 y.o. male admitted 12/11/2018 with nausea/vomiting, abd pain, LA 8.1, AF/RVR    Hospital Course    78 y.o. male who presented 12/11/2018 with shortness of breath and abdominal pain.  He has a past medical history of seizure disorder chronic, chronic respiratory failure on 2 L of home oxygen at night, peripheral vascular disease, and GERD.  He has been having nausea and vomiting for the past 2 weeks.  He says that his pain is worse on the lower left but he has diffuse generalized pain.  He has a history of colitis and he has a colostomy placement from several years ago.  He has not have been having high output.  His lactic acid is 8.1..  No hematemasis or gi bleed.  Has been having cough with productive sputum.      Interval Problem Update  Reviewed last 24 hour events:   Remains on high flow oxygen  Lucid and orriented  Subjectively feels about same or slightly improved from yesterday  Sinus tach with PVCs  NPO with feeding tube  Worsening pulm infiltrates by CXR today  Remains on antibiotics  Adequate cough/gag       Prior 24 hours   Biliary stent placed 12/11   More disoriented today   SR, PVCs   Afebrile   alta TF at goal   Colostomy with o/p   Good UOP   EOB   HFNC 50, 60%, PEP,    CXR Pending   abx day 6 - C3/flagyl; cx's neg repeat   BUN up to 26; AP down slowly      Review of Systems  Review of Systems   Unable to perform ROS: Acuity of condition   Constitutional: Positive for malaise/fatigue.   HENT: Negative.    Eyes: Negative.    Respiratory: Positive for cough and shortness of breath.         Dry cough unchanged   Gastrointestinal: Negative.    Genitourinary: Negative.    Musculoskeletal: Negative.    Skin: Negative.    Neurological: Positive for weakness.        Vital Signs for last 24 hours   Temp:  [36.1 °C (96.9 °F)-37.1 °C (98.8 °F)] 36.3 °C (97.3 °F)  Pulse:  [] 114  Resp:  [12-32] 22    Hemodynamic  parameters for last 24 hours       Respiratory       Physical Exam   Physical Exam   Constitutional: He is oriented to person, place, and time. He appears well-developed. He appears lethargic. He appears ill.   HENT:   Head: Atraumatic.   Eyes: Pupils are equal, round, and reactive to light. Right eye exhibits no discharge.   Neck: Neck supple. No tracheal deviation present.   Cardiovascular: An irregularly irregular rhythm present.   Pulmonary/Chest: He has rales.   Diminished, moderate respiratory distress,   Abdominal: Soft. Bowel sounds are normal. He exhibits distension.   Musculoskeletal: Normal range of motion. He exhibits no edema.   Neurological: He is oriented to person, place, and time. He appears lethargic. No cranial nerve deficit.   Skin: Skin is warm and dry.       Medications  Current Facility-Administered Medications   Medication Dose Route Frequency Provider Last Rate Last Dose   • furosemide (LASIX) injection 40 mg  40 mg Intravenous Q DAY Franc Horn M.D.   40 mg at 12/17/18 1051   • potassium bicarbonate (KLYTE) effervescent tablet 50 mEq  50 mEq Oral DAILY Franc Horn M.D.   50 mEq at 12/17/18 1051   • piperacillin-tazobactam (ZOSYN) 3.375 g in  mL IVPB  3.375 g Intravenous Q8HRS Gifty Adams M.D.       • ipratropium-albuterol (DUONEB) nebulizer solution  3 mL Nebulization Q4H PRN (RT) Aime Patterson M.D.   3 mL at 12/15/18 1510   • enoxaparin (LOVENOX) inj 40 mg  40 mg Subcutaneous DAILY Jason Niño M.D.   40 mg at 12/17/18 0542   • ferrous sulfate (FEOSOL) 220 (44 Fe) MG/5ML solution 325 mg  325 mg Feeding Tube BID WITH MEALS Jason Niño M.D.   325 mg at 12/17/18 0720   • levETIRAcetam (KEPPRA) tablet 750 mg  750 mg Per NG Tube BID Jason Niño M.D.   750 mg at 12/17/18 0537   • Pharmacy Consult: Enteral tube feeding - review meds/change route/product selection   Other PRN Aime Patterson M.D.       • acetaminophen (TYLENOL) tablet 650 mg  650 mg  Feeding Tube Q6HRS PRN Aime Patterson M.D.       • Pharmacy Consult Request ...Pain Management Review   Other PRN Aime Patterson M.D.        And   • oxyCODONE immediate-release (ROXICODONE) tablet 2.5 mg  2.5 mg Feeding Tube Q3HRS PRN Aime Patterson M.D.        And   • oxyCODONE immediate-release (ROXICODONE) tablet 5 mg  5 mg Feeding Tube Q3HRS PRN Aime Patterson M.D.   5 mg at 12/17/18 1143    And   • morphine (pf) 10 mg/mL injection 2 mg  2 mg Intravenous Q3HRS PRN Aime Patterson M.D.   2 mg at 12/16/18 2246   • famotidine (PEPCID) tablet 20 mg  20 mg Per NG Tube BID Aime Patterson M.D.   20 mg at 12/17/18 0600   • ondansetron (ZOFRAN) syringe/vial injection 4 mg  4 mg Intravenous Q6HRS PRN Adonay Queen R.N.   4 mg at 12/12/18 0033   • Respiratory Care per Protocol   Nebulization Continuous RT Juan Cobian M.D.       • NS (BOLUS) infusion 1,000 mL  1,000 mL Intravenous Once PRN Juan Cobian M.D.   Stopped at 12/12/18 1440       Fluids    Intake/Output Summary (Last 24 hours) at 12/17/18 1604  Last data filed at 12/17/18 1600   Gross per 24 hour   Intake          2173.33 ml   Output             3215 ml   Net         -1041.67 ml       Laboratory          Recent Labs      12/15/18   1412  12/16/18   0954  12/17/18   0930   SODIUM  136  138  136   POTASSIUM  4.1  3.9  4.0   CHLORIDE  104  104  99   CO2  24  30  33   BUN  26*  29*  28*   CREATININE  0.78  0.71  0.72   CALCIUM  9.9  8.7  8.9     Recent Labs      12/15/18   1412  12/16/18   0954  12/17/18   0930   ALTSGPT  27  32  31   ASTSGOT  26  29  22   ALKPHOSPHAT  200*  152*  143*   TBILIRUBIN  0.8  0.7  0.7   GLUCOSE  122*  113*  125*     Recent Labs      12/15/18   1412  12/16/18   0332  12/16/18   0954  12/17/18   0330  12/17/18   0930   WBC  20.9*  17.6*   --   24.3*   --    NEUTSPOLYS  88.60*  82.80*   --   83.10*   --    LYMPHOCYTES  5.00*  7.30*   --   7.40*   --    MONOCYTES  4.60  7.70   --   6.00   --     EOSINOPHILS  0.00  0.20   --   1.60   --    BASOPHILS  0.10  0.40   --   0.70   --    ASTSGOT  26   --   29   --   22   ALTSGPT  27   --   32   --   31   ALKPHOSPHAT  200*   --   152*   --   143*   TBILIRUBIN  0.8   --   0.7   --   0.7     Recent Labs      12/15/18   1412  12/16/18   0332  12/17/18   0330   RBC  3.24*  2.94*  3.48*   HEMOGLOBIN  10.5*  9.3*  11.2*   HEMATOCRIT  31.7*  29.7*  35.5*   PLATELETCT  394  392  372       Imaging  X-Ray:  I have personally reviewed the images and compared with prior images.    Assessment/Plan  Sepsis (HCC)- (present on admission)   Assessment & Plan    Severe sepsis with end organ damage  Lactic acid 9, trending down  Associated with respiratory failure with severe hypoxia  Continue Zosyn, report of gram-negative rods from blood cultures, final pending  Cover for cholangitis, aspiration pneumonitis, suspect ongoing gallstone pancreatitis  Titrated oxygen, high flow nasal cannula, monitor need for intubation    Remains critically ill and fragile with high risk of mortality       Mass of upper lobe of right lung- (present on admission)   Assessment & Plan    Follow-up CT, plus minus PET     Pneumonia due to infectious organism- (present on admission)   Assessment & Plan    Consolidations seen on ct imaing  Possible aspiration, especially givne vomiting  Blood culture pending    Cultures negative to date- continue empiric antibiotic therapy     Acute on chronic respiratory failure with hypoxia (HCC)- (present on admission)   Assessment & Plan    Home o2 nocturnal, history of COPD  Now exacerbated with sepsis/SIRS, gallstone pancreatitis,?  Pneumonia  High flow nasal cannula, respiratory protocols, may require intubation    No interval change but remains at risk for respiratory failure requiring intubation/ mechanical ventilation     Atrial flutter (HCC)   Assessment & Plan    On amiodarone drip.  No changes today     Troponin level elevated- (present on admission)    Assessment & Plan    Secondary to demand ischemia     Generalized abdominal pain- (present on admission)   Assessment & Plan    Secondary to gallstone pancreatitis  Volume resuscitation  MRCP, GI consultation    Nothing acute to do today- abdomen benign on exam          VTE:  Lovenox  Ulcer: H2 Antagonist  Lines: None    I have performed a physical exam and reviewed and updated ROS and Plan today (12/17/2018). In review of yesterday's note (12/16/2018), there are no changes except as documented above.   He remains critically ill.  ERCP and biliary stent.  He will require lithotripsy in the future.  Increasing FiO2 requirement, increasing left pleural effusion on chest imaging, risk for infection.  Monitor closely in ICU with high risk for further deterioration.  He may require intubation; f/u cxr today  Discussed patient condition and risk of morbidity and/or mortality with Hospitalist, Family, RN, RT, Pharmacy and QA team  The patient remains critically ill.  Critical care time = 40 minutes in directly providing and coordinating critical care and extensive data review.  No time overlap and excludes procedures.

## 2018-12-17 NOTE — CARE PLAN
Problem: Pain Management  Goal: Pain level will decrease to patient's comfort goal  Outcome: PROGRESSING AS EXPECTED  Assessed pt self report as we as S &S of pain. Pt has been medicated for pain. Will continue to monitor.       Problem: Skin Integrity  Goal: Risk for impaired skin integrity will decrease  Outcome: PROGRESSING AS EXPECTED  Assessed risk factors for skin integrity impermeant / pressure ulcers.Assesses pt skin front and back..   Educated pt. about the precautions we use to prevent skin breakdown. Pt verbalized understanding. Pt understand the impotence of turning Q 2 hours

## 2018-12-17 NOTE — PROGRESS NOTES
12 hour chart check and skin     12 hour monitor summery:     Rhythm: SR-ST   14.10.32  Ectopy: PVCs noted (occ- frequent)

## 2018-12-17 NOTE — THERAPY
"Speech Language Therapy dysphagia treatment completed.   Functional Status:  Patient seen for dysphagia tx this date. Pt initially alert and oriented to self and place, not to time (stated month as \"February\"), though lethargy increased as session progressed. Pt seated upright in bed and oral care completed. Throat clearing noted following each spontaneous dry swallow, concerning for penetration/aspiration of saliva. Conservative amount of ice chips provided, given high concern for aspiration. Pt provided two trials of ice chips with immediate throat clearing following the swallow noted in 2/2 instances. When cued to cough to clear airway, prolonged congestive cough appreciated, concerning for aspiration. No further PO trials administered today. Provided max verbal/gestural cues for pt to execute pharyngeal, laryngeal, lingual swallowing exercises x10 reps with pt noted to perseverate on prior exercise with difficulty switching sets. Written handout provided and demonstrated with wife, Courtney, to help reinforce exercise program. Recommend continue NPO/Cortrak and prefeeding trials with SLP only as appropriate. SLP following.   Recommendations: continue NPO/Cortrak and prefeeding trials with SLP only as appropriate; Oral care 4x/day; Swallowing exercises 3x/day  Plan of Care: Will benefit from Speech Therapy 3 times per week  Post-Acute Therapy: Discharge to a transitional care facility for continued skilled therapy services.    See \"Rehab Therapy-Acute\" Patient Summary Report for complete documentation.     "

## 2018-12-17 NOTE — CARE PLAN
Problem: Safety  Goal: Will remain free from falls  Outcome: PROGRESSING AS EXPECTED    Intervention: Assess risk factors for falls  Assessed risk factors for falls.  Intervention: Implement fall precautions   12/17/18 1218   OTHER   Environmental Precautions Treaded Slipper Socks on Patient;Personal Belongings, Wastebasket, Call Bell etc. in Easy Reach;Transferred to Stronger Side;Report Given to Other Health Care Providers Regarding Fall Risk;Bed in Low Position;Communication Sign for Patients & Families;Mobility Assessed & Appropriate Sign Placed   Bedrails Bedrails Closest to Bathroom Down   Chair/Bed Strip Alarm Yes - Alarm On   Bed Alarm Yes - Alarm On         Problem: Infection  Goal: Will remain free from infection    Intervention: Assess signs and symptoms of infection  Assessed signs and symptoms of infection.  Intervention: Implement standard precautions and perform hand washing before and after patient contact  Implemented standard precautions and strict hand hygiene.  Intervention: Give CDC handouts for infection prevention (infection prevention/hand washing, disease specific, and device specific) and document in Education  Patient verbally educated and states understanding.  Intervention: Assess for removal of potential routes of infection, such as IV, central line, intra-arterial or urinary catheters  Assessed for removal of potential routes of infection.

## 2018-12-18 PROBLEM — D72.829 LEUKOCYTOSIS: Status: ACTIVE | Noted: 2018-01-01

## 2018-12-18 PROBLEM — K86.3 PANCREATIC PSEUDOCYST: Status: ACTIVE | Noted: 2018-01-01

## 2018-12-18 NOTE — PROGRESS NOTES
12 hour chart check. Assumed care of patient at 0645.Received bedside report from TIGRE Luna. Patient comfortable in bed with no needs or concerns at this time. Bed alarm set, call light within reach, bed in lowest position, treaded slipper socks on. See flowsheets for VS. Monitor ranges appropriate. Patient's pain is 8 / 10. See MAR for medication administration.  No family at bed side. Will continue to monitor closely.

## 2018-12-18 NOTE — PROGRESS NOTES
Infectious Disease Progress Note    Author: Gifty Adams M.D. Date & Time of service: 2018  10:59 AM    Chief Complaint:  Sepsis and pneumonia      Interval History:  12/15 AF, O2 15 L oxygen mask, increase, Labs and micro results reviewed. Imaging reviewed, chest x-ray slightly worse on the left. Medications reviewed.    AF, O2 50 L High flow NC, Labs and micro results reviewed. Imaging reviewed.   AF WBC 24 c/o N/V today   AF WBC 23 continued nausea and vomiting-denies abd pain     Medications reviewed.  No secretions per nurse.  No change in stool output in colostomy.     Review of Systems:  Review of Systems   Constitutional: Positive for malaise/fatigue. Negative for chills and fever.   Respiratory: Negative for cough, sputum production and shortness of breath.    Gastrointestinal: Positive for nausea and vomiting. Negative for abdominal pain and diarrhea.   Musculoskeletal: Negative for back pain.   Skin: Negative for itching and rash.   Neurological: Positive for weakness.       Hemodynamics:  Temp (24hrs), Av.7 °C (98 °F), Min:36.3 °C (97.3 °F), Max:37.1 °C (98.8 °F)  Temperature: 36.7 °C (98.1 °F)  Pulse  Av.6  Min: 54  Max: 169Heart Rate (Monitored): (!) 107  NIBP: (!) 90/56       Physical Exam:  Physical Exam   Constitutional: He appears well-developed.   HENT:   Head: Normocephalic and atraumatic.   Eyes: Pupils are equal, round, and reactive to light. EOM are normal. No scleral icterus.   Neck: Neck supple.   Cardiovascular: Normal rate and regular rhythm.    Pulmonary/Chest: Effort normal. No respiratory distress.   Diminished breath sounds bilaterally   Mild tachypnea   Abdominal: Soft. Bowel sounds are normal. He exhibits no distension. There is no tenderness. There is no rebound and no guarding.   ostomy   Musculoskeletal: He exhibits no edema.   Neurological: He is alert.   Skin: Skin is warm and dry. No rash noted. He is not diaphoretic.   Hyperpigmented,  macular lesion on right upper face and eyelid, chronic   Psychiatric: He has a normal mood and affect.   Nursing note and vitals reviewed.      Meds:    Current Facility-Administered Medications:   •  NS  •  furosemide  •  potassium bicarbonate  •  [COMPLETED] piperacillin-tazobactam **AND** piperacillin-tazobactam  •  ipratropium-albuterol  •  enoxaparin (LOVENOX) injection  •  ferrous sulfate  •  levETIRAcetam  •  Pharmacy  •  acetaminophen  •  Notify provider if pain remains uncontrolled **AND** Use the numeric rating scale (NRS-11) on regular floors and Critical-Care Pain Observation Tool (CPOT) on ICUs/Trauma to assess pain **AND** Pulse Ox (Oximetry) **AND** Pharmacy Consult Request **AND** If patient difficult to arouse and/or has respiratory depression, stop any opiates that are currently infusing and call a Rapid Response. **AND** oxyCODONE immediate-release **AND** oxyCODONE immediate-release **AND** morphine injection  •  famotidine  •  ondansetron  •  Respiratory Care per Protocol  •  NS    Labs:  Recent Labs      12/16/18   0332  12/17/18   0330  12/18/18   0620   WBC  17.6*  24.3*  23.2*   RBC  2.94*  3.48*  3.44*   HEMOGLOBIN  9.3*  11.2*  11.1*   HEMATOCRIT  29.7*  35.5*  34.3*   MCV  101.0*  99.7*  99.7*   MCH  31.6  32.2  32.3   RDW  51.0*  51.3*  51.8*   PLATELETCT  392  372  339   MPV  9.5  9.2  9.5   NEUTSPOLYS  82.80*  83.10*  85.00*   LYMPHOCYTES  7.30*  7.40*  6.10*   MONOCYTES  7.70  6.00  5.90   EOSINOPHILS  0.20  1.60  1.40   BASOPHILS  0.40  0.70  0.20     Recent Labs      12/15/18   1412  12/16/18   0954  12/17/18   0930   SODIUM  136  138  136   POTASSIUM  4.1  3.9  4.0   CHLORIDE  104  104  99   CO2  24  30  33   GLUCOSE  122*  113*  125*   BUN  26*  29*  28*     Recent Labs      12/15/18   1412  12/16/18   0954  12/17/18   0930   ALBUMIN  2.5*  2.2*  2.4*   TBILIRUBIN  0.8  0.7  0.7   ALKPHOSPHAT  200*  152*  143*   TOTPROTEIN  5.2*  4.4*  4.9*   ALTSGPT  27  32  31   ASTSGOT  26  29   22   CREATININE  0.78  0.71  0.72       Imaging:  Ct-abdomen-pelvis With    Result Date: 12/11/2018 12/11/2018 9:22 PM HISTORY/REASON FOR EXAM:  Abd pain, diverticulitis suspected Nausea, vomiting. TECHNIQUE/EXAM DESCRIPTION:   CT scan of the abdomen and pelvis with contrast. Contrast-enhanced helical scanning was obtained from the diaphragmatic domes through the pubic symphysis following the bolus administration of nonionic contrast without complication. 100 mL of Omnipaque 350 nonionic contrast was administered without complication. Low dose optimization technique was utilized for this CT exam including automated exposure control and adjustment of the mA and/or kV according to patient size. COMPARISON: CT chest 12/11/2018. FINDINGS: Lung bases: Please see dedicated CT chest report Abdomen: The liver is unremarkable. The spleen is unremarkable. There is stranding and fluid surrounding the entire pancreas, most in the pancreatic tail. There is high density fluid extending from the pancreatic tail to the left paracolic gutter. There are multiple lobulated peripancreatic fluid area surrounding the  pancreas The gallbladder demonstrates multiple layering gallstones. Very dilated CBD, measuring up to 1.6 cm. There is a soft tissue attenuation lesion in the distal CBD (image 39 series 7) The adrenal glands are normal in size. The kidneys enhance symmetrically. There is a 7.5 cm cyst in the upper pole right kidney. No hydronephrosis. Tiny nonobstructive calculus in the lower pole left kidney. Moderate atherosclerotic disease of the abdominal aorta and its branches. Aortobiiliac graft There is no lymphadenopathy. No bowel wall thickening or bowel dilatation. Diffuse wall thickening of the stomach. Right lower quadrant ostomy. Pelvis: Limited visualization of the pelvis due to bilateral femur hardwares. There are small urinary bladder diverticula. Layering stones/debris in the urinary bladder. Moderate amount of high  density fluid in the pelvis, likely hemorrhagic fluid No aggressive bone lesions are seen. ___________________________________     1. Stranding and fluid surrounding the entire pancreas with several lobulated peripancreatic fluid area surrounding the pancreas. This could be sequela of prior pancreatitis versus cystic pancreatic neoplasm. More high density fluid extending from the pancreatic tail to the left paracolic gutter could relate to hemorrhage of one of these cystic lesions or sequela of acute on chronic pancreatitis. Severely dilated CBD, measuring up to 1.6 cm. Soft tissue attenuation lesion in the distal CBD could be an obstructing mass or noncalcified stone. Further evaluation with MRCP and MR pancreas is recommended. 2. Cholelithiasis. 3. Diffuse wall thickening of the stomach could be gastritis or reactive change secondary to the pancreatic process. 4. Layering stones/debris in the urinary bladder.    Dx-chest-portable (1 View)    Result Date: 12/15/2018  12/15/2018 8:50 AM HISTORY/REASON FOR EXAM:  Shortness of Breath TECHNIQUE/EXAM DESCRIPTION AND NUMBER OF VIEWS: Single portable view of the chest. COMPARISON:  CXR 12/14/2018. CT for PE 12/11/2018. FINDINGS: Feeding tube is present. The heart is at the upper limits of normal in size. There is persistent atelectasis or pneumonia in the left lower lobe with small left pleural effusion. Round lobulated opacity or mass in the right upper lobe is present. Minimal interstitial edema is present in the right lower lobe.     1.  Persistent atelectasis or pneumonia in the left lower lobe with small left pleural effusion. 2.  Minor residual edema and effusion in the right lower lobe. 3.  Lobulated pulmonary nodules in the right midlung again noted. Differential diagnosis includes lung carcinoma.    Dx-chest-portable (1 View)    Result Date: 12/14/2018 12/14/2018 3:45 AM HISTORY/REASON FOR EXAM:  Shortness of Breath. TECHNIQUE/EXAM DESCRIPTION AND NUMBER OF  VIEWS: Single portable view of the chest. COMPARISON: 12/11/2018 FINDINGS: LUNGS: Unchanged right upper lobe mass. Small bilateral pleural effusions, left slightly worse than the right. Retrocardiac opacity, atelectasis versus consolidation. PNEUMOTHORAX: None. LINES AND TUBES: Enteric tube tip is distal to the GE junction, outside the field-of-view. MEDIASTINUM: Stable cardiac silhouette. Atherosclerosis. MUSCULOSKELETAL STRUCTURES: Unchanged.     1. New retrocardiac atelectasis versus consolidation. 2. Small bilateral pleural effusions and bibasilar atelectasis, left worse than right. 3. Stable right upper lobe mass. 4. Enteric tube tip is distal to the GE junction.    Dx-chest-portable (1 View)    Result Date: 12/11/2018 12/11/2018 4:35 PM HISTORY/REASON FOR EXAM:  Loss of appetite, sore throat, chills.  Nausea and vomiting. TECHNIQUE/EXAM DESCRIPTION AND NUMBER OF VIEWS: Single portable view of the chest. COMPARISON: 1/30/2018 FINDINGS: Cardiac mediastinal contour is unchanged. Ill-defined opacity again seen in the RIGHT mid to upper lung, slightly more apparent than prior exam. No pleural fluid collection or pneumothorax. Severe degenerative change of both shoulders.     1.  No pneumonia or pulmonary edema. 2.  Slight interval increase in size of ill-defined RIGHT mid to upper lung nodular opacity, concerning for mass.    [id number] Ducts    Result Date: 12/13/2018 12/13/2018 3:05 PM HISTORY/REASON FOR EXAM:  Main OR ERCP TECHNIQUE/EXAM DESCRIPTION AND NUMBER OF VIEWS: Fluoroscopic unit obligated to the operating room for greater than one hour for ERCP procedure.  6 fluoroscopic images provided. COMPARISON: None FINDINGS: Initial cannulation the common bile duct which is dilated and shows filling defect consistent with stones which appear to be removed on later images. 6 seconds fluoroscopy time utilized.     Limited images obtained during ERCP procedure showing apparent removal of common bile duct  stones.    Mr-abdomen-with & W/o    Result Date: 12/13/2018 12/12/2018 3:47 PM HISTORY/REASON FOR EXAM:  Neoplasm: pancreas, suspected; Obstruction of bile duct. TECHNIQUE/EXAM DESCRIPTION: MRI of the pancreas with dynamic IV gadolinium enhancement. MR imaging of the pancreas was performed.  MR images of the pancreas were obtained with coronal and axial single-shot fast spin-echo T2, fat-suppressed axial FRFSE T2, axial in-phase and out-of-phase FSPGR T1, axial DWI with a b-value of 600, 3D MRCP,  precontrast fat-suppressed FSPGR T1, dynamic gadolinium enhanced axial fat-suppressed T1 FSPGR in the arterial dominant, portal venous, 2-minute, and 4-minute delayed phases, with delayed coronal fat-suppressed T1 FSPGR sequence. . The study was performed on a [employer] 1.5 [employer] MRI scanner. 7 mL Gadavist contrast was administered intravenously. COMPARISON: CT abdomen and pelvis 12/11/2018 FINDINGS: Motion artifact limits exam. Small bilateral pleural effusions with associated consolidation. Liver shows mildly nodular margins.  No enhancing mass demonstrated.  Prominent intrahepatic biliary ducts. Spleen is unremarkable. Small amount of peritoneal fluid present. RIGHT kidney cysts with largest at the upper pole measuring 7.7 cm.  LEFT kidney is unremarkable.  No enhancing renal mass. Gallbladder shows multiple dependent signal voids consistent with stones. Common hepatic duct is dilated measuring 17 mm.,  Bile duct measures 14 mm.  Large ovoid signal voids within the distal common bile duct consistent with stones. Pancreatic duct is not significantly dilated. Complex fluid collection tracks along the pancreatic head and body, as seen on prior CT.  Edema in the pancreatic tail with fluid extending into the LEFT pararenal and perisplenic space.  T1 hyperintense material present. Multilevel Schmorl's nodes in the thoracolumbar spine, with reactive endplate changes.  Apparent edema within the L3 vertebral body.     1.   Marked biliary dilation with large common bile duct stones. 2.  Stones also present in the gallbladder. 3.  Complex peripancreatic fluid collection, likely pseudocyst, with apparent inflammatory changes in the pancreatic tail suggesting superimposed acute pancreatitis with fluid extending into the LEFT pararenal space, likely hemorrhagic pancreatitis.  Hemorrhage within cystic lesion at the dorsal aspect of pancreatic tail also noted.  Pancreatic mass is difficult to exclude. 4.  Small volume ascites. 5.  Small bilateral pleural effusions with associated consolidation. 6.  RIGHT kidney cysts. 7.  Possible subacute compression fracture of L3.     Ct-cta Chest Pulmonary Artery W/ Recons    Result Date: 12/11/2018 12/11/2018 9:22 PM HISTORY/REASON FOR EXAM:  PE suspected, high pretest prob; Rule out PE. Also comment on right upper lobe opacity TECHNIQUE/EXAM DESCRIPTION: CT angiogram scan for pulmonary embolism with contrast, with reconstructions. 1.25 mm helical sections were obtained from the lung apices through the lung bases following the rapid bolus administration of 100 mL of Omnipaque 350 nonionic contrast. Thin-section overlapping reconstruction interval was utilized. Coronal reconstructions were generated from the axial data. MIP post processing was performed and utilized for the interpretation. Low dose optimization technique was utilized for this CT exam including automated exposure control and adjustment of the mA and/or kV according to patient size. COMPARISON: 3/24/2015 FINDINGS: Pulmonary Embolism: No. Main Pulmonary Arteries: No. Segmental branches: No. Subsegmental branches: No. Additional Comments: None. Lungs: Lobulated 1.7 x 1.8 x 3.1 cm mass in the right upper lobe. Diffuse emphysematous change. Patchy bibasilar opacities. Airway: There is debris completely filling the bilateral lower lobe airways. Pleura: No pleural effusion or pneumothorax. Nodes: No enlarged mediastinal or hilar lymph nodes.  Additional findings: Thoracic aorta and great vessels:  No aneurysm. Moderate atherosclerotic disease Heart and pericardium: Moderate coronary artery calcification. No pericardial effusion. Thoracic spine:  No fracture or malalignment. No compression deformity. Chest wall:   Unremarkable. Diffuse wall thickening throughout the esophagus. There is retaining fluid in the esophagus. There is a 1.4 cm paraesophageal lymph node. Questionable enlarged right paraesophageal lymph node more superiorly (image 93 series 4). Visualized upper abdomen: Please see dedicated report     1. No CT evidence of pulmonary embolism. 2. Lobulated 1.7 x 1.8 x 3.1 cm mass in the right upper lobe, concerning for lung malignancy. PET/CT, and/or tissue sampling is recommended. 3. Diffuse wall thickening throughout the esophagus could relate to esophagitis. Mildly prominent 1.4 cm paraesophageal lymph node adjacent to the distal esophagus. Questionable enlarged right paraesophageal lymph nodes more proximally. 4. Fluid opacification of the esophagus. There is debris completely filled the bilateral lower lobe airways with associated patchy bibasilar opacities. This is concerning for aspiration pneumonia due to retained fluid in the esophagus.    Ec-echocardiogram Complete W/ Cont    Result Date: 12/12/2018  Transthoracic Echo Report Echocardiography Laboratory CONCLUSIONS Left ventricular ejection fraction is visually estimated to be 65%. Hypokinesis of the apical septal wall. Grossly normal right ventricular size and systolic function. No significant valve disease or flow abnormalities. Compared to the images of the prior study done 3/9/2018 -  there has been no significant changeLV EF:  65    % FINDINGS Left Ventricle Normal left ventricular systolic function. Left ventricular ejection fraction is visually estimated to be 65%. Hypokinesis of the apical septal wall. Diastolic function is difficult to assess with tachycardia. Contrast was used to  enhance visualization of the endocardial border. 3ML of contrast was administered. Existing IV was used. Located at the left ac fossa. Right Ventricle Right ventricle not well visualized. Grossly normal right ventricular size and systolic function. Right Atrium The right atrium is normal in size.  Normal inferior vena cava size and inspiratory collapse. Left Atrium The left atrium is normal in size.  Left atrial volume index is 27  mL/sq m. Mitral Valve Structurally normal mitral valve without significant stenosis.  Trace tricuspid regurgitation. Aortic Valve The aortic valve is not well visualized but appears to be opening well with no aortic insufficiency. Tricuspid Valve The tricuspid valve is not well visualized. Unable to estimate pulmonary artery pressure due to an inadequate tricuspid regurgitant jet. Pulmonic Valve The pulmonic valve is not well visualized. Pericardium Normal pericardium without effusion. Aorta Ascending aorta not well visualized. Segundo Barreto MD (Electronically Signed) Final Date:     12 December 2018                 11:30    Dx-abdomen For Tube Placement    Result Date: 12/14/2018 12/13/2018 11:29 PM HISTORY/REASON FOR EXAM:  Line evaluation. TECHNIQUE/EXAM DESCRIPTION AND NUMBER OF VIEWS:  1 view(s) of the abdomen. COMPARISON:  No recent studies available for comparison. FINDINGS: Enteric tube has been placed. The tip projects over the junction of the second and third portions of duodenum. The bowel gas pattern is nonspecific.  Several dilated small bowel loops.     Enteric tube tip projects over the junction of the second and third portions of duodenum.      Micro:  Results     Procedure Component Value Units Date/Time    BLOOD CULTURE [[id number]]  (Abnormal)  (Susceptibility) Collected:  12/11/18 1553    Order Status:  Completed Specimen:  Blood from Peripheral Updated:  12/17/18 1140     Significant Indicator POS (POS)     Source BLD     Site PERIPHERAL     Blood Culture  "Growth detected by Bactec instrument. 12/12/2018  15:36 (A)      Prevotella oralis (A)      Klebsiella pneumoniae (A)    Narrative:       CALL  [place]  161 tel. [phone],  CALLED  161 tel. [phone] 12/16/2018, 12:55, RB PERF. RESULTS CALLED TO: RN  [id number] [Working up Aerobic Lactose ]  Per Hospital Policy: Only change Specimen Src: to \"Line\" if  specified by physician order.    Culture & Susceptibility     KLEBSIELLA PNEUMONIAE     Antibiotic Sensitivity Microscan Unit Status    Ampicillin Resistant >16 mcg/mL Final    Method: SENSITIVITY, AMERICO    Cefepime Sensitive <=8 mcg/mL Final    Method: SENSITIVITY, AMERICO    Cefotaxime Sensitive <=2 mcg/mL Final    Method: SENSITIVITY, AMERICO    Cefotetan Sensitive <=16 mcg/mL Final    Method: SENSITIVITY, AMERICO    Ceftazidime Sensitive <=1 mcg/mL Final    Method: SENSITIVITY, AMERICO    Ceftriaxone Sensitive <=8 mcg/mL Final    Method: SENSITIVITY, AMERICO    Cefuroxime Sensitive <=4 mcg/mL Final    Method: SENSITIVITY, AMERICO    Ciprofloxacin Sensitive <=1 mcg/mL Final    Method: SENSITIVITY, AMERICO    Ertapenem Sensitive <=1 mcg/mL Final    Method: SENSITIVITY, AMERICO    Gentamicin Sensitive <=4 mcg/mL Final    Method: SENSITIVITY, AMERICO    Pip/Tazobactam Sensitive <=16 mcg/mL Final    Method: SENSITIVITY, AMERICO    Tigecycline Sensitive <=2 mcg/mL Final    Method: SENSITIVITY, AMERICO    Tobramycin Sensitive <=4 mcg/mL Final    Method: SENSITIVITY, AMERICO    Trimeth/Sulfa Sensitive <=2/38 mcg/mL Final    Method: SENSITIVITY, AMERICO                       BLOOD CULTURE [[id number]] Collected:  12/14/18 1436    Order Status:  Completed Specimen:  Blood from Peripheral Updated:  12/15/18 0900     Significant Indicator NEG     Source BLD     Site PERIPHERAL     Blood Culture No Growth    Note: Blood cultures are incubated for 5 days and  are monitored continuously.Positive blood cultures  are called to the RN and reported as soon as  they are identified.      Narrative:       Collected By:[id number] DERIK " "PREET FORMAN  Per Hospital Policy: Only change Specimen Src: to \"Line\" if  specified by physician order.    BLOOD CULTURE [[id number]] Collected:  12/14/18 1307    Order Status:  Completed Specimen:  Blood from Peripheral Updated:  12/15/18 0900     Significant Indicator NEG     Source BLD     Site PERIPHERAL     Blood Culture No Growth    Note: Blood cultures are incubated for 5 days and  are monitored continuously.Positive blood cultures  are called to the RN and reported as soon as  they are identified.      Narrative:       Collected By:[id number] DERIK FORMAN  Per Hospital Policy: Only change Specimen Src: to \"Line\" if  specified by physician order.    BLOOD CULTURE [[id number]]  (Abnormal) Collected:  12/11/18 1620    Order Status:  Completed Specimen:  Blood from Peripheral Updated:  12/13/18 1324     Significant Indicator POS (POS)     Source BLD     Site PERIPHERAL     Blood Culture Growth detected by Bactec instrument. 12/12/2018  17:04 (A)      Prevotella oralis (A)    Narrative:       CALL  [place]  161 tel. [phone],  CALLED  161 tel. [phone] 12/12/2018, 17:05, RB PERF. RESULTS CALLED  TO:[id number] RN  Per Hospital Policy: Only change Specimen Src: to \"Line\" if  specified by physician order.    URINE CULTURE(NEW) [[id number]] Collected:  12/11/18 2030    Order Status:  Completed Specimen:  Urine Updated:  12/13/18 0826     Significant Indicator NEG     Source UR     Site --     Urine Culture No growth at 48 hours    Narrative:       Indication for culture:->Emergency Room Patient    Influenza A/B By PCR [[id number]] Collected:  12/11/18 2125    Order Status:  Completed Updated:  12/11/18 2214     Influenza virus A RNA Negative     Influenza virus B, PCR Negative    URINALYSIS [[id number]]  (Abnormal) Collected:  12/11/18 2030    Order Status:  Completed Specimen:  Urine Updated:  12/11/18 2110     Color Yellow     Character Cloudy (A)     Specific Gravity 1.025     Ph 5.0     Glucose Negative mg/dL      Ketones " Trace (A) mg/dL      Protein 30 (A) mg/dL      Bilirubin Negative     Urobilinogen, Urine 0.2     Nitrite Negative     Leukocyte Esterase Negative     Occult Blood Negative     Micro Urine Req Microscopic    Narrative:       Indication for culture:->Emergency Room Patient          Assessment:  Active Hospital Problems    Diagnosis   • Sepsis (HCC) [A41.9]   • Acute on chronic respiratory failure with hypoxia (HCC) [J96.21]   • Pneumonia due to infectious organism [J18.9]   • Mass of upper lobe of right lung [R91.8]   • Acute biliary pancreatitis [K85.10]   • Oral phase dysphagia [R13.11]   • Generalized abdominal pain [R10.84]   • Troponin level elevated [R74.8]   • Esophagitis [K20.9]   • Atrial flutter (HCC) [I48.92]        ASSESSMENT:      Conner Mora is a 78 y.o.male admitted 12/11/2018. Pt has a past medical history of seizure disorder on Keppra, respiratory disorder on 2 L nocturnal oxygen, PAD, he has a colostomy and is status post colectomy in 2010.  Presented to the ED complaining of nausea, vomiting and abdominal pain times 2-weeks.  He also reported cough and increasing shortness of breath.  He was diagnosed with pneumonia based on CT chest.  He has pancreatitis thought secondary to gallstones and is status post ERCP. ID consulted for new bacteremia with Prevotella oralis. He is currently being treated with ceftriaxone 2 g daily and Flagyl 500 mg 3 times daily.      PLAN:      Sepsis   Cholangitis and pneumonia  Afebrile  Persistent leukocytosis  Initial demand ischemia and AMS  Bcxs 12/11 + Prevotella oralis and Klebsiella  Bcxs12/14 blood cultures NGTD   Anticipate IV abx through 12/28 followed by PO due to stent below  Recommend repeat CT as WBC not improving and continued N/V     Pancreatitis secondary to large gallstone  MRI on 1212 with marked biliary dilation, stones noted, peripancreatic fluid collection, likely pseudocyst with inflammatory changes suggesting acute pancreatitis  S/p ERCP on  12/13 with 35 x 18 mm stone obstructing the distal bile duct as well as multiple smaller stones, stone was not removed due to large size plan for lithotripsy at a later date  Biliary stent placed for deep compression, improvement in alk phos and T bili  CT as above     Pneumonia  CT on 12/11 debris in the bilateral lower lobe airways and associated bibasilar opacity, fluid retained in the esophagus- concern for aspiration pneumonia  Persistent O2 requirements   Pulm toilet  Abx as above      Lung mass  Lobulated 1.7 x 1.8 x 3.1 cm mass in the right upper lobe-concern for malignancy  Biopsy once may stable     Nausea and vomiting  Multifactorial  DC'd Flagyl  Continue Zosyn  Improved LFTS and lipase at last check     Esophagitis- noted on CT

## 2018-12-18 NOTE — PROGRESS NOTES
Critical Care Progress Note    Date of admission  12/11/2018    Chief Complaint  78 y.o. male admitted 12/11/2018 with nausea/vomiting, abd pain, LA 8.1, AF/RVR    Hospital Course    78 y.o. male who presented 12/11/2018 with shortness of breath and abdominal pain.  He has a past medical history of seizure disorder chronic, chronic respiratory failure on 2 L of home oxygen at night, peripheral vascular disease, and GERD.  He has been having nausea and vomiting for the past 2 weeks.  He says that his pain is worse on the lower left but he has diffuse generalized pain.  He has a history of colitis and he has a colostomy placement from several years ago.  He has not have been having high output.  His lactic acid is 8.1..  No hematemasis or gi bleed.  Has been having cough with productive sputum.      Interval Problem Update  Reviewed last 24 hour events:   Continues on high flow oxygen  WBC remains high at 23.3  Oriented X 2  SBP   Complains of LLQ abdominal pain  Weak cough  IV antibiotc through 12/28    Prior 24 hours  Remains on high flow oxygen  Lucid and orriented  Subjectively feels about same or slightly improved from yesterday  Sinus tach with PVCs  NPO with feeding tube  Worsening pulm infiltrates by CXR today  Remains on antibiotics  Adequate cough/gag             Review of Systems  Review of Systems   Unable to perform ROS: Acuity of condition   Constitutional: Positive for malaise/fatigue.   HENT: Negative.    Eyes: Negative.    Respiratory: Positive for cough and shortness of breath.         Dry cough unchanged   Gastrointestinal: Positive for abdominal pain.   Genitourinary: Negative.    Musculoskeletal: Negative.    Skin: Negative.    Neurological: Positive for weakness.   Psychiatric/Behavioral: Negative.         Vital Signs for last 24 hours   Temp:  [36.3 °C (97.3 °F)-36.9 °C (98.4 °F)] 36.7 °C (98.1 °F)  Pulse:  [] 106  Resp:  [16-29] 18    Hemodynamic parameters for last 24 hours        Respiratory       Physical Exam   Physical Exam   Constitutional: He is oriented to person, place, and time. He appears well-developed. He appears lethargic. He appears ill.   HENT:   Head: Atraumatic.   Eyes: Pupils are equal, round, and reactive to light. Right eye exhibits no discharge.   Neck: Neck supple. No tracheal deviation present.   Cardiovascular: An irregularly irregular rhythm present.   Pulmonary/Chest: He has rales.   Diminished, moderate respiratory distress,   Abdominal: Soft. Bowel sounds are normal. He exhibits distension. There is tenderness.   LLQ abdominal pain, no guarding   Musculoskeletal: Normal range of motion. He exhibits no edema.   Neurological: He is oriented to person, place, and time. He appears lethargic. No cranial nerve deficit.   Skin: Skin is warm and dry.   Psychiatric: He has a normal mood and affect.       Medications  Current Facility-Administered Medications   Medication Dose Route Frequency Provider Last Rate Last Dose   • furosemide (LASIX) injection 40 mg  40 mg Intravenous Q DAY Franc Horn M.D.   40 mg at 12/18/18 0613   • potassium bicarbonate (KLYTE) effervescent tablet 50 mEq  50 mEq Oral DAILY Franc Horn M.D.   50 mEq at 12/18/18 0612   • piperacillin-tazobactam (ZOSYN) 3.375 g in  mL IVPB  3.375 g Intravenous Q8HRS Gifty Adams M.D. 25 mL/hr at 12/18/18 1245 3.375 g at 12/18/18 1245   • ipratropium-albuterol (DUONEB) nebulizer solution  3 mL Nebulization Q4H PRN (RT) Aime Patterson M.D.   3 mL at 12/15/18 1510   • enoxaparin (LOVENOX) inj 40 mg  40 mg Subcutaneous DAILY Jason Niño M.D.   40 mg at 12/18/18 0804   • ferrous sulfate (FEOSOL) 220 (44 Fe) MG/5ML solution 325 mg  325 mg Feeding Tube BID WITH MEALS Jason Niño M.D.   325 mg at 12/18/18 1231   • levETIRAcetam (KEPPRA) tablet 750 mg  750 mg Per NG Tube BID Jason Niño M.D.   750 mg at 12/18/18 0612   • Pharmacy Consult: Enteral tube feeding - review  meds/change route/product selection   Other PRN Aime Patterson M.D.       • acetaminophen (TYLENOL) tablet 650 mg  650 mg Feeding Tube Q6HRS PRN Aime Patterson M.D.       • Pharmacy Consult Request ...Pain Management Review   Other PRN Aime Patterson M.D.        And   • oxyCODONE immediate-release (ROXICODONE) tablet 2.5 mg  2.5 mg Feeding Tube Q3HRS PRN Aime Patterson M.D.        And   • oxyCODONE immediate-release (ROXICODONE) tablet 5 mg  5 mg Feeding Tube Q3HRS PRN Aime Patterson M.D.   5 mg at 12/18/18 0804    And   • morphine (pf) 10 mg/mL injection 2 mg  2 mg Intravenous Q3HRS PRN Aime Patterson M.D.   2 mg at 12/16/18 2246   • famotidine (PEPCID) tablet 20 mg  20 mg Per NG Tube BID Aime Patterson M.D.   20 mg at 12/18/18 0613   • ondansetron (ZOFRAN) syringe/vial injection 4 mg  4 mg Intravenous Q6HRS PRN Adonay Queen R.N.   4 mg at 12/18/18 0426   • Respiratory Care per Protocol   Nebulization Continuous RT Juan Cobian M.D.       • NS (BOLUS) infusion 1,000 mL  1,000 mL Intravenous Once PRN Juan Cobian M.D.   Stopped at 12/12/18 1440       Fluids    Intake/Output Summary (Last 24 hours) at 12/18/18 1438  Last data filed at 12/18/18 0800   Gross per 24 hour   Intake          1321.66 ml   Output             2235 ml   Net          -913.34 ml       Laboratory          Recent Labs      12/16/18   0954  12/17/18   0930   SODIUM  138  136   POTASSIUM  3.9  4.0   CHLORIDE  104  99   CO2  30  33   BUN  29*  28*   CREATININE  0.71  0.72   CALCIUM  8.7  8.9     Recent Labs      12/16/18   0954  12/17/18   0930   ALTSGPT  32  31   ASTSGOT  29  22   ALKPHOSPHAT  152*  143*   TBILIRUBIN  0.7  0.7   GLUCOSE  113*  125*     Recent Labs      12/16/18   0332  12/16/18   0954  12/17/18   0330  12/17/18   0930  12/18/18   0620   WBC  17.6*   --   24.3*   --   23.2*   NEUTSPOLYS  82.80*   --   83.10*   --   85.00*   LYMPHOCYTES  7.30*   --   7.40*   --   6.10*   MONOCYTES  7.70    --   6.00   --   5.90   EOSINOPHILS  0.20   --   1.60   --   1.40   BASOPHILS  0.40   --   0.70   --   0.20   ASTSGOT   --   29   --   22   --    ALTSGPT   --   32   --   31   --    ALKPHOSPHAT   --   152*   --   143*   --    TBILIRUBIN   --   0.7   --   0.7   --      Recent Labs      12/16/18   0332  12/17/18   0330  12/18/18   0620   RBC  2.94*  3.48*  3.44*   HEMOGLOBIN  9.3*  11.2*  11.1*   HEMATOCRIT  29.7*  35.5*  34.3*   PLATELETCT  392  372  339       Imaging  X-Ray:  I have personally reviewed the images and compared with prior images.    Assessment/Plan  Sepsis (HCC)- (present on admission)   Assessment & Plan    Severe sepsis with end organ damage  Lactic acid 9, trending down  Associated with respiratory failure with severe hypoxia  Continue Zosyn, report of gram-negative rods from blood cultures, final pending  Cover for cholangitis, aspiration pneumonitis, suspect ongoing gallstone pancreatitis  Titrated oxygen, high flow nasal cannula, monitor need for intubation    Remains critically ill and fragile with high risk of mortality  High WBC still despite broad spectrum antibiotics  Will obtain CT of abdomen today  Continue antibiotics       Mass of upper lobe of right lung- (present on admission)   Assessment & Plan    Follow-up CT, plus minus PET     Pneumonia due to infectious organism- (present on admission)   Assessment & Plan    Consolidations seen on ct imaing  Possible aspiration, especially givne vomiting  Blood culture pending    Cultures negative to date- continue empiric antibiotic therapy  Reculture prn     Acute on chronic respiratory failure with hypoxia (HCC)- (present on admission)   Assessment & Plan    Home o2 nocturnal, history of COPD  Now exacerbated with sepsis/SIRS, gallstone pancreatitis,?  Pneumonia  High flow nasal cannula, respiratory protocols, may require intubation    No interval change but remains at risk for respiratory failure requiring intubation/ mechanical  ventilation  With weak cough will need to observe closely. With weak cough NOT a candidate for non-invasive vent/     Atrial flutter (HCC)   Assessment & Plan    On amiodarone drip.  No changes today     Troponin level elevated- (present on admission)   Assessment & Plan    Secondary to demand ischemia     Generalized abdominal pain- (present on admission)   Assessment & Plan    Secondary to gallstone pancreatitis  Volume resuscitation  MRCP, GI consultation    CT of abdomen- abdominal pain high WBC          VTE:  Lovenox  Ulcer: H2 Antagonist  Lines: None    I have performed a physical exam and reviewed and updated ROS and Plan today (12/18/2018). In review of yesterday's note (12/17/2018), there are no changes except as documented above.   He remains critically ill.  ERCP and biliary stent.  He will require lithotripsy in the future.  Increasing FiO2 requirement, increasing left pleural effusion on chest imaging, risk for infection.  Monitor closely in ICU with high risk for further deterioration.  He may require intubation; f/u cxr today  Discussed patient condition and risk of morbidity and/or mortality with Hospitalist, Family, RN, RT, Pharmacy and QA team  The patient remains critically ill.  Critical care time = 35 minutes in directly providing and coordinating critical care and extensive data review.  No time overlap and excludes procedures.

## 2018-12-18 NOTE — PROGRESS NOTES
Monitor summary    .14/.12/.34  PVCs frequent    7 beat run of v-tach at 1900 12/17/18. No chest pain. Isolated incident during shift.  Jeremy LANDEROS

## 2018-12-18 NOTE — PROGRESS NOTES
Monitor Summary  Sinus Tachycardia 100s  .16/.12/.32    12 Hour Chart Check    · 2 RN skin check complete.   · Devices in place Mepilex.  · Skin assessed under devices.  · Confirmed skin tear found on left buttock.  · The following interventions in place: Mepilex

## 2018-12-18 NOTE — PROGRESS NOTES
Hospital Medicine Daily Progress Note    Date of Service  12/18/2018    Chief Complaint  Abdominal pain    Hospital Course    78 y.o. 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 Source is suspected pneumonia/pancreatic abscess. Patient had ERCP and biliary stent placed on 12/13/18. Bacteremia with Prevotella and Klebsiella.  Repeat Blood cultures no growth to date      Interval Problem Update  On high flow nasal canula 40L 60% O2  Ongoing abd pain  Urgent CT Abd/Pelvis ordered by myself  Discussed with ICU Rn and Dr Horn  Weak cough  Afebrile  Enteral feed decreased to 35cc/hr due to abd pain.   On zosyn  Diuresing with lasix    Consultants/Specialty  Intensivist  Gastroenterology  Infectious disease     Code Status  FULL    Disposition  Monitor in ICU    Review of Systems  Review of Systems   Constitutional: Negative for chills and fever.   Respiratory: Negative for shortness of breath.    Cardiovascular: Negative for chest pain and leg swelling.   Gastrointestinal: Positive for abdominal pain. Negative for diarrhea, nausea and vomiting.   Genitourinary: Negative for dysuria.   Musculoskeletal: Negative for neck pain.   Neurological: Negative for dizziness and headaches.   Psychiatric/Behavioral: The patient is not nervous/anxious.         Physical Exam  Temp:  [36.3 °C (97.4 °F)-36.9 °C (98.4 °F)] 36.6 °C (97.8 °F)  Pulse:  [] 101  Resp:  [16-30] 30  BP: (92)/(58) 92/58    Physical Exam   Constitutional: He is oriented to person, place, and time. He appears well-developed. He appears ill. No distress.   HENT:   Head: Normocephalic and atraumatic.   Nose: Nose normal.   Mouth/Throat: Oropharynx is clear and moist.   Eyes: Conjunctivae and EOM are normal. Right eye exhibits no discharge. Left eye exhibits no discharge. No scleral icterus.   Neck: Normal range of motion. No tracheal deviation present.    Cardiovascular: Normal rate, regular rhythm, normal heart sounds and intact distal pulses.    No murmur heard.  Pulmonary/Chest: Effort normal and breath sounds normal. No respiratory distress. He has no wheezes.   Abdominal: Soft. Bowel sounds are normal. He exhibits no distension. There is tenderness.   Musculoskeletal: He exhibits no edema.   Neurological: He is alert and oriented to person, place, and time. No cranial nerve deficit.   Skin: Skin is warm. He is not diaphoretic.   Psychiatric: He has a normal mood and affect. His behavior is normal. Thought content normal.   Vitals reviewed.      Fluids    Intake/Output Summary (Last 24 hours) at 12/18/18 2301  Last data filed at 12/18/18 1800   Gross per 24 hour   Intake           695.83 ml   Output             2780 ml   Net         -2084.17 ml       Laboratory  Recent Labs      12/16/18   0332  12/17/18   0330  12/18/18   0620   WBC  17.6*  24.3*  23.2*   RBC  2.94*  3.48*  3.44*   HEMOGLOBIN  9.3*  11.2*  11.1*   HEMATOCRIT  29.7*  35.5*  34.3*   MCV  101.0*  99.7*  99.7*   MCH  31.6  32.2  32.3   MCHC  31.3*  32.3*  32.4*   RDW  51.0*  51.3*  51.8*   PLATELETCT  392  372  339   MPV  9.5  9.2  9.5     Recent Labs      12/16/18   0954  12/17/18   0930   SODIUM  138  136   POTASSIUM  3.9  4.0   CHLORIDE  104  99   CO2  30  33   GLUCOSE  113*  125*   BUN  29*  28*   CREATININE  0.71  0.72   CALCIUM  8.7  8.9                   Imaging  CT-ABDOMEN-PELVIS WITH   Final Result      1.  Extensive irregular and heterogeneous fluid collection about the pancreas, extending into the LEFT upper quadrant/perisplenic region and LEFT paracolic gutter which is more extensive than prior exam and likely indicates pancreatic pseudocyst.   2.  Inflammation adjacent the gastric fundus.   3.  Postoperative changes as described.  Biliary stent present.   4.  Increasing bilateral pleural fluid and associated atelectasis.      DX-CHEST-PORTABLE (1 VIEW)   Final Result         1.   Pulmonary edema and/or infiltrates.   2.  Small layering left pleural effusion   3.  Nodular density in the right upper lung field, corresponding with pulmonary nodular mass on recent CT December 11, 2018. See CT for further characterization and recommendations.      DX-CHEST-PORTABLE (1 VIEW)   Final Result         1. Left effusion is smaller. Unchanged left basilar atelectasis.   2. Stable right basilar opacity, atelectasis versus consolidation.   3. Unchanged nodular mass lesion in the right upper lobe.      DX-CHEST-PORTABLE (1 VIEW)   Final Result      1.  Persistent atelectasis or pneumonia in the left lower lobe with small left pleural effusion.      2.  Minor residual edema and effusion in the right lower lobe.      3.  Lobulated pulmonary nodules in the right midlung again noted. Differential diagnosis includes lung carcinoma.      DX-CHEST-PORTABLE (1 VIEW)   Final Result         1. New retrocardiac atelectasis versus consolidation.   2. Small bilateral pleural effusions and bibasilar atelectasis, left worse than right.   3. Stable right upper lobe mass.   4. Enteric tube tip is distal to the GE junction.      DX-ABDOMEN FOR TUBE PLACEMENT   Final Result      Enteric tube tip projects over the junction of the second and third portions of duodenum.      [id number] DUCTS   Final Result      Limited images obtained during ERCP procedure showing apparent removal of common bile duct stones.      MR-ABDOMEN-WITH & W/O   Final Result      1.  Marked biliary dilation with large common bile duct stones.   2.  Stones also present in the gallbladder.   3.  Complex peripancreatic fluid collection, likely pseudocyst, with apparent inflammatory changes in the pancreatic tail suggesting superimposed acute pancreatitis with fluid extending into the LEFT pararenal space, likely hemorrhagic pancreatitis.     Hemorrhage within cystic lesion at the dorsal aspect of pancreatic tail also noted.  Pancreatic mass is  difficult to exclude.   4.  Small volume ascites.   5.  Small bilateral pleural effusions with associated consolidation.   6.  RIGHT kidney cysts.   7.  Possible subacute compression fracture of L3.            EC-ECHOCARDIOGRAM COMPLETE W/ CONT   Final Result      CT-ABDOMEN-PELVIS WITH   Final Result         1. Stranding and fluid surrounding the entire pancreas with several lobulated peripancreatic fluid area surrounding the pancreas. This could be sequela of prior pancreatitis versus cystic pancreatic neoplasm.      More high density fluid extending from the pancreatic tail to the left paracolic gutter could relate to hemorrhage of one of these cystic lesions or sequela of acute on chronic pancreatitis.      Severely dilated CBD, measuring up to 1.6 cm. Soft tissue attenuation lesion in the distal CBD could be an obstructing mass or noncalcified stone.      Further evaluation with MRCP and MR pancreas is recommended.      2. Cholelithiasis.      3. Diffuse wall thickening of the stomach could be gastritis or reactive change secondary to the pancreatic process.      4. Layering stones/debris in the urinary bladder.      CT-CTA CHEST PULMONARY ARTERY W/ RECONS   Final Result         1. No CT evidence of pulmonary embolism.      2. Lobulated 1.7 x 1.8 x 3.1 cm mass in the right upper lobe, concerning for lung malignancy. PET/CT, and/or tissue sampling is recommended.      3. Diffuse wall thickening throughout the esophagus could relate to esophagitis. Mildly prominent 1.4 cm paraesophageal lymph node adjacent to the distal esophagus. Questionable enlarged right paraesophageal lymph nodes more proximally.      4. Fluid opacification of the esophagus. There is debris completely filled the bilateral lower lobe airways with associated patchy bibasilar opacities. This is concerning for aspiration pneumonia due to retained fluid in the esophagus.      DX-CHEST-PORTABLE (1 VIEW)   Final Result      1.  No pneumonia or  pulmonary edema.   2.  Slight interval increase in size of ill-defined RIGHT mid to upper lung nodular opacity, concerning for mass.           Assessment/Plan  Acute biliary pancreatitis   Assessment & Plan    Status post ERCP with sphincterotomy and stenting on 12/13/2018 12/18/18 CT Abd showing increase in size of pancreatic pseudocyst  Patient has retained large CBD stone for which she will need follow-up as outpatient for repeat ERCP with lithotripsy  Evaluated by Dr. Carter from surgery lap gregory canceled given his acute respiratory failure and high oxygen requirements       Sepsis (HCC)- (present on admission)   Assessment & Plan    This is severe sepsis with the following associated acute organ dysfunction(s): acute respiratory failure.   Abdominal source and pneumonia  Antibitoics  ID consulting  Monitor vitals, I/O's, imaging     Mass of upper lobe of right lung- (present on admission)   Assessment & Plan    He will need CT-guided biopsy when he has recovered from this acute illness  Patient and wife have been made aware       Pneumonia due to infectious organism- (present on admission)   Assessment & Plan    Pneumonia due to GNB anaerobes Prevotella   With bacteremia likely secondary to aspiration  Zosyn       Acute on chronic respiratory failure with hypoxia (HCC)- (present on admission)   Assessment & Plan    CTA chest negative for PE  SOB/failure due to pneumonia and sepsis  Antibiotics  Monitor vitals     Anemia- (present on admission)   Assessment & Plan    12/18 Hgb:11.1  No sign of bleeding.  Monitor CBC     Leukocytosis   Assessment & Plan    Ongoing  Ct Abd/pelvis ordered and shows increase in pseudocyst  ID consulting  Monitor cbc and vitals.     Pancreatic pseudocyst   Assessment & Plan    Pain management  GI consulting  Future drainage once cyst matured     Oral phase dysphagia   Assessment & Plan    Continue tube feeding  Continue speech therapy     Atrial flutter (HCC)   Assessment & Plan     Resolved  Off amiodarone drip  Monitor on vitals/telemetry  Evaluated by cardiology with no recommendations for anticoagulation     Esophagitis- (present on admission)   Assessment & Plan    Continue Pepcid     Troponin level elevated- (present on admission)   Assessment & Plan    C/o due to demand ischemia  Echo EF 65% unchanged from prior          VTE prophylaxis: SCDs

## 2018-12-19 NOTE — PROGRESS NOTES
Monitor summary: nsr-st, rate , 18/12/36    12 hour chart check     2 RN skin check.  Redness noted under high flow straps to tops of bilateral ears. Foam protectors applied and straps pulled above ears.  See wound note for other pressure ulcer sites.  Incontinent dermatitis noted to perineum/coccyx area.  Condom cath replaced twice during shift with complete change of sheets required.  Barrier cream applied.  Left buttock red/purple, blanchable. Mepilex in place.

## 2018-12-19 NOTE — DISCHARGE PLANNING
Transitional Care Navigator:    Per chart review patient appears to be a candidate for post acute services of SNF per medical history, ST recommendations and LACE+ of 80 indicating high risk of readmission. PT and OT are pending assessments at this time. Please consider a referral to SNF for post acute needs.

## 2018-12-19 NOTE — PROGRESS NOTES
Infectious Disease Progress Note    Author: Gifty Adams M.D. Date & Time of service: 2018  3:14 PM    Chief Complaint:  Sepsis and pneumonia      Interval History:  12/15 AF, O2 15 L oxygen mask, increase, Labs and micro results reviewed. Imaging reviewed, chest x-ray slightly worse on the left. Medications reviewed.    AF, O2 50 L High flow NC, Labs and micro results reviewed. Imaging reviewed.   AF WBC 24 c/o N/V today   AF WBC 23 continued nausea and vomiting-denies abd pain   AF WBC 22.5 +nausea, less vomiting CT reviewed   Medications reviewed.  No secretions per nurse.  No change in stool output in colostomy.     Review of Systems:  Review of Systems   Constitutional: Positive for malaise/fatigue. Negative for chills and fever.   Respiratory: Negative for cough, sputum production and shortness of breath.    Gastrointestinal: Positive for abdominal pain, nausea and vomiting. Negative for diarrhea.   Musculoskeletal: Negative for back pain.   Skin: Negative for itching and rash.   Neurological: Positive for weakness.       Hemodynamics:  Temp (24hrs), Av.6 °C (97.9 °F), Min:36.4 °C (97.5 °F), Max:36.8 °C (98.2 °F)  Temperature: 36.4 °C (97.5 °F)  Pulse  Av.9  Min: 54  Max: 169Heart Rate (Monitored): (!) 108  Blood Pressure : (!) 92/58, NIBP: (!) 94/61       Physical Exam:  Physical Exam   Constitutional: He appears well-developed.   HENT:   Head: Normocephalic and atraumatic.   Eyes: Pupils are equal, round, and reactive to light. EOM are normal. No scleral icterus.   Neck: Normal range of motion.   Cardiovascular: Normal rate and regular rhythm.    Pulmonary/Chest: Effort normal. No respiratory distress.   Diminished breath sounds bilaterally   Mild tachypnea   Abdominal: Soft. Bowel sounds are normal. He exhibits no distension. There is tenderness. There is guarding. There is no rebound.   Ostomy  voluntary   Musculoskeletal: He exhibits no edema.   BKA    Lymphadenopathy:     He has no cervical adenopathy.   Neurological: He is alert.   Skin: Skin is warm and dry. No rash noted. He is not diaphoretic.   Hyperpigmented, macular lesion on right upper face and eyelid, chronic   Psychiatric: He has a normal mood and affect.   Nursing note and vitals reviewed.      Meds:    Current Facility-Administered Medications:   •  [COMPLETED] piperacillin-tazobactam **AND** piperacillin-tazobactam  •  ipratropium-albuterol  •  enoxaparin (LOVENOX) injection  •  ferrous sulfate  •  levETIRAcetam  •  Pharmacy  •  acetaminophen  •  Notify provider if pain remains uncontrolled **AND** Use the numeric rating scale (NRS-11) on regular floors and Critical-Care Pain Observation Tool (CPOT) on ICUs/Trauma to assess pain **AND** Pulse Ox (Oximetry) **AND** Pharmacy Consult Request **AND** If patient difficult to arouse and/or has respiratory depression, stop any opiates that are currently infusing and call a Rapid Response. **AND** oxyCODONE immediate-release **AND** oxyCODONE immediate-release **AND** morphine injection  •  famotidine  •  ondansetron  •  Respiratory Care per Protocol  •  NS    Labs:  Recent Labs      12/17/18   0330  12/18/18   0620  12/19/18   0400   WBC  24.3*  23.2*  22.5*   RBC  3.48*  3.44*  3.36*   HEMOGLOBIN  11.2*  11.1*  10.6*   HEMATOCRIT  35.5*  34.3*  33.7*   MCV  99.7*  99.7*  100.3*   MCH  32.2  32.3  31.5   RDW  51.3*  51.8*  52.5*   PLATELETCT  372  339  399   MPV  9.2  9.5  9.5   NEUTSPOLYS  83.10*  85.00*  85.40*   LYMPHOCYTES  7.40*  6.10*  5.50*   MONOCYTES  6.00  5.90  5.90   EOSINOPHILS  1.60  1.40  1.70   BASOPHILS  0.70  0.20  0.70     Recent Labs      12/17/18   0930  12/19/18   0400   SODIUM  136  137   POTASSIUM  4.0  4.5   CHLORIDE  99  95*   CO2  33  35*   GLUCOSE  125*  126*   BUN  28*  24*     Recent Labs      12/17/18   0930  12/19/18   0400   ALBUMIN  2.4*  2.4*   TBILIRUBIN  0.7  0.9   ALKPHOSPHAT  143*  111*   TOTPROTEIN  4.9*  5.1*    ALTSGPT  31  20   ASTSGOT  22  16   CREATININE  0.72  0.91       Imaging:  Ct-abdomen-pelvis With    Result Date: 12/11/2018 12/11/2018 9:22 PM HISTORY/REASON FOR EXAM:  Abd pain, diverticulitis suspected Nausea, vomiting. TECHNIQUE/EXAM DESCRIPTION:   CT scan of the abdomen and pelvis with contrast. Contrast-enhanced helical scanning was obtained from the diaphragmatic domes through the pubic symphysis following the bolus administration of nonionic contrast without complication. 100 mL of Omnipaque 350 nonionic contrast was administered without complication. Low dose optimization technique was utilized for this CT exam including automated exposure control and adjustment of the mA and/or kV according to patient size. COMPARISON: CT chest 12/11/2018. FINDINGS: Lung bases: Please see dedicated CT chest report Abdomen: The liver is unremarkable. The spleen is unremarkable. There is stranding and fluid surrounding the entire pancreas, most in the pancreatic tail. There is high density fluid extending from the pancreatic tail to the left paracolic gutter. There are multiple lobulated peripancreatic fluid area surrounding the  pancreas The gallbladder demonstrates multiple layering gallstones. Very dilated CBD, measuring up to 1.6 cm. There is a soft tissue attenuation lesion in the distal CBD (image 39 series 7) The adrenal glands are normal in size. The kidneys enhance symmetrically. There is a 7.5 cm cyst in the upper pole right kidney. No hydronephrosis. Tiny nonobstructive calculus in the lower pole left kidney. Moderate atherosclerotic disease of the abdominal aorta and its branches. Aortobiiliac graft There is no lymphadenopathy. No bowel wall thickening or bowel dilatation. Diffuse wall thickening of the stomach. Right lower quadrant ostomy. Pelvis: Limited visualization of the pelvis due to bilateral femur hardwares. There are small urinary bladder diverticula. Layering stones/debris in the urinary bladder.  Moderate amount of high density fluid in the pelvis, likely hemorrhagic fluid No aggressive bone lesions are seen. ___________________________________     1. Stranding and fluid surrounding the entire pancreas with several lobulated peripancreatic fluid area surrounding the pancreas. This could be sequela of prior pancreatitis versus cystic pancreatic neoplasm. More high density fluid extending from the pancreatic tail to the left paracolic gutter could relate to hemorrhage of one of these cystic lesions or sequela of acute on chronic pancreatitis. Severely dilated CBD, measuring up to 1.6 cm. Soft tissue attenuation lesion in the distal CBD could be an obstructing mass or noncalcified stone. Further evaluation with MRCP and MR pancreas is recommended. 2. Cholelithiasis. 3. Diffuse wall thickening of the stomach could be gastritis or reactive change secondary to the pancreatic process. 4. Layering stones/debris in the urinary bladder.    Dx-chest-portable (1 View)    Result Date: 12/15/2018  12/15/2018 8:50 AM HISTORY/REASON FOR EXAM:  Shortness of Breath TECHNIQUE/EXAM DESCRIPTION AND NUMBER OF VIEWS: Single portable view of the chest. COMPARISON:  CXR 12/14/2018. CT for PE 12/11/2018. FINDINGS: Feeding tube is present. The heart is at the upper limits of normal in size. There is persistent atelectasis or pneumonia in the left lower lobe with small left pleural effusion. Round lobulated opacity or mass in the right upper lobe is present. Minimal interstitial edema is present in the right lower lobe.     1.  Persistent atelectasis or pneumonia in the left lower lobe with small left pleural effusion. 2.  Minor residual edema and effusion in the right lower lobe. 3.  Lobulated pulmonary nodules in the right midlung again noted. Differential diagnosis includes lung carcinoma.    Dx-chest-portable (1 View)    Result Date: 12/14/2018 12/14/2018 3:45 AM HISTORY/REASON FOR EXAM:  Shortness of Breath. TECHNIQUE/EXAM  DESCRIPTION AND NUMBER OF VIEWS: Single portable view of the chest. COMPARISON: 12/11/2018 FINDINGS: LUNGS: Unchanged right upper lobe mass. Small bilateral pleural effusions, left slightly worse than the right. Retrocardiac opacity, atelectasis versus consolidation. PNEUMOTHORAX: None. LINES AND TUBES: Enteric tube tip is distal to the GE junction, outside the field-of-view. MEDIASTINUM: Stable cardiac silhouette. Atherosclerosis. MUSCULOSKELETAL STRUCTURES: Unchanged.     1. New retrocardiac atelectasis versus consolidation. 2. Small bilateral pleural effusions and bibasilar atelectasis, left worse than right. 3. Stable right upper lobe mass. 4. Enteric tube tip is distal to the GE junction.    Dx-chest-portable (1 View)    Result Date: 12/11/2018 12/11/2018 4:35 PM HISTORY/REASON FOR EXAM:  Loss of appetite, sore throat, chills.  Nausea and vomiting. TECHNIQUE/EXAM DESCRIPTION AND NUMBER OF VIEWS: Single portable view of the chest. COMPARISON: 1/30/2018 FINDINGS: Cardiac mediastinal contour is unchanged. Ill-defined opacity again seen in the RIGHT mid to upper lung, slightly more apparent than prior exam. No pleural fluid collection or pneumothorax. Severe degenerative change of both shoulders.     1.  No pneumonia or pulmonary edema. 2.  Slight interval increase in size of ill-defined RIGHT mid to upper lung nodular opacity, concerning for mass.    En-poow-rilhjfy Ducts    Result Date: 12/13/2018 12/13/2018 3:05 PM HISTORY/REASON FOR EXAM:  Main OR ERCP TECHNIQUE/EXAM DESCRIPTION AND NUMBER OF VIEWS: Fluoroscopic unit obligated to the operating room for greater than one hour for ERCP procedure.  6 fluoroscopic images provided. COMPARISON: None FINDINGS: Initial cannulation the common bile duct which is dilated and shows filling defect consistent with stones which appear to be removed on later images. 6 seconds fluoroscopy time utilized.     Limited images obtained during ERCP procedure showing apparent removal  of common bile duct stones.    Mr-abdomen-with & W/o    Result Date: 12/13/2018 12/12/2018 3:47 PM HISTORY/REASON FOR EXAM:  Neoplasm: pancreas, suspected; Obstruction of bile duct. TECHNIQUE/EXAM DESCRIPTION: MRI of the pancreas with dynamic IV gadolinium enhancement. MR imaging of the pancreas was performed.  MR images of the pancreas were obtained with coronal and axial single-shot fast spin-echo T2, fat-suppressed axial FRFSE T2, axial in-phase and out-of-phase FSPGR T1, axial DWI with a b-value of 600, 3D MRCP,  precontrast fat-suppressed FSPGR T1, dynamic gadolinium enhanced axial fat-suppressed T1 FSPGR in the arterial dominant, portal venous, 2-minute, and 4-minute delayed phases, with delayed coronal fat-suppressed T1 FSPGR sequence. . The study was performed on a [employer] 1.5 [place] MRI scanner. 7 mL Gadavist contrast was administered intravenously. COMPARISON: CT abdomen and pelvis 12/11/2018 FINDINGS: Motion artifact limits exam. Small bilateral pleural effusions with associated consolidation. Liver shows mildly nodular margins.  No enhancing mass demonstrated.  Prominent intrahepatic biliary ducts. Spleen is unremarkable. Small amount of peritoneal fluid present. RIGHT kidney cysts with largest at the upper pole measuring 7.7 cm.  LEFT kidney is unremarkable.  No enhancing renal mass. Gallbladder shows multiple dependent signal voids consistent with stones. Common hepatic duct is dilated measuring 17 mm.,  Bile duct measures 14 mm.  Large ovoid signal voids within the distal common bile duct consistent with stones. Pancreatic duct is not significantly dilated. Complex fluid collection tracks along the pancreatic head and body, as seen on prior CT.  Edema in the pancreatic tail with fluid extending into the LEFT pararenal and perisplenic space.  T1 hyperintense material present. Multilevel Schmorl's nodes in the thoracolumbar spine, with reactive endplate changes.  Apparent edema within the L3  vertebral body.     1.  Marked biliary dilation with large common bile duct stones. 2.  Stones also present in the gallbladder. 3.  Complex peripancreatic fluid collection, likely pseudocyst, with apparent inflammatory changes in the pancreatic tail suggesting superimposed acute pancreatitis with fluid extending into the LEFT pararenal space, likely hemorrhagic pancreatitis.  Hemorrhage within cystic lesion at the dorsal aspect of pancreatic tail also noted.  Pancreatic mass is difficult to exclude. 4.  Small volume ascites. 5.  Small bilateral pleural effusions with associated consolidation. 6.  RIGHT kidney cysts. 7.  Possible subacute compression fracture of L3.     Ct-cta Chest Pulmonary Artery W/ Recons    Result Date: 12/11/2018 12/11/2018 9:22 PM HISTORY/REASON FOR EXAM:  PE suspected, high pretest prob; Rule out PE. Also comment on right upper lobe opacity TECHNIQUE/EXAM DESCRIPTION: CT angiogram scan for pulmonary embolism with contrast, with reconstructions. 1.25 mm helical sections were obtained from the lung apices through the lung bases following the rapid bolus administration of 100 mL of Omnipaque 350 nonionic contrast. Thin-section overlapping reconstruction interval was utilized. Coronal reconstructions were generated from the axial data. MIP post processing was performed and utilized for the interpretation. Low dose optimization technique was utilized for this CT exam including automated exposure control and adjustment of the mA and/or kV according to patient size. COMPARISON: 3/24/2015 FINDINGS: Pulmonary Embolism: No. Main Pulmonary Arteries: No. Segmental branches: No. Subsegmental branches: No. Additional Comments: None. Lungs: Lobulated 1.7 x 1.8 x 3.1 cm mass in the right upper lobe. Diffuse emphysematous change. Patchy bibasilar opacities. Airway: There is debris completely filling the bilateral lower lobe airways. Pleura: No pleural effusion or pneumothorax. Nodes: No enlarged mediastinal  or hilar lymph nodes. Additional findings: Thoracic aorta and great vessels:  No aneurysm. Moderate atherosclerotic disease Heart and pericardium: Moderate coronary artery calcification. No pericardial effusion. Thoracic spine:  No fracture or malalignment. No compression deformity. Chest wall:   Unremarkable. Diffuse wall thickening throughout the esophagus. There is retaining fluid in the esophagus. There is a 1.4 cm paraesophageal lymph node. Questionable enlarged right paraesophageal lymph node more superiorly (image 93 series 4). Visualized upper abdomen: Please see dedicated report     1. No CT evidence of pulmonary embolism. 2. Lobulated 1.7 x 1.8 x 3.1 cm mass in the right upper lobe, concerning for lung malignancy. PET/CT, and/or tissue sampling is recommended. 3. Diffuse wall thickening throughout the esophagus could relate to esophagitis. Mildly prominent 1.4 cm paraesophageal lymph node adjacent to the distal esophagus. Questionable enlarged right paraesophageal lymph nodes more proximally. 4. Fluid opacification of the esophagus. There is debris completely filled the bilateral lower lobe airways with associated patchy bibasilar opacities. This is concerning for aspiration pneumonia due to retained fluid in the esophagus.    Ec-echocardiogram Complete W/ Cont    Result Date: 12/12/2018  Transthoracic Echo Report Echocardiography Laboratory CONCLUSIONS Left ventricular ejection fraction is visually estimated to be 65%. Hypokinesis of the apical septal wall. Grossly normal right ventricular size and systolic function. No significant valve disease or flow abnormalities. Compared to the images of the prior study done 3/9/2018 -  there has been no significant changeLV EF:  65    % FINDINGS Left Ventricle Normal left ventricular systolic function. Left ventricular ejection fraction is visually estimated to be 65%. Hypokinesis of the apical septal wall. Diastolic function is difficult to assess with  tachycardia. Contrast was used to enhance visualization of the endocardial border. 3ML of contrast was administered. Existing IV was used. Located at the left ac fossa. Right Ventricle Right ventricle not well visualized. Grossly normal right ventricular size and systolic function. Right Atrium The right atrium is normal in size.  Normal inferior vena cava size and inspiratory collapse. Left Atrium The left atrium is normal in size.  Left atrial volume index is 27  mL/sq m. Mitral Valve Structurally normal mitral valve without significant stenosis.  Trace tricuspid regurgitation. Aortic Valve The aortic valve is not well visualized but appears to be opening well with no aortic insufficiency. Tricuspid Valve The tricuspid valve is not well visualized. Unable to estimate pulmonary artery pressure due to an inadequate tricuspid regurgitant jet. Pulmonic Valve The pulmonic valve is not well visualized. Pericardium Normal pericardium without effusion. Aorta Ascending aorta not well visualized. Segundo Barreto MD (Electronically Signed) Final Date:     12 December 2018                 11:30    Dx-abdomen For Tube Placement    Result Date: 12/14/2018 12/13/2018 11:29 PM HISTORY/REASON FOR EXAM:  Line evaluation. TECHNIQUE/EXAM DESCRIPTION AND NUMBER OF VIEWS:  1 view(s) of the abdomen. COMPARISON:  No recent studies available for comparison. FINDINGS: Enteric tube has been placed. The tip projects over the junction of the second and third portions of duodenum. The bowel gas pattern is nonspecific.  Several dilated small bowel loops.     Enteric tube tip projects over the junction of the second and third portions of duodenum.      Micro:  Results     Procedure Component Value Units Date/Time    BLOOD CULTURE [[id number]]  (Abnormal)  (Susceptibility) Collected:  12/11/18 1553    Order Status:  Completed Specimen:  Blood from Peripheral Updated:  12/17/18 1140     Significant Indicator POS (POS)     Source BLD     Site  "PERIPHERAL     Blood Culture Growth detected by Bactec instrument. 12/12/2018  15:36 (A)      Prevotella oralis (A)      Klebsiella pneumoniae (A)    Narrative:       CALL  [place]  161 tel. [phone],  CALLED  161 tel. [phone] 12/16/2018, 12:55, RB PERF. RESULTS CALLED TO: RN  [phone] [Working up Aerobic Lactose ]  Per Hospital Policy: Only change Specimen Src: to \"Line\" if  specified by physician order.    Culture & Susceptibility     KLEBSIELLA PNEUMONIAE     Antibiotic Sensitivity Microscan Unit Status    Ampicillin Resistant >16 mcg/mL Final    Method: SENSITIVITY, AMERICO    Cefepime Sensitive <=8 mcg/mL Final    Method: SENSITIVITY, AMERICO    Cefotaxime Sensitive <=2 mcg/mL Final    Method: SENSITIVITY, AMERICO    Cefotetan Sensitive <=16 mcg/mL Final    Method: SENSITIVITY, AMERICO    Ceftazidime Sensitive <=1 mcg/mL Final    Method: SENSITIVITY, AMERICO    Ceftriaxone Sensitive <=8 mcg/mL Final    Method: SENSITIVITY, AMERICO    Cefuroxime Sensitive <=4 mcg/mL Final    Method: SENSITIVITY, AMERICO    Ciprofloxacin Sensitive <=1 mcg/mL Final    Method: SENSITIVITY, AMERICO    Ertapenem Sensitive <=1 mcg/mL Final    Method: SENSITIVITY, AMERICO    Gentamicin Sensitive <=4 mcg/mL Final    Method: SENSITIVITY, AMERICO    Pip/Tazobactam Sensitive <=16 mcg/mL Final    Method: SENSITIVITY, AMERICO    Tigecycline Sensitive <=2 mcg/mL Final    Method: SENSITIVITY, AMERICO    Tobramycin Sensitive <=4 mcg/mL Final    Method: SENSITIVITY, AMERICO    Trimeth/Sulfa Sensitive <=2/38 mcg/mL Final    Method: SENSITIVITY, AMERICO                       BLOOD CULTURE [[id number]] Collected:  12/14/18 1436    Order Status:  Completed Specimen:  Blood from Peripheral Updated:  12/15/18 0900     Significant Indicator NEG     Source BLD     Site PERIPHERAL     Blood Culture No Growth    Note: Blood cultures are incubated for 5 days and  are monitored continuously.Positive blood cultures  are called to the RN and reported as soon as  they are identified.      Narrative:       " "Collected By:[id number] DERIK FORMAN  Per Hospital Policy: Only change Specimen Src: to \"Line\" if  specified by physician order.    BLOOD CULTURE [[id number]] Collected:  12/14/18 1307    Order Status:  Completed Specimen:  Blood from Peripheral Updated:  12/15/18 0900     Significant Indicator NEG     Source BLD     Site PERIPHERAL     Blood Culture No Growth    Note: Blood cultures are incubated for 5 days and  are monitored continuously.Positive blood cultures  are called to the RN and reported as soon as  they are identified.      Narrative:       Collected By:[id number] DERIK FORMAN  Per Hospital Policy: Only change Specimen Src: to \"Line\" if  specified by physician order.    BLOOD CULTURE [[id number]]  (Abnormal) Collected:  12/11/18 1620    Order Status:  Completed Specimen:  Blood from Peripheral Updated:  12/13/18 1324     Significant Indicator POS (POS)     Source BLD     Site PERIPHERAL     Blood Culture Growth detected by Bactec instrument. 12/12/2018  17:04 (A)      Prevotella oralis (A)    Narrative:       CALL  [place]  161 tel. [phone],  CALLED  161 tel. [phone] 12/12/2018, 17:05, RB PERF. RESULTS CALLED  TO:[id number] RN  Per Hospital Policy: Only change Specimen Src: to \"Line\" if  specified by physician order.    URINE CULTURE(NEW) [[id number]] Collected:  12/11/18 2030    Order Status:  Completed Specimen:  Urine Updated:  12/13/18 0826     Significant Indicator NEG     Source UR     Site --     Urine Culture No growth at 48 hours    Narrative:       Indication for culture:->Emergency Room Patient          Assessment:  Active Hospital Problems    Diagnosis   • Sepsis (HCC) [A41.9]   • Acute on chronic respiratory failure with hypoxia (HCC) [J96.21]   • Pneumonia due to infectious organism [J18.9]   • Mass of upper lobe of right lung [R91.8]   • Acute biliary pancreatitis [K85.10]   • Oral phase dysphagia [R13.11]   • Generalized abdominal pain [R10.84]   • Troponin level elevated [R74.8]   • " Esophagitis [K20.9]   • Atrial flutter (HCC) [I48.92]        ASSESSMENT:      Conner Mora is a 78 y.o.male admitted 12/11/2018. Pt has a past medical history of seizure disorder on Keppra, respiratory disorder on 2 L nocturnal oxygen, PAD, he has a colostomy and is status post colectomy in 2010.  Presented to the ED complaining of nausea, vomiting and abdominal pain times 2-weeks.  He also reported cough and increasing shortness of breath.  He was diagnosed with pneumonia based on CT chest.  He has pancreatitis thought secondary to gallstones and is status post ERCP. ID consulted for new bacteremia with Prevotella oralis. He is currently being treated with ceftriaxone 2 g daily and Flagyl 500 mg 3 times daily.      PLAN:      Sepsis   Cholangitis and pneumonia  Afebrile  Persistent leukocytosis  Initial demand ischemia and AMS  Bcxs 12/11 + Prevotella oralis and Klebsiella  Bcxs12/14 blood cultures NGTD   Anticipate IV abx through 12/28 followed by PO due to stent below     Pancreatitis secondary to large gallstone  Complicated by pseudocyst  MRI on 1212 with marked biliary dilation, stones noted, peripancreatic fluid collection, likely pseudocyst with inflammatory changes suggesting acute pancreatitis  S/p ERCP on 12/13 with 35 x 18 mm stone obstructing the distal bile duct as well as multiple smaller stones, stone was not removed due to large size plan for lithotripsy at a later date  Biliary stent placed for deep compression, improvement in alk phos and T bili  CT 12/18 Extensive multiloculated fluid collection about the pancreas, extending into the perisplenic region, increased in size from prior exam, likely complex pseudocyst.      Leukocytosis  Multifactorial  Monitor    Pneumonia  CT on 12/11 debris in the bilateral lower lobe airways and associated bibasilar opacity, fluid retained in the esophagus- concern for aspiration pneumonia  Persistent O2 requirements   Pulm toilet  Abx as above      Lung  mass  Lobulated 1.7 x 1.8 x 3.1 cm mass in the right upper lobe-concern for malignancy  Biopsy once may stable     Nausea and vomiting  Multifactorial  Continue abx as above  Improved LFTS and lipase at last check  CT as above     DW IM Dr Sorto

## 2018-12-19 NOTE — PROGRESS NOTES
12 hour chart check.     Monitor Summary : SR ST   Rate     .16/.12/.40  Frequent PVC     Two RN skin check.

## 2018-12-19 NOTE — PROCEDURES
Vascular Access Team    Date of Insertion: 12/19/18  Arm Circumference: 26  Internal length: 17  External Length: 0  Vein Occupancy %: 31  Reason for Midline: Access  Labs: WBC 22.5, , BUN 24, Cr 0.91, GFR >60, INR n/a    Orders confirmed, vessel patency confirmed with ultrasound. Risks and benefits of procedure explained to patient and education regarding line associated bloodstream infections provided. Questions answered.     Power Midline placed in LUE per MD order with ultrasound guidance, initial arm circumference 26cm. 4 Fr, single lumen Power Midline placed in brachial vein after 3 attempt(s). 2 cc's of 1% lidocaine injected intradermally, 21 gauge microintroducer needle and modified Seldinger technique used. 17 cm catheter inserted with good blood return. Secured at 0 cm marker. Each lumen flushed without resistance with 10 mL 0.9% normal saline. Midline secured with Biopatch and Tegaderm.     Midline placement is confirmed by nurse using ultrasound and ability to flush and draw blood. Midline is appropriate for use at this time.  No X-ray is needed for placement confirmation. Pt tolerated procedure well.  Patient condition relayed to unit RN or ordering physician via this post procedure note in the EMR.     Ultrasound images uploaded to PACS and viewable in the EMR - yes  Ultrasound imaged printed and placed in paper chart - no     BARD Power Midline ref # [id number], Lot # [id number]

## 2018-12-19 NOTE — PROGRESS NOTES
Care taken over at 2030.  Introduced self to patient and wife.  Patient resting quietly in bed.  VS WNL.  Call light in reach.  Bed alarm on.

## 2018-12-19 NOTE — PROGRESS NOTES
Critical Care Progress Note    Date of admission  12/11/2018    Chief Complaint  78 y.o. male admitted 12/11/2018 with nausea/vomiting, abd pain, LA 8.1, AF/RVR    Hospital Course    78 y.o. male who presented 12/11/2018 with shortness of breath and abdominal pain.  He has a past medical history of seizure disorder chronic, chronic respiratory failure on 2 L of home oxygen at night, peripheral vascular disease, and GERD.  He has been having nausea and vomiting for the past 2 weeks.  He says that his pain is worse on the lower left but he has diffuse generalized pain.  He has a history of colitis and he has a colostomy placement from several years ago.  He has not have been having high output.  His lactic acid is 8.1..  No hematemasis or gi bleed.  Has been having cough with productive sputum.      Interval Problem Update  Reviewed last 24 hour events:   Escalating oxygen requirments  Subjectively no change  CXR unchanged  Afebrile  PT/OT consults  Sandoval placed  CT abdomen no major new findings  Palliative care consult  Pt/wife request DNR/DNI status today  Talks if few word sentences  Oriented x 3  RR 26-30 times per minute but not labored      Prior 24  Remains on high flow oxygen  Lucid and orriented  Subjectively feels about same or slightly improved from yesterday  Sinus tach with PVCs  NPO with feeding tube  Worsening pulm infiltrates by CXR today  Remains on antibiotics  Adequate cough/gag       Prior 24 hours   Biliary stent placed 12/11   More disoriented today   SR, PVCs   Afebrile   alta TF at goal   Colostomy with o/p   Good UOP   EOB   HFNC 50, 60%, PEP,    CXR Pending   abx day 6 - C3/flagyl; cx's neg repeat   BUN up to 26; AP down slowly      Review of Systems  Review of Systems   Unable to perform ROS: Acuity of condition   Constitutional: Positive for malaise/fatigue.   HENT: Negative.    Eyes: Negative.    Respiratory: Positive for cough and shortness of breath.         Dry cough unchanged    Gastrointestinal: Negative.    Genitourinary: Negative.    Musculoskeletal: Negative.    Skin: Negative.    Neurological: Positive for weakness.        Vital Signs for last 24 hours   Temp:  [36.4 °C (97.5 °F)-36.8 °C (98.2 °F)] 36.4 °C (97.5 °F)  Pulse:  [] 107  Resp:  [15-30] 16  BP: (92)/(58) 92/58    Hemodynamic parameters for last 24 hours       Respiratory       Physical Exam   Physical Exam   Constitutional: He is oriented to person, place, and time. He appears well-developed. He appears lethargic. He appears ill.   HENT:   Head: Atraumatic.   Eyes: Pupils are equal, round, and reactive to light. Right eye exhibits no discharge.   Neck: Neck supple. No tracheal deviation present.   Cardiovascular: An irregularly irregular rhythm present.   Pulmonary/Chest: He has rales.   Diminished, moderate respiratory distress,   Abdominal: Soft. Bowel sounds are normal. He exhibits distension.   Musculoskeletal: Normal range of motion. He exhibits no edema.   Neurological: He is oriented to person, place, and time. He appears lethargic. No cranial nerve deficit.   Skin: Skin is warm and dry.   Psychiatric: He has a normal mood and affect. His behavior is normal.       Medications  Current Facility-Administered Medications   Medication Dose Route Frequency Provider Last Rate Last Dose   • piperacillin-tazobactam (ZOSYN) 3.375 g in  mL IVPB  3.375 g Intravenous Q8HRS Gifty Adams M.D. 25 mL/hr at 12/19/18 1302 3.375 g at 12/19/18 1302   • ipratropium-albuterol (DUONEB) nebulizer solution  3 mL Nebulization Q4H PRN (RT) Aime Patterson M.D.   3 mL at 12/15/18 1510   • enoxaparin (LOVENOX) inj 40 mg  40 mg Subcutaneous DAILY Jason Niño M.D.   40 mg at 12/19/18 0500   • ferrous sulfate (FEOSOL) 220 (44 Fe) MG/5ML solution 325 mg  325 mg Feeding Tube BID WITH MEALS Jason Niño M.D.   325 mg at 12/19/18 0817   • levETIRAcetam (KEPPRA) tablet 750 mg  750 mg Per NG Tube BID Jason Niño M.D.    750 mg at 12/19/18 0500   • Pharmacy Consult: Enteral tube feeding - review meds/change route/product selection   Other PRN Aime Patterson M.D.       • acetaminophen (TYLENOL) tablet 650 mg  650 mg Feeding Tube Q6HRS PRN Aime Patterson M.D.       • Pharmacy Consult Request ...Pain Management Review   Other PRN Aime Patterson M.D.        And   • oxyCODONE immediate-release (ROXICODONE) tablet 2.5 mg  2.5 mg Feeding Tube Q3HRS PRN Aime Patterson M.D.        And   • oxyCODONE immediate-release (ROXICODONE) tablet 5 mg  5 mg Feeding Tube Q3HRS PRN Aime Patterson M.D.   5 mg at 12/18/18 0804    And   • morphine (pf) 10 mg/mL injection 2 mg  2 mg Intravenous Q3HRS PRN Aime Patterson M.D.   2 mg at 12/16/18 2246   • famotidine (PEPCID) tablet 20 mg  20 mg Per NG Tube BID Aime Patterson M.D.   20 mg at 12/19/18 0500   • ondansetron (ZOFRAN) syringe/vial injection 4 mg  4 mg Intravenous Q6HRS PRN Adonay Queen R.N.   4 mg at 12/18/18 0426   • Respiratory Care per Protocol   Nebulization Continuous RT Juan Cobian M.D.       • NS (BOLUS) infusion 1,000 mL  1,000 mL Intravenous Once PRN Juan Cobian M.D.   Stopped at 12/12/18 1440       Fluids    Intake/Output Summary (Last 24 hours) at 12/19/18 1533  Last data filed at 12/19/18 0800   Gross per 24 hour   Intake              590 ml   Output             1775 ml   Net            -1185 ml       Laboratory          Recent Labs      12/17/18   0930  12/19/18   0400   SODIUM  136  137   POTASSIUM  4.0  4.5   CHLORIDE  99  95*   CO2  33  35*   BUN  28*  24*   CREATININE  0.72  0.91   CALCIUM  8.9  9.6     Recent Labs      12/17/18   0930  12/19/18   0400   ALTSGPT  31  20   ASTSGOT  22  16   ALKPHOSPHAT  143*  111*   TBILIRUBIN  0.7  0.9   GLUCOSE  125*  126*     Recent Labs      12/17/18   0330  12/17/18   0930  12/18/18   0620  12/19/18   0400   WBC  24.3*   --   23.2*  22.5*   NEUTSPOLYS  83.10*   --   85.00*  85.40*   LYMPHOCYTES   7.40*   --   6.10*  5.50*   MONOCYTES  6.00   --   5.90  5.90   EOSINOPHILS  1.60   --   1.40  1.70   BASOPHILS  0.70   --   0.20  0.70   ASTSGOT   --   22   --   16   ALTSGPT   --   31   --   20   ALKPHOSPHAT   --   143*   --   111*   TBILIRUBIN   --   0.7   --   0.9     Recent Labs      12/17/18   0330  12/18/18   0620  12/19/18   0400   RBC  3.48*  3.44*  3.36*   HEMOGLOBIN  11.2*  11.1*  10.6*   HEMATOCRIT  35.5*  34.3*  33.7*   PLATELETCT  372  339  399       Imaging  X-Ray:  I have personally reviewed the images and compared with prior images.    Assessment/Plan  Sepsis (HCC)- (present on admission)   Assessment & Plan    Severe sepsis with end organ damage  Lactic acid 9, trending down  Associated with respiratory failure with severe hypoxia  Continue Zosyn, report of gram-negative rods from blood cultures, final pending  Cover for cholangitis, aspiration pneumonitis, suspect ongoing gallstone pancreatitis  Titrated oxygen, high flow nasal cannula, monitor need for intubation    Remains critically ill and fragile with high risk of mortality  High WBC still despite broad spectrum antibiotics  CT of abdomen does not reveal source driving this infected status  I suspect the source of the WBC is pulmonary. Too sick for a bronch without intubation. Pt/wife request DNR/DNI status.       Mass of upper lobe of right lung- (present on admission)   Assessment & Plan    Follow-up CT, plus minus PET    Pt is currently too sick for this evaluation.     Pneumonia due to infectious organism- (present on admission)   Assessment & Plan    Consolidations seen on ct imaing  Possible aspiration, especially givne vomiting  Blood culture pending    Cultures negative to date- continue empiric antibiotic therapy   No major interval change over past 24 hours     Acute on chronic respiratory failure with hypoxia (HCC)- (present on admission)   Assessment & Plan    Home o2 nocturnal, history of COPD  Now exacerbated with sepsis/SIRS,  gallstone pancreatitis,?  Pneumonia  High flow nasal cannula, respiratory protocols, may require intubation    No interval change but remains at risk for respiratory failure requiring intubation/ mechanical ventilation  With weak cough will need to observe closely. With weak cough NOT a candidate for non-invasive vent/     Atrial flutter (HCC)   Assessment & Plan    On amiodarone drip.  No changes today     Troponin level elevated- (present on admission)   Assessment & Plan    Secondary to demand ischemia     Generalized abdominal pain- (present on admission)   Assessment & Plan    Secondary to gallstone pancreatitis  Volume resuscitation  MRCP, GI consultation    CT of abdomen- abdominal pain high WBC- no acute findings          VTE:  Lovenox  Ulcer: H2 Antagonist  Lines: None    I have performed a physical exam and reviewed and updated ROS and Plan today (12/19/2018). In review of yesterday's note (12/18/2018), there are no changes except as documented above.   He remains critically ill.  ERCP and biliary stent.  He will require lithotripsy in the future.  Increasing FiO2 requirement, increasing left pleural effusion on chest imaging, risk for infection.  Monitor closely in ICU with high risk for further deterioration.  He may require intubation; f/u cxr today  Discussed patient condition and risk of morbidity and/or mortality with Hospitalist, Family, RN, RT, Pharmacy and QA team  The patient remains critically ill.  Critical care time = 33 minutes in directly providing and coordinating critical care and extensive data review.  No time overlap and excludes procedures.

## 2018-12-19 NOTE — CARE PLAN
Problem: Oxygenation:  Goal: Maintain adequate oxygenation dependent on patient condition    Intervention: Manage oxygen therapy by monitoring pulse oximetry and/or ABG values  Pt remains on HHFO 50lpm/50%. Will continue to titrate as tolerated

## 2018-12-19 NOTE — PROGRESS NOTES
Assumed care of patient at 0700.Received bedside report from TIGRE Mahoney. Patient comfortable in bed with no needs or concerns at this time. Bed alarm set, call light within reach, bed in lowest position, treaded slipper socks on. See flowsheets for VS. Monitor ranges appropriate. Patient's pain is 0 / 10. No family at bed side. Will continue to monitor closely.

## 2018-12-20 NOTE — PALLIATIVE CARE
"Palliative Care follow-up  PC RN met with pt, Courtney and Farheen (granddaughter) at bedside, pt is sleeping. Courtney requested to discuss conversation that PC RN had with pt prior, pt told her \"somone talked to me about the end of time\". PC RN confirmed that she spoke with pt regarding end of life and GOC. PC RN updated family on conversation with pt, discussed what pt expressed, not wanting to suffer, wanting to die at home with Courtney.     Long discussion regarding COPD, the progressive and terminal nature of COPD, pt's overall debility, current O2 needs, and concern about pt surviving this hospital stay. Discussed establishing healthcare goals within the parameters of the disease processes. Courtney stated that she needs to include the family in this discussion, she would also like to discuss end of life with pt and see what he has to say before anyone makes decisions.     PC RN provided contact card, encouraged family to call with any questions or concerns.    Active listening, education, validation, normalization, and emotional support provided.    Updated:   Conchita Keating     Plan:   Family to discuss heatlhcare goals with pt and family, will schedule a care conference when family can discuss.     Thank you for allowing Palliative Care to participate in this patient's care. Please feel free to call [phone] with any questions or concerns.  "

## 2018-12-20 NOTE — CARE PLAN
Problem: Skin Integrity  Goal: Risk for impaired skin integrity will decrease  Outcome: PROGRESSING SLOWER THAN EXPECTED    Intervention: Assess risk factors for impaired skin integrity and/or pressure ulcers  See skin notes    Intervention: Implement precautions to protect skin integrity in collaboration with the interdisciplinary team  Done. See flowsheets and two RN skin checks.  Intervention: Assess and monitor skin integrity, appearance and/or temperature  Done.      Problem: Mobility  Goal: Risk for activity intolerance will decrease  Outcome: PROGRESSING AS EXPECTED  See flowsheets for mobilization.   Intervention: Provide rest periods  Done.  Intervention: Encourage patient to increase activity level in collaboration with Interdisciplinary Team  Done. See POC

## 2018-12-20 NOTE — PROGRESS NOTES
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 Source is suspected pneumonia/pancreatic abscess. Patient had ERCP and biliary stent placed on 12/13/18. Bacteremia with Prevotella and Klebsiella.  Repeat Blood cultures no growth to date      Interval Problem Update  Hgb:17.8  Awake and alert to place but confused to time  On high flow NC 50L and 50%  Palliative care met with family and they are deciding along with the patient on further directionof care.      Consultants/Specialty  Intensivist  Gastroenterology  Infectious disease     Code Status  DNR/DNI    Disposition  Monitor in ICU    Review of Systems  Review of Systems   Constitutional: Negative for fever.   HENT: Negative for sore throat.    Respiratory: Negative for shortness of breath.    Cardiovascular: Negative for chest pain and leg swelling.   Gastrointestinal: Positive for abdominal pain (LUQ). Negative for nausea and vomiting.   Musculoskeletal: Negative for neck pain.   Neurological: Positive for weakness. Negative for headaches.   Psychiatric/Behavioral: The patient is not nervous/anxious.         Physical Exam  Temp:  [36.2 °C (97.2 °F)-36.7 °C (98.1 °F)] 36.2 °C (97.2 °F)  Pulse:  [] 102  Resp:  [19-35] 25    Physical Exam   Constitutional: He is oriented to person, place, and time. He appears well-developed. He appears ill. No distress.   HENT:   Head: Normocephalic and atraumatic.   Nose: Nose normal.   Mouth/Throat: Oropharynx is clear and moist.   Eyes: Conjunctivae and EOM are normal. Right eye exhibits no discharge. Left eye exhibits no discharge. No scleral icterus.   Neck: No tracheal deviation present.   Cardiovascular: Normal rate, regular rhythm, normal heart sounds and intact distal pulses.    No  murmur heard.  Pulmonary/Chest: Effort normal and breath sounds normal. No respiratory distress. He has no wheezes.   Abdominal: Soft. Bowel sounds are normal. He exhibits no distension. There is tenderness.   Musculoskeletal: He exhibits no edema.   Neurological: He is alert and oriented to person, place, and time. No cranial nerve deficit.   Skin: Skin is warm and dry. He is not diaphoretic.   Psychiatric: He has a normal mood and affect. His behavior is normal. Thought content normal.   Vitals reviewed.      Fluids    Intake/Output Summary (Last 24 hours) at 12/20/18 1605  Last data filed at 12/20/18 1400   Gross per 24 hour   Intake              930 ml   Output             1895 ml   Net             -965 ml       Laboratory  Recent Labs      12/18/18   0620  12/19/18   0400  12/20/18   0450   WBC  23.2*  22.5*  17.8*   RBC  3.44*  3.36*  3.23*   HEMOGLOBIN  11.1*  10.6*  10.4*   HEMATOCRIT  34.3*  33.7*  32.3*   MCV  99.7*  100.3*  100.0*   MCH  32.3  31.5  32.2   MCHC  32.4*  31.5*  32.2*   RDW  51.8*  52.5*  52.1*   PLATELETCT  339  399  428   MPV  9.5  9.5  9.8     Recent Labs      12/19/18   0400  12/20/18   0450   SODIUM  137  138   POTASSIUM  4.5  4.2   CHLORIDE  95*  95*   CO2  35*  37*   GLUCOSE  126*  134*   BUN  24*  29*   CREATININE  0.91  1.11   CALCIUM  9.6  9.6                   Imaging  CT-ABDOMEN-PELVIS WITH   Final Result      1.  Extensive irregular and heterogeneous fluid collection about the pancreas, extending into the LEFT upper quadrant/perisplenic region and LEFT paracolic gutter which is more extensive than prior exam and likely indicates pancreatic pseudocyst.   2.  Inflammation adjacent the gastric fundus.   3.  Postoperative changes as described.  Biliary stent present.   4.  Increasing bilateral pleural fluid and associated atelectasis.      DX-CHEST-PORTABLE (1 VIEW)   Final Result         1.  Pulmonary edema and/or infiltrates.   2.  Small layering left pleural effusion   3.   Nodular density in the right upper lung field, corresponding with pulmonary nodular mass on recent CT December 11, 2018. See CT for further characterization and recommendations.      DX-CHEST-PORTABLE (1 VIEW)   Final Result         1. Left effusion is smaller. Unchanged left basilar atelectasis.   2. Stable right basilar opacity, atelectasis versus consolidation.   3. Unchanged nodular mass lesion in the right upper lobe.      DX-CHEST-PORTABLE (1 VIEW)   Final Result      1.  Persistent atelectasis or pneumonia in the left lower lobe with small left pleural effusion.      2.  Minor residual edema and effusion in the right lower lobe.      3.  Lobulated pulmonary nodules in the right midlung again noted. Differential diagnosis includes lung carcinoma.      DX-CHEST-PORTABLE (1 VIEW)   Final Result         1. New retrocardiac atelectasis versus consolidation.   2. Small bilateral pleural effusions and bibasilar atelectasis, left worse than right.   3. Stable right upper lobe mass.   4. Enteric tube tip is distal to the GE junction.      DX-ABDOMEN FOR TUBE PLACEMENT   Final Result      Enteric tube tip projects over the junction of the second and third portions of duodenum.      YC-NEOK-SISJOVV DUCTS   Final Result      Limited images obtained during ERCP procedure showing apparent removal of common bile duct stones.      MR-ABDOMEN-WITH & W/O   Final Result      1.  Marked biliary dilation with large common bile duct stones.   2.  Stones also present in the gallbladder.   3.  Complex peripancreatic fluid collection, likely pseudocyst, with apparent inflammatory changes in the pancreatic tail suggesting superimposed acute pancreatitis with fluid extending into the LEFT pararenal space, likely hemorrhagic pancreatitis.     Hemorrhage within cystic lesion at the dorsal aspect of pancreatic tail also noted.  Pancreatic mass is difficult to exclude.   4.  Small volume ascites.   5.  Small bilateral pleural effusions  with associated consolidation.   6.  RIGHT kidney cysts.   7.  Possible subacute compression fracture of L3.            EC-ECHOCARDIOGRAM COMPLETE W/ CONT   Final Result      CT-ABDOMEN-PELVIS WITH   Final Result         1. Stranding and fluid surrounding the entire pancreas with several lobulated peripancreatic fluid area surrounding the pancreas. This could be sequela of prior pancreatitis versus cystic pancreatic neoplasm.      More high density fluid extending from the pancreatic tail to the left paracolic gutter could relate to hemorrhage of one of these cystic lesions or sequela of acute on chronic pancreatitis.      Severely dilated CBD, measuring up to 1.6 cm. Soft tissue attenuation lesion in the distal CBD could be an obstructing mass or noncalcified stone.      Further evaluation with MRCP and MR pancreas is recommended.      2. Cholelithiasis.      3. Diffuse wall thickening of the stomach could be gastritis or reactive change secondary to the pancreatic process.      4. Layering stones/debris in the urinary bladder.      CT-CTA CHEST PULMONARY ARTERY W/ RECONS   Final Result         1. No CT evidence of pulmonary embolism.      2. Lobulated 1.7 x 1.8 x 3.1 cm mass in the right upper lobe, concerning for lung malignancy. PET/CT, and/or tissue sampling is recommended.      3. Diffuse wall thickening throughout the esophagus could relate to esophagitis. Mildly prominent 1.4 cm paraesophageal lymph node adjacent to the distal esophagus. Questionable enlarged right paraesophageal lymph nodes more proximally.      4. Fluid opacification of the esophagus. There is debris completely filled the bilateral lower lobe airways with associated patchy bibasilar opacities. This is concerning for aspiration pneumonia due to retained fluid in the esophagus.      DX-CHEST-PORTABLE (1 VIEW)   Final Result      1.  No pneumonia or pulmonary edema.   2.  Slight interval increase in size of ill-defined RIGHT mid to upper lung  nodular opacity, concerning for mass.      IR-MIDLINE CATHETER INSERTION >5 YRS    (Results Pending)        Assessment/Plan  Acute biliary pancreatitis   Assessment & Plan    Status post ERCP with sphincterotomy and stenting on 12/13/2018 12/18/18 CT Abd showing increase in size of pancreatic pseudocyst  Enteral nutrition via cortrak feeing tube  Patient has retained large CBD stone for which he will need follow-up as outpatient for repeat ERCP with lithotripsy  Evaluated by Dr. Carter from surgery lap gregory canceled given his acute respiratory failure and high oxygen requirements       Sepsis (HCC)- (present on admission)   Assessment & Plan    This is severe sepsis with the following associated acute organ dysfunction(s): acute respiratory failure.   Abdominal source and pneumonia  Antibitoics  ID consulting  Monitor vitals, I/O's, imaging     Mass of upper lobe of right lung- (present on admission)   Assessment & Plan    He will need CT-guided biopsy when he has recovered from this acute illness  Patient and wife have been made aware       Pneumonia due to infectious organism- (present on admission)   Assessment & Plan    Pneumonia due to Prevotella & Klebsiella  With bacteremia likely secondary to aspiration  Zosyn       Acute on chronic respiratory failure with hypoxia (HCC)- (present on admission)   Assessment & Plan    CTA chest negative for PE  SOB/failure due to pneumonia and sepsis  Antibiotics  Monitor vitals     Anemia- (present on admission)   Assessment & Plan    12/20 Hgb: 17.8  12/19 Hgb:10.6  12/18 Hgb:11.1  No sign of bleeding.  Monitor CBC     Leukocytosis   Assessment & Plan    Mild downtrend  ID consulting  Monitor cbc and vitals.  12/19 WBC:22.5  12/20 WBC 17     Pancreatic pseudocyst   Assessment & Plan    Pain management  GI consulting  Future drainage once cyst matured     Oral phase dysphagia   Assessment & Plan    Continue tube feeding  Continue speech therapy     Atrial flutter (HCC)    Assessment & Plan    Resolved  Off amiodarone drip  Monitor on vitals/telemetry  Evaluated by cardiology with no recommendations for anticoagulation     Esophagitis- (present on admission)   Assessment & Plan    Continue Pepcid     Troponin level elevated- (present on admission)   Assessment & Plan    C/o due to demand ischemia  Echo EF 65% unchanged from prior     Generalized abdominal pain- (present on admission)   Assessment & Plan    Likely due to pancreatic pseudocyst  Pain management          VTE prophylaxis: SCDs

## 2018-12-20 NOTE — THERAPY
"Physical Therapy Evaluation completed.   Bed Mobility:  Supine to Sit: Contact Guard Assist (raised HOB)  Transfers: Sit to Stand: Minimal Assist (with FWW)  Gait: Level Of Assist: Minimal Assist with Front-Wheel Walker x 3ft      Plan of Care: Will benefit from Physical Therapy 3 times per week  Discharge Recommendations: Equipment: No Equipment Needed.     Pt presents with impaired activity tolerance, dynamic balance and gait s/p c/c N/V found to have PNA and pancreatitis. Pt is most limited by hiflow attachment, able to initiate gait, tachy to 126, BP elevating to 105/82 with little fatigue noted, maintained 92% sp02. In current condition would recommend placement. Will follow.     See \"Rehab Therapy-Acute\" Patient Summary Report for complete documentation.     "

## 2018-12-20 NOTE — CONSULTS
Reason for PC Consult: Advance Care Planning    Consulted by:   Dr. Horn    Assessment:  General:   78 year old male admitted for A-fib on 12/11/18. Pt has a history of COPD, 2L O2 at [facility], seziures, PVD, GERD, and colostomy. Pt presented to [facility] ED with epigastric and left lower quadrant abdominal pain, nausea and vomiting. Pt was experiencing shortness of breath, he was placed on HFNC.    Dyspnea: Yes, 96% on 50L and 50% FiO2 HFNC  Last BM: 12/20/18    Pain: No    Depression: No   Dementia: No       Spiritual:  Is Yarsanism or spirituality important for coping with this illness? No, Pt declined  visit  Has a  or spiritual provider visit been requested? No    Palliative Performance Scale: 30%    Advance Directive: None on File    DPOA: None on file, MEÑO Cavanaughty Abby ([phone])  POLST: None on File    Code Status: DNR      Outcome:  PC RN visited pt at bedside, introduced self and role of PC. Discussed pt's understanding of clinical picture, pt verbalized understanding of abdominal pain. PC RN discussed pt's COPD diagnosis, pt states he was diagnosed 4 years ago and has only needed oxygen at night. Pt last saw a doctor last year, his PCP. Pt denies seeing a pulmonologist for his COPD. Explored pt's understanding of COPD, pt verbalized understanding of the progressive and terminal nature of COPD.     PC RN explored pt's beliefs and values about end of life, pt states he hasn't thought about it nor has he discussed it with his wife or family. Pt states he has never really needed to think about it until most recently. Pt denies feeling his overall health declining, but does expressed his understanding of overall general debility and disease processes. PC RN explored pt's thoughts about end of life and what is important to him at that time. Pt expressed needing to think about it more, but being at home with his wife is important to him.     PC RN discussed code status, AD and POLST form. Pt  confirmed he is a DNR/DNI. Discussed AD and POLST form, pt is agreeable to completing both forms, he choose his wife Courtney as DPOA and granddaughter Katelyn Willard as 1st Alt. Pt choose statements of desires #2, 3, & 5. Cert of competency signed and placed in  office for notary. Completed POLST form, scanned copy into EMR, original placed on hard chart and to be sent with pt upon discharge.     PC RN discussed called Courtney to discuss above and assist in conversation with pt to express his wishes, pt verbalized agreement. Called Courtney, introduced self and role of PC. She will be at bedside today to discuss GOC.    Updated:   IDT rounds    Plan:   DNR/DNI, POLST complete, AD awaiting notary. Meet with pt and spouse Courtney at bedside at 1300 today, 12/20.    Recommendations: I do not recommend an ethics or hospice consult at this time because GOC have not been established.    Thank you for allowing Palliative Care to participate in this patient's care. Please feel free to call [phone] with any questions or concerns.

## 2018-12-20 NOTE — PROGRESS NOTES
· RN skin check complete.   · Devices in place cortrak, HFNC, NIBP, SCDs, bowser catheter, PIVs.  · Skin assessed under devices yes.  · Confirmed pressure ulcers found on bilateral upper ears.  · New potential pressure ulcers noted on blanchable redness/purplish coccyx/pernieum. Wound consult placed and wound reported.  · The following interventions in place waffle cushion in place, pillows under elbows and heel, grey foam on HFNC strap and strap adjusted Q2, turned Q2, Mepilex placed on coccyx

## 2018-12-20 NOTE — PROGRESS NOTES
Two Rn Skin check.     2 RN assessment completed with TIGRE Yoder. Coccyx red  and purple, bree cream applied. Bilateral ears red and non-blanching. Right heel pink and blanching heels floated on pillow. Left BKA, scabbed and pink. Elbows pink and blanching pillows in place. Skin tears on left forearm and right abdomen. Waffle cushion overlay in use.  Pictures taken and in chart.  Wound consult ordered.

## 2018-12-20 NOTE — CARE PLAN
Problem: Oxygenation:  Goal: Maintain adequate oxygenation dependent on patient condition  Patient remains on High Flow NC  50 LPM/50% Fi02  Will continue to titrate as tolerated

## 2018-12-20 NOTE — PROGRESS NOTES
Infectious Disease Progress Note    Author: Gifty Adams M.D. Date & Time of service: 2018  12:18 PM    Chief Complaint:  Sepsis and pneumonia      Interval History:  12/15 AF, O2 15 L oxygen mask, increase, Labs and micro results reviewed. Imaging reviewed, chest x-ray slightly worse on the left. Medications reviewed.    AF, O2 50 L High flow NC, Labs and micro results reviewed. Imaging reviewed.   AF WBC 24 c/o N/V today   AF WBC 23 continued nausea and vomiting-denies abd pain   AF WBC 22.5 +nausea, less vomiting CT reviewed   AF WBC 17.8 somnolent today   Medications reviewed.  No secretions per nurse.  No change in stool output in colostomy.     Review of Systems:  Review of Systems   Unable to perform ROS: Other   Constitutional: Negative for chills and fever.   Respiratory: Negative for shortness of breath.    Skin: Negative for rash.   somnolent    Hemodynamics:  Temp (24hrs), Av.6 °C (97.9 °F), Min:36.3 °C (97.3 °F), Max:36.7 °C (98.1 °F)  Temperature: 36.3 °C (97.3 °F)  Pulse  Av.6  Min: 54  Max: 169Heart Rate (Monitored): 98  NIBP: 104/63       Physical Exam:  Physical Exam   Constitutional: He appears well-developed. No distress.   HENT:   Head: Normocephalic and atraumatic.   Neck: Normal range of motion.   Cardiovascular: Normal rate and regular rhythm.    Pulmonary/Chest: Effort normal. He has no wheezes. He has no rales.   Diminished breath sounds bilaterally   Mild tachypnea   Abdominal: Soft. Bowel sounds are normal. He exhibits no distension. There is tenderness. There is no rebound and no guarding.   Ostomy     Musculoskeletal: He exhibits no edema.   BKA   Lymphadenopathy:     He has no cervical adenopathy.   Neurological:   somnolent   Skin: Skin is warm. No rash noted.   Hyperpigmented, macular lesion on right upper face and eyelid, chronic   Psychiatric: He has a normal mood and affect.   Nursing note and vitals reviewed.      Meds:    Current  Facility-Administered Medications:   •  ascorbic acid  •  [COMPLETED] piperacillin-tazobactam **AND** piperacillin-tazobactam  •  ipratropium-albuterol  •  enoxaparin (LOVENOX) injection  •  ferrous sulfate  •  levETIRAcetam  •  Pharmacy  •  acetaminophen  •  Notify provider if pain remains uncontrolled **AND** Use the numeric rating scale (NRS-11) on regular floors and Critical-Care Pain Observation Tool (CPOT) on ICUs/Trauma to assess pain **AND** Pulse Ox (Oximetry) **AND** Pharmacy Consult Request **AND** If patient difficult to arouse and/or has respiratory depression, stop any opiates that are currently infusing and call a Rapid Response. **AND** oxyCODONE immediate-release **AND** oxyCODONE immediate-release **AND** morphine injection  •  famotidine  •  ondansetron  •  Respiratory Care per Protocol  •  NS    Labs:  Recent Labs      12/18/18   0620  12/19/18 0400  12/20/18   0450   WBC  23.2*  22.5*  17.8*   RBC  3.44*  3.36*  3.23*   HEMOGLOBIN  11.1*  10.6*  10.4*   HEMATOCRIT  34.3*  33.7*  32.3*   MCV  99.7*  100.3*  100.0*   MCH  32.3  31.5  32.2   RDW  51.8*  52.5*  52.1*   PLATELETCT  339  399  428   MPV  9.5  9.5  9.8   NEUTSPOLYS  85.00*  85.40*  80.40*   LYMPHOCYTES  6.10*  5.50*  7.30*   MONOCYTES  5.90  5.90  9.20   EOSINOPHILS  1.40  1.70  2.20   BASOPHILS  0.20  0.70  0.30     Recent Labs      12/19/18   0400  12/20/18   0450   SODIUM  137  138   POTASSIUM  4.5  4.2   CHLORIDE  95*  95*   CO2  35*  37*   GLUCOSE  126*  134*   BUN  24*  29*     Recent Labs      12/19/18   0400  12/20/18   0450   ALBUMIN  2.4*   --    TBILIRUBIN  0.9   --    ALKPHOSPHAT  111*   --    TOTPROTEIN  5.1*   --    ALTSGPT  20   --    ASTSGOT  16   --    CREATININE  0.91  1.11       Imaging:  Ct-abdomen-pelvis With    Result Date: 12/11/2018 12/11/2018 9:22 PM HISTORY/REASON FOR EXAM:  Abd pain, diverticulitis suspected Nausea, vomiting. TECHNIQUE/EXAM DESCRIPTION:   CT scan of the abdomen and pelvis with contrast.  Contrast-enhanced helical scanning was obtained from the diaphragmatic domes through the pubic symphysis following the bolus administration of nonionic contrast without complication. 100 mL of Omnipaque 350 nonionic contrast was administered without complication. Low dose optimization technique was utilized for this CT exam including automated exposure control and adjustment of the mA and/or kV according to patient size. COMPARISON: CT chest 12/11/2018. FINDINGS: Lung bases: Please see dedicated CT chest report Abdomen: The liver is unremarkable. The spleen is unremarkable. There is stranding and fluid surrounding the entire pancreas, most in the pancreatic tail. There is high density fluid extending from the pancreatic tail to the left paracolic gutter. There are multiple lobulated peripancreatic fluid area surrounding the  pancreas The gallbladder demonstrates multiple layering gallstones. Very dilated CBD, measuring up to 1.6 cm. There is a soft tissue attenuation lesion in the distal CBD (image 39 series 7) The adrenal glands are normal in size. The kidneys enhance symmetrically. There is a 7.5 cm cyst in the upper pole right kidney. No hydronephrosis. Tiny nonobstructive calculus in the lower pole left kidney. Moderate atherosclerotic disease of the abdominal aorta and its branches. Aortobiiliac graft There is no lymphadenopathy. No bowel wall thickening or bowel dilatation. Diffuse wall thickening of the stomach. Right lower quadrant ostomy. Pelvis: Limited visualization of the pelvis due to bilateral femur hardwares. There are small urinary bladder diverticula. Layering stones/debris in the urinary bladder. Moderate amount of high density fluid in the pelvis, likely hemorrhagic fluid No aggressive bone lesions are seen. ___________________________________     1. Stranding and fluid surrounding the entire pancreas with several lobulated peripancreatic fluid area surrounding the pancreas. This could be sequela  of prior pancreatitis versus cystic pancreatic neoplasm. More high density fluid extending from the pancreatic tail to the left paracolic gutter could relate to hemorrhage of one of these cystic lesions or sequela of acute on chronic pancreatitis. Severely dilated CBD, measuring up to 1.6 cm. Soft tissue attenuation lesion in the distal CBD could be an obstructing mass or noncalcified stone. Further evaluation with MRCP and MR pancreas is recommended. 2. Cholelithiasis. 3. Diffuse wall thickening of the stomach could be gastritis or reactive change secondary to the pancreatic process. 4. Layering stones/debris in the urinary bladder.    Dx-chest-portable (1 View)    Result Date: 12/15/2018  12/15/2018 8:50 AM HISTORY/REASON FOR EXAM:  Shortness of Breath TECHNIQUE/EXAM DESCRIPTION AND NUMBER OF VIEWS: Single portable view of the chest. COMPARISON:  CXR 12/14/2018. CT for PE 12/11/2018. FINDINGS: Feeding tube is present. The heart is at the upper limits of normal in size. There is persistent atelectasis or pneumonia in the left lower lobe with small left pleural effusion. Round lobulated opacity or mass in the right upper lobe is present. Minimal interstitial edema is present in the right lower lobe.     1.  Persistent atelectasis or pneumonia in the left lower lobe with small left pleural effusion. 2.  Minor residual edema and effusion in the right lower lobe. 3.  Lobulated pulmonary nodules in the right midlung again noted. Differential diagnosis includes lung carcinoma.    Dx-chest-portable (1 View)    Result Date: 12/14/2018 12/14/2018 3:45 AM HISTORY/REASON FOR EXAM:  Shortness of Breath. TECHNIQUE/EXAM DESCRIPTION AND NUMBER OF VIEWS: Single portable view of the chest. COMPARISON: 12/11/2018 FINDINGS: LUNGS: Unchanged right upper lobe mass. Small bilateral pleural effusions, left slightly worse than the right. Retrocardiac opacity, atelectasis versus consolidation. PNEUMOTHORAX: None. LINES AND TUBES: Enteric  tube tip is distal to the GE junction, outside the field-of-view. MEDIASTINUM: Stable cardiac silhouette. Atherosclerosis. MUSCULOSKELETAL STRUCTURES: Unchanged.     1. New retrocardiac atelectasis versus consolidation. 2. Small bilateral pleural effusions and bibasilar atelectasis, left worse than right. 3. Stable right upper lobe mass. 4. Enteric tube tip is distal to the GE junction.    Dx-chest-portable (1 View)    Result Date: 12/11/2018 12/11/2018 4:35 PM HISTORY/REASON FOR EXAM:  Loss of appetite, sore throat, chills.  Nausea and vomiting. TECHNIQUE/EXAM DESCRIPTION AND NUMBER OF VIEWS: Single portable view of the chest. COMPARISON: 1/30/2018 FINDINGS: Cardiac mediastinal contour is unchanged. Ill-defined opacity again seen in the RIGHT mid to upper lung, slightly more apparent than prior exam. No pleural fluid collection or pneumothorax. Severe degenerative change of both shoulders.     1.  No pneumonia or pulmonary edema. 2.  Slight interval increase in size of ill-defined RIGHT mid to upper lung nodular opacity, concerning for mass.    [id number] Ducts    Result Date: 12/13/2018 12/13/2018 3:05 PM HISTORY/REASON FOR EXAM:  Main OR ERCP TECHNIQUE/EXAM DESCRIPTION AND NUMBER OF VIEWS: Fluoroscopic unit obligated to the operating room for greater than one hour for ERCP procedure.  6 fluoroscopic images provided. COMPARISON: None FINDINGS: Initial cannulation the common bile duct which is dilated and shows filling defect consistent with stones which appear to be removed on later images. 6 seconds fluoroscopy time utilized.     Limited images obtained during ERCP procedure showing apparent removal of common bile duct stones.    Mr-abdomen-with & W/o    Result Date: 12/13/2018 12/12/2018 3:47 PM HISTORY/REASON FOR EXAM:  Neoplasm: pancreas, suspected; Obstruction of bile duct. TECHNIQUE/EXAM DESCRIPTION: MRI of the pancreas with dynamic IV gadolinium enhancement. MR imaging of the pancreas was performed.   MR images of the pancreas were obtained with coronal and axial single-shot fast spin-echo T2, fat-suppressed axial FRFSE T2, axial in-phase and out-of-phase FSPGR T1, axial DWI with a b-value of 600, 3D MRCP,  precontrast fat-suppressed FSPGR T1, dynamic gadolinium enhanced axial fat-suppressed T1 FSPGR in the arterial dominant, portal venous, 2-minute, and 4-minute delayed phases, with delayed coronal fat-suppressed T1 FSPGR sequence. . The study was performed on a [employer] 1.5 [place] MRI scanner. 7 mL Gadavist contrast was administered intravenously. COMPARISON: CT abdomen and pelvis 12/11/2018 FINDINGS: Motion artifact limits exam. Small bilateral pleural effusions with associated consolidation. Liver shows mildly nodular margins.  No enhancing mass demonstrated.  Prominent intrahepatic biliary ducts. Spleen is unremarkable. Small amount of peritoneal fluid present. RIGHT kidney cysts with largest at the upper pole measuring 7.7 cm.  LEFT kidney is unremarkable.  No enhancing renal mass. Gallbladder shows multiple dependent signal voids consistent with stones. Common hepatic duct is dilated measuring 17 mm.,  Bile duct measures 14 mm.  Large ovoid signal voids within the distal common bile duct consistent with stones. Pancreatic duct is not significantly dilated. Complex fluid collection tracks along the pancreatic head and body, as seen on prior CT.  Edema in the pancreatic tail with fluid extending into the LEFT pararenal and perisplenic space.  T1 hyperintense material present. Multilevel Schmorl's nodes in the thoracolumbar spine, with reactive endplate changes.  Apparent edema within the L3 vertebral body.     1.  Marked biliary dilation with large common bile duct stones. 2.  Stones also present in the gallbladder. 3.  Complex peripancreatic fluid collection, likely pseudocyst, with apparent inflammatory changes in the pancreatic tail suggesting superimposed acute pancreatitis with fluid extending into  the LEFT pararenal space, likely hemorrhagic pancreatitis.  Hemorrhage within cystic lesion at the dorsal aspect of pancreatic tail also noted.  Pancreatic mass is difficult to exclude. 4.  Small volume ascites. 5.  Small bilateral pleural effusions with associated consolidation. 6.  RIGHT kidney cysts. 7.  Possible subacute compression fracture of L3.     Ct-cta Chest Pulmonary Artery W/ Recons    Result Date: 12/11/2018 12/11/2018 9:22 PM HISTORY/REASON FOR EXAM:  PE suspected, high pretest prob; Rule out PE. Also comment on right upper lobe opacity TECHNIQUE/EXAM DESCRIPTION: CT angiogram scan for pulmonary embolism with contrast, with reconstructions. 1.25 mm helical sections were obtained from the lung apices through the lung bases following the rapid bolus administration of 100 mL of Omnipaque 350 nonionic contrast. Thin-section overlapping reconstruction interval was utilized. Coronal reconstructions were generated from the axial data. MIP post processing was performed and utilized for the interpretation. Low dose optimization technique was utilized for this CT exam including automated exposure control and adjustment of the mA and/or kV according to patient size. COMPARISON: 3/24/2015 FINDINGS: Pulmonary Embolism: No. Main Pulmonary Arteries: No. Segmental branches: No. Subsegmental branches: No. Additional Comments: None. Lungs: Lobulated 1.7 x 1.8 x 3.1 cm mass in the right upper lobe. Diffuse emphysematous change. Patchy bibasilar opacities. Airway: There is debris completely filling the bilateral lower lobe airways. Pleura: No pleural effusion or pneumothorax. Nodes: No enlarged mediastinal or hilar lymph nodes. Additional findings: Thoracic aorta and great vessels:  No aneurysm. Moderate atherosclerotic disease Heart and pericardium: Moderate coronary artery calcification. No pericardial effusion. Thoracic spine:  No fracture or malalignment. No compression deformity. Chest wall:   Unremarkable. Diffuse  wall thickening throughout the esophagus. There is retaining fluid in the esophagus. There is a 1.4 cm paraesophageal lymph node. Questionable enlarged right paraesophageal lymph node more superiorly (image 93 series 4). Visualized upper abdomen: Please see dedicated report     1. No CT evidence of pulmonary embolism. 2. Lobulated 1.7 x 1.8 x 3.1 cm mass in the right upper lobe, concerning for lung malignancy. PET/CT, and/or tissue sampling is recommended. 3. Diffuse wall thickening throughout the esophagus could relate to esophagitis. Mildly prominent 1.4 cm paraesophageal lymph node adjacent to the distal esophagus. Questionable enlarged right paraesophageal lymph nodes more proximally. 4. Fluid opacification of the esophagus. There is debris completely filled the bilateral lower lobe airways with associated patchy bibasilar opacities. This is concerning for aspiration pneumonia due to retained fluid in the esophagus.    Ec-echocardiogram Complete W/ Cont    Result Date: 12/12/2018  Transthoracic Echo Report Echocardiography Laboratory CONCLUSIONS Left ventricular ejection fraction is visually estimated to be 65%. Hypokinesis of the apical septal wall. Grossly normal right ventricular size and systolic function. No significant valve disease or flow abnormalities. Compared to the images of the prior study done 3/9/2018 -  there has been no significant changeLV EF:  65    % FINDINGS Left Ventricle Normal left ventricular systolic function. Left ventricular ejection fraction is visually estimated to be 65%. Hypokinesis of the apical septal wall. Diastolic function is difficult to assess with tachycardia. Contrast was used to enhance visualization of the endocardial border. 3ML of contrast was administered. Existing IV was used. Located at the left ac fossa. Right Ventricle Right ventricle not well visualized. Grossly normal right ventricular size and systolic function. Right Atrium The right atrium is normal in size.  " Normal inferior vena cava size and inspiratory collapse. Left Atrium The left atrium is normal in size.  Left atrial volume index is 27  mL/sq m. Mitral Valve Structurally normal mitral valve without significant stenosis.  Trace tricuspid regurgitation. Aortic Valve The aortic valve is not well visualized but appears to be opening well with no aortic insufficiency. Tricuspid Valve The tricuspid valve is not well visualized. Unable to estimate pulmonary artery pressure due to an inadequate tricuspid regurgitant jet. Pulmonic Valve The pulmonic valve is not well visualized. Pericardium Normal pericardium without effusion. Aorta Ascending aorta not well visualized. Segundo Barreto MD (Electronically Signed) Final Date:     12 December 2018                 11:30    Dx-abdomen For Tube Placement    Result Date: 12/14/2018 12/13/2018 11:29 PM HISTORY/REASON FOR EXAM:  Line evaluation. TECHNIQUE/EXAM DESCRIPTION AND NUMBER OF VIEWS:  1 view(s) of the abdomen. COMPARISON:  No recent studies available for comparison. FINDINGS: Enteric tube has been placed. The tip projects over the junction of the second and third portions of duodenum. The bowel gas pattern is nonspecific.  Several dilated small bowel loops.     Enteric tube tip projects over the junction of the second and third portions of duodenum.      Micro:  Results     Procedure Component Value Units Date/Time    BLOOD CULTURE [[id number]] Collected:  12/14/18 1436    Order Status:  Completed Specimen:  Blood from Peripheral Updated:  12/19/18 2300     Significant Indicator NEG     Source BLD     Site PERIPHERAL     Blood Culture No growth after 5 days of incubation.    Narrative:       Collected By:[id number] DERIK FORMAN  Per Hospital Policy: Only change Specimen Src: to \"Line\" if  specified by physician order.    BLOOD CULTURE [[id number]] Collected:  12/14/18 1307    Order Status:  Completed Specimen:  Blood from Peripheral Updated:  12/19/18 2300     " "Significant Indicator NEG     Source BLD     Site PERIPHERAL     Blood Culture No growth after 5 days of incubation.    Narrative:       Collected By:[id number] DERIK FORMAN  Per Hospital Policy: Only change Specimen Src: to \"Line\" if  specified by physician order.    BLOOD CULTURE [[id number]]  (Abnormal)  (Susceptibility) Collected:  12/11/18 1553    Order Status:  Completed Specimen:  Blood from Peripheral Updated:  12/17/18 1140     Significant Indicator POS (POS)     Source BLD     Site PERIPHERAL     Blood Culture Growth detected by Bactec instrument. 12/12/2018  15:36 (A)      Prevotella oralis (A)      Klebsiella pneumoniae (A)    Narrative:       CALL  [place]  161 tel. [phone],  CALLED  161 tel. [phone] 12/16/2018, 12:55, RB PERF. RESULTS CALLED TO: RN  [id number] [Working up Aerobic Lactose ]  Per Hospital Policy: Only change Specimen Src: to \"Line\" if  specified by physician order.    Culture & Susceptibility     KLEBSIELLA PNEUMONIAE     Antibiotic Sensitivity Microscan Unit Status    Ampicillin Resistant >16 mcg/mL Final    Method: SENSITIVITY, AMERICO    Cefepime Sensitive <=8 mcg/mL Final    Method: SENSITIVITY, AMERICO    Cefotaxime Sensitive <=2 mcg/mL Final    Method: SENSITIVITY, AMERICO    Cefotetan Sensitive <=16 mcg/mL Final    Method: SENSITIVITY, AMERICO    Ceftazidime Sensitive <=1 mcg/mL Final    Method: SENSITIVITY, AMERICO    Ceftriaxone Sensitive <=8 mcg/mL Final    Method: SENSITIVITY, AMERICO    Cefuroxime Sensitive <=4 mcg/mL Final    Method: SENSITIVITY, AMERICO    Ciprofloxacin Sensitive <=1 mcg/mL Final    Method: SENSITIVITY, AMERICO    Ertapenem Sensitive <=1 mcg/mL Final    Method: SENSITIVITY, AMERICO    Gentamicin Sensitive <=4 mcg/mL Final    Method: SENSITIVITY, AMERICO    Pip/Tazobactam Sensitive <=16 mcg/mL Final    Method: SENSITIVITY, AMERICO    Tigecycline Sensitive <=2 mcg/mL Final    Method: SENSITIVITY, AMERICO    Tobramycin Sensitive <=4 mcg/mL Final    Method: SENSITIVITY, AMERICO    Trimeth/Sulfa Sensitive " "<=2/38 mcg/mL Final    Method: SENSITIVITY, AMERICO                       BLOOD CULTURE [[id number]]  (Abnormal) Collected:  12/11/18 1620    Order Status:  Completed Specimen:  Blood from Peripheral Updated:  12/13/18 1324     Significant Indicator POS (POS)     Source BLD     Site PERIPHERAL     Blood Culture Growth detected by Bactec instrument. 12/12/2018  17:04 (A)      Prevotella oralis (A)    Narrative:       CALL  [place]  161 tel. [phone],  CALLED  161 tel. [phone] 12/12/2018, 17:05, RB PERF. RESULTS CALLED  TO:[id number] RN  Per Hospital Policy: Only change Specimen Src: to \"Line\" if  specified by physician order.          Assessment:  Active Hospital Problems    Diagnosis   • Sepsis (HCC) [A41.9]   • Acute on chronic respiratory failure with hypoxia (HCC) [J96.21]   • Pneumonia due to infectious organism [J18.9]   • Mass of upper lobe of right lung [R91.8]   • Acute biliary pancreatitis [K85.10]   • Oral phase dysphagia [R13.11]   • Generalized abdominal pain [R10.84]   • Troponin level elevated [R74.8]   • Esophagitis [K20.9]   • Atrial flutter (HCC) [I48.92]        ASSESSMENT:      Conner Mora is a 78 y.o.male admitted 12/11/2018. Pt has a past medical history of seizure disorder on Keppra, respiratory disorder on 2 L nocturnal oxygen, PAD, he has a colostomy and is status post colectomy in 2010.  Presented to the ED complaining of nausea, vomiting and abdominal pain times 2-weeks.  He also reported cough and increasing shortness of breath.  He was diagnosed with pneumonia based on CT chest.  He has pancreatitis thought secondary to gallstones and is status post ERCP. ID consulted for new bacteremia with Prevotella oralis. He is currently being treated with ceftriaxone 2 g daily and Flagyl 500 mg 3 times daily.      PLAN:      Sepsis   Cholangitis and pneumonia  Afebrile  Persistent leukocytosis  Initial demand ischemia and AMS  Bcxs 12/11 + Prevotella oralis and Klebsiella  Bcxs12/14 blood cultures " NGTD   Anticipate IV abx through 12/28 followed by PO due to stent below if tolerated     Pancreatitis secondary to large gallstone  Complicated by pseudocyst  MRI on 1212 with marked biliary dilation, stones noted, peripancreatic fluid collection, likely pseudocyst with inflammatory changes suggesting acute pancreatitis  S/p ERCP on 12/13 with 35 x 18 mm stone obstructing the distal bile duct as well as multiple smaller stones, stone was not removed due to large size plan for lithotripsy at a later date  Biliary stent placed for deep compression, improvement in alk phos and T bili  CT 12/18 Extensive multiloculated fluid collection about the pancreas, extending into the perisplenic region, increased in size from prior exam, likely complex pseudocyst.      Leukocytosis  Multifactorial  Monitor    Pneumonia  CT on 12/11 debris in the bilateral lower lobe airways and associated bibasilar opacity, fluid retained in the esophagus- concern for aspiration pneumonia  Pulm toilet  Abx as above      Lung mass  Lobulated 1.7 x 1.8 x 3.1 cm mass in the right upper lobe-concern for malignancy  Biopsy once may stable     Nausea and vomiting, improved  Multifactorial  Continue abx as above  Improved LFTS and lipase at last check  CT as above     DW IM Dr Sorto

## 2018-12-20 NOTE — PROGRESS NOTES
Critical Care Progress Note    Date of admission  12/11/2018    Chief Complaint  78 y.o. male admitted 12/11/2018 with nausea/vomiting, abd pain, LA 8.1, AF/RVR    Hospital Course    78 y.o. male who presented 12/11/2018 with shortness of breath and abdominal pain.  He has a past medical history of seizure disorder chronic, chronic respiratory failure on 2 L of home oxygen at night, peripheral vascular disease, and GERD.  He has been having nausea and vomiting for the past 2 weeks.  He says that his pain is worse on the lower left but he has diffuse generalized pain.  He has a history of colitis and he has a colostomy placement from several years ago.  He has not have been having high output.  His lactic acid is 8.1..  No hematemasis or gi bleed.  Has been having cough with productive sputum.      Interval Problem Update  Reviewed last 24 hour events:   WBC 17.5- decreasing  High flow oxygen continues   Weak cough  DNR/DNI  Palliative care consulted  Difficult to mobilize  On antibiotics      Prior 24 hours  Escalating oxygen requirments  Subjectively no change  CXR unchanged  Afebrile  PT/OT consults  Sandoval placed  CT abdomen no major new findings  Palliative care consult  Pt/wife request DNR/DNI status today  Talks if few word sentences  Oriented x 3  RR 26-30 times per minute but not labored      Prior 24 hours   Biliary stent placed 12/11   More disoriented today   SR, PVCs   Afebrile   alta TF at goal   Colostomy with o/p   Good UOP   EOB   HFNC 50, 60%, PEP,    CXR Pending   abx day 6 - C3/flagyl; cx's neg repeat   BUN up to 26; AP down slowly      Review of Systems  Review of Systems   Unable to perform ROS: Acuity of condition   Constitutional: Positive for malaise/fatigue.   HENT: Negative.    Eyes: Negative.    Respiratory: Positive for cough and shortness of breath.         Dry cough unchanged   Gastrointestinal: Negative.    Genitourinary: Negative.    Musculoskeletal: Negative.    Skin: Negative.     Neurological: Positive for weakness.        Vital Signs for last 24 hours   Temp:  [36.4 °C (97.5 °F)-36.7 °C (98.1 °F)] 36.7 °C (98.1 °F)  Pulse:  [] 107  Resp:  [16-34] 23    Hemodynamic parameters for last 24 hours       Respiratory       Physical Exam   Physical Exam   Constitutional: He is oriented to person, place, and time. He appears well-developed. He appears lethargic. He appears ill.   HENT:   Head: Atraumatic.   Eyes: Pupils are equal, round, and reactive to light. Right eye exhibits no discharge.   Neck: Neck supple. No tracheal deviation present.   Cardiovascular: An irregularly irregular rhythm present.   Pulmonary/Chest: He has rales.   Diminished, moderate respiratory distress,   Abdominal: Soft. Bowel sounds are normal. He exhibits distension.   Musculoskeletal: Normal range of motion. He exhibits no edema.   Neurological: He is oriented to person, place, and time. He appears lethargic. No cranial nerve deficit.   Skin: Skin is warm and dry.   Psychiatric: He has a normal mood and affect. His behavior is normal.       Medications  Current Facility-Administered Medications   Medication Dose Route Frequency Provider Last Rate Last Dose   • ascorbic acid tablet 500 mg  500 mg Oral DAILY Jorge Sorto DROJELIO   500 mg at 12/20/18 0500   • piperacillin-tazobactam (ZOSYN) 3.375 g in  mL IVPB  3.375 g Intravenous Q8HRS Gifty Adams M.D. 25 mL/hr at 12/20/18 0442 3.375 g at 12/20/18 0442   • ipratropium-albuterol (DUONEB) nebulizer solution  3 mL Nebulization Q4H PRN (RT) Aime Patterson M.D.   3 mL at 12/15/18 1510   • enoxaparin (LOVENOX) inj 40 mg  40 mg Subcutaneous DAILY Jason Niño M.D.   40 mg at 12/20/18 0500   • ferrous sulfate (FEOSOL) 220 (44 Fe) MG/5ML solution 325 mg  325 mg Feeding Tube BID WITH MEALS Jason Niño M.D.   325 mg at 12/20/18 0837   • levETIRAcetam (KEPPRA) tablet 750 mg  750 mg Per NG Tube BID Jason Niño M.D.   750 mg at 12/20/18 0500   •  Pharmacy Consult: Enteral tube feeding - review meds/change route/product selection   Other PRN Aime Patterson M.D.       • acetaminophen (TYLENOL) tablet 650 mg  650 mg Feeding Tube Q6HRS PRN Aime Patterson M.D.       • Pharmacy Consult Request ...Pain Management Review   Other PRN Aime Patterson M.D.        And   • oxyCODONE immediate-release (ROXICODONE) tablet 2.5 mg  2.5 mg Feeding Tube Q3HRS PRN Aime Patterson M.D.        And   • oxyCODONE immediate-release (ROXICODONE) tablet 5 mg  5 mg Feeding Tube Q3HRS PRN Aime Patterson M.D.   5 mg at 12/18/18 0804    And   • morphine (pf) 10 mg/mL injection 2 mg  2 mg Intravenous Q3HRS PRN Aime Patterson M.D.   2 mg at 12/16/18 2246   • famotidine (PEPCID) tablet 20 mg  20 mg Per NG Tube BID Aime Patterson M.D.   20 mg at 12/20/18 0500   • ondansetron (ZOFRAN) syringe/vial injection 4 mg  4 mg Intravenous Q6HRS PRN Adonay Queen R.N.   4 mg at 12/18/18 0426   • Respiratory Care per Protocol   Nebulization Continuous RT Juan Cobian M.D.       • NS (BOLUS) infusion 1,000 mL  1,000 mL Intravenous Once PRN Juan Cobian M.D.   Stopped at 12/12/18 1440       Fluids    Intake/Output Summary (Last 24 hours) at 12/20/18 0908  Last data filed at 12/20/18 0600   Gross per 24 hour   Intake              800 ml   Output             2175 ml   Net            -1375 ml       Laboratory          Recent Labs      12/17/18   0930  12/19/18   0400  12/20/18   0450   SODIUM  136  137  138   POTASSIUM  4.0  4.5  4.2   CHLORIDE  99  95*  95*   CO2  33  35*  37*   BUN  28*  24*  29*   CREATININE  0.72  0.91  1.11   CALCIUM  8.9  9.6  9.6     Recent Labs      12/17/18   0930  12/19/18   0400  12/20/18   0450   ALTSGPT  31  20   --    ASTSGOT  22  16   --    ALKPHOSPHAT  143*  111*   --    TBILIRUBIN  0.7  0.9   --    GLUCOSE  125*  126*  134*     Recent Labs      12/17/18   0930  12/18/18   0620  12/19/18   0400  12/20/18   0450   WBC   --   23.2*   22.5*  17.8*   NEUTSPOLYS   --   85.00*  85.40*  80.40*   LYMPHOCYTES   --   6.10*  5.50*  7.30*   MONOCYTES   --   5.90  5.90  9.20   EOSINOPHILS   --   1.40  1.70  2.20   BASOPHILS   --   0.20  0.70  0.30   ASTSGOT  22   --   16   --    ALTSGPT  31   --   20   --    ALKPHOSPHAT  143*   --   111*   --    TBILIRUBIN  0.7   --   0.9   --      Recent Labs      12/18/18   0620  12/19/18   0400  12/20/18   0450   RBC  3.44*  3.36*  3.23*   HEMOGLOBIN  11.1*  10.6*  10.4*   HEMATOCRIT  34.3*  33.7*  32.3*   PLATELETCT  339  399  428       Imaging  X-Ray:  I have personally reviewed the images and compared with prior images.    Assessment/Plan  Sepsis (HCC)- (present on admission)   Assessment & Plan    Severe sepsis with end organ damage  Lactic acid 9, trending down  Associated with respiratory failure with severe hypoxia  Continue Zosyn, report of gram-negative rods from blood cultures, final pending  Cover for cholangitis, aspiration pneumonitis, suspect ongoing gallstone pancreatitis  Titrated oxygen, high flow nasal cannula, monitor need for intubation    Remains critically ill and fragile with high risk of mortality  High WBC still despite broad spectrum antibiotics  CT of abdomen does not reveal source driving this infected status  I suspect the source of the WBC is pulmonary. Too sick for a bronch without intubation. Pt/wife request DNR/DNI status.       Mass of upper lobe of right lung- (present on admission)   Assessment & Plan    Follow-up CT, plus minus PET    Pt is currently too sick for this evaluation.     Pneumonia due to infectious organism- (present on admission)   Assessment & Plan    Consolidations seen on ct imaing  Possible aspiration, especially givne vomiting  Blood culture pending    Cultures negative to date- continue empiric antibiotic therapy   No major interval change over past 24 hours     Acute on chronic respiratory failure with hypoxia (HCC)- (present on admission)   Assessment & Plan     Home o2 nocturnal, history of COPD  Now exacerbated with sepsis/SIRS, gallstone pancreatitis,?  Pneumonia  High flow nasal cannula, respiratory protocols, may require intubation    No interval change but remains at risk for respiratory failure requiring intubation/ mechanical ventilation  With weak cough will need to observe closely. With weak cough NOT a candidate for non-invasive vent/     Atrial flutter (HCC)   Assessment & Plan    On amiodarone drip.  No changes today     Troponin level elevated- (present on admission)   Assessment & Plan    Secondary to demand ischemia     Generalized abdominal pain- (present on admission)   Assessment & Plan    Secondary to gallstone pancreatitis  Volume resuscitation  MRCP, GI consultation    CT of abdomen- abdominal pain high WBC- no acute findings          VTE:  Lovenox  Ulcer: H2 Antagonist  Lines: None    I have performed a physical exam and reviewed and updated ROS and Plan today (12/20/2018). In review of yesterday's note (12/19/2018), there are no changes except as documented above.   He remains critically ill.  ERCP and biliary stent.  He will require lithotripsy in the future.  Increasing FiO2 requirement, increasing left pleural effusion on chest imaging, risk for infection.  Monitor closely in ICU with high risk for further deterioration.  He may require intubation; f/u cxr today  Discussed patient condition and risk of morbidity and/or mortality with Hospitalist, Family, RN, RT, Pharmacy and QA team  The patient remains critically ill.  Critical care time = 30 minutes in directly providing and coordinating critical care and extensive data review.  No time overlap and excludes procedures.

## 2018-12-20 NOTE — PROGRESS NOTES
2 RN skin check     Redness noted to bilateral ears from high flow straps.  Foam applied and kept above/off ears.  Picture uploaded.     Incontinent dermatitis noted to coccyx/perineum.  Sandoval catheter in place.  Barrier paste applied.  Mepilex off at this time. See picture.      Left buttock skin tear/purple, blanchable area. See picture.     See wound documentation for other ulcer.     Turned q2h, waffle overlay in place, stood at bedside with walker.

## 2018-12-20 NOTE — CARE PLAN
Problem: Skin Integrity  Goal: Risk for impaired skin integrity will decrease  Outcome: PROGRESSING SLOWER THAN EXPECTED

## 2018-12-20 NOTE — DISCHARGE PLANNING
Anticipated Discharge Disposition: d/c to LTAC vs comfort care    Action: Noted palliative RN Naila w/ family at bedside.    Spoke to RN later. Family will discuss w/ the other family members regarding care plan. If they choose to continue treatment, pt will be appropriate candidate for LTAC. RN will advise LSW tomorrow after family has communicated w/ each other.    Barriers to Discharge: waiting for family/pt decision to either start comfort care or choose LTAC    Plan: f/u w/ palliative, medical team, pt/family

## 2018-12-20 NOTE — CARE PLAN
Problem: Respiratory:  Goal: Respiratory status will improve  Outcome: PROGRESSING SLOWER THAN EXPECTED

## 2018-12-20 NOTE — PROGRESS NOTES
0930- Palliative RN at bedside.    1000- Multidisciplinary rounds completed. Plan- Palliative to set up family meeting with patient's wife and grand-daughter (please see Palliative RN's note for details), transfer to telemetry, increase TF as tolerated.     1330- Palliative RN at bedside to speak with patient's wife and grand-daughter.    Report given to TIGRE Prabhakar RN 12/20/18 7:11 PM

## 2018-12-21 NOTE — CARE PLAN
Problem: Oxygenation:  Goal: Maintain adequate oxygenation dependent on patient condition  Outcome: PROGRESSING AS EXPECTED    Intervention: Manage oxygen therapy by monitoring pulse oximetry and/or ABG values  Pt remains oh HHFO at 40L/40% fio2; SPO2 93%

## 2018-12-21 NOTE — THERAPY
Speech Therapy Contact Note:  Attempted to see pt for dysphagia treatment. Per RN, pt to transfer to [place] [id number] in about 5 mins. SLP to follow after transfer.

## 2018-12-21 NOTE — PROGRESS NOTES
Report called to Rn on telemetry. Pt aware of transfer, belongings packed, wife Courtney called and notified of room change at 0935. No new concerns at this time.

## 2018-12-21 NOTE — CARE PLAN
Problem: Oxygenation:  Goal: Maintain adequate oxygenation dependent on patient condition  Pt remains on HHFNC. Settings of 50 LPM / 50% FiO2. Will titrate FiO2 and liter flow as pt condition permits.

## 2018-12-21 NOTE — THERAPY
"Speech Language Therapy dysphagia treatment completed.   Functional Status:  Pt seen on this date for dysphagia treatment. Pt A&O x3 with disorientation to date and pt's wife present at bedside. Pt currently on 40L high flow. PO trials of single ice chips, NTL via teaspoon and cup sip, and purees were provided to pt and coughing, audible upper airway congestion, increased WOB, and wet voice following PO were noted which is concerning for aspiration. Upon palpation, laryngeal elevation noted to be complete and cough noted to strong and productive. As PO trials progressed, increased fatigue was reported by pt. Pt reporting that he is completing oral motor and laryngeal exercises once a day, however, pt unable to demonstrate exercises when asked by clinician. Re-educated pt on how to complete oral motor and laryngeal exercises and pt completed 5-10 reps with \"fair-good\" accuracy. Pt continues to show consistent s/sx of penetration/aspiration and would recommend continuation of NPO with cortrak for nutrition. Dysphagia education and results/recommendations were provided to pt and pt's wife and they verbalized understanding. Pt's wife questioning when pt will be able to eat again and discussed safest diet would be NPO with cortrak, however, pt has option to eat despite risk if they choose. Discussed with pt and pt's wife that pt showing consistent s/sx of aspiration with trials today so would recommend NPO at this time and they verbalized understanding. If pt and pt's family do choose to eat despite risk, would recommend a NTFL diet with 1:1 direct supervision during meals, which would reduce, but not completely eliminate risk of aspiration. SLP to follow for dysphagia therapy .    Recommendations: continuation of NPO with cortrak, if pt and pt's family choose to eat despite risk, would recommend a NTFL diet with direct supervision during meals which would reduce, but not completely eliminate the risk of " "aspiration.  Plan of Care: Will benefit from Speech Therapy 3 times per week  Post-Acute Therapy: Recommend inpatient transitional care services for continued speech therapy services.        See \"Rehab Therapy-Acute\" Patient Summary Report for complete documentation.     "

## 2018-12-21 NOTE — CARE PLAN
Problem: Infection  Goal: Will remain free from infection  Outcome: PROGRESSING AS EXPECTED  Patient remains afebrile, WBC trending down.

## 2018-12-21 NOTE — PROGRESS NOTES
Critical Care Progress Note    Date of admission  12/11/2018    Chief Complaint  78 y.o. male admitted 12/11/2018 with nausea/vomiting, abd pain, LA 8.1, AF/RVR    Hospital Course    78 y.o. male who presented 12/11/2018 with shortness of breath and abdominal pain.  He has a past medical history of seizure disorder chronic, chronic respiratory failure on 2 L of home oxygen at night, peripheral vascular disease, and GERD.  He has been having nausea and vomiting for the past 2 weeks.  He says that his pain is worse on the lower left but he has diffuse generalized pain.  He has a history of colitis and he has a colostomy placement from several years ago.  He has not have been having high output.  His lactic acid is 8.1..  No hematemasis or gi bleed.  Has been having cough with productive sputum.      Interval Problem Update  Reviewed last 24 hour events:   WBC 17.5- decreasing  High flow oxygen continues   Weak cough  DNR/DNI  Palliative care consulted  Difficult to mobilize  On antibiotics      Prior 24 hours  Escalating oxygen requirments  Subjectively no change  CXR unchanged  Afebrile  PT/OT consults  Sandoval placed  CT abdomen no major new findings  Palliative care consult  Pt/wife request DNR/DNI status today  Talks if few word sentences  Oriented x 3  RR 26-30 times per minute but not labored      Prior 24 hours   Biliary stent placed 12/11   More disoriented today   SR, PVCs   Afebrile   alta TF at goal   Colostomy with o/p   Good UOP   EOB   HFNC 50, 60%, PEP,    CXR Pending   abx day 6 - C3/flagyl; cx's neg repeat   BUN up to 26; AP down slowly      Review of Systems  Review of Systems   Unable to perform ROS: Acuity of condition   Constitutional: Positive for malaise/fatigue.   HENT: Negative.    Eyes: Negative.    Respiratory: Positive for cough and shortness of breath.         Dry cough unchanged   Gastrointestinal: Negative.    Genitourinary: Negative.    Musculoskeletal: Negative.    Skin: Negative.     Neurological: Positive for weakness.        Vital Signs for last 24 hours   Temp:  [36.2 °C (97.2 °F)-36.8 °C (98.3 °F)] 36.8 °C (98.3 °F)  Pulse:  [] 104  Resp:  [22-39] 34  BP: (104-132)/(54-71) 117/60    Hemodynamic parameters for last 24 hours       Respiratory       Physical Exam   Physical Exam   Constitutional: He is oriented to person, place, and time. He appears well-developed. He appears lethargic. He appears ill.   HENT:   Head: Atraumatic.   Eyes: Pupils are equal, round, and reactive to light. Right eye exhibits no discharge.   Neck: Neck supple. No tracheal deviation present.   Cardiovascular: An irregularly irregular rhythm present.   Pulmonary/Chest: He has rales.   Diminished, moderate respiratory distress,   Abdominal: Soft. Bowel sounds are normal. He exhibits distension.   Musculoskeletal: Normal range of motion. He exhibits no edema.   Neurological: He is oriented to person, place, and time. He appears lethargic. No cranial nerve deficit.   Skin: Skin is warm and dry.   Psychiatric: He has a normal mood and affect. His behavior is normal.       Medications  Current Facility-Administered Medications   Medication Dose Route Frequency Provider Last Rate Last Dose   • ascorbic acid tablet 500 mg  500 mg Oral DAILY Jorge Sorto DROJELIO   500 mg at 12/21/18 0502   • piperacillin-tazobactam (ZOSYN) 3.375 g in  mL IVPB  3.375 g Intravenous Q8HRS Gifty Adams M.D. 25 mL/hr at 12/21/18 0503 3.375 g at 12/21/18 0503   • ipratropium-albuterol (DUONEB) nebulizer solution  3 mL Nebulization Q4H PRN (RT) Aime Patterson M.D.   3 mL at 12/15/18 1510   • enoxaparin (LOVENOX) inj 40 mg  40 mg Subcutaneous DAILY Jason Niño M.D.   40 mg at 12/21/18 0502   • ferrous sulfate (FEOSOL) 220 (44 Fe) MG/5ML solution 325 mg  325 mg Feeding Tube BID WITH MEALS Jason Niño M.D.   325 mg at 12/20/18 1703   • levETIRAcetam (KEPPRA) tablet 750 mg  750 mg Per NG Tube BID Jason Niño M.D.    750 mg at 12/21/18 0502   • Pharmacy Consult: Enteral tube feeding - review meds/change route/product selection   Other PRN Aime Patterson M.D.       • acetaminophen (TYLENOL) tablet 650 mg  650 mg Feeding Tube Q6HRS PRN Aime Patterson M.D.       • Pharmacy Consult Request ...Pain Management Review   Other PRN Aime Patterson M.D.        And   • oxyCODONE immediate-release (ROXICODONE) tablet 2.5 mg  2.5 mg Feeding Tube Q3HRS PRN Aime Patterson M.D.        And   • oxyCODONE immediate-release (ROXICODONE) tablet 5 mg  5 mg Feeding Tube Q3HRS PRN Aime Patterson M.D.   5 mg at 12/18/18 0804    And   • morphine (pf) 10 mg/mL injection 2 mg  2 mg Intravenous Q3HRS PRN Aime Patterson M.D.   2 mg at 12/16/18 2246   • famotidine (PEPCID) tablet 20 mg  20 mg Per NG Tube BID Aime Patterson M.D.   20 mg at 12/21/18 0503   • ondansetron (ZOFRAN) syringe/vial injection 4 mg  4 mg Intravenous Q6HRS PRN Adonay Queen R.N.   4 mg at 12/18/18 0426   • Respiratory Care per Protocol   Nebulization Continuous RT Juan Cobian M.D.       • NS (BOLUS) infusion 1,000 mL  1,000 mL Intravenous Once PRN Juan Cobian M.D.   Stopped at 12/12/18 1440       Fluids    Intake/Output Summary (Last 24 hours) at 12/21/18 0721  Last data filed at 12/21/18 0600   Gross per 24 hour   Intake             1500 ml   Output             2050 ml   Net             -550 ml       Laboratory          Recent Labs      12/19/18   0400  12/20/18   0450  12/21/18   0529   SODIUM  137  138  139   POTASSIUM  4.5  4.2  3.8   CHLORIDE  95*  95*  101   CO2  35*  37*  34*   BUN  24*  29*  31*   CREATININE  0.91  1.11  1.23   CALCIUM  9.6  9.6  10.1     Recent Labs      12/19/18   0400  12/20/18   0450  12/21/18   0529   ALTSGPT  20   --   16   ASTSGOT  16   --   16   ALKPHOSPHAT  111*   --   98   TBILIRUBIN  0.9   --   0.7   GLUCOSE  126*  134*  142*     Recent Labs      12/19/18   0400  12/20/18   0450  12/21/18   0529   WBC   22.5*  17.8*  18.4*   NEUTSPOLYS  85.40*  80.40*  80.50*   LYMPHOCYTES  5.50*  7.30*  7.70*   MONOCYTES  5.90  9.20  8.40   EOSINOPHILS  1.70  2.20  2.40   BASOPHILS  0.70  0.30  0.40   ASTSGOT  16   --   16   ALTSGPT  20   --   16   ALKPHOSPHAT  111*   --   98   TBILIRUBIN  0.9   --   0.7     Recent Labs      12/19/18   0400  12/20/18   0450  12/21/18   0529   RBC  3.36*  3.23*  3.14*   HEMOGLOBIN  10.6*  10.4*  10.1*   HEMATOCRIT  33.7*  32.3*  31.8*   PLATELETCT  399  428  463*       Imaging  X-Ray:  I have personally reviewed the images and compared with prior images.    Assessment/Plan  Sepsis (HCC)- (present on admission)   Assessment & Plan    Severe sepsis with end organ damage  Lactic acid 9, trending down  Associated with respiratory failure with severe hypoxia  Continue Zosyn, report of gram-negative rods from blood cultures, final pending  Cover for cholangitis, aspiration pneumonitis, suspect ongoing gallstone pancreatitis  Titrated oxygen, high flow nasal cannula, monitor need for intubation    Remains critically ill and fragile with high risk of mortality  High WBC still despite broad spectrum antibiotics  CT of abdomen does not reveal source driving this infected status  I suspect the source of the WBC is pulmonary. Too sick for a bronch without intubation. Pt/wife request DNR/DNI status.  Continue same therapy  High risk of death on this hospital stay given pt's frailty and lack of improvement this week       Mass of upper lobe of right lung- (present on admission)   Assessment & Plan    Follow-up CT, plus minus PET    Pt is currently too sick for this evaluation. Could represent cancer but unknown. If it were cancer pt is too sick for cancer treatments     Pneumonia due to infectious organism- (present on admission)   Assessment & Plan    Consolidations seen on ct imaing  Possible aspiration, especially givne vomiting  Blood culture pending    Cultures negative to date- continue empiric antibiotic  therapy  No responding as well to antibiotics as hople     Acute on chronic respiratory failure with hypoxia (HCC)- (present on admission)   Assessment & Plan    Home o2 nocturnal, history of COPD  Now exacerbated with sepsis/SIRS, gallstone pancreatitis,?  Pneumonia  High flow nasal cannula, respiratory protocols, may require intubation    No interval change but remains at risk for respiratory failure requiring intubation/ mechanical ventilation  With weak cough will need to observe closely. With weak cough NOT a candidate for non-invasive vent/     Atrial flutter (HCC)   Assessment & Plan    On amiodarone drip.  No changes today     Troponin level elevated- (present on admission)   Assessment & Plan    Secondary to demand ischemia     Generalized abdominal pain- (present on admission)   Assessment & Plan    Secondary to gallstone pancreatitis  Volume resuscitation  MRCP, GI consultation    CT of abdomen- abdominal pain high WBC- no acute findings          VTE:  Lovenox  Ulcer: H2 Antagonist  Lines: None    I have performed a physical exam and reviewed and updated ROS and Plan today (12/21/2018). In review of yesterday's note (12/20/2018), there are no changes except as documented above.   He remains critically ill.  ERCP and biliary stent.  He will require lithotripsy in the future.  Increasing FiO2 requirement, increasing left pleural effusion on chest imaging, risk for infection.  Monitor closely in ICU with high risk for further deterioration.  He may require intubation; f/u cxr today  Discussed patient condition and risk of morbidity and/or mortality with Hospitalist, Family, RN, RT, Pharmacy and QA team  The patient remains critically ill.  Critical care time = 30 minutes in directly providing and coordinating critical care and extensive data review.  No time overlap and excludes procedures.

## 2018-12-21 NOTE — PROGRESS NOTES
· 2 RN skin check complete.   · Devices in place: HFNC with grey foam padding, Cortrak with tape to bridge of nose, pulse ox, BP cuff, cardiac monitoring electrodes to chest,  Bowser catheter with stat lock to the right inner thigh, SCD on right calf.  · Skin assessed under devices: Bilateral ear lobes demonstrate non-blanching redness, cheek bones under HFNC WNL - blanching redness and dry, bowser site WNL, bridge of nose WNL - No skin issues.   · Confirmed pressure ulcers found on bilateral ear lobes.  · New potential pressure ulcers noted on: N/A  · Wound consult placed and wound reported: Already in place.  · The following interventions in place: Grey foam on HFNC and securing device fixated off ears, heels elevated off bed and bony prominences padded with pillows, mepilex to sacrum, dressings covering LLQ abd skin tear.

## 2018-12-21 NOTE — CARE PLAN
Problem: Bowel/Gastric:  Goal: Normal bowel function is maintained or improved  Outcome: PROGRESSING AS EXPECTED  BS active, present and audible in all 4 quadrants. Colostomy with adequate output. Stoma red, moist and WNL.

## 2018-12-21 NOTE — CARE PLAN
Problem: Respiratory:  Goal: Respiratory status will improve  Outcome: PROGRESSING SLOWER THAN EXPECTED  Patient remains on 50L and 50% on HFNC. Encouraging coughing and deep breathing, assisting with mobility.     Problem: Pain Management  Goal: Pain level will decrease to patient's comfort goal  Outcome: PROGRESSING AS EXPECTED  Patient has denied pain throughout the shift.    Problem: Skin Integrity  Goal: Risk for impaired skin integrity will decrease  Outcome: PROGRESSING SLOWER THAN EXPECTED  Patient has pressure injuries to bilateral ears, ? Contact dermatitis to perineum area. Wound consult placed. Q2 turns, waffle cushion, pillows under elbows and heels, mepilex on sacrum, grey ear protectors on HFNC and strap adjusted PRN    Problem: Mobility  Goal: Risk for activity intolerance will decrease  Outcome: PROGRESSING SLOWER THAN EXPECTED  Patient mobilized to edge of bed with RN and CNA, tires quickly.

## 2018-12-21 NOTE — CARE PLAN
Problem: Urinary Elimination:  Goal: Ability to reestablish a normal urinary elimination pattern will improve  Outcome: PROGRESSING AS EXPECTED  Urinary catheter removed. Patient reports normal urinary elimination at home, telemetry nurse receiving patient aware to monitor output.     Problem: Mobility  Goal: Risk for activity intolerance will decrease  Outcome: PROGRESSING AS EXPECTED  Patient mobilized yesterday, willing to mobilize to chair today post transfer.

## 2018-12-21 NOTE — DIETARY
Nutrition Support Weekly Update: Day 10 of admit.  Conner Mora is a 78 y.o. male with admitting DX of Atrial fibrillation with RVR, CAP (community acquired pneumonia), Sepsis. Tube feeding initiated on 12/14. Current TF via Small Bowel Cortrak is Diabetisource AC @ 65 ml/hr, providing 1872 kcals, 94 grams protein, 1279 mL free water per day.      Assessment:  Wt 12/21: 77.4 kg via bed scale - wt increased since admit. Suspect related to fluids or items on bed when weighed. Will continue to use wt on 12/12 measured admit wt/suspected dry wt.     Evaluation:   1. TF running @ goal   2. Labs: Glucose 142, BUN 31  3. Meds: ascorbic acid, lovenox, pepcid, feosol, keppra, zosyn  4. Last BM: 12/21  5. Current feeding continues to meet pt's estimated nutritional needs.      Recommendations/Plan:  1. Continue TF formula and rate   2. Fluids per MD  3. Diet upgrades per SLP.      RD following

## 2018-12-21 NOTE — PROGRESS NOTES
Bedside report rec'd. Pt resting in bed, call light in reach, whiteboard updated. All needs addressed at this time.

## 2018-12-21 NOTE — CARE PLAN
Problem: Oxygenation:  Goal: Maintain adequate oxygenation dependent on patient condition  HFNC 50L/40% pt SP02 92-95%, will continue to monitor

## 2018-12-22 NOTE — PROGRESS NOTES
Pt arrived to unit via hospital bed at 1220 from [facility]. Pt oriented to room, unit, and plan of care. Tele-monitor placed and monitor room notified. All questions answered at this time. Call light within reach; fall precautions in place.

## 2018-12-22 NOTE — RESPIRATORY CARE
COPD EDUCATION by COPD CLINICAL EDUCATOR  12/22/2018 at 7:33 AM by Annette Diez     Patient reviewed by COPD education team. Patient does not qualify for COPD program.

## 2018-12-22 NOTE — PROGRESS NOTES
2 RN skin check completed with Marybeth LANDEROS. Ears red, scabbed, non-blanching. Oxygen strap re-adjusted and foam applied.  Elbows red, blanching. Heels red, blanching.  Sacrum red, excoriated, slow to miguel a and moist. Mepilex soiled, removed. Prakash cream applied.  No other skin breakdown noted.

## 2018-12-22 NOTE — PROGRESS NOTES
Hospital Medicine Daily Progress Note    Date of Service  12/22/2018    Chief Complaint  78 y.o. male admitted 12/11/2018 with 78 y.o. 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 Source is suspected pneumonia/pancreatic abscess. Patient had ERCP and biliary stent placed on 12/13/18. Bacteremia with Prevotella and Klebsiella.  Repeat Blood cultures no growth to date      Hospital Course    Interval Problem Update  none    Consultants/Specialty  Surgery  Intensivist  Gastroenterology  Infectious disease     Code Status  dnr/dni    Disposition  pending    Review of Systems  Review of Systems   Unable to perform ROS: Acuity of condition        Physical Exam  Temp:  [36.6 °C (97.9 °F)-36.9 °C (98.4 °F)] 36.6 °C (97.9 °F)  Pulse:  [] 102  Resp:  [14-20] 16  BP: (115-127)/(70-74) 122/74    Physical Exam   Constitutional: No distress.   HENT:   Head: Normocephalic and atraumatic.   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 Conjunctivae are normal.   Neck: Normal range of motion. Neck supple.   Cardiovascular: Normal rate and regular rhythm.    Pulmonary/Chest: Effort normal and breath sounds normal.   Abdominal: There is no rebound and no guarding.   Musculoskeletal: He exhibits no edema or tenderness.   Skin: Skin is warm and dry. He is not diaphoretic.       Fluids    Intake/Output Summary (Last 24 hours) at 12/22/18 1306  Last data filed at 12/22/18 0628   Gross per 24 hour   Intake              180 ml   Output             1975 ml   Net            -1795 ml       Laboratory  Recent Labs      12/20/18   0450  12/21/18   0529  12/22/18   0154   WBC  17.8*  18.4*  21.2*   RBC  3.23*  3.14*  3.22*   HEMOGLOBIN  10.4*  10.1*  10.3*   HEMATOCRIT  32.3*  31.8*  33.4*   MCV  100.0*  101.3*  103.7*   MCH  32.2  32.2  32.0   MCHC  32.2*  31.8*   30.8*   RDW  52.1*  52.9*  55.0*   PLATELETCT  428  463*  440   MPV  9.8  9.7  9.6     Recent Labs      12/20/18   0450  12/21/18   0529  12/22/18   0154   SODIUM  138  139  144   POTASSIUM  4.2  3.8  4.0   CHLORIDE  95*  101  104   CO2  37*  34*  33   GLUCOSE  134*  142*  127*   BUN  29*  31*  36*   CREATININE  1.11  1.23  1.24   CALCIUM  9.6  10.1  10.2                   Imaging      Assessment/Plan  Acute biliary pancreatitis   Assessment & Plan    Status post ERCP with sphincterotomy and stenting on 12/13/2018 12/18/18 CT Abd showing increase in size of pancreatic pseudocyst  Enteral nutrition via cortrak feeing tube  Patient has retained large CBD stone for which he will need follow-up as outpatient for repeat ERCP with lithotripsy  Evaluated by Dr. Carter from surgery lap gregory canceled given his acute respiratory failure and high oxygen requirements  Seen at the bed side and in no acute distress       Sepsis (HCC)- (present on admission)   Assessment & Plan    This is severe sepsis with the following associated acute organ dysfunction(s): acute respiratory failure.   Abdominal source and pneumonia  Antibitoics  ID consulting  Monitor vitals, I/O's, imaging     Mass of upper lobe of right lung- (present on admission)   Assessment & Plan    He will need CT-guided biopsy when he has recovered from this acute illness  Patient and wife have been made aware       Pneumonia due to infectious organism- (present on admission)   Assessment & Plan    Pneumonia due to Prevotella & Klebsiella  With bacteremia likely secondary to aspiration  Zosyn  As per I.D       Acute on chronic respiratory failure with hypoxia (HCC)- (present on admission)   Assessment & Plan    CTA chest negative for PE  SOB/failure due to pneumonia and sepsis  Antibiotics  Monitor vitals     Anemia- (present on admission)   Assessment & Plan    H/H ARE STATBLE  No sign of bleeding.  Monitor CBC     Leukocytosis   Assessment & Plan    On zosyn  ID  input is noted  Monitor cbc and vitals.  Cbc tomorrow     Pancreatic pseudocyst   Assessment & Plan    Pain management  GI input is noted  Future drainage once cyst matured     Oral phase dysphagia   Assessment & Plan    Continue tube feeding  Continue speech therapy     Atrial flutter (HCC)   Assessment & Plan    Resolved  Off amiodarone drip  Monitor on vitals/telemetry  Evaluated by cardiology with no recommendations for anticoagulation     Esophagitis- (present on admission)   Assessment & Plan    Continue Pepcid     Troponin level elevated- (present on admission)   Assessment & Plan    C/o due to demand ischemia  Echo EF 65% unchanged from prior     Generalized abdominal pain- (present on admission)   Assessment & Plan    Likely due to pancreatic pseudocyst  Pain management          VTE prophylaxis: lovenox

## 2018-12-22 NOTE — PROGRESS NOTES
2 RN skin check done with Mari LANDEROS.    Bilateral ears DTI, non-blanching. High flow tubing adjusted off of ears, adhesive foam applied for cushion.   Bilateral elbows pink and blanching.  Small skin tear to R forearm/wrist area. Dressing in place, CDI.  Colostomy to RLQ. Stoma red.   Small abrasion/skin tear to LLQ, dressing in place, CDI.  Sacrum/perinuem pink/red and slow to miguel a with excoriation. Barrier wipes and cream in place. Q2 turns in place. Waffle mattress in place.  L BKA, no skin breakdown. Floated on a pillow.  R heel red and blanching, floated on a pillow.

## 2018-12-22 NOTE — PROGRESS NOTES
Assumed care report received. Assessment completed. AOx3. Pt resting in bed, denies any pain at this time. No other complaints at this time. Plan of care discussed, verbalized understanding. Fall precautions in place. Call light within reach. Tele monitor in place. White board updated. Bed alarm in use. Cortrak in place. Tube feeding running at 65ml which is goal.

## 2018-12-22 NOTE — PROGRESS NOTES
Infectious Disease Progress Note    Author: Gifty Adams M.D. Date & Time of service: 2018  8:41 PM    Chief Complaint:  Sepsis and pneumonia      Interval History:  12/15 AF, O2 15 L oxygen mask, increase, Labs and micro results reviewed. Imaging reviewed, chest x-ray slightly worse on the left. Medications reviewed.    AF, O2 50 L High flow NC, Labs and micro results reviewed. Imaging reviewed.   AF WBC 24 c/o N/V today   AF WBC 23 continued nausea and vomiting-denies abd pain   AF WBC 22.5 +nausea, less vomiting CT reviewed   AF WBC 17.8 somnolent today   AF WBC 18 transferred out of ICU-getting swallowing study Less nausea     Medications reviewed.  No secretions per nurse.  No change in stool output in colostomy.     Review of Systems:  Review of Systems   Constitutional: Negative for chills and fever.   Respiratory: Negative for shortness of breath.    Gastrointestinal: Positive for abdominal pain. Negative for nausea and vomiting.   Skin: Negative for rash.   somnolent    Hemodynamics:  Temp (24hrs), Av.8 °C (98.2 °F), Min:36.6 °C (97.9 °F), Max:36.9 °C (98.4 °F)  Temperature: 36.9 °C (98.4 °F)  Pulse  Av.1  Min: 54  Max: 169Heart Rate (Monitored): (!) 103  Blood Pressure : 122/73, NIBP: 107/65       Physical Exam:  Physical Exam   Constitutional: He appears well-developed. No distress.   HENT:   Head: Normocephalic and atraumatic.   Eyes: Pupils are equal, round, and reactive to light. EOM are normal.   Neck: Neck supple.   Cardiovascular: Normal rate and regular rhythm.    Pulmonary/Chest: Effort normal. No respiratory distress.   Diminished breath sounds bilaterally   Mild tachypnea   Abdominal: Soft. Bowel sounds are normal. He exhibits no distension. There is tenderness. There is no rebound and no guarding.   Ostomy     Musculoskeletal: He exhibits no edema.   BKA   Lymphadenopathy:     He has no cervical adenopathy.   Neurological: He is alert.   Skin:  Skin is warm. No rash noted.   Hyperpigmented, macular lesion on right upper face and eyelid, chronic   Psychiatric: He has a normal mood and affect.   Nursing note and vitals reviewed.      Meds:    Current Facility-Administered Medications:   •  ascorbic acid  •  [COMPLETED] piperacillin-tazobactam **AND** piperacillin-tazobactam  •  ipratropium-albuterol  •  enoxaparin (LOVENOX) injection  •  ferrous sulfate  •  levETIRAcetam  •  Pharmacy  •  acetaminophen  •  Notify provider if pain remains uncontrolled **AND** Use the numeric rating scale (NRS-11) on regular floors and Critical-Care Pain Observation Tool (CPOT) on ICUs/Trauma to assess pain **AND** Pulse Ox (Oximetry) **AND** Pharmacy Consult Request **AND** If patient difficult to arouse and/or has respiratory depression, stop any opiates that are currently infusing and call a Rapid Response. **AND** oxyCODONE immediate-release **AND** oxyCODONE immediate-release **AND** morphine injection  •  famotidine  •  ondansetron  •  Respiratory Care per Protocol  •  NS    Labs:  Recent Labs      12/19/18   0400  12/20/18   0450  12/21/18   0529   WBC  22.5*  17.8*  18.4*   RBC  3.36*  3.23*  3.14*   HEMOGLOBIN  10.6*  10.4*  10.1*   HEMATOCRIT  33.7*  32.3*  31.8*   MCV  100.3*  100.0*  101.3*   MCH  31.5  32.2  32.2   RDW  52.5*  52.1*  52.9*   PLATELETCT  399  428  463*   MPV  9.5  9.8  9.7   NEUTSPOLYS  85.40*  80.40*  80.50*   LYMPHOCYTES  5.50*  7.30*  7.70*   MONOCYTES  5.90  9.20  8.40   EOSINOPHILS  1.70  2.20  2.40   BASOPHILS  0.70  0.30  0.40     Recent Labs      12/19/18   0400  12/20/18   0450  12/21/18   0529   SODIUM  137  138  139   POTASSIUM  4.5  4.2  3.8   CHLORIDE  95*  95*  101   CO2  35*  37*  34*   GLUCOSE  126*  134*  142*   BUN  24*  29*  31*     Recent Labs      12/19/18   0400  12/20/18   0450  12/21/18   0529   ALBUMIN  2.4*   --   2.6*   TBILIRUBIN  0.9   --   0.7   ALKPHOSPHAT  111*   --   98   TOTPROTEIN  5.1*   --   5.8*   ALTSGPT  20    --   16   ASTSGOT  16   --   16   CREATININE  0.91  1.11  1.23       Imaging:  Ct-abdomen-pelvis With    Result Date: 12/11/2018 12/11/2018 9:22 PM HISTORY/REASON FOR EXAM:  Abd pain, diverticulitis suspected Nausea, vomiting. TECHNIQUE/EXAM DESCRIPTION:   CT scan of the abdomen and pelvis with contrast. Contrast-enhanced helical scanning was obtained from the diaphragmatic domes through the pubic symphysis following the bolus administration of nonionic contrast without complication. 100 mL of Omnipaque 350 nonionic contrast was administered without complication. Low dose optimization technique was utilized for this CT exam including automated exposure control and adjustment of the mA and/or kV according to patient size. COMPARISON: CT chest 12/11/2018. FINDINGS: Lung bases: Please see dedicated CT chest report Abdomen: The liver is unremarkable. The spleen is unremarkable. There is stranding and fluid surrounding the entire pancreas, most in the pancreatic tail. There is high density fluid extending from the pancreatic tail to the left paracolic gutter. There are multiple lobulated peripancreatic fluid area surrounding the  pancreas The gallbladder demonstrates multiple layering gallstones. Very dilated CBD, measuring up to 1.6 cm. There is a soft tissue attenuation lesion in the distal CBD (image 39 series 7) The adrenal glands are normal in size. The kidneys enhance symmetrically. There is a 7.5 cm cyst in the upper pole right kidney. No hydronephrosis. Tiny nonobstructive calculus in the lower pole left kidney. Moderate atherosclerotic disease of the abdominal aorta and its branches. Aortobiiliac graft There is no lymphadenopathy. No bowel wall thickening or bowel dilatation. Diffuse wall thickening of the stomach. Right lower quadrant ostomy. Pelvis: Limited visualization of the pelvis due to bilateral femur hardwares. There are small urinary bladder diverticula. Layering stones/debris in the urinary bladder.  Moderate amount of high density fluid in the pelvis, likely hemorrhagic fluid No aggressive bone lesions are seen. ___________________________________     1. Stranding and fluid surrounding the entire pancreas with several lobulated peripancreatic fluid area surrounding the pancreas. This could be sequela of prior pancreatitis versus cystic pancreatic neoplasm. More high density fluid extending from the pancreatic tail to the left paracolic gutter could relate to hemorrhage of one of these cystic lesions or sequela of acute on chronic pancreatitis. Severely dilated CBD, measuring up to 1.6 cm. Soft tissue attenuation lesion in the distal CBD could be an obstructing mass or noncalcified stone. Further evaluation with MRCP and MR pancreas is recommended. 2. Cholelithiasis. 3. Diffuse wall thickening of the stomach could be gastritis or reactive change secondary to the pancreatic process. 4. Layering stones/debris in the urinary bladder.    Dx-chest-portable (1 View)    Result Date: 12/15/2018  12/15/2018 8:50 AM HISTORY/REASON FOR EXAM:  Shortness of Breath TECHNIQUE/EXAM DESCRIPTION AND NUMBER OF VIEWS: Single portable view of the chest. COMPARISON:  CXR 12/14/2018. CT for PE 12/11/2018. FINDINGS: Feeding tube is present. The heart is at the upper limits of normal in size. There is persistent atelectasis or pneumonia in the left lower lobe with small left pleural effusion. Round lobulated opacity or mass in the right upper lobe is present. Minimal interstitial edema is present in the right lower lobe.     1.  Persistent atelectasis or pneumonia in the left lower lobe with small left pleural effusion. 2.  Minor residual edema and effusion in the right lower lobe. 3.  Lobulated pulmonary nodules in the right midlung again noted. Differential diagnosis includes lung carcinoma.    Dx-chest-portable (1 View)    Result Date: 12/14/2018 12/14/2018 3:45 AM HISTORY/REASON FOR EXAM:  Shortness of Breath. TECHNIQUE/EXAM  DESCRIPTION AND NUMBER OF VIEWS: Single portable view of the chest. COMPARISON: 12/11/2018 FINDINGS: LUNGS: Unchanged right upper lobe mass. Small bilateral pleural effusions, left slightly worse than the right. Retrocardiac opacity, atelectasis versus consolidation. PNEUMOTHORAX: None. LINES AND TUBES: Enteric tube tip is distal to the GE junction, outside the field-of-view. MEDIASTINUM: Stable cardiac silhouette. Atherosclerosis. MUSCULOSKELETAL STRUCTURES: Unchanged.     1. New retrocardiac atelectasis versus consolidation. 2. Small bilateral pleural effusions and bibasilar atelectasis, left worse than right. 3. Stable right upper lobe mass. 4. Enteric tube tip is distal to the GE junction.    Dx-chest-portable (1 View)    Result Date: 12/11/2018 12/11/2018 4:35 PM HISTORY/REASON FOR EXAM:  Loss of appetite, sore throat, chills.  Nausea and vomiting. TECHNIQUE/EXAM DESCRIPTION AND NUMBER OF VIEWS: Single portable view of the chest. COMPARISON: 1/30/2018 FINDINGS: Cardiac mediastinal contour is unchanged. Ill-defined opacity again seen in the RIGHT mid to upper lung, slightly more apparent than prior exam. No pleural fluid collection or pneumothorax. Severe degenerative change of both shoulders.     1.  No pneumonia or pulmonary edema. 2.  Slight interval increase in size of ill-defined RIGHT mid to upper lung nodular opacity, concerning for mass.    Jv-vslj-[id number] Ducts    Result Date: 12/13/2018 12/13/2018 3:05 PM HISTORY/REASON FOR EXAM:  Main OR ERCP TECHNIQUE/EXAM DESCRIPTION AND NUMBER OF VIEWS: Fluoroscopic unit obligated to the operating room for greater than one hour for ERCP procedure.  6 fluoroscopic images provided. COMPARISON: None FINDINGS: Initial cannulation the common bile duct which is dilated and shows filling defect consistent with stones which appear to be removed on later images. 6 seconds fluoroscopy time utilized.     Limited images obtained during ERCP procedure showing apparent removal  of common bile duct stones.    Mr-abdomen-with & W/o    Result Date: 12/13/2018 12/12/2018 3:47 PM HISTORY/REASON FOR EXAM:  Neoplasm: pancreas, suspected; Obstruction of bile duct. TECHNIQUE/EXAM DESCRIPTION: MRI of the pancreas with dynamic IV gadolinium enhancement. MR imaging of the pancreas was performed.  MR images of the pancreas were obtained with coronal and axial single-shot fast spin-echo T2, fat-suppressed axial FRFSE T2, axial in-phase and out-of-phase FSPGR T1, axial DWI with a b-value of 600, 3D MRCP,  precontrast fat-suppressed FSPGR T1, dynamic gadolinium enhanced axial fat-suppressed T1 FSPGR in the arterial dominant, portal venous, 2-minute, and 4-minute delayed phases, with delayed coronal fat-suppressed T1 FSPGR sequence. . The study was performed on a Interactive [employer]KO[employer] 1.5 [place] MRI scanner. 7 mL Gadavist contrast was administered intravenously. COMPARISON: CT abdomen and pelvis 12/11/2018 FINDINGS: Motion artifact limits exam. Small bilateral pleural effusions with associated consolidation. Liver shows mildly nodular margins.  No enhancing mass demonstrated.  Prominent intrahepatic biliary ducts. Spleen is unremarkable. Small amount of peritoneal fluid present. RIGHT kidney cysts with largest at the upper pole measuring 7.7 cm.  LEFT kidney is unremarkable.  No enhancing renal mass. Gallbladder shows multiple dependent signal voids consistent with stones. Common hepatic duct is dilated measuring 17 mm.,  Bile duct measures 14 mm.  Large ovoid signal voids within the distal common bile duct consistent with stones. Pancreatic duct is not significantly dilated. Complex fluid collection tracks along the pancreatic head and body, as seen on prior CT.  Edema in the pancreatic tail with fluid extending into the LEFT pararenal and perisplenic space.  T1 hyperintense material present. Multilevel Schmorl's nodes in the thoracolumbar spine, with reactive endplate changes.  Apparent edema within the L3  vertebral body.     1.  Marked biliary dilation with large common bile duct stones. 2.  Stones also present in the gallbladder. 3.  Complex peripancreatic fluid collection, likely pseudocyst, with apparent inflammatory changes in the pancreatic tail suggesting superimposed acute pancreatitis with fluid extending into the LEFT pararenal space, likely hemorrhagic pancreatitis.  Hemorrhage within cystic lesion at the dorsal aspect of pancreatic tail also noted.  Pancreatic mass is difficult to exclude. 4.  Small volume ascites. 5.  Small bilateral pleural effusions with associated consolidation. 6.  RIGHT kidney cysts. 7.  Possible subacute compression fracture of L3.     Ct-cta Chest Pulmonary Artery W/ Recons    Result Date: 12/11/2018 12/11/2018 9:22 PM HISTORY/REASON FOR EXAM:  PE suspected, high pretest prob; Rule out PE. Also comment on right upper lobe opacity TECHNIQUE/EXAM DESCRIPTION: CT angiogram scan for pulmonary embolism with contrast, with reconstructions. 1.25 mm helical sections were obtained from the lung apices through the lung bases following the rapid bolus administration of 100 mL of Omnipaque 350 nonionic contrast. Thin-section overlapping reconstruction interval was utilized. Coronal reconstructions were generated from the axial data. MIP post processing was performed and utilized for the interpretation. Low dose optimization technique was utilized for this CT exam including automated exposure control and adjustment of the mA and/or kV according to patient size. COMPARISON: 3/24/2015 FINDINGS: Pulmonary Embolism: No. Main Pulmonary Arteries: No. Segmental branches: No. Subsegmental branches: No. Additional Comments: None. Lungs: Lobulated 1.7 x 1.8 x 3.1 cm mass in the right upper lobe. Diffuse emphysematous change. Patchy bibasilar opacities. Airway: There is debris completely filling the bilateral lower lobe airways. Pleura: No pleural effusion or pneumothorax. Nodes: No enlarged mediastinal  or hilar lymph nodes. Additional findings: Thoracic aorta and great vessels:  No aneurysm. Moderate atherosclerotic disease Heart and pericardium: Moderate coronary artery calcification. No pericardial effusion. Thoracic spine:  No fracture or malalignment. No compression deformity. Chest wall:   Unremarkable. Diffuse wall thickening throughout the esophagus. There is retaining fluid in the esophagus. There is a 1.4 cm paraesophageal lymph node. Questionable enlarged right paraesophageal lymph node more superiorly (image 93 series 4). Visualized upper abdomen: Please see dedicated report     1. No CT evidence of pulmonary embolism. 2. Lobulated 1.7 x 1.8 x 3.1 cm mass in the right upper lobe, concerning for lung malignancy. PET/CT, and/or tissue sampling is recommended. 3. Diffuse wall thickening throughout the esophagus could relate to esophagitis. Mildly prominent 1.4 cm paraesophageal lymph node adjacent to the distal esophagus. Questionable enlarged right paraesophageal lymph nodes more proximally. 4. Fluid opacification of the esophagus. There is debris completely filled the bilateral lower lobe airways with associated patchy bibasilar opacities. This is concerning for aspiration pneumonia due to retained fluid in the esophagus.    Ec-echocardiogram Complete W/ Cont    Result Date: 12/12/2018  Transthoracic Echo Report Echocardiography Laboratory CONCLUSIONS Left ventricular ejection fraction is visually estimated to be 65%. Hypokinesis of the apical septal wall. Grossly normal right ventricular size and systolic function. No significant valve disease or flow abnormalities. Compared to the images of the prior study done 3/9/2018 -  there has been no significant changeLV EF:  65    % FINDINGS Left Ventricle Normal left ventricular systolic function. Left ventricular ejection fraction is visually estimated to be 65%. Hypokinesis of the apical septal wall. Diastolic function is difficult to assess with  tachycardia. Contrast was used to enhance visualization of the endocardial border. 3ML of contrast was administered. Existing IV was used. Located at the left ac fossa. Right Ventricle Right ventricle not well visualized. Grossly normal right ventricular size and systolic function. Right Atrium The right atrium is normal in size.  Normal inferior vena cava size and inspiratory collapse. Left Atrium The left atrium is normal in size.  Left atrial volume index is 27  mL/sq m. Mitral Valve Structurally normal mitral valve without significant stenosis.  Trace tricuspid regurgitation. Aortic Valve The aortic valve is not well visualized but appears to be opening well with no aortic insufficiency. Tricuspid Valve The tricuspid valve is not well visualized. Unable to estimate pulmonary artery pressure due to an inadequate tricuspid regurgitant jet. Pulmonic Valve The pulmonic valve is not well visualized. Pericardium Normal pericardium without effusion. Aorta Ascending aorta not well visualized. Segundo Barreto MD (Electronically Signed) Final Date:     12 December 2018                 11:30    Dx-abdomen For Tube Placement    Result Date: 12/14/2018 12/13/2018 11:29 PM HISTORY/REASON FOR EXAM:  Line evaluation. TECHNIQUE/EXAM DESCRIPTION AND NUMBER OF VIEWS:  1 view(s) of the abdomen. COMPARISON:  No recent studies available for comparison. FINDINGS: Enteric tube has been placed. The tip projects over the junction of the second and third portions of duodenum. The bowel gas pattern is nonspecific.  Several dilated small bowel loops.     Enteric tube tip projects over the junction of the second and third portions of duodenum.      Micro:  Results     Procedure Component Value Units Date/Time    BLOOD CULTURE [[id number]] Collected:  12/14/18 1436    Order Status:  Completed Specimen:  Blood from Peripheral Updated:  12/19/18 2300     Significant Indicator NEG     Source BLD     Site PERIPHERAL     Blood Culture No  "growth after 5 days of incubation.    Narrative:       Collected By:[id number] MORE PREET FORMAN  Per Hospital Policy: Only change Specimen Src: to \"Line\" if  specified by physician order.    BLOOD CULTURE [[id number]] Collected:  12/14/18 1307    Order Status:  Completed Specimen:  Blood from Peripheral Updated:  12/19/18 2300     Significant Indicator NEG     Source BLD     Site PERIPHERAL     Blood Culture No growth after 5 days of incubation.    Narrative:       Collected By:[id number] MOREBAKARI OVIEDO DASIA  Per Hospital Policy: Only change Specimen Src: to \"Line\" if  specified by physician order.    BLOOD CULTURE [[id number]]  (Abnormal)  (Susceptibility) Collected:  12/11/18 1553    Order Status:  Completed Specimen:  Blood from Peripheral Updated:  12/17/18 1140     Significant Indicator POS (POS)     Source BLD     Site PERIPHERAL     Blood Culture Growth detected by Bactec instrument. 12/12/2018  15:36 (A)      Prevotella oralis (A)      Klebsiella pneumoniae (A)    Narrative:       CALL  [place]  161 tel. [phone],  CALLED  161 tel. [phone] 12/16/2018, 12:55, RB PERF. RESULTS CALLED TO: RN  [phone] [Working up Aerobic Lactose ]  Per Hospital Policy: Only change Specimen Src: to \"Line\" if  specified by physician order.    Culture & Susceptibility     KLEBSIELLA PNEUMONIAE     Antibiotic Sensitivity Microscan Unit Status    Ampicillin Resistant >16 mcg/mL Final    Method: SENSITIVITY, AMERICO    Cefepime Sensitive <=8 mcg/mL Final    Method: SENSITIVITY, AMERICO    Cefotaxime Sensitive <=2 mcg/mL Final    Method: SENSITIVITY, AMERICO    Cefotetan Sensitive <=16 mcg/mL Final    Method: SENSITIVITY, AMERICO    Ceftazidime Sensitive <=1 mcg/mL Final    Method: SENSITIVITY, AMERICO    Ceftriaxone Sensitive <=8 mcg/mL Final    Method: SENSITIVITY, AMERICO    Cefuroxime Sensitive <=4 mcg/mL Final    Method: SENSITIVITY, AMERICO    Ciprofloxacin Sensitive <=1 mcg/mL Final    Method: SENSITIVITY, AMERICO    Ertapenem Sensitive <=1 mcg/mL Final    " Method: SENSITIVITY, AMERICO    Gentamicin Sensitive <=4 mcg/mL Final    Method: SENSITIVITY, AMERICO    Pip/Tazobactam Sensitive <=16 mcg/mL Final    Method: SENSITIVITY, AMERICO    Tigecycline Sensitive <=2 mcg/mL Final    Method: SENSITIVITY, AMERICO    Tobramycin Sensitive <=4 mcg/mL Final    Method: SENSITIVITY, AMERICO    Trimeth/Sulfa Sensitive <=2/38 mcg/mL Final    Method: SENSITIVITY, AMERICO                             Assessment:  Active Hospital Problems    Diagnosis   • Sepsis (HCC) [A41.9]   • Acute on chronic respiratory failure with hypoxia (HCC) [J96.21]   • Pneumonia due to infectious organism [J18.9]   • Mass of upper lobe of right lung [R91.8]   • Acute biliary pancreatitis [K85.10]   • Oral phase dysphagia [R13.11]   • Generalized abdominal pain [R10.84]   • Troponin level elevated [R74.8]   • Esophagitis [K20.9]   • Atrial flutter (HCC) [I48.92]        ASSESSMENT:      Conner Mora is a 78 y.o.male admitted 12/11/2018. Pt has a past medical history of seizure disorder on Keppra, respiratory disorder on 2 L nocturnal oxygen, PAD, he has a colostomy and is status post colectomy in 2010.  Presented to the ED complaining of nausea, vomiting and abdominal pain times 2-weeks.  He also reported cough and increasing shortness of breath.  He was diagnosed with pneumonia based on CT chest.  He has pancreatitis thought secondary to gallstones and is status post ERCP. ID consulted for new bacteremia with Prevotella oralis. He is currently being treated with ceftriaxone 2 g daily and Flagyl 500 mg 3 times daily.      PLAN:      Sepsis   Cholangitis and pneumonia  Afebrile  Persistent leukocytosis  Initial demand ischemia and AMS  Bcxs 12/11 + Prevotella oralis and Klebsiella  Bcxs12/14 blood cultures NGTD   Anticipate IV abx through 12/28 followed by PO due to stent below if tolerating PO     Pancreatitis secondary to large gallstone  Complicated by pseudocyst  MRI on 1212 with marked biliary dilation, stones noted,  peripancreatic fluid collection, likely pseudocyst with inflammatory changes suggesting acute pancreatitis  S/p ERCP on 12/13 with 35 x 18 mm stone obstructing the distal bile duct as well as multiple smaller stones, stone was not removed due to large size plan for lithotripsy at a later date  Biliary stent placed for deep compression, improvement in alk phos and T bili  CT 12/18 Extensive multiloculated fluid collection about the pancreas, extending into the perisplenic region, increased in size from prior exam, likely complex pseudocyst.      Leukocytosis  Multifactorial  Monitor    Pneumonia  CT on 12/11 debris in the bilateral lower lobe airways and associated bibasilar opacity, fluid retained in the esophagus- concern for aspiration pneumonia  Pulm toilet  Abx as above      Lung mass  Lobulated 1.7 x 1.8 x 3.1 cm mass in the right upper lobe-concern for malignancy  Biopsy once may stable     Nausea and vomiting, improved  Multifactorial  Continue abx as above  Improved LFTS and lipase at last check  CT as above  Aspiration precautions     DW Speech therapy

## 2018-12-22 NOTE — CARE PLAN
Problem: Safety  Goal: Will remain free from injury  Outcome: PROGRESSING AS EXPECTED  Fall precautions in place. Bed in lowest position. Non-skid socks in place. Personal possessions within reach. Mobility sign on door. Bed-alarm on. Call light within reach. Pt educated regarding fall prevention and states understanding.     Problem: Skin Integrity  Goal: Risk for impaired skin integrity will decrease  Outcome: PROGRESSING AS EXPECTED  Pt turned and positioned every two hours. Incontinence care provided and barrier paste applied as needed.

## 2018-12-23 NOTE — PROGRESS NOTES
[facility] Medicine Daily Progress Note    Date of Service  12/23/2018    Chief Complaint  78 y.o. male admitted 12/11/2018 with 78 y.o. 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 Source is suspected pneumonia/pancreatic abscess. Patient had ERCP and biliary stent placed on 12/13/18. Bacteremia with Prevotella and Klebsiella.  Repeat Blood cultures no growth to date      Hospital Course    Interval Problem Update  none    Consultants/Specialty  Surgery  Intensivist  Gastroenterology  Infectious disease     Code Status  dnr/dni    Disposition  pending    Review of Systems  Review of Systems   Constitutional: Positive for malaise/fatigue. Negative for fever.   HENT: Negative for ear discharge, ear pain and tinnitus.    Eyes: Negative for blurred vision, double vision and photophobia.   Respiratory: Negative for hemoptysis, sputum production and shortness of breath.    Cardiovascular: Negative for chest pain and palpitations.   Gastrointestinal: Negative for abdominal pain, nausea and vomiting.   Genitourinary: Negative for dysuria, frequency and urgency.   Musculoskeletal: Negative for back pain, joint pain and neck pain.   Skin: Negative for itching.   Neurological: Positive for weakness. Negative for tingling, tremors and headaches.        Physical Exam  Temp:  [36 °C (96.8 °F)-37.2 °C (98.9 °F)] 36 °C (96.8 °F)  Pulse:  [] 106  Resp:  [16-18] 17  BP: (106-118)/(65-74) 110/68    Physical Exam   Constitutional: No distress.   HENT:   Right Ear: External ear normal.   Left Ear: External ear normal.   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 Left eye exhibits no discharge.   Neck: No JVD present.   Cardiovascular: Normal heart sounds.    Pulmonary/Chest: No stridor. No respiratory distress. He has no wheezes.   Abdominal: There is no tenderness. There  is no rebound and no guarding.   Musculoskeletal: He exhibits no edema or tenderness.   Neurological: He is alert.   Skin: Skin is warm and dry. He is not diaphoretic.       Fluids    Intake/Output Summary (Last 24 hours) at 12/23/18 1255  Last data filed at 12/23/18 0600   Gross per 24 hour   Intake              880 ml   Output             1250 ml   Net             -370 ml       Laboratory  Recent Labs      12/21/18   0529  12/22/18   0154  12/23/18   0206   WBC  18.4*  21.2*  22.2*   RBC  3.14*  3.22*  3.42*   HEMOGLOBIN  10.1*  10.3*  10.9*   HEMATOCRIT  31.8*  33.4*  34.8*   MCV  101.3*  103.7*  101.8*   MCH  32.2  32.0  31.9   MCHC  31.8*  30.8*  31.3*   RDW  52.9*  55.0*  53.1*   PLATELETCT  463*  440  453*   MPV  9.7  9.6  9.8     Recent Labs      12/21/18   0529  12/22/18   0154  12/23/18   0835   SODIUM  139  144  142   POTASSIUM  3.8  4.0  4.6   CHLORIDE  101  104  106   CO2  34*  33  27   GLUCOSE  142*  127*  130*   BUN  31*  36*  40*   CREATININE  1.23  1.24  1.11   CALCIUM  10.1  10.2  10.2                   Imaging      Assessment/Plan  Acute biliary pancreatitis   Assessment & Plan    Status post ERCP with sphincterotomy and stenting on 12/13/2018 12/18/18 CT Abd showing increase in size of pancreatic pseudocyst  Enteral nutrition via cortrak feeing tube  Patient has retained large CBD stone for which he will need follow-up as outpatient for repeat ERCP with lithotripsy  Evaluated by Dr. Carter from surgery lap gregory canceled given his acute respiratory failure and high oxygen requirements  Seen at the bed side and in no acute distress       Sepsis (HCC)- (present on admission)   Assessment & Plan    This is severe sepsis with the following associated acute organ dysfunction(s): acute respiratory failure.   Abdominal source and pneumonia  On zosyn  I.D input is noted  Has improved     Mass of upper lobe of right lung- (present on admission)   Assessment & Plan    He will need CT-guided biopsy when he  has recovered from this acute illness  Patient and wife have been made aware       Pneumonia due to infectious organism- (present on admission)   Assessment & Plan    Pneumonia due to Prevotella & Klebsiella  With bacteremia likely secondary to aspiration  Zosyn  As per I.D       Acute on chronic respiratory failure with hypoxia (HCC)- (present on admission)   Assessment & Plan    CTA chest negative for PE  SOB/failure due to pneumonia and sepsis  Antibiotics  Monitor vitals     Anemia- (present on admission)   Assessment & Plan    H/H ARE STATBLE  No sign of bleeding.  Monitor CBC     Leukocytosis   Assessment & Plan    On zosyn  ID input is noted  Monitor cbc and vitals.  Cbc tomorrow     Pancreatic pseudocyst   Assessment & Plan    Pain management  GI input is noted  Future drainage once cyst matured     Oral phase dysphagia   Assessment & Plan    Continue tube feeding  Continue speech therapy     Atrial flutter (HCC)   Assessment & Plan    Resolved  Off amiodarone drip  Monitor on vitals/telemetry  Evaluated by cardiology with no recommendations for anticoagulation  Will start him on low dose lopressor     Esophagitis- (present on admission)   Assessment & Plan    Continue Pepcid     Troponin level elevated- (present on admission)   Assessment & Plan    C/o due to demand ischemia  Echo EF 65% unchanged from prior     Generalized abdominal pain- (present on admission)   Assessment & Plan    Likely due to pancreatic pseudocyst  Pain management          VTE prophylaxis: lovenox

## 2018-12-23 NOTE — PROGRESS NOTES
2 RN skin check complete:    · Bilateral ears with unstageable PU  · Grey foam applied to offload string to oxy mask.  · Bilateral elbows pink and blanching  · Floated on pillows.  · Small skin tear to Right forearm/wrist area  · Dressing in place, CDI.  · Colostomy to RLQ. Stoma red.   · Dressing CDI  · Skin tear to LLQ  · Dressing in place, CDI.  · Sacrum red and slow to miguel a with excoriation and open tears  · Ostomy powder, miconazole-bree, viscopaste and bree barrier paste applied--per wound RN orders  · Small scab to anterior left stump, posterior pink and blanching  · Floated on a pillow and preventative mepilex applied to stump  · Right heel red and blanchable  · Floated on a pillow, preventive mepilex applied.    · Q2 turns  · Waffle mattress  · Condom cath applied x2, difficult to keep on pt.  · Low airloss bed ordered--currently unavailable.  --Yany Zavala RN aware of need as well as bed runner, charge to notify in bed meeting.

## 2018-12-23 NOTE — PROGRESS NOTES
Infectious Disease Progress Note    Author: Gifty Adams M.D. Date & Time of service: 2018  7:01 PM    Chief Complaint:  Sepsis and pneumonia      Interval History:  12/15 AF, O2 15 L oxygen mask, increase, Labs and micro results reviewed. Imaging reviewed, chest x-ray slightly worse on the left. Medications reviewed.    AF, O2 50 L High flow NC, Labs and micro results reviewed. Imaging reviewed.   AF WBC 24 c/o N/V today   AF WBC 23 continued nausea and vomiting-denies abd pain   AF WBC 22.5 +nausea, less vomiting CT reviewed   AF WBC 17.8 somnolent today   AF WBC 18 transferred out of ICU-getting swallowing study Less nausea   AF WBC 22 somnolent-no acute events     Medications reviewed.  No secretions per nurse.  No change in stool output in colostomy.     Review of Systems:  Review of Systems   Constitutional: Negative for chills and fever.   Respiratory: Negative for shortness of breath.    Gastrointestinal: Positive for abdominal pain. Negative for diarrhea, nausea and vomiting.   Skin: Negative for rash.   somnolent    Hemodynamics:  Temp (24hrs), Av.7 °C (98.1 °F), Min:36.3 °C (97.4 °F), Max:37.1 °C (98.7 °F)  Temperature: 37.1 °C (98.7 °F)  Pulse  Av.1  Min: 54  Max: 169  Blood Pressure : 118/74       Physical Exam:  Physical Exam   Constitutional: He appears well-developed.   HENT:   Head: Normocephalic and atraumatic.   Eyes: Pupils are equal, round, and reactive to light. EOM are normal.   Neck: Normal range of motion.   Cardiovascular: Normal rate and regular rhythm.    Pulmonary/Chest: Effort normal. He has no wheezes. He has no rales.   Diminished breath sounds bilaterally   Mild tachypnea   Abdominal: Soft. Bowel sounds are normal. He exhibits no distension. There is tenderness.   Ostomy     Musculoskeletal: He exhibits no edema.   BKA   Lymphadenopathy:     He has no cervical adenopathy.   Neurological: He is alert.   Skin: Skin is warm. No  rash noted. He is not diaphoretic.   Hyperpigmented, macular lesion on right upper face and eyelid, chronic   Psychiatric: He has a normal mood and affect.   Nursing note and vitals reviewed.      Meds:    Current Facility-Administered Medications:   •  ascorbic acid  •  [COMPLETED] piperacillin-tazobactam **AND** piperacillin-tazobactam  •  ipratropium-albuterol  •  enoxaparin (LOVENOX) injection  •  ferrous sulfate  •  levETIRAcetam  •  Pharmacy  •  acetaminophen  •  Notify provider if pain remains uncontrolled **AND** Use the numeric rating scale (NRS-11) on regular floors and Critical-Care Pain Observation Tool (CPOT) on ICUs/Trauma to assess pain **AND** Pulse Ox (Oximetry) **AND** Pharmacy Consult Request **AND** If patient difficult to arouse and/or has respiratory depression, stop any opiates that are currently infusing and call a Rapid Response. **AND** oxyCODONE immediate-release **AND** oxyCODONE immediate-release **AND** morphine injection  •  famotidine  •  ondansetron  •  Respiratory Care per Protocol  •  NS    Labs:  Recent Labs      12/20/18   0450  12/21/18   0529  12/22/18   0154   WBC  17.8*  18.4*  21.2*   RBC  3.23*  3.14*  3.22*   HEMOGLOBIN  10.4*  10.1*  10.3*   HEMATOCRIT  32.3*  31.8*  33.4*   MCV  100.0*  101.3*  103.7*   MCH  32.2  32.2  32.0   RDW  52.1*  52.9*  55.0*   PLATELETCT  428  463*  440   MPV  9.8  9.7  9.6   NEUTSPOLYS  80.40*  80.50*  81.50*   LYMPHOCYTES  7.30*  7.70*  7.60*   MONOCYTES  9.20  8.40  7.70   EOSINOPHILS  2.20  2.40  2.40   BASOPHILS  0.30  0.40  0.30     Recent Labs      12/20/18   0450  12/21/18   0529  12/22/18   0154   SODIUM  138  139  144   POTASSIUM  4.2  3.8  4.0   CHLORIDE  95*  101  104   CO2  37*  34*  33   GLUCOSE  134*  142*  127*   BUN  29*  31*  36*     Recent Labs      12/20/18   0450  12/21/18   0529  12/22/18   0154   ALBUMIN   --   2.6*  2.6*   TBILIRUBIN   --   0.7  0.7   ALKPHOSPHAT   --   98  110*   TOTPROTEIN   --   5.8*  6.0   ALTSGPT    --   16  15   ASTSGOT   --   16  26   CREATININE  1.11  1.23  1.24       Imaging:  Ct-abdomen-pelvis With    Result Date: 12/11/2018 12/11/2018 9:22 PM HISTORY/REASON FOR EXAM:  Abd pain, diverticulitis suspected Nausea, vomiting. TECHNIQUE/EXAM DESCRIPTION:   CT scan of the abdomen and pelvis with contrast. Contrast-enhanced helical scanning was obtained from the diaphragmatic domes through the pubic symphysis following the bolus administration of nonionic contrast without complication. 100 mL of Omnipaque 350 nonionic contrast was administered without complication. Low dose optimization technique was utilized for this CT exam including automated exposure control and adjustment of the mA and/or kV according to patient size. COMPARISON: CT chest 12/11/2018. FINDINGS: Lung bases: Please see dedicated CT chest report Abdomen: The liver is unremarkable. The spleen is unremarkable. There is stranding and fluid surrounding the entire pancreas, most in the pancreatic tail. There is high density fluid extending from the pancreatic tail to the left paracolic gutter. There are multiple lobulated peripancreatic fluid area surrounding the  pancreas The gallbladder demonstrates multiple layering gallstones. Very dilated CBD, measuring up to 1.6 cm. There is a soft tissue attenuation lesion in the distal CBD (image 39 series 7) The adrenal glands are normal in size. The kidneys enhance symmetrically. There is a 7.5 cm cyst in the upper pole right kidney. No hydronephrosis. Tiny nonobstructive calculus in the lower pole left kidney. Moderate atherosclerotic disease of the abdominal aorta and its branches. Aortobiiliac graft There is no lymphadenopathy. No bowel wall thickening or bowel dilatation. Diffuse wall thickening of the stomach. Right lower quadrant ostomy. Pelvis: Limited visualization of the pelvis due to bilateral femur hardwares. There are small urinary bladder diverticula. Layering stones/debris in the urinary  bladder. Moderate amount of high density fluid in the pelvis, likely hemorrhagic fluid No aggressive bone lesions are seen. ___________________________________     1. Stranding and fluid surrounding the entire pancreas with several lobulated peripancreatic fluid area surrounding the pancreas. This could be sequela of prior pancreatitis versus cystic pancreatic neoplasm. More high density fluid extending from the pancreatic tail to the left paracolic gutter could relate to hemorrhage of one of these cystic lesions or sequela of acute on chronic pancreatitis. Severely dilated CBD, measuring up to 1.6 cm. Soft tissue attenuation lesion in the distal CBD could be an obstructing mass or noncalcified stone. Further evaluation with MRCP and MR pancreas is recommended. 2. Cholelithiasis. 3. Diffuse wall thickening of the stomach could be gastritis or reactive change secondary to the pancreatic process. 4. Layering stones/debris in the urinary bladder.    Dx-chest-portable (1 View)    Result Date: 12/15/2018  12/15/2018 8:50 AM HISTORY/REASON FOR EXAM:  Shortness of Breath TECHNIQUE/EXAM DESCRIPTION AND NUMBER OF VIEWS: Single portable view of the chest. COMPARISON:  CXR 12/14/2018. CT for PE 12/11/2018. FINDINGS: Feeding tube is present. The heart is at the upper limits of normal in size. There is persistent atelectasis or pneumonia in the left lower lobe with small left pleural effusion. Round lobulated opacity or mass in the right upper lobe is present. Minimal interstitial edema is present in the right lower lobe.     1.  Persistent atelectasis or pneumonia in the left lower lobe with small left pleural effusion. 2.  Minor residual edema and effusion in the right lower lobe. 3.  Lobulated pulmonary nodules in the right midlung again noted. Differential diagnosis includes lung carcinoma.    Dx-chest-portable (1 View)    Result Date: 12/14/2018 12/14/2018 3:45 AM HISTORY/REASON FOR EXAM:  Shortness of Breath.  TECHNIQUE/EXAM DESCRIPTION AND NUMBER OF VIEWS: Single portable view of the chest. COMPARISON: 12/11/2018 FINDINGS: LUNGS: Unchanged right upper lobe mass. Small bilateral pleural effusions, left slightly worse than the right. Retrocardiac opacity, atelectasis versus consolidation. PNEUMOTHORAX: None. LINES AND TUBES: Enteric tube tip is distal to the GE junction, outside the field-of-view. MEDIASTINUM: Stable cardiac silhouette. Atherosclerosis. MUSCULOSKELETAL STRUCTURES: Unchanged.     1. New retrocardiac atelectasis versus consolidation. 2. Small bilateral pleural effusions and bibasilar atelectasis, left worse than right. 3. Stable right upper lobe mass. 4. Enteric tube tip is distal to the GE junction.    Dx-chest-portable (1 View)    Result Date: 12/11/2018 12/11/2018 4:35 PM HISTORY/REASON FOR EXAM:  Loss of appetite, sore throat, chills.  Nausea and vomiting. TECHNIQUE/EXAM DESCRIPTION AND NUMBER OF VIEWS: Single portable view of the chest. COMPARISON: 1/30/2018 FINDINGS: Cardiac mediastinal contour is unchanged. Ill-defined opacity again seen in the RIGHT mid to upper lung, slightly more apparent than prior exam. No pleural fluid collection or pneumothorax. Severe degenerative change of both shoulders.     1.  No pneumonia or pulmonary edema. 2.  Slight interval increase in size of ill-defined RIGHT mid to upper lung nodular opacity, concerning for mass.    My-fbsu-pcvwybd Ducts    Result Date: 12/13/2018 12/13/2018 3:05 PM HISTORY/REASON FOR EXAM:  Main OR ERCP TECHNIQUE/EXAM DESCRIPTION AND NUMBER OF VIEWS: Fluoroscopic unit obligated to the operating room for greater than one hour for ERCP procedure.  6 fluoroscopic images provided. COMPARISON: None FINDINGS: Initial cannulation the common bile duct which is dilated and shows filling defect consistent with stones which appear to be removed on later images. 6 seconds fluoroscopy time utilized.     Limited images obtained during ERCP procedure showing  apparent removal of common bile duct stones.    Mr-abdomen-with & W/o    Result Date: 12/13/2018 12/12/2018 3:47 PM HISTORY/REASON FOR EXAM:  Neoplasm: pancreas, suspected; Obstruction of bile duct. TECHNIQUE/EXAM DESCRIPTION: MRI of the pancreas with dynamic IV gadolinium enhancement. MR imaging of the pancreas was performed.  MR images of the pancreas were obtained with coronal and axial single-shot fast spin-echo T2, fat-suppressed axial FRFSE T2, axial in-phase and out-of-phase FSPGR T1, axial DWI with a b-value of 600, 3D MRCP,  precontrast fat-suppressed FSPGR T1, dynamic gadolinium enhanced axial fat-suppressed T1 FSPGR in the arterial dominant, portal venous, 2-minute, and 4-minute delayed phases, with delayed coronal fat-suppressed T1 FSPGR sequence. . The study was performed on a [employer] 1.5 [employer] MRI scanner. 7 mL Gadavist contrast was administered intravenously. COMPARISON: CT abdomen and pelvis 12/11/2018 FINDINGS: Motion artifact limits exam. Small bilateral pleural effusions with associated consolidation. Liver shows mildly nodular margins.  No enhancing mass demonstrated.  Prominent intrahepatic biliary ducts. Spleen is unremarkable. Small amount of peritoneal fluid present. RIGHT kidney cysts with largest at the upper pole measuring 7.7 cm.  LEFT kidney is unremarkable.  No enhancing renal mass. Gallbladder shows multiple dependent signal voids consistent with stones. Common hepatic duct is dilated measuring 17 mm.,  Bile duct measures 14 mm.  Large ovoid signal voids within the distal common bile duct consistent with stones. Pancreatic duct is not significantly dilated. Complex fluid collection tracks along the pancreatic head and body, as seen on prior CT.  Edema in the pancreatic tail with fluid extending into the LEFT pararenal and perisplenic space.  T1 hyperintense material present. Multilevel Schmorl's nodes in the thoracolumbar spine, with reactive endplate changes.  Apparent edema  within the L3 vertebral body.     1.  Marked biliary dilation with large common bile duct stones. 2.  Stones also present in the gallbladder. 3.  Complex peripancreatic fluid collection, likely pseudocyst, with apparent inflammatory changes in the pancreatic tail suggesting superimposed acute pancreatitis with fluid extending into the LEFT pararenal space, likely hemorrhagic pancreatitis.  Hemorrhage within cystic lesion at the dorsal aspect of pancreatic tail also noted.  Pancreatic mass is difficult to exclude. 4.  Small volume ascites. 5.  Small bilateral pleural effusions with associated consolidation. 6.  RIGHT kidney cysts. 7.  Possible subacute compression fracture of L3.     Ct-cta Chest Pulmonary Artery W/ Recons    Result Date: 12/11/2018 12/11/2018 9:22 PM HISTORY/REASON FOR EXAM:  PE suspected, high pretest prob; Rule out PE. Also comment on right upper lobe opacity TECHNIQUE/EXAM DESCRIPTION: CT angiogram scan for pulmonary embolism with contrast, with reconstructions. 1.25 mm helical sections were obtained from the lung apices through the lung bases following the rapid bolus administration of 100 mL of Omnipaque 350 nonionic contrast. Thin-section overlapping reconstruction interval was utilized. Coronal reconstructions were generated from the axial data. MIP post processing was performed and utilized for the interpretation. Low dose optimization technique was utilized for this CT exam including automated exposure control and adjustment of the mA and/or kV according to patient size. COMPARISON: 3/24/2015 FINDINGS: Pulmonary Embolism: No. Main Pulmonary Arteries: No. Segmental branches: No. Subsegmental branches: No. Additional Comments: None. Lungs: Lobulated 1.7 x 1.8 x 3.1 cm mass in the right upper lobe. Diffuse emphysematous change. Patchy bibasilar opacities. Airway: There is debris completely filling the bilateral lower lobe airways. Pleura: No pleural effusion or pneumothorax. Nodes: No  enlarged mediastinal or hilar lymph nodes. Additional findings: Thoracic aorta and great vessels:  No aneurysm. Moderate atherosclerotic disease Heart and pericardium: Moderate coronary artery calcification. No pericardial effusion. Thoracic spine:  No fracture or malalignment. No compression deformity. Chest wall:   Unremarkable. Diffuse wall thickening throughout the esophagus. There is retaining fluid in the esophagus. There is a 1.4 cm paraesophageal lymph node. Questionable enlarged right paraesophageal lymph node more superiorly (image 93 series 4). Visualized upper abdomen: Please see dedicated report     1. No CT evidence of pulmonary embolism. 2. Lobulated 1.7 x 1.8 x 3.1 cm mass in the right upper lobe, concerning for lung malignancy. PET/CT, and/or tissue sampling is recommended. 3. Diffuse wall thickening throughout the esophagus could relate to esophagitis. Mildly prominent 1.4 cm paraesophageal lymph node adjacent to the distal esophagus. Questionable enlarged right paraesophageal lymph nodes more proximally. 4. Fluid opacification of the esophagus. There is debris completely filled the bilateral lower lobe airways with associated patchy bibasilar opacities. This is concerning for aspiration pneumonia due to retained fluid in the esophagus.    Ec-echocardiogram Complete W/ Cont    Result Date: 12/12/2018  Transthoracic Echo Report Echocardiography Laboratory CONCLUSIONS Left ventricular ejection fraction is visually estimated to be 65%. Hypokinesis of the apical septal wall. Grossly normal right ventricular size and systolic function. No significant valve disease or flow abnormalities. Compared to the images of the prior study done 3/9/2018 -  there has been no significant changeLV EF:  65    % FINDINGS Left Ventricle Normal left ventricular systolic function. Left ventricular ejection fraction is visually estimated to be 65%. Hypokinesis of the apical septal wall. Diastolic function is difficult to  assess with tachycardia. Contrast was used to enhance visualization of the endocardial border. 3ML of contrast was administered. Existing IV was used. Located at the left ac fossa. Right Ventricle Right ventricle not well visualized. Grossly normal right ventricular size and systolic function. Right Atrium The right atrium is normal in size.  Normal inferior vena cava size and inspiratory collapse. Left Atrium The left atrium is normal in size.  Left atrial volume index is 27  mL/sq m. Mitral Valve Structurally normal mitral valve without significant stenosis.  Trace tricuspid regurgitation. Aortic Valve The aortic valve is not well visualized but appears to be opening well with no aortic insufficiency. Tricuspid Valve The tricuspid valve is not well visualized. Unable to estimate pulmonary artery pressure due to an inadequate tricuspid regurgitant jet. Pulmonic Valve The pulmonic valve is not well visualized. Pericardium Normal pericardium without effusion. Aorta Ascending aorta not well visualized. Segundo Barreto MD (Electronically Signed) Final Date:     12 December 2018                 11:30    Dx-abdomen For Tube Placement    Result Date: 12/14/2018 12/13/2018 11:29 PM HISTORY/REASON FOR EXAM:  Line evaluation. TECHNIQUE/EXAM DESCRIPTION AND NUMBER OF VIEWS:  1 view(s) of the abdomen. COMPARISON:  No recent studies available for comparison. FINDINGS: Enteric tube has been placed. The tip projects over the junction of the second and third portions of duodenum. The bowel gas pattern is nonspecific.  Several dilated small bowel loops.     Enteric tube tip projects over the junction of the second and third portions of duodenum.      Micro:  Results     Procedure Component Value Units Date/Time    BLOOD CULTURE [[id number]] Collected:  12/14/18 1436    Order Status:  Completed Specimen:  Blood from Peripheral Updated:  12/19/18 2300     Significant Indicator NEG     Source BLD     Site PERIPHERAL     Blood  "Culture No growth after 5 days of incubation.    Narrative:       Collected By:[id number] MORE PREET DASIA  Per Hospital Policy: Only change Specimen Src: to \"Line\" if  specified by physician order.    BLOOD CULTURE [[id number]] Collected:  12/14/18 1307    Order Status:  Completed Specimen:  Blood from Peripheral Updated:  12/19/18 2300     Significant Indicator NEG     Source BLD     Site PERIPHERAL     Blood Culture No growth after 5 days of incubation.    Narrative:       Collected By:[id number] DERIK OVIEDO DASIA  Per Hospital Policy: Only change Specimen Src: to \"Line\" if  specified by physician order.    BLOOD CULTURE [[id number]]  (Abnormal)  (Susceptibility) Collected:  12/11/18 1553    Order Status:  Completed Specimen:  Blood from Peripheral Updated:  12/17/18 1140     Significant Indicator POS (POS)     Source BLD     Site PERIPHERAL     Blood Culture Growth detected by Bactec instrument. 12/12/2018  15:36 (A)      Prevotella oralis (A)      Klebsiella pneumoniae (A)    Narrative:       CALL  [place]  161 tel. [phone],  CALLED  161 tel. [phone] 12/16/2018, 12:55, RB PERF. RESULTS CALLED TO: RN  [id number] [Working up Aerobic Lactose ]  Per Hospital Policy: Only change Specimen Src: to \"Line\" if  specified by physician order.    Culture & Susceptibility     KLEBSIELLA PNEUMONIAE     Antibiotic Sensitivity Microscan Unit Status    Ampicillin Resistant >16 mcg/mL Final    Method: SENSITIVITY, AMERICO    Cefepime Sensitive <=8 mcg/mL Final    Method: SENSITIVITY, AMERICO    Cefotaxime Sensitive <=2 mcg/mL Final    Method: SENSITIVITY, AMERICO    Cefotetan Sensitive <=16 mcg/mL Final    Method: SENSITIVITY, AMERICO    Ceftazidime Sensitive <=1 mcg/mL Final    Method: SENSITIVITY, AMERICO    Ceftriaxone Sensitive <=8 mcg/mL Final    Method: SENSITIVITY, AMERICO    Cefuroxime Sensitive <=4 mcg/mL Final    Method: SENSITIVITY, AMERICO    Ciprofloxacin Sensitive <=1 mcg/mL Final    Method: SENSITIVITY, AMERICO    Ertapenem Sensitive <=1 mcg/mL " Final    Method: SENSITIVITY, AMERICO    Gentamicin Sensitive <=4 mcg/mL Final    Method: SENSITIVITY, AMERICO    Pip/Tazobactam Sensitive <=16 mcg/mL Final    Method: SENSITIVITY, AMERICO    Tigecycline Sensitive <=2 mcg/mL Final    Method: SENSITIVITY, AMERICO    Tobramycin Sensitive <=4 mcg/mL Final    Method: SENSITIVITY, AMERICO    Trimeth/Sulfa Sensitive <=2/38 mcg/mL Final    Method: SENSITIVITY, AMERICO                             Assessment:  Active Hospital Problems    Diagnosis   • Sepsis (HCC) [A41.9]   • Acute on chronic respiratory failure with hypoxia (HCC) [J96.21]   • Pneumonia due to infectious organism [J18.9]   • Mass of upper lobe of right lung [R91.8]   • Acute biliary pancreatitis [K85.10]   • Oral phase dysphagia [R13.11]   • Generalized abdominal pain [R10.84]   • Troponin level elevated [R74.8]   • Esophagitis [K20.9]   • Atrial flutter (HCC) [I48.92]        ASSESSMENT:      Conner Mora is a 78 y.o.male admitted 12/11/2018. Pt has a past medical history of seizure disorder on Keppra, respiratory disorder on 2 L nocturnal oxygen, PAD, he has a colostomy and is status post colectomy in 2010.  Presented to the ED complaining of nausea, vomiting and abdominal pain times 2-weeks.  He also reported cough and increasing shortness of breath.  He was diagnosed with pneumonia based on CT chest.  He has pancreatitis thought secondary to gallstones and is status post ERCP. ID consulted for new bacteremia with Prevotella oralis. He is currently being treated with ceftriaxone 2 g daily and Flagyl 500 mg 3 times daily.      PLAN:      Sepsis   Cholangitis and pneumonia  Afebrile  Persistent leukocytosis  Initial demand ischemia and AMS  Bcxs 12/11 + Prevotella oralis and Klebsiella  Bcxs12/14 blood cultures NGTD   Anticipate IV abx through 12/28 followed by PO due to stent below if tolerating PO     Pancreatitis secondary to large gallstone  Complicated by pseudocyst  MRI on 1212 with marked biliary dilation, stones  noted, peripancreatic fluid collection, likely pseudocyst with inflammatory changes suggesting acute pancreatitis  S/p ERCP on 12/13 with 35 x 18 mm stone obstructing the distal bile duct as well as multiple smaller stones, stone was not removed due to large size plan for lithotripsy at a later date  Biliary stent placed for deep compression, improvement in alk phos and T bili  CT 12/18 Extensive multiloculated fluid collection about the pancreas, extending into the perisplenic region, increased in size from prior exam, likely complex pseudocyst.      Leukocytosis  Multifactorial  Monitor    Pneumonia  CT on 12/11 debris in the bilateral lower lobe airways and associated bibasilar opacity, fluid retained in the esophagus- concern for aspiration pneumonia  Pulm toilet  Abx as above      Lung mass  Lobulated 1.7 x 1.8 x 3.1 cm mass in the right upper lobe-concern for malignancy  Biopsy if feasible    Nausea and vomiting, improved  Multifactorial  Continue abx as above  Improved LFTS and lipase at last check  CT as above  Aspiration precautions     DW wife

## 2018-12-23 NOTE — PROGRESS NOTES
0800: Spoke to MD regarding 12 beat run of v.tach.  CMP being drawn and adding on a Mag.     --labs resulted WNL.

## 2018-12-23 NOTE — WOUND TEAM
"[facility]  Inpatient Services  Initial Wound and Skin Care Evaluation    Admission Date: 12/11/2018     Consult Date: 12/12/2018 @ 0701   Re-consult Date: 12/20/2018 @1259  HPI, PMH, SH: Reviewed    Unit where seen by Wound Team: [id number]     WOUND CONSULT RELATED TO:  Bilateral ears, sacrum    SUBJECTIVE:  \"Can you get me a urinal\"      Self Report / Pain Level:  Denies pain       OBJECTIVE:  Pt lying in bed with oxy mask in use. Waffle overlay to mattress, Pillows in use to offload pressure.    WOUND TYPE, LOCATION, CHARACTERISTICS (Pressure Injuries: location, stage, POA or date identified)    Pressure Injury 12/19/18 Ear Right (Active)   Wound Image       Pressure Injury Stage Unstageable    State of Healing Non-healing    Site Assessment Red;Painful;Dry    Fabiola-wound Assessment Clean;Dry;Intact    Margins Attached edges;Defined edges    Wound Length (cm) 0.5 cm    Wound Width (cm) 0.6 cm    Wound Surface Area (cm^2) 0.3 cm^2    Closure Open to air;None    Drainage Amount None    Non-staged Wound Description Not applicable    Treatments Cleansed;Site care    Cleansing Normal Saline Irrigation    Periwound Protectant Not Applicable    Dressing Options Open to Air    Dressing Cleansing/Solutions Not Applicable    Dressing Changed New    Dressing Change Frequency Every Shift    NEXT Weekly Photo (Inpatient Only) 12/30/18    WOUND NURSE ONLY - Odor None    WOUND NURSE ONLY - Exposed Structures Other (Comments)    WOUND NURSE ONLY - Tissue Type and Percentage 100% Brown/red eschar    WOUND NURSE ONLY - Time Spent with Patient (mins) 60        Pressure Injury 12/19/18 Ear Left (Active)   Wound Image      Pressure Injury Stage Unstageable    State of Healing Non-healing    Site Assessment Dry;Red;Painful;Dusky    Fabiola-wound Assessment Red    Margins Attached edges;Defined edges    Wound Length (cm) 0.7 cm    Wound Width (cm) 1 cm    Wound Surface Area (cm^2) 0.7 cm^2    Closure None    Drainage Amount " None    Non-staged Wound Description Not applicable    Treatments Cleansed;Site care    Cleansing Normal Saline Irrigation    Periwound Protectant Not Applicable    Dressing Options Open to Air    Dressing Cleansing/Solutions Not Applicable    Dressing Change Frequency Every Shift    NEXT Weekly Photo (Inpatient Only) 12/30/18    WOUND NURSE ONLY - Odor None    WOUND NURSE ONLY - Exposed Structures Other (Comments)    WOUND NURSE ONLY - Tissue Type and Percentage 100% Red/brown eschar                                             MOISTURE ASSOCIATED SKIN DAMAGE         Initial assessment on 12/12 (Quick to alexis)      ^^Current Assessment (SLOW TO ALEXIS)^^        Lab Values:    WBC:       WBC   Date/Time Value Ref Range Status   12/23/2018 02:06 AM 22.2 (H) 4.8 - 10.8 K/uL Final   10/26/2010 10:15 AM 9.3 4.0 - 10.5 x10E3/uL Final     AIC:      Lab Results   Component Value Date/Time    HBA1C 5.5 03/06/2018 11:36 PM         Culture:   NA    INTERVENTIONS BY WOUND TEAM:  New consult placed for bilateral ears and worsening sacrum. This RN in to assess area's. Bilateral ears assessed, they have a brown red eschar that is firmly adhered, the area does not blanching. Oxy mask ties are overlying the area. This RN attempted to offload. Pt was then able to turn self to the right side. Sacrococcygeal area assessed. Pt has partial thickness skin tears from MASD to the sacrum and buttocks. Pts previous assessment and photos were obtained from pt turned to the left side. All areas then were quick to alexis. Pts sacrum now is blanching but is slow to alexis. Pt is at risk for further skin breakdown and potential pressure injuries. Pt is on a waffle overlay. Low airloss bed was ordered to prevent further breakdown. Pt has an ostomy but is incontinent of urine. Discussed possible need for condom cath. Orders in place for miconazole bree with viscopaste strips.    Dressing selection:  BILATERAL EARS: OFFLOAD WITH GRAY FOAM OR TENDER  ; SACRUM: OSTOMY POWDER, MICONAZOLE-RIGOBERTO, VISCOPASTE STRIP, RIGOBERTO BARRIER PASTE       Interdisciplinary consultation: Patient, Bedside RN (Mari)    EVALUATION: Pt is an older gentleman with an ileostomy and a left BKA site. Pt currently with tube feed and oxygen via Oxy Mask. Pt and family met with palliative care regarding POC. Pt currently DNR/DNI, pt family to follow up with palliative regarding POC. Per discharge plan if pt continues with treatment he will require LTACH, pt did indicate to Palliative that he would like to go home and be with his wife lucie and not suffer. Pt has bilateral unstageable's to his ears that are small, however given pts oxygen demands may be difficult to heal. Pt also has significant MASD to his sacrococcygeal and buttock area. There is some concern that pt may develop a pressure injury as the area is slow to miguel a at this time. Low airloss bed ordered and dressing orders in place to prevent further breakdown.    Factors affecting wound healing: Age, Hx CVA, L BKA, A. Fib, COPD, ARF, Seizures  Goals: Steady decrease in wound area and depth weekly.    NURSING PLAN OF CARE ORDERS (X):    Dressing changes: See NEW Dressing Care orders: X  Skin care: See Skin Care orders: X  Rectal tube care: See Rectal Tube Care orders:   Other orders:      WOUND TEAM PLAN OF CARE (X):   NPWT change 3 x week:        Dressing changes by wound team:       Follow up as needed: X Wound team will evaluate pressure injury progress and healing weekly.   Other (explain):     Anticipated discharge plans (X):   SNF:           Home Care:           Outpatient Wound Center:            Self Care:            Other: X, Pt lives with granddaughter and wife.

## 2018-12-23 NOTE — PROGRESS NOTES
Infectious Disease Progress Note    Author: Gifty Adams M.D. Date & Time of service: 2018  3:32 PM    Chief Complaint:  Sepsis and pneumonia      Interval History:  12/15 AF, O2 15 L oxygen mask, increase, Labs and micro results reviewed. Imaging reviewed, chest x-ray slightly worse on the left. Medications reviewed.    AF, O2 50 L High flow NC, Labs and micro results reviewed. Imaging reviewed.   AF WBC 24 c/o N/V today   AF WBC 23 continued nausea and vomiting-denies abd pain   AF WBC 22.5 +nausea, less vomiting CT reviewed   AF WBC 17.8 somnolent today   AF WBC 18 transferred out of ICU-getting swallowing study Less nausea   AF WBC 21.2 somnolent-no acute events   AF WBC 22 no new complaints     Medications reviewed.  No secretions per nurse.  No change in stool output in colostomy.     Review of Systems:  Review of Systems   Constitutional: Negative for chills and fever.   Respiratory: Negative for shortness of breath.    Gastrointestinal: Positive for abdominal pain. Negative for diarrhea, nausea and vomiting.   Skin: Negative for rash.   somnolent    Hemodynamics:  Temp (24hrs), Av.7 °C (98 °F), Min:36 °C (96.8 °F), Max:37.2 °C (98.9 °F)  Temperature: 36 °C (96.8 °F)  Pulse  Av.1  Min: 54  Max: 169  Blood Pressure : 121/74       Physical Exam:  Physical Exam   Constitutional: He appears well-developed.   HENT:   Head: Normocephalic and atraumatic.   Eyes: Pupils are equal, round, and reactive to light. EOM are normal.   Neck: Normal range of motion.   Cardiovascular: Normal rate and regular rhythm.    Pulmonary/Chest: Effort normal. He has no wheezes. He has no rales.   Diminished breath sounds bilaterally   Mild tachypnea   Abdominal: Soft. Bowel sounds are normal. He exhibits no distension. There is tenderness.   Ostomy     Musculoskeletal: He exhibits no edema.   BKA   Lymphadenopathy:     He has no cervical adenopathy.   Neurological: He is  alert.   Skin: Skin is warm. No rash noted. He is not diaphoretic.   Hyperpigmented, macular lesion on right upper face and eyelid, chronic   Psychiatric: He has a normal mood and affect.   Nursing note and vitals reviewed.      Meds:    Current Facility-Administered Medications:   •  miconazole 2%-zinc oxide  •  metoprolol  •  ascorbic acid  •  [COMPLETED] piperacillin-tazobactam **AND** piperacillin-tazobactam  •  ipratropium-albuterol  •  enoxaparin (LOVENOX) injection  •  ferrous sulfate  •  levETIRAcetam  •  Pharmacy  •  acetaminophen  •  Notify provider if pain remains uncontrolled **AND** Use the numeric rating scale (NRS-11) on regular floors and Critical-Care Pain Observation Tool (CPOT) on ICUs/Trauma to assess pain **AND** Pulse Ox (Oximetry) **AND** Pharmacy Consult Request **AND** If patient difficult to arouse and/or has respiratory depression, stop any opiates that are currently infusing and call a Rapid Response. **AND** oxyCODONE immediate-release **AND** oxyCODONE immediate-release **AND** morphine injection  •  famotidine  •  ondansetron  •  Respiratory Care per Protocol  •  NS    Labs:  Recent Labs      12/21/18 0529 12/22/18 0154 12/23/18   0206   WBC  18.4*  21.2*  22.2*   RBC  3.14*  3.22*  3.42*   HEMOGLOBIN  10.1*  10.3*  10.9*   HEMATOCRIT  31.8*  33.4*  34.8*   MCV  101.3*  103.7*  101.8*   MCH  32.2  32.0  31.9   RDW  52.9*  55.0*  53.1*   PLATELETCT  463*  440  453*   MPV  9.7  9.6  9.8   NEUTSPOLYS  80.50*  81.50*  83.90*   LYMPHOCYTES  7.70*  7.60*  6.30*   MONOCYTES  8.40  7.70  6.80   EOSINOPHILS  2.40  2.40  2.10   BASOPHILS  0.40  0.30  0.30     Recent Labs      12/21/18   0529 12/22/18   0154 12/23/18   0835   SODIUM  139  144  142   POTASSIUM  3.8  4.0  4.6   CHLORIDE  101  104  106   CO2  34*  33  27   GLUCOSE  142*  127*  130*   BUN  31*  36*  40*     Recent Labs      12/21/18   0529  12/22/18   0154  12/23/18   0835   ALBUMIN  2.6*  2.6*  2.7*   TBILIRUBIN  0.7  0.7   0.6   ALKPHOSPHAT  98  110*  111*   TOTPROTEIN  5.8*  6.0  6.0   ALTSGPT  16  15  13   ASTSGOT  16  26  30   CREATININE  1.23  1.24  1.11       Imaging:  Ct-abdomen-pelvis With    Result Date: 12/11/2018 12/11/2018 9:22 PM HISTORY/REASON FOR EXAM:  Abd pain, diverticulitis suspected Nausea, vomiting. TECHNIQUE/EXAM DESCRIPTION:   CT scan of the abdomen and pelvis with contrast. Contrast-enhanced helical scanning was obtained from the diaphragmatic domes through the pubic symphysis following the bolus administration of nonionic contrast without complication. 100 mL of Omnipaque 350 nonionic contrast was administered without complication. Low dose optimization technique was utilized for this CT exam including automated exposure control and adjustment of the mA and/or kV according to patient size. COMPARISON: CT chest 12/11/2018. FINDINGS: Lung bases: Please see dedicated CT chest report Abdomen: The liver is unremarkable. The spleen is unremarkable. There is stranding and fluid surrounding the entire pancreas, most in the pancreatic tail. There is high density fluid extending from the pancreatic tail to the left paracolic gutter. There are multiple lobulated peripancreatic fluid area surrounding the  pancreas The gallbladder demonstrates multiple layering gallstones. Very dilated CBD, measuring up to 1.6 cm. There is a soft tissue attenuation lesion in the distal CBD (image 39 series 7) The adrenal glands are normal in size. The kidneys enhance symmetrically. There is a 7.5 cm cyst in the upper pole right kidney. No hydronephrosis. Tiny nonobstructive calculus in the lower pole left kidney. Moderate atherosclerotic disease of the abdominal aorta and its branches. Aortobiiliac graft There is no lymphadenopathy. No bowel wall thickening or bowel dilatation. Diffuse wall thickening of the stomach. Right lower quadrant ostomy. Pelvis: Limited visualization of the pelvis due to bilateral femur hardwares. There are small  urinary bladder diverticula. Layering stones/debris in the urinary bladder. Moderate amount of high density fluid in the pelvis, likely hemorrhagic fluid No aggressive bone lesions are seen. ___________________________________     1. Stranding and fluid surrounding the entire pancreas with several lobulated peripancreatic fluid area surrounding the pancreas. This could be sequela of prior pancreatitis versus cystic pancreatic neoplasm. More high density fluid extending from the pancreatic tail to the left paracolic gutter could relate to hemorrhage of one of these cystic lesions or sequela of acute on chronic pancreatitis. Severely dilated CBD, measuring up to 1.6 cm. Soft tissue attenuation lesion in the distal CBD could be an obstructing mass or noncalcified stone. Further evaluation with MRCP and MR pancreas is recommended. 2. Cholelithiasis. 3. Diffuse wall thickening of the stomach could be gastritis or reactive change secondary to the pancreatic process. 4. Layering stones/debris in the urinary bladder.    Dx-chest-portable (1 View)    Result Date: 12/15/2018  12/15/2018 8:50 AM HISTORY/REASON FOR EXAM:  Shortness of Breath TECHNIQUE/EXAM DESCRIPTION AND NUMBER OF VIEWS: Single portable view of the chest. COMPARISON:  CXR 12/14/2018. CT for PE 12/11/2018. FINDINGS: Feeding tube is present. The heart is at the upper limits of normal in size. There is persistent atelectasis or pneumonia in the left lower lobe with small left pleural effusion. Round lobulated opacity or mass in the right upper lobe is present. Minimal interstitial edema is present in the right lower lobe.     1.  Persistent atelectasis or pneumonia in the left lower lobe with small left pleural effusion. 2.  Minor residual edema and effusion in the right lower lobe. 3.  Lobulated pulmonary nodules in the right midlung again noted. Differential diagnosis includes lung carcinoma.    Dx-chest-portable (1 View)    Result Date: 12/14/2018 12/14/2018  3:45 AM HISTORY/REASON FOR EXAM:  Shortness of Breath. TECHNIQUE/EXAM DESCRIPTION AND NUMBER OF VIEWS: Single portable view of the chest. COMPARISON: 12/11/2018 FINDINGS: LUNGS: Unchanged right upper lobe mass. Small bilateral pleural effusions, left slightly worse than the right. Retrocardiac opacity, atelectasis versus consolidation. PNEUMOTHORAX: None. LINES AND TUBES: Enteric tube tip is distal to the GE junction, outside the field-of-view. MEDIASTINUM: Stable cardiac silhouette. Atherosclerosis. MUSCULOSKELETAL STRUCTURES: Unchanged.     1. New retrocardiac atelectasis versus consolidation. 2. Small bilateral pleural effusions and bibasilar atelectasis, left worse than right. 3. Stable right upper lobe mass. 4. Enteric tube tip is distal to the GE junction.    Dx-chest-portable (1 View)    Result Date: 12/11/2018 12/11/2018 4:35 PM HISTORY/REASON FOR EXAM:  Loss of appetite, sore throat, chills.  Nausea and vomiting. TECHNIQUE/EXAM DESCRIPTION AND NUMBER OF VIEWS: Single portable view of the chest. COMPARISON: 1/30/2018 FINDINGS: Cardiac mediastinal contour is unchanged. Ill-defined opacity again seen in the RIGHT mid to upper lung, slightly more apparent than prior exam. No pleural fluid collection or pneumothorax. Severe degenerative change of both shoulders.     1.  No pneumonia or pulmonary edema. 2.  Slight interval increase in size of ill-defined RIGHT mid to upper lung nodular opacity, concerning for mass.    [id number] Ducts    Result Date: 12/13/2018 12/13/2018 3:05 PM HISTORY/REASON FOR EXAM:  Main OR ERCP TECHNIQUE/EXAM DESCRIPTION AND NUMBER OF VIEWS: Fluoroscopic unit obligated to the operating room for greater than one hour for ERCP procedure.  6 fluoroscopic images provided. COMPARISON: None FINDINGS: Initial cannulation the common bile duct which is dilated and shows filling defect consistent with stones which appear to be removed on later images. 6 seconds fluoroscopy time utilized.      Limited images obtained during ERCP procedure showing apparent removal of common bile duct stones.    Mr-abdomen-with & W/o    Result Date: 12/13/2018 12/12/2018 3:47 PM HISTORY/REASON FOR EXAM:  Neoplasm: pancreas, suspected; Obstruction of bile duct. TECHNIQUE/EXAM DESCRIPTION: MRI of the pancreas with dynamic IV gadolinium enhancement. MR imaging of the pancreas was performed.  MR images of the pancreas were obtained with coronal and axial single-shot fast spin-echo T2, fat-suppressed axial FRFSE T2, axial in-phase and out-of-phase FSPGR T1, axial DWI with a b-value of 600, 3D MRCP,  precontrast fat-suppressed FSPGR T1, dynamic gadolinium enhanced axial fat-suppressed T1 FSPGR in the arterial dominant, portal venous, 2-minute, and 4-minute delayed phases, with delayed coronal fat-suppressed T1 FSPGR sequence. . The study was performed on a [employer] 1.5 [place] MRI scanner. 7 mL Gadavist contrast was administered intravenously. COMPARISON: CT abdomen and pelvis 12/11/2018 FINDINGS: Motion artifact limits exam. Small bilateral pleural effusions with associated consolidation. Liver shows mildly nodular margins.  No enhancing mass demonstrated.  Prominent intrahepatic biliary ducts. Spleen is unremarkable. Small amount of peritoneal fluid present. RIGHT kidney cysts with largest at the upper pole measuring 7.7 cm.  LEFT kidney is unremarkable.  No enhancing renal mass. Gallbladder shows multiple dependent signal voids consistent with stones. Common hepatic duct is dilated measuring 17 mm.,  Bile duct measures 14 mm.  Large ovoid signal voids within the distal common bile duct consistent with stones. Pancreatic duct is not significantly dilated. Complex fluid collection tracks along the pancreatic head and body, as seen on prior CT.  Edema in the pancreatic tail with fluid extending into the LEFT pararenal and perisplenic space.  T1 hyperintense material present. Multilevel Schmorl's nodes in the thoracolumbar  spine, with reactive endplate changes.  Apparent edema within the L3 vertebral body.     1.  Marked biliary dilation with large common bile duct stones. 2.  Stones also present in the gallbladder. 3.  Complex peripancreatic fluid collection, likely pseudocyst, with apparent inflammatory changes in the pancreatic tail suggesting superimposed acute pancreatitis with fluid extending into the LEFT pararenal space, likely hemorrhagic pancreatitis.  Hemorrhage within cystic lesion at the dorsal aspect of pancreatic tail also noted.  Pancreatic mass is difficult to exclude. 4.  Small volume ascites. 5.  Small bilateral pleural effusions with associated consolidation. 6.  RIGHT kidney cysts. 7.  Possible subacute compression fracture of L3.     Ct-cta Chest Pulmonary Artery W/ Recons    Result Date: 12/11/2018 12/11/2018 9:22 PM HISTORY/REASON FOR EXAM:  PE suspected, high pretest prob; Rule out PE. Also comment on right upper lobe opacity TECHNIQUE/EXAM DESCRIPTION: CT angiogram scan for pulmonary embolism with contrast, with reconstructions. 1.25 mm helical sections were obtained from the lung apices through the lung bases following the rapid bolus administration of 100 mL of Omnipaque 350 nonionic contrast. Thin-section overlapping reconstruction interval was utilized. Coronal reconstructions were generated from the axial data. MIP post processing was performed and utilized for the interpretation. Low dose optimization technique was utilized for this CT exam including automated exposure control and adjustment of the mA and/or kV according to patient size. COMPARISON: 3/24/2015 FINDINGS: Pulmonary Embolism: No. Main Pulmonary Arteries: No. Segmental branches: No. Subsegmental branches: No. Additional Comments: None. Lungs: Lobulated 1.7 x 1.8 x 3.1 cm mass in the right upper lobe. Diffuse emphysematous change. Patchy bibasilar opacities. Airway: There is debris completely filling the bilateral lower lobe airways. Pleura:  No pleural effusion or pneumothorax. Nodes: No enlarged mediastinal or hilar lymph nodes. Additional findings: Thoracic aorta and great vessels:  No aneurysm. Moderate atherosclerotic disease Heart and pericardium: Moderate coronary artery calcification. No pericardial effusion. Thoracic spine:  No fracture or malalignment. No compression deformity. Chest wall:   Unremarkable. Diffuse wall thickening throughout the esophagus. There is retaining fluid in the esophagus. There is a 1.4 cm paraesophageal lymph node. Questionable enlarged right paraesophageal lymph node more superiorly (image 93 series 4). Visualized upper abdomen: Please see dedicated report     1. No CT evidence of pulmonary embolism. 2. Lobulated 1.7 x 1.8 x 3.1 cm mass in the right upper lobe, concerning for lung malignancy. PET/CT, and/or tissue sampling is recommended. 3. Diffuse wall thickening throughout the esophagus could relate to esophagitis. Mildly prominent 1.4 cm paraesophageal lymph node adjacent to the distal esophagus. Questionable enlarged right paraesophageal lymph nodes more proximally. 4. Fluid opacification of the esophagus. There is debris completely filled the bilateral lower lobe airways with associated patchy bibasilar opacities. This is concerning for aspiration pneumonia due to retained fluid in the esophagus.    Ec-echocardiogram Complete W/ Cont    Result Date: 12/12/2018  Transthoracic Echo Report Echocardiography Laboratory CONCLUSIONS Left ventricular ejection fraction is visually estimated to be 65%. Hypokinesis of the apical septal wall. Grossly normal right ventricular size and systolic function. No significant valve disease or flow abnormalities. Compared to the images of the prior study done 3/9/2018 -  there has been no significant changeLV EF:  65    % FINDINGS Left Ventricle Normal left ventricular systolic function. Left ventricular ejection fraction is visually estimated to be 65%. Hypokinesis of the apical  septal wall. Diastolic function is difficult to assess with tachycardia. Contrast was used to enhance visualization of the endocardial border. 3ML of contrast was administered. Existing IV was used. Located at the left ac fossa. Right Ventricle Right ventricle not well visualized. Grossly normal right ventricular size and systolic function. Right Atrium The right atrium is normal in size.  Normal inferior vena cava size and inspiratory collapse. Left Atrium The left atrium is normal in size.  Left atrial volume index is 27  mL/sq m. Mitral Valve Structurally normal mitral valve without significant stenosis.  Trace tricuspid regurgitation. Aortic Valve The aortic valve is not well visualized but appears to be opening well with no aortic insufficiency. Tricuspid Valve The tricuspid valve is not well visualized. Unable to estimate pulmonary artery pressure due to an inadequate tricuspid regurgitant jet. Pulmonic Valve The pulmonic valve is not well visualized. Pericardium Normal pericardium without effusion. Aorta Ascending aorta not well visualized. Segundo Barreto MD (Electronically Signed) Final Date:     12 December 2018                 11:30    Dx-abdomen For Tube Placement    Result Date: 12/14/2018 12/13/2018 11:29 PM HISTORY/REASON FOR EXAM:  Line evaluation. TECHNIQUE/EXAM DESCRIPTION AND NUMBER OF VIEWS:  1 view(s) of the abdomen. COMPARISON:  No recent studies available for comparison. FINDINGS: Enteric tube has been placed. The tip projects over the junction of the second and third portions of duodenum. The bowel gas pattern is nonspecific.  Several dilated small bowel loops.     Enteric tube tip projects over the junction of the second and third portions of duodenum.      Micro:  Results     Procedure Component Value Units Date/Time    BLOOD CULTURE [[id number]] Collected:  12/14/18 1436    Order Status:  Completed Specimen:  Blood from Peripheral Updated:  12/19/18 2300     Significant Indicator NEG  "    Source BLD     Site PERIPHERAL     Blood Culture No growth after 5 days of incubation.    Narrative:       Collected By:[id number] DERIK OVIEDO DASIA  Per Hospital Policy: Only change Specimen Src: to \"Line\" if  specified by physician order.    BLOOD CULTURE [[id number]] Collected:  12/14/18 1307    Order Status:  Completed Specimen:  Blood from Peripheral Updated:  12/19/18 2300     Significant Indicator NEG     Source BLD     Site PERIPHERAL     Blood Culture No growth after 5 days of incubation.    Narrative:       Collected By:[id number] DERIK FORMAN  Per Hospital Policy: Only change Specimen Src: to \"Line\" if  specified by physician order.    BLOOD CULTURE [[id number]]  (Abnormal)  (Susceptibility) Collected:  12/11/18 1553    Order Status:  Completed Specimen:  Blood from Peripheral Updated:  12/17/18 1140     Significant Indicator POS (POS)     Source BLD     Site PERIPHERAL     Blood Culture Growth detected by Bactec instrument. 12/12/2018  15:36 (A)      Prevotella oralis (A)      Klebsiella pneumoniae (A)    Narrative:       CALL  [place]  161 tel. [phone],  CALLED  161 tel. [phone] 12/16/2018, 12:55, RB PERF. RESULTS CALLED TO: RN  [phone] [Working up Aerobic Lactose ]  Per Hospital Policy: Only change Specimen Src: to \"Line\" if  specified by physician order.    Culture & Susceptibility     KLEBSIELLA PNEUMONIAE     Antibiotic Sensitivity Microscan Unit Status    Ampicillin Resistant >16 mcg/mL Final    Method: SENSITIVITY, AMERICO    Cefepime Sensitive <=8 mcg/mL Final    Method: SENSITIVITY, AMERICO    Cefotaxime Sensitive <=2 mcg/mL Final    Method: SENSITIVITY, AMERICO    Cefotetan Sensitive <=16 mcg/mL Final    Method: SENSITIVITY, AMERICO    Ceftazidime Sensitive <=1 mcg/mL Final    Method: SENSITIVITY, AMERICO    Ceftriaxone Sensitive <=8 mcg/mL Final    Method: SENSITIVITY, AMERICO    Cefuroxime Sensitive <=4 mcg/mL Final    Method: SENSITIVITY, AMERICO    Ciprofloxacin Sensitive <=1 mcg/mL Final    Method: " SENSITIVITY, AMERICO    Ertapenem Sensitive <=1 mcg/mL Final    Method: SENSITIVITY, AMERICO    Gentamicin Sensitive <=4 mcg/mL Final    Method: SENSITIVITY, AMERICO    Pip/Tazobactam Sensitive <=16 mcg/mL Final    Method: SENSITIVITY, AMERICO    Tigecycline Sensitive <=2 mcg/mL Final    Method: SENSITIVITY, AMERICO    Tobramycin Sensitive <=4 mcg/mL Final    Method: SENSITIVITY, AMERICO    Trimeth/Sulfa Sensitive <=2/38 mcg/mL Final    Method: SENSITIVITY, AMERICO                             Assessment:  Active Hospital Problems    Diagnosis   • Sepsis (HCC) [A41.9]   • Acute on chronic respiratory failure with hypoxia (HCC) [J96.21]   • Pneumonia due to infectious organism [J18.9]   • Mass of upper lobe of right lung [R91.8]   • Acute biliary pancreatitis [K85.10]   • Oral phase dysphagia [R13.11]   • Generalized abdominal pain [R10.84]   • Troponin level elevated [R74.8]   • Esophagitis [K20.9]   • Atrial flutter (HCC) [I48.92]        ASSESSMENT:      Conner Mora is a 78 y.o.male admitted 12/11/2018. Pt has a past medical history of seizure disorder on Keppra, respiratory disorder on 2 L nocturnal oxygen, PAD, he has a colostomy and is status post colectomy in 2010.  Presented to the ED complaining of nausea, vomiting and abdominal pain times 2-weeks.  He also reported cough and increasing shortness of breath.  He was diagnosed with pneumonia based on CT chest.  He has pancreatitis thought secondary to gallstones and is status post ERCP. ID consulted for new bacteremia with Prevotella oralis. He is currently being treated with ceftriaxone 2 g daily and Flagyl 500 mg 3 times daily.      PLAN:      Sepsis   Cholangitis and pneumonia  Afebrile  Persistent leukocytosis  Initial demand ischemia and AMS  Bcxs 12/11 + Prevotella oralis and Klebsiella  Bcxs12/14 blood cultures NGTD   Anticipate IV abx through 12/28 followed by PO due to stent below if tolerating PO     Pancreatitis secondary to large gallstone  Complicated by pseudocyst  MRI  on 1212 with marked biliary dilation, stones noted, peripancreatic fluid collection, likely pseudocyst with inflammatory changes suggesting acute pancreatitis  S/p ERCP on 12/13 with 35 x 18 mm stone obstructing the distal bile duct as well as multiple smaller stones, stone was not removed due to large size plan for lithotripsy at a later date  Biliary stent placed for deep compression, improvement in alk phos and T bili  CT 12/18 Extensive multiloculated fluid collection about the pancreas, extending into the perisplenic region, increased in size from prior exam, likely complex pseudocyst.      Leukocytosis, not improved  Multifactorial  Monitor    Pneumonia  CT on 12/11 debris in the bilateral lower lobe airways and associated bibasilar opacity, fluid retained in the esophagus- concern for aspiration pneumonia  Pulm toilet  Abx as above      Lung mass  Lobulated 1.7 x 1.8 x 3.1 cm mass in the right upper lobe-concern for malignancy  Biopsy if feasible    Nausea and vomiting, improved  Multifactorial  Continue abx as above  Improved LFTS and lipase at last check  CT as above  Aspiration precautions

## 2018-12-23 NOTE — PROGRESS NOTES
2 RN skin assessment completed with TIGRE Somers:    Bilateral ears noted to have DTI, non-blanching.   Bilateral elbows pink and blanching; floated on pillows.  Small skin tear to Right forearm/wrist area;dressing in place, CDI.  Colostomy to RLQ. Stoma red.   Skin tear to LLQ;dressing in place, CDI.  Sacrum red and slow to miguel a with excoriation; Barrier wipes and barrier cream in use.  Small scab to left stump; floated on a pillow.  Right heel red and blanchable; floated on a pillow.    Turned every 2 hours and waffle mattress in place.

## 2018-12-24 NOTE — DIETARY
Brief Nutrition Services Note: Consult received to reassess pt's fluid needs. Per MD notes, 250 ml free water flushes three times daily added. Diabetisource AC running @ goal of 65 ml/hr provides 1273 ml free water/day. TF + flushes will provide 2023 ml free water/day.   Pt's estimated fluid needs: 8607-9392 ml free water/day (30-35 ml/kg).     Plan/rec: Recommend 900-1100 ml free water/day as free water flushes; provide slow administration of low volume free water flushes due to small bowel cortrak feeding tube placement (consider ~ 150  Ml free water flushes every 4 hours). Call made to MD to discuss recommendations, no call back. Reviewed with RN who reports she will address this with MD. RD following.

## 2018-12-24 NOTE — PROGRESS NOTES
Infectious Disease Progress Note    Author: Vicki Marin M.D. Date & Time of service: 2018  10:17 AM    Chief Complaint:  Follow-up for sepsis and pneumonia      Interval History:  12/15 AF, O2 15 L oxygen mask, increase, Labs and micro results reviewed. Imaging reviewed, chest x-ray slightly worse on the left. Medications reviewed.    AF, O2 50 L High flow NC, Labs and micro results reviewed. Imaging reviewed.   AF WBC 24 c/o N/V today   AF WBC 23 continued nausea and vomiting-denies abd pain   AF WBC 22.5 +nausea, less vomiting CT reviewed   AF WBC 17.8 somnolent today   AF WBC 18 transferred out of ICU-getting swallowing study Less nausea   AF WBC 21.2 somnolent-no acute events   AF WBC 22 no new complaints   afebrile WBC 19.7 patient is more alert today per nursing, he is complaining of a dry mouth, denies any abdominal pain   Medications reviewed.  No secretions per nurse.  No change in stool output in colostomy.     Review of Systems:  Review of Systems   Constitutional: Negative for chills and fever.   HENT:        Dry mouth   Respiratory: Negative for shortness of breath.    Gastrointestinal: Negative for abdominal pain, diarrhea, nausea and vomiting.   Skin: Negative for rash.       Hemodynamics:  Temp (24hrs), Av.4 °C (97.5 °F), Min:35.9 °C (96.6 °F), Max:37 °C (98.6 °F)  Temperature: 36.9 °C (98.4 °F)  Pulse  Av  Min: 54  Max: 169  Blood Pressure : 104/63       Physical Exam:  Physical Exam   Constitutional: He appears well-developed.   Elderly and chronically ill-appearing   HENT:   Head: Normocephalic and atraumatic.   Eyes: Pupils are equal, round, and reactive to light. EOM are normal.   Neck: Normal range of motion.   Cardiovascular: Normal rate and regular rhythm.    Pulmonary/Chest: Effort normal. He has no wheezes. He has no rales.   Diminished breath sounds bilaterally   Mild tachypnea   Abdominal: Soft. Bowel sounds are normal. He  exhibits no distension. There is no tenderness.   Ostomy     Musculoskeletal: He exhibits no edema.   Left BKA    Left upper extremity midline nontender   Lymphadenopathy:     He has no cervical adenopathy.   Neurological: He is alert.   Skin: Skin is warm. No rash noted. He is not diaphoretic.   Hyperpigmented, macular lesion on right upper face and eyelid, chronic   Psychiatric: He has a normal mood and affect.   Nursing note and vitals reviewed.      Meds:    Current Facility-Administered Medications:   •  miconazole 2%-zinc oxide  •  metoprolol  •  ascorbic acid  •  [COMPLETED] piperacillin-tazobactam **AND** piperacillin-tazobactam  •  ipratropium-albuterol  •  enoxaparin (LOVENOX) injection  •  ferrous sulfate  •  levETIRAcetam  •  Pharmacy  •  acetaminophen  •  Notify provider if pain remains uncontrolled **AND** Use the numeric rating scale (NRS-11) on regular floors and Critical-Care Pain Observation Tool (CPOT) on ICUs/Trauma to assess pain **AND** Pulse Ox (Oximetry) **AND** Pharmacy Consult Request **AND** If patient difficult to arouse and/or has respiratory depression, stop any opiates that are currently infusing and call a Rapid Response. **AND** oxyCODONE immediate-release **AND** oxyCODONE immediate-release **AND** morphine injection  •  famotidine  •  ondansetron  •  Respiratory Care per Protocol  •  NS    Labs:  Recent Labs      12/22/18   0154  12/23/18   0206  12/24/18   0039   WBC  21.2*  22.2*  19.7*   RBC  3.22*  3.42*  3.33*   HEMOGLOBIN  10.3*  10.9*  10.7*   HEMATOCRIT  33.4*  34.8*  34.4*   MCV  103.7*  101.8*  103.3*   MCH  32.0  31.9  32.1   RDW  55.0*  53.1*  54.4*   PLATELETCT  440  453*  442   MPV  9.6  9.8  10.0   NEUTSPOLYS  81.50*  83.90*  80.90*   LYMPHOCYTES  7.60*  6.30*  8.30*   MONOCYTES  7.70  6.80  7.90   EOSINOPHILS  2.40  2.10  2.10   BASOPHILS  0.30  0.30  0.30     Recent Labs      12/22/18   0154  12/23/18   0835  12/24/18   0806   SODIUM  144  142  143   POTASSIUM  4.0   4.6  3.5*   CHLORIDE  104  106  106   CO2  33  27  31   GLUCOSE  127*  130*  123*   BUN  36*  40*  43*     Recent Labs      12/22/18   0154  12/23/18   0835  12/24/18   0806   ALBUMIN  2.6*  2.7*  2.6*   TBILIRUBIN  0.7  0.6  0.5   ALKPHOSPHAT  110*  111*  117*   TOTPROTEIN  6.0  6.0  6.0   ALTSGPT  15  13  14   ASTSGOT  26  30  17   CREATININE  1.24  1.11  1.26       Imaging:  Ct-abdomen-pelvis With    Result Date: 12/11/2018 12/11/2018 9:22 PM HISTORY/REASON FOR EXAM:  Abd pain, diverticulitis suspected Nausea, vomiting. TECHNIQUE/EXAM DESCRIPTION:   CT scan of the abdomen and pelvis with contrast. Contrast-enhanced helical scanning was obtained from the diaphragmatic domes through the pubic symphysis following the bolus administration of nonionic contrast without complication. 100 mL of Omnipaque 350 nonionic contrast was administered without complication. Low dose optimization technique was utilized for this CT exam including automated exposure control and adjustment of the mA and/or kV according to patient size. COMPARISON: CT chest 12/11/2018. FINDINGS: Lung bases: Please see dedicated CT chest report Abdomen: The liver is unremarkable. The spleen is unremarkable. There is stranding and fluid surrounding the entire pancreas, most in the pancreatic tail. There is high density fluid extending from the pancreatic tail to the left paracolic gutter. There are multiple lobulated peripancreatic fluid area surrounding the  pancreas The gallbladder demonstrates multiple layering gallstones. Very dilated CBD, measuring up to 1.6 cm. There is a soft tissue attenuation lesion in the distal CBD (image 39 series 7) The adrenal glands are normal in size. The kidneys enhance symmetrically. There is a 7.5 cm cyst in the upper pole right kidney. No hydronephrosis. Tiny nonobstructive calculus in the lower pole left kidney. Moderate atherosclerotic disease of the abdominal aorta and its branches. Aortobiiliac graft There is no  lymphadenopathy. No bowel wall thickening or bowel dilatation. Diffuse wall thickening of the stomach. Right lower quadrant ostomy. Pelvis: Limited visualization of the pelvis due to bilateral femur hardwares. There are small urinary bladder diverticula. Layering stones/debris in the urinary bladder. Moderate amount of high density fluid in the pelvis, likely hemorrhagic fluid No aggressive bone lesions are seen. ___________________________________     1. Stranding and fluid surrounding the entire pancreas with several lobulated peripancreatic fluid area surrounding the pancreas. This could be sequela of prior pancreatitis versus cystic pancreatic neoplasm. More high density fluid extending from the pancreatic tail to the left paracolic gutter could relate to hemorrhage of one of these cystic lesions or sequela of acute on chronic pancreatitis. Severely dilated CBD, measuring up to 1.6 cm. Soft tissue attenuation lesion in the distal CBD could be an obstructing mass or noncalcified stone. Further evaluation with MRCP and MR pancreas is recommended. 2. Cholelithiasis. 3. Diffuse wall thickening of the stomach could be gastritis or reactive change secondary to the pancreatic process. 4. Layering stones/debris in the urinary bladder.    Dx-chest-portable (1 View)    Result Date: 12/15/2018  12/15/2018 8:50 AM HISTORY/REASON FOR EXAM:  Shortness of Breath TECHNIQUE/EXAM DESCRIPTION AND NUMBER OF VIEWS: Single portable view of the chest. COMPARISON:  CXR 12/14/2018. CT for PE 12/11/2018. FINDINGS: Feeding tube is present. The heart is at the upper limits of normal in size. There is persistent atelectasis or pneumonia in the left lower lobe with small left pleural effusion. Round lobulated opacity or mass in the right upper lobe is present. Minimal interstitial edema is present in the right lower lobe.     1.  Persistent atelectasis or pneumonia in the left lower lobe with small left pleural effusion. 2.  Minor residual  edema and effusion in the right lower lobe. 3.  Lobulated pulmonary nodules in the right midlung again noted. Differential diagnosis includes lung carcinoma.    Dx-chest-portable (1 View)    Result Date: 12/14/2018 12/14/2018 3:45 AM HISTORY/REASON FOR EXAM:  Shortness of Breath. TECHNIQUE/EXAM DESCRIPTION AND NUMBER OF VIEWS: Single portable view of the chest. COMPARISON: 12/11/2018 FINDINGS: LUNGS: Unchanged right upper lobe mass. Small bilateral pleural effusions, left slightly worse than the right. Retrocardiac opacity, atelectasis versus consolidation. PNEUMOTHORAX: None. LINES AND TUBES: Enteric tube tip is distal to the GE junction, outside the field-of-view. MEDIASTINUM: Stable cardiac silhouette. Atherosclerosis. MUSCULOSKELETAL STRUCTURES: Unchanged.     1. New retrocardiac atelectasis versus consolidation. 2. Small bilateral pleural effusions and bibasilar atelectasis, left worse than right. 3. Stable right upper lobe mass. 4. Enteric tube tip is distal to the GE junction.    Dx-chest-portable (1 View)    Result Date: 12/11/2018 12/11/2018 4:35 PM HISTORY/REASON FOR EXAM:  Loss of appetite, sore throat, chills.  Nausea and vomiting. TECHNIQUE/EXAM DESCRIPTION AND NUMBER OF VIEWS: Single portable view of the chest. COMPARISON: 1/30/2018 FINDINGS: Cardiac mediastinal contour is unchanged. Ill-defined opacity again seen in the RIGHT mid to upper lung, slightly more apparent than prior exam. No pleural fluid collection or pneumothorax. Severe degenerative change of both shoulders.     1.  No pneumonia or pulmonary edema. 2.  Slight interval increase in size of ill-defined RIGHT mid to upper lung nodular opacity, concerning for mass.    Fa-[id number]-[id number] Ducts    Result Date: 12/13/2018 12/13/2018 3:05 PM HISTORY/REASON FOR EXAM:  Main OR ERCP TECHNIQUE/EXAM DESCRIPTION AND NUMBER OF VIEWS: Fluoroscopic unit obligated to the operating room for greater than one hour for ERCP procedure.  6 fluoroscopic images  provided. COMPARISON: None FINDINGS: Initial cannulation the common bile duct which is dilated and shows filling defect consistent with stones which appear to be removed on later images. 6 seconds fluoroscopy time utilized.     Limited images obtained during ERCP procedure showing apparent removal of common bile duct stones.    Mr-abdomen-with & W/o    Result Date: 12/13/2018 12/12/2018 3:47 PM HISTORY/REASON FOR EXAM:  Neoplasm: pancreas, suspected; Obstruction of bile duct. TECHNIQUE/EXAM DESCRIPTION: MRI of the pancreas with dynamic IV gadolinium enhancement. MR imaging of the pancreas was performed.  MR images of the pancreas were obtained with coronal and axial single-shot fast spin-echo T2, fat-suppressed axial FRFSE T2, axial in-phase and out-of-phase FSPGR T1, axial DWI with a b-value of 600, 3D MRCP,  precontrast fat-suppressed FSPGR T1, dynamic gadolinium enhanced axial fat-suppressed T1 FSPGR in the arterial dominant, portal venous, 2-minute, and 4-minute delayed phases, with delayed coronal fat-suppressed T1 FSPGR sequence. . The study was performed on a takokat Signa 1.5 [place] MRI scanner. 7 mL Gadavist contrast was administered intravenously. COMPARISON: CT abdomen and pelvis 12/11/2018 FINDINGS: Motion artifact limits exam. Small bilateral pleural effusions with associated consolidation. Liver shows mildly nodular margins.  No enhancing mass demonstrated.  Prominent intrahepatic biliary ducts. Spleen is unremarkable. Small amount of peritoneal fluid present. RIGHT kidney cysts with largest at the upper pole measuring 7.7 cm.  LEFT kidney is unremarkable.  No enhancing renal mass. Gallbladder shows multiple dependent signal voids consistent with stones. Common hepatic duct is dilated measuring 17 mm.,  Bile duct measures 14 mm.  Large ovoid signal voids within the distal common bile duct consistent with stones. Pancreatic duct is not significantly dilated. Complex fluid collection tracks along the  pancreatic head and body, as seen on prior CT.  Edema in the pancreatic tail with fluid extending into the LEFT pararenal and perisplenic space.  T1 hyperintense material present. Multilevel Schmorl's nodes in the thoracolumbar spine, with reactive endplate changes.  Apparent edema within the L3 vertebral body.     1.  Marked biliary dilation with large common bile duct stones. 2.  Stones also present in the gallbladder. 3.  Complex peripancreatic fluid collection, likely pseudocyst, with apparent inflammatory changes in the pancreatic tail suggesting superimposed acute pancreatitis with fluid extending into the LEFT pararenal space, likely hemorrhagic pancreatitis.  Hemorrhage within cystic lesion at the dorsal aspect of pancreatic tail also noted.  Pancreatic mass is difficult to exclude. 4.  Small volume ascites. 5.  Small bilateral pleural effusions with associated consolidation. 6.  RIGHT kidney cysts. 7.  Possible subacute compression fracture of L3.     Ct-cta Chest Pulmonary Artery W/ Recons    Result Date: 12/11/2018 12/11/2018 9:22 PM HISTORY/REASON FOR EXAM:  PE suspected, high pretest prob; Rule out PE. Also comment on right upper lobe opacity TECHNIQUE/EXAM DESCRIPTION: CT angiogram scan for pulmonary embolism with contrast, with reconstructions. 1.25 mm helical sections were obtained from the lung apices through the lung bases following the rapid bolus administration of 100 mL of Omnipaque 350 nonionic contrast. Thin-section overlapping reconstruction interval was utilized. Coronal reconstructions were generated from the axial data. MIP post processing was performed and utilized for the interpretation. Low dose optimization technique was utilized for this CT exam including automated exposure control and adjustment of the mA and/or kV according to patient size. COMPARISON: 3/24/2015 FINDINGS: Pulmonary Embolism: No. Main Pulmonary Arteries: No. Segmental branches: No. Subsegmental branches: No.  Additional Comments: None. Lungs: Lobulated 1.7 x 1.8 x 3.1 cm mass in the right upper lobe. Diffuse emphysematous change. Patchy bibasilar opacities. Airway: There is debris completely filling the bilateral lower lobe airways. Pleura: No pleural effusion or pneumothorax. Nodes: No enlarged mediastinal or hilar lymph nodes. Additional findings: Thoracic aorta and great vessels:  No aneurysm. Moderate atherosclerotic disease Heart and pericardium: Moderate coronary artery calcification. No pericardial effusion. Thoracic spine:  No fracture or malalignment. No compression deformity. Chest wall:   Unremarkable. Diffuse wall thickening throughout the esophagus. There is retaining fluid in the esophagus. There is a 1.4 cm paraesophageal lymph node. Questionable enlarged right paraesophageal lymph node more superiorly (image 93 series 4). Visualized upper abdomen: Please see dedicated report     1. No CT evidence of pulmonary embolism. 2. Lobulated 1.7 x 1.8 x 3.1 cm mass in the right upper lobe, concerning for lung malignancy. PET/CT, and/or tissue sampling is recommended. 3. Diffuse wall thickening throughout the esophagus could relate to esophagitis. Mildly prominent 1.4 cm paraesophageal lymph node adjacent to the distal esophagus. Questionable enlarged right paraesophageal lymph nodes more proximally. 4. Fluid opacification of the esophagus. There is debris completely filled the bilateral lower lobe airways with associated patchy bibasilar opacities. This is concerning for aspiration pneumonia due to retained fluid in the esophagus.    Ec-echocardiogram Complete W/ Cont    Result Date: 12/12/2018  Transthoracic Echo Report Echocardiography Laboratory CONCLUSIONS Left ventricular ejection fraction is visually estimated to be 65%. Hypokinesis of the apical septal wall. Grossly normal right ventricular size and systolic function. No significant valve disease or flow abnormalities. Compared to the images of the prior  study done 3/9/2018 -  there has been no significant changeLV EF:  65    % FINDINGS Left Ventricle Normal left ventricular systolic function. Left ventricular ejection fraction is visually estimated to be 65%. Hypokinesis of the apical septal wall. Diastolic function is difficult to assess with tachycardia. Contrast was used to enhance visualization of the endocardial border. 3ML of contrast was administered. Existing IV was used. Located at the left ac fossa. Right Ventricle Right ventricle not well visualized. Grossly normal right ventricular size and systolic function. Right Atrium The right atrium is normal in size.  Normal inferior vena cava size and inspiratory collapse. Left Atrium The left atrium is normal in size.  Left atrial volume index is 27  mL/sq m. Mitral Valve Structurally normal mitral valve without significant stenosis.  Trace tricuspid regurgitation. Aortic Valve The aortic valve is not well visualized but appears to be opening well with no aortic insufficiency. Tricuspid Valve The tricuspid valve is not well visualized. Unable to estimate pulmonary artery pressure due to an inadequate tricuspid regurgitant jet. Pulmonic Valve The pulmonic valve is not well visualized. Pericardium Normal pericardium without effusion. Aorta Ascending aorta not well visualized. Sgeundo Barreto MD (Electronically Signed) Final Date:     12 December 2018                 11:30    Dx-abdomen For Tube Placement    Result Date: 12/14/2018 12/13/2018 11:29 PM HISTORY/REASON FOR EXAM:  Line evaluation. TECHNIQUE/EXAM DESCRIPTION AND NUMBER OF VIEWS:  1 view(s) of the abdomen. COMPARISON:  No recent studies available for comparison. FINDINGS: Enteric tube has been placed. The tip projects over the junction of the second and third portions of duodenum. The bowel gas pattern is nonspecific.  Several dilated small bowel loops.     Enteric tube tip projects over the junction of the second and third portions of  "duodenum.      Micro:  Results     Procedure Component Value Units Date/Time    BLOOD CULTURE [[id number]] Collected:  12/14/18 1436    Order Status:  Completed Specimen:  Blood from Peripheral Updated:  12/19/18 2300     Significant Indicator NEG     Source BLD     Site PERIPHERAL     Blood Culture No growth after 5 days of incubation.    Narrative:       Collected By:[id number] DERIK FORMAN  Per Hospital Policy: Only change Specimen Src: to \"Line\" if  specified by physician order.    BLOOD CULTURE [[id number]] Collected:  12/14/18 1307    Order Status:  Completed Specimen:  Blood from Peripheral Updated:  12/19/18 2300     Significant Indicator NEG     Source BLD     Site PERIPHERAL     Blood Culture No growth after 5 days of incubation.    Narrative:       Collected By:[id number] DERIK FORMAN  Per Hospital Policy: Only change Specimen Src: to \"Line\" if  specified by physician order.    BLOOD CULTURE [[id number]]  (Abnormal)  (Susceptibility) Collected:  12/11/18 1553    Order Status:  Completed Specimen:  Blood from Peripheral Updated:  12/17/18 1140     Significant Indicator POS (POS)     Source BLD     Site PERIPHERAL     Blood Culture Growth detected by Bactec instrument. 12/12/2018  15:36 (A)      Prevotella oralis (A)      Klebsiella pneumoniae (A)    Narrative:       CALL  [place]  161 tel. [phone],  CALLED  161 tel. [phone] 12/16/2018, 12:55, RB PERF. RESULTS CALLED TO: RN  [id number] [Working up Aerobic Lactose ]  Per Hospital Policy: Only change Specimen Src: to \"Line\" if  specified by physician order.    Culture & Susceptibility     KLEBSIELLA PNEUMONIAE     Antibiotic Sensitivity Microscan Unit Status    Ampicillin Resistant >16 mcg/mL Final    Method: SENSITIVITY, AMERICO    Cefepime Sensitive <=8 mcg/mL Final    Method: SENSITIVITY, AMERICO    Cefotaxime Sensitive <=2 mcg/mL Final    Method: SENSITIVITY, AMERICO    Cefotetan Sensitive <=16 mcg/mL Final    Method: SENSITIVITY, AMERICO    Ceftazidime Sensitive " <=1 mcg/mL Final    Method: SENSITIVITY, AMERICO    Ceftriaxone Sensitive <=8 mcg/mL Final    Method: SENSITIVITY, AMERICO    Cefuroxime Sensitive <=4 mcg/mL Final    Method: SENSITIVITY, AMERICO    Ciprofloxacin Sensitive <=1 mcg/mL Final    Method: SENSITIVITY, AMERICO    Ertapenem Sensitive <=1 mcg/mL Final    Method: SENSITIVITY, AMERICO    Gentamicin Sensitive <=4 mcg/mL Final    Method: SENSITIVITY, AMERICO    Pip/Tazobactam Sensitive <=16 mcg/mL Final    Method: SENSITIVITY, AMERICO    Tigecycline Sensitive <=2 mcg/mL Final    Method: SENSITIVITY, AMERICO    Tobramycin Sensitive <=4 mcg/mL Final    Method: SENSITIVITY, AMERICO    Trimeth/Sulfa Sensitive <=2/38 mcg/mL Final    Method: SENSITIVITY, AMERICO                             Assessment:  Active Hospital Problems    Diagnosis   • Sepsis (HCC) [A41.9]   • Acute on chronic respiratory failure with hypoxia (HCC) [J96.21]   • Pneumonia due to infectious organism [J18.9]   • Mass of upper lobe of right lung [R91.8]   • Acute biliary pancreatitis [K85.10]   • Oral phase dysphagia [R13.11]   • Generalized abdominal pain [R10.84]   • Troponin level elevated [R74.8]   • Esophagitis [K20.9]   • Atrial flutter ([facility]) [I48.92]        ASSESSMENT:      Conner Mora is a 78 y.o.male admitted 12/11/2018. Pt has a past medical history of seizure disorder on Keppra, respiratory disorder on 2 L nocturnal oxygen, PAD, he has a colostomy and is status post colectomy in 2010.  Presented to the ED complaining of nausea, vomiting and abdominal pain times 2-weeks.  He also reported cough and increasing shortness of breath.  He was diagnosed with pneumonia based on CT chest.  He has pancreatitis thought secondary to gallstones and is status post ERCP. ID consulted for new bacteremia with Prevotella oralis.    PLAN:      Sepsis   Secondary to cholangitis and pneumonia  Afebrile  Persistent leukocytosis  Initial demand ischemia and AMS  Bcxs 12/11 + Prevotella oralis and Klebsiella  Bcxs12/14 blood cultures NGTD    Anticipate IV abx through 12/28 followed by PO due to stent below if tolerating PO     Pancreatitis secondary to large gallstone  Complicated by pseudocyst  MRI on 1212 with marked biliary dilation, stones noted, peripancreatic fluid collection, likely pseudocyst with inflammatory changes suggesting acute pancreatitis  S/p ERCP on 12/13 with 35 x 18 mm stone obstructing the distal bile duct as well as multiple smaller stones, stone was not removed due to large size plan for lithotripsy at a later date  Biliary stent placed for deep compression, improvement in alk phos and T bili  CT 12/18 Extensive multiloculated fluid collection about the pancreas, extending into the perisplenic region, increased in size from prior exam, likely complex pseudocyst.      Leukocytosis, persistent  Multifactorial  Monitor    Pneumonia  CT on 12/11 debris in the bilateral lower lobe airways and associated bibasilar opacity, fluid retained in the esophagus- concern for aspiration pneumonia  Pulm toilet  Abx as above      Lung mass  Lobulated 1.7 x 1.8 x 3.1 cm mass in the right upper lobe-concern for malignancy  Biopsy if feasible    Nausea and vomiting, improved  Multifactorial  Continue abx as above  Improved LFTS and lipase at last check  CT as above  Aspiration precautions     Discussed with RN

## 2018-12-24 NOTE — CARE PLAN
Problem: Skin Integrity  Goal: Risk for impaired skin integrity will decrease  Outcome: PROGRESSING AS EXPECTED  No new skin break down due to interventions in place     Problem: Mobility  Goal: Risk for activity intolerance will decrease  Outcome: PROGRESSING AS EXPECTED  Will be able to sit in chair three times a day

## 2018-12-24 NOTE — PROGRESS NOTES
0705: Bedside report received.  Pt resting in bed.  Denies any pain or needs at this time.  Call light and personal belongings within reach.  Pt educated to call for assistance.  POC discussed.    0730: Wound care completed per orders to fran/coccyx area.    1200: Spoke to Dr. Nuñez about pts REZA and fluid status.  MD requested dietary reassess pts free water flushes at this time.  (Current is only 30mL Q4).  Wife at bedside.    1400: Pt OOB to chair.  Pts wife brought pt ice chips--pt and pts wife understand risks of oral intake at this time and recommendations made by speech. Both pt and pts wife aware of risks and want to continue despite them.  Joaquin notified.    1720: Full bed bath completed, wound care completed per orders to fran/coccyx area.

## 2018-12-24 NOTE — PROGRESS NOTES
2 RN skin check:    · Bilateral ears with unstageable PU  ? Grey foam to offload string to oxy mask.  · Discoloration to R side of face  · Bilateral elbows pink and blanching  ? Floated on pillows.  · Small skin tear to Right forearm/wrist area  ? Dressing in place, CDI.  · Colostomy to RLQ. Stoma red.   ? Dressing CDI  · Skin tear to LLQ  ? Dressing in place, CDI.  · Sacrum red and slow to miguel a with excoriation and open tears  ? Ostomy powder, miconazole-bree, viscopaste and bree barrier paste applied--per wound RN orders  · Small scab to anterior left stump, posterior pink and blanching  ? Floated on a pillow and preventative mepilex applied to stump  · Right heel red and blanchable  ? Heel offloading boot applied     · Q2 turns  · Waffle mattress  · Condom cath  · Low airloss bed ordered--currently pending.

## 2018-12-24 NOTE — PROGRESS NOTES
[facility] Medicine Daily Progress Note    Date of Service  12/24/2018    Chief Complaint  78 y.o. male admitted 12/11/2018 with 78 y.o. 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 Source is suspected pneumonia/pancreatic abscess. Patient had ERCP and biliary stent placed on 12/13/18. Bacteremia with Prevotella and Klebsiella.  Repeat Blood cultures no growth to date      Hospital Course    Interval Problem Update  none    Consultants/Specialty  Surgery  Intensivist  Gastroenterology  Infectious disease     Code Status  dnr/dni    Disposition  pending    Review of Systems  Review of Systems   Constitutional: Positive for malaise/fatigue. Negative for chills.   HENT: Negative for congestion, ear discharge and nosebleeds.    Eyes: Negative for photophobia, pain and discharge.   Respiratory: Negative for hemoptysis, sputum production and shortness of breath.    Cardiovascular: Negative for chest pain, palpitations and orthopnea.   Gastrointestinal: Negative for abdominal pain, nausea and vomiting.   Genitourinary: Negative for frequency, hematuria and urgency.   Musculoskeletal: Negative for back pain, joint pain and neck pain.   Skin: Negative for itching.   Neurological: Positive for weakness. Negative for dizziness, tingling and headaches.        Physical Exam  Temp:  [35.9 °C (96.6 °F)-37 °C (98.6 °F)] 35.9 °C (96.7 °F)  Pulse:  [68-99] 98  Resp:  [14-20] 18  BP: (104-130)/(63-71) 121/70    Physical Exam   Constitutional: No distress.   HENT:   Head: Normocephalic and atraumatic.   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 Conjunctivae are normal.   Neck: Normal range of motion. Neck supple.   Cardiovascular: Normal rate and regular rhythm.    Pulmonary/Chest: No respiratory distress. He has no wheezes.   Abdominal: He exhibits no  distension. There is no tenderness. There is no rebound.   Musculoskeletal: He exhibits no edema or tenderness.   Neurological: He is alert.   Skin: Skin is warm and dry. He is not diaphoretic.       Fluids    Intake/Output Summary (Last 24 hours) at 12/24/18 1346  Last data filed at 12/24/18 0730   Gross per 24 hour   Intake                0 ml   Output             1800 ml   Net            -1800 ml       Laboratory  Recent Labs      12/22/18   0154  12/23/18   0206  12/24/18   0039   WBC  21.2*  22.2*  19.7*   RBC  3.22*  3.42*  3.33*   HEMOGLOBIN  10.3*  10.9*  10.7*   HEMATOCRIT  33.4*  34.8*  34.4*   MCV  103.7*  101.8*  103.3*   MCH  32.0  31.9  32.1   MCHC  30.8*  31.3*  31.1*   RDW  55.0*  53.1*  54.4*   PLATELETCT  440  453*  442   MPV  9.6  9.8  10.0     Recent Labs      12/22/18   0154  12/23/18   0835  12/24/18   0806   SODIUM  144  142  143   POTASSIUM  4.0  4.6  3.5*   CHLORIDE  104  106  106   CO2  33  27  31   GLUCOSE  127*  130*  123*   BUN  36*  40*  43*   CREATININE  1.24  1.11  1.26   CALCIUM  10.2  10.2  10.2                   Imaging      Assessment/Plan  Acute biliary pancreatitis   Assessment & Plan    Status post ERCP with sphincterotomy and stenting on 12/13/2018 12/18/18 CT Abd showing increase in size of pancreatic pseudocyst  Enteral nutrition via cortrak feeing tube  Patient has retained large CBD stone for which he will need follow-up as outpatient for repeat ERCP with lithotripsy  Evaluated by Dr. Carter from surgery lap gregory canceled given his acute respiratory failure and high oxygen requirements  Seen at the bed side and in no acute distress       Sepsis (HCC)- (present on admission)   Assessment & Plan    This is severe sepsis with the following associated acute organ dysfunction(s): acute respiratory failure.   Abdominal source and pneumonia  On zosyn  I.D input is noted  Has improved     Mass of upper lobe of right lung- (present on admission)   Assessment & Plan    He will  need CT-guided biopsy when he has recovered from this acute illness  Patient and wife have been made aware       Pneumonia due to infectious organism- (present on admission)   Assessment & Plan    Pneumonia due to Prevotella & Klebsiella  With bacteremia likely secondary to aspiration  Zosyn  As per I.D       Acute on chronic respiratory failure with hypoxia (HCC)- (present on admission)   Assessment & Plan    CTA chest negative for PE  SOB/failure due to pneumonia and sepsis  Antibiotics  Monitor vitals     Anemia- (present on admission)   Assessment & Plan    H/H ARE STATBLE  No sign of bleeding.  Monitor CBC     Leukocytosis   Assessment & Plan    It is trending down  On zosyn  ID input is noted  Monitor cbc and vitals.  Cbc tomorrow     Pancreatic pseudocyst   Assessment & Plan    Pain management  GI input is noted  Future drainage once cyst matured     Oral phase dysphagia   Assessment & Plan    Continue tube feeding  Continue speech therapy  He looks dehydrated  Will add free water 250ml tid via tube     Atrial flutter (HCC)   Assessment & Plan    Resolved  Off amiodarone drip  Monitor on vitals/telemetry  Evaluated by cardiology with no recommendations for anticoagulation  Will start him on low dose lopressor     Esophagitis- (present on admission)   Assessment & Plan    Continue Pepcid     Troponin level elevated- (present on admission)   Assessment & Plan    C/o due to demand ischemia  Echo EF 65% unchanged from prior     Generalized abdominal pain- (present on admission)   Assessment & Plan    Has resolved          VTE prophylaxis: lovenox

## 2018-12-24 NOTE — PROGRESS NOTES
Two RN skin check completed with Taylor.      DTI to ears: grey foam pads in place.  Discoloration to forehead.   Elbows are pink and blanching: floated on pillows.  Skin tear to right forearm: dressing CDI.  Skin tear to left abdomen: dressing CDI.   Sacrum red slow to miguel a with open tears: dressing change done per wound orders.   Scab to anterior stump: preventative mepilex applied.   Right heel red and blanching: floated on pillow with mepilex on.     Pt is q 2 hour turns. Waffle mattress in place. Condom cath on to assist with wound healing.

## 2018-12-24 NOTE — PROGRESS NOTES
Monitor Summary   Rhythm/Rate  Sinus Tach  BBB   Frequent Couplets, PVC   4 beats of V-tach   0.12/0.14/0.34

## 2018-12-25 NOTE — THERAPY
"Physical Therapy Treatment completed.   Bed Mobility:  Supine to Sit: Minimal Assist  Transfers: Sit to Stand: Minimal Assist  Gait: Level Of Assist: Contact Guard Assist with Front-Wheel Walker       Plan of Care: Will benefit from Physical Therapy 3 times per week  Discharge Recommendations: Equipment: Will Continue to Assess for Equipment Needs. Post-acute therapy Recommend inpatient transitional care services for continued physical therapy services.      See \"Rehab Therapy-Acute\" Patient Summary Report for complete documentation.     Pt progressing well w/ therapy. He does require a lot of time to perform mobility and requires rest breaks during transitions. Pt initially requiring assist w/ seated balance however after prolonged sitting was able to hold his own balance and don his prosthetic. Pt able to ambulate short distances but does show increased WOB quickly. He was educated on increasing OOB activity and was receptive to education. As per initial eval, pt will benefit from post acute therapy.  "

## 2018-12-25 NOTE — PROGRESS NOTES
Two RN skin check completed with TIGRE Veras:      DTI to bilateral ears.  Discoloration to right side of face/forehead.  Elbows are pink and blanching.  Skin tear to right forearm; dressing in place CDI.  Skin tear to left abdomen;dressing in place CDI.   Sacrum red and slow to miguel a with open tears and excoriation; wound paste in place.   Scab to anterior left stump.  Right heel red and blanching; heel float boot in place and preventive mepilex in place.  Colostomy in place to RLQ; stoma red.       Patient is every 2 hour turns. Waffle mattress is  in place. Condom cath in place.

## 2018-12-25 NOTE — CARE PLAN
Problem: Safety  Goal: Will remain free from falls  Outcome: PROGRESSING SLOWER THAN EXPECTED  Bed locked in lowest position and call light is within reach.    Problem: Infection  Goal: Will remain free from infection  Outcome: PROGRESSING SLOWER THAN EXPECTED         details… detailed exam

## 2018-12-25 NOTE — CARE PLAN
Problem: Respiratory:  Goal: Respiratory status will improve  Outcome: PROGRESSING AS EXPECTED  Pt has strong cough; on home o2 amount of 2Liters.     Problem: Skin Integrity  Goal: Risk for impaired skin integrity will decrease  Outcome: PROGRESSING AS EXPECTED  Pt has multiple skin tears and abrasions; blanchable redness; sacrum looks improved; turning q2 hours, following treatment per wound team.

## 2018-12-25 NOTE — PROGRESS NOTES
Infectious Disease Progress Note    Author: Vicki Marin M.D. Date & Time of service: 2018  10:07 AM    Chief Complaint:  Follow-up for sepsis and pneumonia      Interval History:  12/15 AF, O2 15 L oxygen mask, increase, Labs and micro results reviewed. Imaging reviewed, chest x-ray slightly worse on the left. Medications reviewed.    AF, O2 50 L High flow NC, Labs and micro results reviewed. Imaging reviewed.   AF WBC 24 c/o N/V today   AF WBC 23 continued nausea and vomiting-denies abd pain   AF WBC 22.5 +nausea, less vomiting CT reviewed   AF WBC 17.8 somnolent today   AF WBC 18 transferred out of ICU-getting swallowing study Less nausea   AF WBC 21.2 somnolent-no acute events   AF WBC 22 no new complaints   afebrile WBC 19.7 patient is more alert today per nursing, he is complaining of a dry mouth, denies any abdominal pain   afebrile WBC 13.3 patient is somewhat disoriented this morning and not answering questions appropriately    Review of Systems:  Review of Systems   Unable to perform ROS: Mental status change   Patient disoriented    Hemodynamics:  Temp (24hrs), Av.3 °C (97.4 °F), Min:35.9 °C (96.7 °F), Max:36.8 °C (98.3 °F)  Temperature: 36.8 °C (98.3 °F)  Pulse  Av  Min: 54  Max: 169  Blood Pressure : 126/71       Physical Exam:  Physical Exam   Constitutional: He appears well-developed.   Elderly and chronically ill-appearing   HENT:   Head: Normocephalic and atraumatic.   Eyes: Pupils are equal, round, and reactive to light. EOM are normal.   Neck: Normal range of motion.   Cardiovascular: Normal rate and regular rhythm.    Pulmonary/Chest: Effort normal. He has no wheezes. He has no rales.   Diminished breath sounds bilaterally   Mild tachypnea   Abdominal: Soft. Bowel sounds are normal. He exhibits no distension. There is no tenderness.   Ostomy     Musculoskeletal: He exhibits no edema.   Left BKA    Left upper extremity midline  nontender   Lymphadenopathy:     He has no cervical adenopathy.   Neurological:   Confused   Skin: Skin is warm. No rash noted. He is not diaphoretic.   Hyperpigmented, macular lesion on right upper face and eyelid, chronic   Psychiatric: He has a normal mood and affect.   Nursing note and vitals reviewed.      Meds:    Current Facility-Administered Medications:   •  potassium chloride SA  •  NS  •  miconazole 2%-zinc oxide  •  metoprolol  •  ascorbic acid  •  [COMPLETED] piperacillin-tazobactam **AND** piperacillin-tazobactam  •  ipratropium-albuterol  •  enoxaparin (LOVENOX) injection  •  ferrous sulfate  •  levETIRAcetam  •  Pharmacy  •  acetaminophen  •  Notify provider if pain remains uncontrolled **AND** Use the numeric rating scale (NRS-11) on regular floors and Critical-Care Pain Observation Tool (CPOT) on ICUs/Trauma to assess pain **AND** Pulse Ox (Oximetry) **AND** Pharmacy Consult Request **AND** If patient difficult to arouse and/or has respiratory depression, stop any opiates that are currently infusing and call a Rapid Response. **AND** oxyCODONE immediate-release **AND** oxyCODONE immediate-release **AND** morphine injection  •  famotidine  •  ondansetron  •  Respiratory Care per Protocol  •  NS    Labs:  Recent Labs      12/23/18   0206  12/24/18   0039  12/25/18   0154   WBC  22.2*  19.7*  13.3*   RBC  3.42*  3.33*  3.31*   HEMOGLOBIN  10.9*  10.7*  10.5*   HEMATOCRIT  34.8*  34.4*  33.6*   MCV  101.8*  103.3*  101.5*   MCH  31.9  32.1  31.7   RDW  53.1*  54.4*  53.7*   PLATELETCT  453*  442  425   MPV  9.8  10.0  9.9   NEUTSPOLYS  83.90*  80.90*  77.30*   LYMPHOCYTES  6.30*  8.30*  10.80*   MONOCYTES  6.80  7.90  7.40   EOSINOPHILS  2.10  2.10  3.40   BASOPHILS  0.30  0.30  0.50     Recent Labs      12/24/18   0806  12/25/18   0154  12/25/18   0916   SODIUM  143  147*  146*   POTASSIUM  3.5*  3.4*  3.9   CHLORIDE  106  108  110   CO2  31  30  26   GLUCOSE  123*  118*  118*   BUN  43*  42*  43*      Recent Labs      12/24/18   0806  12/25/18   0154  12/25/18   0916   ALBUMIN  2.6*  2.7*  2.8*   TBILIRUBIN  0.5  0.5  0.6   ALKPHOSPHAT  117*  116*  111*   TOTPROTEIN  6.0  6.0  6.3   ALTSGPT  14  13  14   ASTSGOT  17  14  19   CREATININE  1.26  1.29  1.31       Imaging:  Ct-abdomen-pelvis With    Result Date: 12/11/2018 12/11/2018 9:22 PM HISTORY/REASON FOR EXAM:  Abd pain, diverticulitis suspected Nausea, vomiting. TECHNIQUE/EXAM DESCRIPTION:   CT scan of the abdomen and pelvis with contrast. Contrast-enhanced helical scanning was obtained from the diaphragmatic domes through the pubic symphysis following the bolus administration of nonionic contrast without complication. 100 mL of Omnipaque 350 nonionic contrast was administered without complication. Low dose optimization technique was utilized for this CT exam including automated exposure control and adjustment of the mA and/or kV according to patient size. COMPARISON: CT chest 12/11/2018. FINDINGS: Lung bases: Please see dedicated CT chest report Abdomen: The liver is unremarkable. The spleen is unremarkable. There is stranding and fluid surrounding the entire pancreas, most in the pancreatic tail. There is high density fluid extending from the pancreatic tail to the left paracolic gutter. There are multiple lobulated peripancreatic fluid area surrounding the  pancreas The gallbladder demonstrates multiple layering gallstones. Very dilated CBD, measuring up to 1.6 cm. There is a soft tissue attenuation lesion in the distal CBD (image 39 series 7) The adrenal glands are normal in size. The kidneys enhance symmetrically. There is a 7.5 cm cyst in the upper pole right kidney. No hydronephrosis. Tiny nonobstructive calculus in the lower pole left kidney. Moderate atherosclerotic disease of the abdominal aorta and its branches. Aortobiiliac graft There is no lymphadenopathy. No bowel wall thickening or bowel dilatation. Diffuse wall thickening of the stomach.  Right lower quadrant ostomy. Pelvis: Limited visualization of the pelvis due to bilateral femur hardwares. There are small urinary bladder diverticula. Layering stones/debris in the urinary bladder. Moderate amount of high density fluid in the pelvis, likely hemorrhagic fluid No aggressive bone lesions are seen. ___________________________________     1. Stranding and fluid surrounding the entire pancreas with several lobulated peripancreatic fluid area surrounding the pancreas. This could be sequela of prior pancreatitis versus cystic pancreatic neoplasm. More high density fluid extending from the pancreatic tail to the left paracolic gutter could relate to hemorrhage of one of these cystic lesions or sequela of acute on chronic pancreatitis. Severely dilated CBD, measuring up to 1.6 cm. Soft tissue attenuation lesion in the distal CBD could be an obstructing mass or noncalcified stone. Further evaluation with MRCP and MR pancreas is recommended. 2. Cholelithiasis. 3. Diffuse wall thickening of the stomach could be gastritis or reactive change secondary to the pancreatic process. 4. Layering stones/debris in the urinary bladder.    Dx-chest-portable (1 View)    Result Date: 12/15/2018  12/15/2018 8:50 AM HISTORY/REASON FOR EXAM:  Shortness of Breath TECHNIQUE/EXAM DESCRIPTION AND NUMBER OF VIEWS: Single portable view of the chest. COMPARISON:  CXR 12/14/2018. CT for PE 12/11/2018. FINDINGS: Feeding tube is present. The heart is at the upper limits of normal in size. There is persistent atelectasis or pneumonia in the left lower lobe with small left pleural effusion. Round lobulated opacity or mass in the right upper lobe is present. Minimal interstitial edema is present in the right lower lobe.     1.  Persistent atelectasis or pneumonia in the left lower lobe with small left pleural effusion. 2.  Minor residual edema and effusion in the right lower lobe. 3.  Lobulated pulmonary nodules in the right midlung again  noted. Differential diagnosis includes lung carcinoma.    Dx-chest-portable (1 View)    Result Date: 12/14/2018 12/14/2018 3:45 AM HISTORY/REASON FOR EXAM:  Shortness of Breath. TECHNIQUE/EXAM DESCRIPTION AND NUMBER OF VIEWS: Single portable view of the chest. COMPARISON: 12/11/2018 FINDINGS: LUNGS: Unchanged right upper lobe mass. Small bilateral pleural effusions, left slightly worse than the right. Retrocardiac opacity, atelectasis versus consolidation. PNEUMOTHORAX: None. LINES AND TUBES: Enteric tube tip is distal to the GE junction, outside the field-of-view. MEDIASTINUM: Stable cardiac silhouette. Atherosclerosis. MUSCULOSKELETAL STRUCTURES: Unchanged.     1. New retrocardiac atelectasis versus consolidation. 2. Small bilateral pleural effusions and bibasilar atelectasis, left worse than right. 3. Stable right upper lobe mass. 4. Enteric tube tip is distal to the GE junction.    Dx-chest-portable (1 View)    Result Date: 12/11/2018 12/11/2018 4:35 PM HISTORY/REASON FOR EXAM:  Loss of appetite, sore throat, chills.  Nausea and vomiting. TECHNIQUE/EXAM DESCRIPTION AND NUMBER OF VIEWS: Single portable view of the chest. COMPARISON: 1/30/2018 FINDINGS: Cardiac mediastinal contour is unchanged. Ill-defined opacity again seen in the RIGHT mid to upper lung, slightly more apparent than prior exam. No pleural fluid collection or pneumothorax. Severe degenerative change of both shoulders.     1.  No pneumonia or pulmonary edema. 2.  Slight interval increase in size of ill-defined RIGHT mid to upper lung nodular opacity, concerning for mass.    [id number] Ducts    Result Date: 12/13/2018 12/13/2018 3:05 PM HISTORY/REASON FOR EXAM:  Main OR ERCP TECHNIQUE/EXAM DESCRIPTION AND NUMBER OF VIEWS: Fluoroscopic unit obligated to the operating room for greater than one hour for ERCP procedure.  6 fluoroscopic images provided. COMPARISON: None FINDINGS: Initial cannulation the common bile duct which is dilated and shows  filling defect consistent with stones which appear to be removed on later images. 6 seconds fluoroscopy time utilized.     Limited images obtained during ERCP procedure showing apparent removal of common bile duct stones.    Mr-abdomen-with & W/o    Result Date: 12/13/2018 12/12/2018 3:47 PM HISTORY/REASON FOR EXAM:  Neoplasm: pancreas, suspected; Obstruction of bile duct. TECHNIQUE/EXAM DESCRIPTION: MRI of the pancreas with dynamic IV gadolinium enhancement. MR imaging of the pancreas was performed.  MR images of the pancreas were obtained with coronal and axial single-shot fast spin-echo T2, fat-suppressed axial FRFSE T2, axial in-phase and out-of-phase FSPGR T1, axial DWI with a b-value of 600, 3D MRCP,  precontrast fat-suppressed FSPGR T1, dynamic gadolinium enhanced axial fat-suppressed T1 FSPGR in the arterial dominant, portal venous, 2-minute, and 4-minute delayed phases, with delayed coronal fat-suppressed T1 FSPGR sequence. . The study was performed on a [employer] 1.5 [place] MRI scanner. 7 mL Gadavist contrast was administered intravenously. COMPARISON: CT abdomen and pelvis 12/11/2018 FINDINGS: Motion artifact limits exam. Small bilateral pleural effusions with associated consolidation. Liver shows mildly nodular margins.  No enhancing mass demonstrated.  Prominent intrahepatic biliary ducts. Spleen is unremarkable. Small amount of peritoneal fluid present. RIGHT kidney cysts with largest at the upper pole measuring 7.7 cm.  LEFT kidney is unremarkable.  No enhancing renal mass. Gallbladder shows multiple dependent signal voids consistent with stones. Common hepatic duct is dilated measuring 17 mm.,  Bile duct measures 14 mm.  Large ovoid signal voids within the distal common bile duct consistent with stones. Pancreatic duct is not significantly dilated. Complex fluid collection tracks along the pancreatic head and body, as seen on prior CT.  Edema in the pancreatic tail with fluid extending into the LEFT  pararenal and perisplenic space.  T1 hyperintense material present. Multilevel Schmorl's nodes in the thoracolumbar spine, with reactive endplate changes.  Apparent edema within the L3 vertebral body.     1.  Marked biliary dilation with large common bile duct stones. 2.  Stones also present in the gallbladder. 3.  Complex peripancreatic fluid collection, likely pseudocyst, with apparent inflammatory changes in the pancreatic tail suggesting superimposed acute pancreatitis with fluid extending into the LEFT pararenal space, likely hemorrhagic pancreatitis.  Hemorrhage within cystic lesion at the dorsal aspect of pancreatic tail also noted.  Pancreatic mass is difficult to exclude. 4.  Small volume ascites. 5.  Small bilateral pleural effusions with associated consolidation. 6.  RIGHT kidney cysts. 7.  Possible subacute compression fracture of L3.     Ct-cta Chest Pulmonary Artery W/ Recons    Result Date: 12/11/2018 12/11/2018 9:22 PM HISTORY/REASON FOR EXAM:  PE suspected, high pretest prob; Rule out PE. Also comment on right upper lobe opacity TECHNIQUE/EXAM DESCRIPTION: CT angiogram scan for pulmonary embolism with contrast, with reconstructions. 1.25 mm helical sections were obtained from the lung apices through the lung bases following the rapid bolus administration of 100 mL of Omnipaque 350 nonionic contrast. Thin-section overlapping reconstruction interval was utilized. Coronal reconstructions were generated from the axial data. MIP post processing was performed and utilized for the interpretation. Low dose optimization technique was utilized for this CT exam including automated exposure control and adjustment of the mA and/or kV according to patient size. COMPARISON: 3/24/2015 FINDINGS: Pulmonary Embolism: No. Main Pulmonary Arteries: No. Segmental branches: No. Subsegmental branches: No. Additional Comments: None. Lungs: Lobulated 1.7 x 1.8 x 3.1 cm mass in the right upper lobe. Diffuse emphysematous  change. Patchy bibasilar opacities. Airway: There is debris completely filling the bilateral lower lobe airways. Pleura: No pleural effusion or pneumothorax. Nodes: No enlarged mediastinal or hilar lymph nodes. Additional findings: Thoracic aorta and great vessels:  No aneurysm. Moderate atherosclerotic disease Heart and pericardium: Moderate coronary artery calcification. No pericardial effusion. Thoracic spine:  No fracture or malalignment. No compression deformity. Chest wall:   Unremarkable. Diffuse wall thickening throughout the esophagus. There is retaining fluid in the esophagus. There is a 1.4 cm paraesophageal lymph node. Questionable enlarged right paraesophageal lymph node more superiorly (image 93 series 4). Visualized upper abdomen: Please see dedicated report     1. No CT evidence of pulmonary embolism. 2. Lobulated 1.7 x 1.8 x 3.1 cm mass in the right upper lobe, concerning for lung malignancy. PET/CT, and/or tissue sampling is recommended. 3. Diffuse wall thickening throughout the esophagus could relate to esophagitis. Mildly prominent 1.4 cm paraesophageal lymph node adjacent to the distal esophagus. Questionable enlarged right paraesophageal lymph nodes more proximally. 4. Fluid opacification of the esophagus. There is debris completely filled the bilateral lower lobe airways with associated patchy bibasilar opacities. This is concerning for aspiration pneumonia due to retained fluid in the esophagus.    Ec-echocardiogram Complete W/ Cont    Result Date: 12/12/2018  Transthoracic Echo Report Echocardiography Laboratory CONCLUSIONS Left ventricular ejection fraction is visually estimated to be 65%. Hypokinesis of the apical septal wall. Grossly normal right ventricular size and systolic function. No significant valve disease or flow abnormalities. Compared to the images of the prior study done 3/9/2018 -  there has been no significant changeLV EF:  65    % FINDINGS Left Ventricle Normal left  ventricular systolic function. Left ventricular ejection fraction is visually estimated to be 65%. Hypokinesis of the apical septal wall. Diastolic function is difficult to assess with tachycardia. Contrast was used to enhance visualization of the endocardial border. 3ML of contrast was administered. Existing IV was used. Located at the left ac fossa. Right Ventricle Right ventricle not well visualized. Grossly normal right ventricular size and systolic function. Right Atrium The right atrium is normal in size.  Normal inferior vena cava size and inspiratory collapse. Left Atrium The left atrium is normal in size.  Left atrial volume index is 27  mL/sq m. Mitral Valve Structurally normal mitral valve without significant stenosis.  Trace tricuspid regurgitation. Aortic Valve The aortic valve is not well visualized but appears to be opening well with no aortic insufficiency. Tricuspid Valve The tricuspid valve is not well visualized. Unable to estimate pulmonary artery pressure due to an inadequate tricuspid regurgitant jet. Pulmonic Valve The pulmonic valve is not well visualized. Pericardium Normal pericardium without effusion. Aorta Ascending aorta not well visualized. Segundo Barreto MD (Electronically Signed) Final Date:     12 December 2018                 11:30    Dx-abdomen For Tube Placement    Result Date: 12/14/2018 12/13/2018 11:29 PM HISTORY/REASON FOR EXAM:  Line evaluation. TECHNIQUE/EXAM DESCRIPTION AND NUMBER OF VIEWS:  1 view(s) of the abdomen. COMPARISON:  No recent studies available for comparison. FINDINGS: Enteric tube has been placed. The tip projects over the junction of the second and third portions of duodenum. The bowel gas pattern is nonspecific.  Several dilated small bowel loops.     Enteric tube tip projects over the junction of the second and third portions of duodenum.      Micro:  Results     Procedure Component Value Units Date/Time    BLOOD CULTURE [[id number]] Collected:   "12/14/18 1436    Order Status:  Completed Specimen:  Blood from Peripheral Updated:  12/19/18 2300     Significant Indicator NEG     Source BLD     Site PERIPHERAL     Blood Culture No growth after 5 days of incubation.    Narrative:       Collected By:[id number] DERIK FORMAN  Per Hospital Policy: Only change Specimen Src: to \"Line\" if  specified by physician order.    BLOOD CULTURE [[id number]] Collected:  12/14/18 1307    Order Status:  Completed Specimen:  Blood from Peripheral Updated:  12/19/18 2300     Significant Indicator NEG     Source BLD     Site PERIPHERAL     Blood Culture No growth after 5 days of incubation.    Narrative:       Collected By:[id number] DERIK FORMAN  Per Hospital Policy: Only change Specimen Src: to \"Line\" if  specified by physician order.          Assessment:  Active Hospital Problems    Diagnosis   • Sepsis (HCC) [A41.9]   • Acute on chronic respiratory failure with hypoxia (HCC) [J96.21]   • Pneumonia due to infectious organism [J18.9]   • Mass of upper lobe of right lung [R91.8]   • Acute biliary pancreatitis [K85.10]        ASSESSMENT:      Conner Mora is a 78 y.o.male admitted 12/11/2018. Pt has a past medical history of seizure disorder on Keppra, respiratory disorder on 2 L nocturnal oxygen, PAD, he has a colostomy and is status post colectomy in 2010.  Presented to the ED complaining of nausea, vomiting and abdominal pain times 2-weeks.  He also reported cough and increasing shortness of breath.  He was diagnosed with pneumonia based on CT chest.  He has pancreatitis thought secondary to gallstones and is status post ERCP. ID consulted for new bacteremia with Prevotella oralis.    PLAN:      Sepsis   Secondary to cholangitis and pneumonia  Afebrile  Persistent leukocytosis  Initial demand ischemia and AMS  Bcxs 12/11 + Prevotella oralis and Klebsiella  Bcxs12/14 blood cultures NGTD   Anticipate IV abx through 12/28/18     Pancreatitis secondary to large " gallstone  Complicated by pseudocyst  MRI on 1212 with marked biliary dilation, stones noted, peripancreatic fluid collection, likely pseudocyst with inflammatory changes suggesting acute pancreatitis  S/p ERCP on 12/13 with 35 x 18 mm stone obstructing the distal bile duct as well as multiple smaller stones, stone was not removed due to large size plan for lithotripsy at a later date  Biliary stent placed for deep compression, improvement in alk phos and T bili  CT 12/18 Extensive multiloculated fluid collection about the pancreas, extending into the perisplenic region, increased in size from prior exam, likely complex pseudocyst.      Leukocytosis, persistent but decreased overall  Multifactorial  Monitor    Pneumonia  CT on 12/11 debris in the bilateral lower lobe airways and associated bibasilar opacity, fluid retained in the esophagus- concern for aspiration pneumonia  Pulm toilet  Remains on oxygen mask  Abx as above      Lung mass  Lobulated 1.7 x 1.8 x 3.1 cm mass in the right upper lobe-concern for malignancy  Biopsy if feasible    Nausea and vomiting, improved  Multifactorial  Continue abx as above  Improved LFTS and lipase at last check  CT as above  Aspiration precautions     Discussed with RN

## 2018-12-26 PROBLEM — E87.0 DEHYDRATION WITH HYPERNATREMIA: Status: ACTIVE | Noted: 2018-01-01

## 2018-12-26 PROBLEM — E86.0 DEHYDRATION WITH HYPERNATREMIA: Status: ACTIVE | Noted: 2018-01-01

## 2018-12-26 NOTE — PROGRESS NOTES
Infectious Disease Progress Note    Author: Vicki Marin M.D. Date & Time of service: 2018  10:09 AM    Chief Complaint:  Follow-up for sepsis and pneumonia      Interval History:  12/15 AF, O2 15 L oxygen mask, increase, Labs and micro results reviewed. Imaging reviewed, chest x-ray slightly worse on the left. Medications reviewed.    AF, O2 50 L High flow NC, Labs and micro results reviewed. Imaging reviewed.   AF WBC 24 c/o N/V today   AF WBC 23 continued nausea and vomiting-denies abd pain   AF WBC 22.5 +nausea, less vomiting CT reviewed   AF WBC 17.8 somnolent today   AF WBC 18 transferred out of ICU-getting swallowing study Less nausea   AF WBC 21.2 somnolent-no acute events   AF WBC 22 no new complaints   afebrile WBC 19.7 patient is more alert today per nursing, he is complaining of a dry mouth, denies any abdominal pain   afebrile WBC 13.3 patient is somewhat disoriented this morning and not answering questions appropriately   afebrile WBC 12.9 patient is in wrist restraints this morning and does not know where he is, he is only oriented to name,    Review of Systems:  Review of Systems   Unable to perform ROS: Mental status change   Patient with encephalopathy    Hemodynamics:  Temp (24hrs), Av.4 °C (97.5 °F), Min:35.9 °C (96.7 °F), Max:36.7 °C (98.1 °F)  Temperature: 36.7 °C (98.1 °F)  Pulse  Av  Min: 54  Max: 169  Blood Pressure : 118/74       Physical Exam:  Physical Exam   Constitutional: He appears well-developed.   Elderly and chronically ill-appearing   HENT:   Head: Normocephalic and atraumatic.   Eyes: Pupils are equal, round, and reactive to light. EOM are normal.   Neck: Normal range of motion.   Cardiovascular: Normal rate and regular rhythm.    Pulmonary/Chest: Effort normal. He has no wheezes. He has no rales.   Diminished breath sounds bilaterally   Mild tachypnea   Abdominal: Soft. Bowel sounds are normal. He  exhibits no distension. There is no tenderness.   Ostomy     Musculoskeletal: He exhibits no edema.   Left BKA    Left upper extremity midline nontender   Lymphadenopathy:     He has no cervical adenopathy.   Neurological:   Confused   Skin: Skin is warm. No rash noted. He is not diaphoretic.   Hyperpigmented, macular lesion on right upper face and eyelid, chronic   Psychiatric: He has a normal mood and affect.   Nursing note and vitals reviewed.      Meds:    Current Facility-Administered Medications:   •  potassium chloride SA  •  NS  •  miconazole 2%-zinc oxide  •  metoprolol  •  ascorbic acid  •  [COMPLETED] piperacillin-tazobactam **AND** piperacillin-tazobactam  •  ipratropium-albuterol  •  enoxaparin (LOVENOX) injection  •  ferrous sulfate  •  levETIRAcetam  •  Pharmacy  •  acetaminophen  •  Notify provider if pain remains uncontrolled **AND** Use the numeric rating scale (NRS-11) on regular floors and Critical-Care Pain Observation Tool (CPOT) on ICUs/Trauma to assess pain **AND** Pulse Ox (Oximetry) **AND** Pharmacy Consult Request **AND** If patient difficult to arouse and/or has respiratory depression, stop any opiates that are currently infusing and call a Rapid Response. **AND** oxyCODONE immediate-release **AND** oxyCODONE immediate-release **AND** morphine injection  •  famotidine  •  ondansetron  •  Respiratory Care per Protocol  •  NS    Labs:  Recent Labs      12/24/18   0039  12/25/18   0154  12/26/18   0415   WBC  19.7*  13.3*  12.9*   RBC  3.33*  3.31*  3.50*   HEMOGLOBIN  10.7*  10.5*  11.2*   HEMATOCRIT  34.4*  33.6*  36.0*   MCV  103.3*  101.5*  102.9*   MCH  32.1  31.7  32.0   RDW  54.4*  53.7*  54.5*   PLATELETCT  442  425  386   MPV  10.0  9.9  10.1   NEUTSPOLYS  80.90*  77.30*  75.00*   LYMPHOCYTES  8.30*  10.80*  10.30*   MONOCYTES  7.90  7.40  9.70   EOSINOPHILS  2.10  3.40  4.10   BASOPHILS  0.30  0.50  0.50     Recent Labs      12/25/18   0154  12/25/18   0916  12/26/18   0830    SODIUM  147*  146*  153*   POTASSIUM  3.4*  3.9  3.6   CHLORIDE  108  110  113*   CO2  30  26  32   GLUCOSE  118*  118*  118*   BUN  42*  43*  45*     Recent Labs      12/25/18   0154  12/25/18   0916  12/26/18   0830   ALBUMIN  2.7*  2.8*  2.7*   TBILIRUBIN  0.5  0.6  0.5   ALKPHOSPHAT  116*  111*  128*   TOTPROTEIN  6.0  6.3  6.4   ALTSGPT  13  14  13   ASTSGOT  14  19  13   CREATININE  1.29  1.31  1.41*       Imaging:  Ct-abdomen-pelvis With    Result Date: 12/11/2018 12/11/2018 9:22 PM HISTORY/REASON FOR EXAM:  Abd pain, diverticulitis suspected Nausea, vomiting. TECHNIQUE/EXAM DESCRIPTION:   CT scan of the abdomen and pelvis with contrast. Contrast-enhanced helical scanning was obtained from the diaphragmatic domes through the pubic symphysis following the bolus administration of nonionic contrast without complication. 100 mL of Omnipaque 350 nonionic contrast was administered without complication. Low dose optimization technique was utilized for this CT exam including automated exposure control and adjustment of the mA and/or kV according to patient size. COMPARISON: CT chest 12/11/2018. FINDINGS: Lung bases: Please see dedicated CT chest report Abdomen: The liver is unremarkable. The spleen is unremarkable. There is stranding and fluid surrounding the entire pancreas, most in the pancreatic tail. There is high density fluid extending from the pancreatic tail to the left paracolic gutter. There are multiple lobulated peripancreatic fluid area surrounding the  pancreas The gallbladder demonstrates multiple layering gallstones. Very dilated CBD, measuring up to 1.6 cm. There is a soft tissue attenuation lesion in the distal CBD (image 39 series 7) The adrenal glands are normal in size. The kidneys enhance symmetrically. There is a 7.5 cm cyst in the upper pole right kidney. No hydronephrosis. Tiny nonobstructive calculus in the lower pole left kidney. Moderate atherosclerotic disease of the abdominal aorta  and its branches. Aortobiiliac graft There is no lymphadenopathy. No bowel wall thickening or bowel dilatation. Diffuse wall thickening of the stomach. Right lower quadrant ostomy. Pelvis: Limited visualization of the pelvis due to bilateral femur hardwares. There are small urinary bladder diverticula. Layering stones/debris in the urinary bladder. Moderate amount of high density fluid in the pelvis, likely hemorrhagic fluid No aggressive bone lesions are seen. ___________________________________     1. Stranding and fluid surrounding the entire pancreas with several lobulated peripancreatic fluid area surrounding the pancreas. This could be sequela of prior pancreatitis versus cystic pancreatic neoplasm. More high density fluid extending from the pancreatic tail to the left paracolic gutter could relate to hemorrhage of one of these cystic lesions or sequela of acute on chronic pancreatitis. Severely dilated CBD, measuring up to 1.6 cm. Soft tissue attenuation lesion in the distal CBD could be an obstructing mass or noncalcified stone. Further evaluation with MRCP and MR pancreas is recommended. 2. Cholelithiasis. 3. Diffuse wall thickening of the stomach could be gastritis or reactive change secondary to the pancreatic process. 4. Layering stones/debris in the urinary bladder.    Dx-chest-portable (1 View)    Result Date: 12/15/2018  12/15/2018 8:50 AM HISTORY/REASON FOR EXAM:  Shortness of Breath TECHNIQUE/EXAM DESCRIPTION AND NUMBER OF VIEWS: Single portable view of the chest. COMPARISON:  CXR 12/14/2018. CT for PE 12/11/2018. FINDINGS: Feeding tube is present. The heart is at the upper limits of normal in size. There is persistent atelectasis or pneumonia in the left lower lobe with small left pleural effusion. Round lobulated opacity or mass in the right upper lobe is present. Minimal interstitial edema is present in the right lower lobe.     1.  Persistent atelectasis or pneumonia in the left lower lobe with  small left pleural effusion. 2.  Minor residual edema and effusion in the right lower lobe. 3.  Lobulated pulmonary nodules in the right midlung again noted. Differential diagnosis includes lung carcinoma.    Dx-chest-portable (1 View)    Result Date: 12/14/2018 12/14/2018 3:45 AM HISTORY/REASON FOR EXAM:  Shortness of Breath. TECHNIQUE/EXAM DESCRIPTION AND NUMBER OF VIEWS: Single portable view of the chest. COMPARISON: 12/11/2018 FINDINGS: LUNGS: Unchanged right upper lobe mass. Small bilateral pleural effusions, left slightly worse than the right. Retrocardiac opacity, atelectasis versus consolidation. PNEUMOTHORAX: None. LINES AND TUBES: Enteric tube tip is distal to the GE junction, outside the field-of-view. MEDIASTINUM: Stable cardiac silhouette. Atherosclerosis. MUSCULOSKELETAL STRUCTURES: Unchanged.     1. New retrocardiac atelectasis versus consolidation. 2. Small bilateral pleural effusions and bibasilar atelectasis, left worse than right. 3. Stable right upper lobe mass. 4. Enteric tube tip is distal to the GE junction.    Dx-chest-portable (1 View)    Result Date: 12/11/2018 12/11/2018 4:35 PM HISTORY/REASON FOR EXAM:  Loss of appetite, sore throat, chills.  Nausea and vomiting. TECHNIQUE/EXAM DESCRIPTION AND NUMBER OF VIEWS: Single portable view of the chest. COMPARISON: 1/30/2018 FINDINGS: Cardiac mediastinal contour is unchanged. Ill-defined opacity again seen in the RIGHT mid to upper lung, slightly more apparent than prior exam. No pleural fluid collection or pneumothorax. Severe degenerative change of both shoulders.     1.  No pneumonia or pulmonary edema. 2.  Slight interval increase in size of ill-defined RIGHT mid to upper lung nodular opacity, concerning for mass.    Me-[id number]-[id number] Ducts    Result Date: 12/13/2018 12/13/2018 3:05 PM HISTORY/REASON FOR EXAM:  Main OR ERCP TECHNIQUE/EXAM DESCRIPTION AND NUMBER OF VIEWS: Fluoroscopic unit obligated to the operating room for greater than one  hour for ERCP procedure.  6 fluoroscopic images provided. COMPARISON: None FINDINGS: Initial cannulation the common bile duct which is dilated and shows filling defect consistent with stones which appear to be removed on later images. 6 seconds fluoroscopy time utilized.     Limited images obtained during ERCP procedure showing apparent removal of common bile duct stones.    Mr-abdomen-with & W/o    Result Date: 12/13/2018 12/12/2018 3:47 PM HISTORY/REASON FOR EXAM:  Neoplasm: pancreas, suspected; Obstruction of bile duct. TECHNIQUE/EXAM DESCRIPTION: MRI of the pancreas with dynamic IV gadolinium enhancement. MR imaging of the pancreas was performed.  MR images of the pancreas were obtained with coronal and axial single-shot fast spin-echo T2, fat-suppressed axial FRFSE T2, axial in-phase and out-of-phase FSPGR T1, axial DWI with a b-value of 600, 3D MRCP,  precontrast fat-suppressed FSPGR T1, dynamic gadolinium enhanced axial fat-suppressed T1 FSPGR in the arterial dominant, portal venous, 2-minute, and 4-minute delayed phases, with delayed coronal fat-suppressed T1 FSPGR sequence. . The study was performed on a [employer] 1.5 [employer] MRI scanner. 7 mL Gadavist contrast was administered intravenously. COMPARISON: CT abdomen and pelvis 12/11/2018 FINDINGS: Motion artifact limits exam. Small bilateral pleural effusions with associated consolidation. Liver shows mildly nodular margins.  No enhancing mass demonstrated.  Prominent intrahepatic biliary ducts. Spleen is unremarkable. Small amount of peritoneal fluid present. RIGHT kidney cysts with largest at the upper pole measuring 7.7 cm.  LEFT kidney is unremarkable.  No enhancing renal mass. Gallbladder shows multiple dependent signal voids consistent with stones. Common hepatic duct is dilated measuring 17 mm.,  Bile duct measures 14 mm.  Large ovoid signal voids within the distal common bile duct consistent with stones. Pancreatic duct is not significantly dilated.  Complex fluid collection tracks along the pancreatic head and body, as seen on prior CT.  Edema in the pancreatic tail with fluid extending into the LEFT pararenal and perisplenic space.  T1 hyperintense material present. Multilevel Schmorl's nodes in the thoracolumbar spine, with reactive endplate changes.  Apparent edema within the L3 vertebral body.     1.  Marked biliary dilation with large common bile duct stones. 2.  Stones also present in the gallbladder. 3.  Complex peripancreatic fluid collection, likely pseudocyst, with apparent inflammatory changes in the pancreatic tail suggesting superimposed acute pancreatitis with fluid extending into the LEFT pararenal space, likely hemorrhagic pancreatitis.  Hemorrhage within cystic lesion at the dorsal aspect of pancreatic tail also noted.  Pancreatic mass is difficult to exclude. 4.  Small volume ascites. 5.  Small bilateral pleural effusions with associated consolidation. 6.  RIGHT kidney cysts. 7.  Possible subacute compression fracture of L3.     Ct-cta Chest Pulmonary Artery W/ Recons    Result Date: 12/11/2018 12/11/2018 9:22 PM HISTORY/REASON FOR EXAM:  PE suspected, high pretest prob; Rule out PE. Also comment on right upper lobe opacity TECHNIQUE/EXAM DESCRIPTION: CT angiogram scan for pulmonary embolism with contrast, with reconstructions. 1.25 mm helical sections were obtained from the lung apices through the lung bases following the rapid bolus administration of 100 mL of Omnipaque 350 nonionic contrast. Thin-section overlapping reconstruction interval was utilized. Coronal reconstructions were generated from the axial data. MIP post processing was performed and utilized for the interpretation. Low dose optimization technique was utilized for this CT exam including automated exposure control and adjustment of the mA and/or kV according to patient size. COMPARISON: 3/24/2015 FINDINGS: Pulmonary Embolism: No. Main Pulmonary Arteries: No. Segmental  branches: No. Subsegmental branches: No. Additional Comments: None. Lungs: Lobulated 1.7 x 1.8 x 3.1 cm mass in the right upper lobe. Diffuse emphysematous change. Patchy bibasilar opacities. Airway: There is debris completely filling the bilateral lower lobe airways. Pleura: No pleural effusion or pneumothorax. Nodes: No enlarged mediastinal or hilar lymph nodes. Additional findings: Thoracic aorta and great vessels:  No aneurysm. Moderate atherosclerotic disease Heart and pericardium: Moderate coronary artery calcification. No pericardial effusion. Thoracic spine:  No fracture or malalignment. No compression deformity. Chest wall:   Unremarkable. Diffuse wall thickening throughout the esophagus. There is retaining fluid in the esophagus. There is a 1.4 cm paraesophageal lymph node. Questionable enlarged right paraesophageal lymph node more superiorly (image 93 series 4). Visualized upper abdomen: Please see dedicated report     1. No CT evidence of pulmonary embolism. 2. Lobulated 1.7 x 1.8 x 3.1 cm mass in the right upper lobe, concerning for lung malignancy. PET/CT, and/or tissue sampling is recommended. 3. Diffuse wall thickening throughout the esophagus could relate to esophagitis. Mildly prominent 1.4 cm paraesophageal lymph node adjacent to the distal esophagus. Questionable enlarged right paraesophageal lymph nodes more proximally. 4. Fluid opacification of the esophagus. There is debris completely filled the bilateral lower lobe airways with associated patchy bibasilar opacities. This is concerning for aspiration pneumonia due to retained fluid in the esophagus.    Ec-echocardiogram Complete W/ Cont    Result Date: 12/12/2018  Transthoracic Echo Report Echocardiography Laboratory CONCLUSIONS Left ventricular ejection fraction is visually estimated to be 65%. Hypokinesis of the apical septal wall. Grossly normal right ventricular size and systolic function. No significant valve disease or flow  abnormalities. Compared to the images of the prior study done 3/9/2018 -  there has been no significant changeLV EF:  65    % FINDINGS Left Ventricle Normal left ventricular systolic function. Left ventricular ejection fraction is visually estimated to be 65%. Hypokinesis of the apical septal wall. Diastolic function is difficult to assess with tachycardia. Contrast was used to enhance visualization of the endocardial border. 3ML of contrast was administered. Existing IV was used. Located at the left ac fossa. Right Ventricle Right ventricle not well visualized. Grossly normal right ventricular size and systolic function. Right Atrium The right atrium is normal in size.  Normal inferior vena cava size and inspiratory collapse. Left Atrium The left atrium is normal in size.  Left atrial volume index is 27  mL/sq m. Mitral Valve Structurally normal mitral valve without significant stenosis.  Trace tricuspid regurgitation. Aortic Valve The aortic valve is not well visualized but appears to be opening well with no aortic insufficiency. Tricuspid Valve The tricuspid valve is not well visualized. Unable to estimate pulmonary artery pressure due to an inadequate tricuspid regurgitant jet. Pulmonic Valve The pulmonic valve is not well visualized. Pericardium Normal pericardium without effusion. Aorta Ascending aorta not well visualized. Segundo Barreto MD (Electronically Signed) Final Date:     12 December 2018                 11:30    Dx-abdomen For Tube Placement    Result Date: 12/14/2018 12/13/2018 11:29 PM HISTORY/REASON FOR EXAM:  Line evaluation. TECHNIQUE/EXAM DESCRIPTION AND NUMBER OF VIEWS:  1 view(s) of the abdomen. COMPARISON:  No recent studies available for comparison. FINDINGS: Enteric tube has been placed. The tip projects over the junction of the second and third portions of duodenum. The bowel gas pattern is nonspecific.  Several dilated small bowel loops.     Enteric tube tip projects over the  "junction of the second and third portions of duodenum.      Micro:  Results     Procedure Component Value Units Date/Time    BLOOD CULTURE [[id number]] Collected:  12/14/18 1436    Order Status:  Completed Specimen:  Blood from Peripheral Updated:  12/19/18 2300     Significant Indicator NEG     Source BLD     Site PERIPHERAL     Blood Culture No growth after 5 days of incubation.    Narrative:       Collected By:[id number] DERIK FORMAN  Per Hospital Policy: Only change Specimen Src: to \"Line\" if  specified by physician order.    BLOOD CULTURE [[id number]] Collected:  12/14/18 1307    Order Status:  Completed Specimen:  Blood from Peripheral Updated:  12/19/18 2300     Significant Indicator NEG     Source BLD     Site PERIPHERAL     Blood Culture No growth after 5 days of incubation.    Narrative:       Collected By:[id number] DERIK FORMAN  Per Hospital Policy: Only change Specimen Src: to \"Line\" if  specified by physician order.          Assessment:  Active Hospital Problems    Diagnosis   • Sepsis (HCC) [A41.9]   • Acute on chronic respiratory failure with hypoxia (HCC) [J96.21]   • Pneumonia due to infectious organism [J18.9]   • Mass of upper lobe of right lung [R91.8]   • Acute biliary pancreatitis [K85.10]        ASSESSMENT:      Conner Mora is a 78 y.o.male admitted 12/11/2018. Pt has a past medical history of seizure disorder on Keppra, respiratory disorder on 2 L nocturnal oxygen, PAD, he has a colostomy and is status post colectomy in 2010.  Presented to the ED complaining of nausea, vomiting and abdominal pain times 2-weeks.  He also reported cough and increasing shortness of breath.  He was diagnosed with pneumonia based on CT chest.  He has pancreatitis thought secondary to gallstones and is status post ERCP. ID consulted for new bacteremia with Prevotella oralis.      PLAN:      Sepsis   Secondary to cholangitis and pneumonia  Afebrile  Persistent leukocytosis  Initial demand ischemia and " AMS  Bcxs 12/11 + Prevotella oralis and Klebsiella  Bcxs12/14 blood cultures NGTD   Anticipate IV abx through 12/28/18     Pancreatitis secondary to large gallstone  Complicated by pseudocyst  MRI on 1212 with marked biliary dilation, stones noted, peripancreatic fluid collection, likely pseudocyst with inflammatory changes suggesting acute pancreatitis  S/p ERCP on 12/13 with 35 x 18 mm stone obstructing the distal bile duct as well as multiple smaller stones, stone was not removed due to large size plan for lithotripsy at a later date  Biliary stent placed for deep compression, improvement in alk phos and T bili  CT 12/18 Extensive multiloculated fluid collection about the pancreas, extending into the perisplenic region, increased in size from prior exam, likely complex pseudocyst.   No surgical plans at this time given acute respiratory failure and high oxygen requirements    Encephalopathy  Likely multifactorial  Monitor    Leukocytosis, persistent but decreased overall  Multifactorial  Monitor  On antibiotics above    Pneumonia  CT on 12/11 debris in the bilateral lower lobe airways and associated bibasilar opacity, fluid retained in the esophagus- concern for aspiration pneumonia  Pulm toilet  Remains on oxygen mask -no changes  Abx as above      Lung mass  Lobulated 1.7 x 1.8 x 3.1 cm mass in the right upper lobe-concern for malignancy  Biopsy if feasible once acute issues stable     Discussed with RN and Dr. Vidal

## 2018-12-26 NOTE — PROGRESS NOTES
Hospital Medicine Daily Progress Note    Date of Service  12/25/2018    Chief Complaint  78 y.o. male admitted 12/11/2018 with 78 y.o. 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 Source is suspected pneumonia/pancreatic abscess. Patient had ERCP and biliary stent placed on 12/13/18. Bacteremia with Prevotella and Klebsiella.  Repeat Blood cultures no growth to date      Hospital Course  12/25: s/p 12/13 biliary stent placed by Dr. arzola.  Doing well, mild jaundice remains.  Wife at bedside.  CT abdomen reviewed, no evidence of pancreatic mass, cholelithiasis noted and stranding and fluid surrounding the pancreas was seen.  Cortrak remains, pending repeat SLP eval.    Consultants/Specialty  Surgery  Intensivist  Gastroenterology  Infectious disease     Code Status  dnr/dni    Disposition  Home once medically stable, will need repeat ERCP 4-6 weeks to break up stones per Dr. Arzola.  Eventually stent removal.    Review of Systems  Review of Systems   Constitutional: Positive for malaise/fatigue. Negative for chills.   HENT: Negative for congestion, ear discharge and nosebleeds.    Eyes: Negative for photophobia, pain and discharge.   Respiratory: Negative for hemoptysis, sputum production and shortness of breath.    Cardiovascular: Negative for chest pain, palpitations and orthopnea.   Gastrointestinal: Negative for abdominal pain, nausea and vomiting.   Genitourinary: Negative for frequency, hematuria and urgency.   Musculoskeletal: Negative for back pain, joint pain and neck pain.   Skin: Negative for itching.   Neurological: Positive for weakness. Negative for dizziness, tingling and headaches.        Physical Exam  Temp:  [35.9 °C (96.7 °F)-36.8 °C (98.3 °F)] 35.9 °C (96.7 °F)  Pulse:  [] 96  Resp:  [18-20] 18  BP: (104-126)/(59-71) 117/71    Physical Exam   Constitutional: No distress.   HENT:    Head: Normocephalic and atraumatic.   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 Conjunctivae are normal.   Neck: Normal range of motion. Neck supple.   Cardiovascular: Normal rate and regular rhythm.    Pulmonary/Chest: No respiratory distress. He has no wheezes.   Abdominal: He exhibits no distension. There is no tenderness. There is no rebound.   Musculoskeletal: He exhibits no edema or tenderness.   Neurological: He is alert.   Skin: Skin is warm and dry. He is not diaphoretic.       Fluids    Intake/Output Summary (Last 24 hours) at 12/25/18 1852  Last data filed at 12/25/18 1800   Gross per 24 hour   Intake             3035 ml   Output             2700 ml   Net              335 ml       Laboratory  Recent Labs      12/23/18   0206  12/24/18   0039  12/25/18   0154   WBC  22.2*  19.7*  13.3*   RBC  3.42*  3.33*  3.31*   HEMOGLOBIN  10.9*  10.7*  10.5*   HEMATOCRIT  34.8*  34.4*  33.6*   MCV  101.8*  103.3*  101.5*   MCH  31.9  32.1  31.7   MCHC  31.3*  31.1*  31.3*   RDW  53.1*  54.4*  53.7*   PLATELETCT  453*  442  425   MPV  9.8  10.0  9.9     Recent Labs      12/24/18   0806  12/25/18   0154  12/25/18   0916   SODIUM  143  147*  146*   POTASSIUM  3.5*  3.4*  3.9   CHLORIDE  106  108  110   CO2  31  30  26   GLUCOSE  123*  118*  118*   BUN  43*  42*  43*   CREATININE  1.26  1.29  1.31   CALCIUM  10.2  10.8*  10.8*                   Imaging      Assessment/Plan  Acute biliary pancreatitis   Assessment & Plan    Status post ERCP with sphincterotomy and stenting on 12/13/2018 12/18/18 CT Abd showing increase in size of pancreatic pseudocyst  Enteral nutrition via cortrak feeing tube  Patient has retained large CBD stone for which he will need follow-up as outpatient for repeat ERCP with lithotripsy  Evaluated by Dr. Carter from surgery lap gregory canceled given his acute respiratory failure and high oxygen requirements  Seen at the bed  side and in no acute distress       Sepsis (HCC)- (present on admission)   Assessment & Plan    This is severe sepsis with the following associated acute organ dysfunction(s): acute respiratory failure.   Abdominal source and pneumonia  On zosyn  I.D input is noted  Has improved     Mass of upper lobe of right lung- (present on admission)   Assessment & Plan    He will need CT-guided biopsy when he has recovered from this acute illness  Patient and wife have been made aware       Pneumonia due to infectious organism- (present on admission)   Assessment & Plan    Pneumonia due to Prevotella & Klebsiella  With bacteremia likely secondary to aspiration  Zosyn, stop date 12/28.  As per I.D       Acute on chronic respiratory failure with hypoxia (HCC)- (present on admission)   Assessment & Plan    CTA chest negative for PE  SOB/failure due to pneumonia and sepsis  Antibiotics  Monitor vitals     Anemia- (present on admission)   Assessment & Plan    H/H ARE STATBLE  No sign of bleeding.  Monitor CBC     Leukocytosis   Assessment & Plan    It is trending down  On zosyn  ID input is noted  Monitor cbc and vitals.  Cbc tomorrow     Pancreatic pseudocyst   Assessment & Plan    Pain management  GI input is noted  Future drainage once cyst matured     Oral phase dysphagia   Assessment & Plan    Continue tube feeding  Continue speech therapy  He looks dehydrated  Will add free water 250ml tid via tube     Atrial flutter (HCC)   Assessment & Plan    Resolved  Off amiodarone drip  Monitor on vitals/telemetry  Evaluated by cardiology with no recommendations for anticoagulation  Will start him on low dose lopressor     Esophagitis- (present on admission)   Assessment & Plan    Continue Pepcid     Troponin level elevated- (present on admission)   Assessment & Plan    C/o due to demand ischemia  Echo EF 65% unchanged from prior     Generalized abdominal pain- (present on admission)   Assessment & Plan    Has resolved          VTE  Continue Regimen: Metronidazole 1% gel prophylaxis: lovenox       Detail Level: Zone

## 2018-12-26 NOTE — DISCHARGE PLANNING
Anticipated Discharge Disposition: TBD    Action: LSW spoke with patient's wife Courtney at bedside regarding d/c plan. Per Courtney she would like to wait and meet with MD today. LSW spoke with her about LTAC and what services they provide over SNF. LSW provided choice form for her to review.   Patient also pending SLP eval.     Barriers to Discharge: TBD    Plan: LSW to follow up with patient/wife and IDT regarding d/c needs

## 2018-12-26 NOTE — CARE PLAN
Problem: Safety  Goal: Will remain free from falls  Outcome: PROGRESSING SLOWER THAN EXPECTED  Bed locked in lowest position and call light is within reach.    Problem: Infection  Goal: Will remain free from infection  Outcome: PROGRESSING SLOWER THAN EXPECTED

## 2018-12-26 NOTE — PROGRESS NOTES
Patient was found to be pulling at cortrak and not letting go and also kept pulling his oxymask off and de sating to th 80s. Called Dr. Jenkins and order for restraints was given.    2315- Notified wife

## 2018-12-27 NOTE — PROGRESS NOTES
2 RN Skin Assessment with Rito RN:    Deep Tissue Injury to Bilateral Ears  RLQ Colostomy  Right heel has blanchable redness  Scab to left knee  Skin tear to left abdomen  Skin tear to right forearm  Right face has discoloration  Skin tear to left buttock  Sacrum red and blanchable with tears    Every two hour turns, mepilex and heel boot to RLE, waffle mattress, bree cream, and visco-paste used as ordered.

## 2018-12-27 NOTE — CARE PLAN
Problem: Respiratory:  Goal: Respiratory status will improve  Outcome: MET Date Met: 12/26/18  Patient satting 95% on 2L 02 which is baseline, no resp distress

## 2018-12-27 NOTE — PROGRESS NOTES
2 RN SKIN CHECK    DTI to bilateral ears.  Discoloration to right side of face/forehead.  Skin tear to right forearm; dressing in place CDI.  Skin tear to left abdomen;dressing in place CDI.   Sacrum red and slow to miguel a with open tears and flaking skin, orders for bree cream and viscopaste, completed this shift.   Scab to anterior left stump.  Right heel red and blanching; heel float boot in place and preventive mepilex in place.  Colostomy in place to RLQ; stoma red.        Q2HR turns. Waffle mattress is  in place. Voiding in urinal.

## 2018-12-27 NOTE — PROGRESS NOTES
Infectious Disease Progress Note    Author: Vicki Marin M.D. Date & Time of service: 2018  11:40 AM    Chief Complaint:  Follow-up for sepsis and pneumonia      Interval History:  12/15 AF, O2 15 L oxygen mask, increase, Labs and micro results reviewed. Imaging reviewed, chest x-ray slightly worse on the left. Medications reviewed.    AF, O2 50 L High flow NC, Labs and micro results reviewed. Imaging reviewed.   AF WBC 24 c/o N/V today   AF WBC 23 continued nausea and vomiting-denies abd pain   AF WBC 22.5 +nausea, less vomiting CT reviewed   AF WBC 17.8 somnolent today   AF WBC 18 transferred out of ICU-getting swallowing study Less nausea   AF WBC 21.2 somnolent-no acute events   AF WBC 22 no new complaints   afebrile WBC 19.7 patient is more alert today per nursing, he is complaining of a dry mouth, denies any abdominal pain   afebrile WBC 13.3 patient is somewhat disoriented this morning and not answering questions appropriately   afebrile WBC 12.9 patient is in wrist restraints this morning and does not know where he is, he is only oriented to name,   afebrile WBC 13.3 patient is alert to name and he knows that he is in the hospital at [facility], repeat CT of the chest yesterday reveals slightly increase in the lobulated mass concerning for tumor progression    Review of Systems:  Review of Systems   Unable to perform ROS: Mental status change   Patient with encephalopathy    Hemodynamics:  Temp (24hrs), Av.4 °C (97.6 °F), Min:35.9 °C (96.6 °F), Max:36.8 °C (98.2 °F)  Temperature: 35.9 °C (96.6 °F)  Pulse  Av  Min: 54  Max: 169  Blood Pressure : 118/64       Physical Exam:  Physical Exam   Constitutional: He appears well-developed.   Elderly and chronically ill-appearing   HENT:   Head: Normocephalic and atraumatic.   Eyes: Pupils are equal, round, and reactive to light. EOM are normal.   Neck: Normal range of motion.    Cardiovascular: Normal rate and regular rhythm.    Pulmonary/Chest: Effort normal. He has no wheezes. He has no rales.   Diminished breath sounds bilaterally   Mild tachypnea   Abdominal: Soft. Bowel sounds are normal. He exhibits no distension. There is no tenderness.   Ostomy     Musculoskeletal: He exhibits no edema.   Left BKA    Left upper extremity midline nontender   Lymphadenopathy:     He has no cervical adenopathy.   Neurological:   Oriented to name and place   Skin: Skin is warm. No rash noted. He is not diaphoretic.   Hyperpigmented, macular lesion on right upper face and eyelid, chronic   Psychiatric: He has a normal mood and affect.   Nursing note and vitals reviewed.      Meds:    Current Facility-Administered Medications:   •  potassium chloride SA  •  ascorbic acid  •  metoprolol  •  miconazole 2%-zinc oxide  •  [COMPLETED] piperacillin-tazobactam **AND** piperacillin-tazobactam  •  ipratropium-albuterol  •  enoxaparin (LOVENOX) injection  •  ferrous sulfate  •  levETIRAcetam  •  Pharmacy  •  acetaminophen  •  Notify provider if pain remains uncontrolled **AND** Use the numeric rating scale (NRS-11) on regular floors and Critical-Care Pain Observation Tool (CPOT) on ICUs/Trauma to assess pain **AND** Pulse Ox (Oximetry) **AND** Pharmacy Consult Request **AND** If patient difficult to arouse and/or has respiratory depression, stop any opiates that are currently infusing and call a Rapid Response. **AND** oxyCODONE immediate-release **AND** oxyCODONE immediate-release **AND** morphine injection  •  famotidine  •  ondansetron  •  Respiratory Care per Protocol  •  NS    Labs:  Recent Labs      12/25/18   0154  12/26/18   0415  12/27/18   0035   WBC  13.3*  12.9*  13.3*   RBC  3.31*  3.50*  3.41*   HEMOGLOBIN  10.5*  11.2*  10.9*   HEMATOCRIT  33.6*  36.0*  35.7*   MCV  101.5*  102.9*  104.7*   MCH  31.7  32.0  32.0   RDW  53.7*  54.5*  56.0*   PLATELETCT  425  386  389   MPV  9.9  10.1  10.0    NEUTSPOLYS  77.30*  75.00*  76.40*   LYMPHOCYTES  10.80*  10.30*  9.80*   MONOCYTES  7.40  9.70  7.30   EOSINOPHILS  3.40  4.10  5.00   BASOPHILS  0.50  0.50  1.00     Recent Labs      12/26/18   0830  12/26/18   1650  12/27/18   0856   SODIUM  153*  151*  150*   POTASSIUM  3.6  3.9  3.8   CHLORIDE  113*  116*  114*   CO2  32  27  27   GLUCOSE  118*  104*  107*   BUN  45*  44*  42*     Recent Labs      12/25/18   0916  12/26/18   0830  12/26/18   1650  12/27/18   0856   ALBUMIN  2.8*  2.7*   --   2.9*   TBILIRUBIN  0.6  0.5   --   0.5   ALKPHOSPHAT  111*  128*   --   132*   TOTPROTEIN  6.3  6.4   --   6.5   ALTSGPT  14  13   --   11   ASTSGOT  19  13   --   16   CREATININE  1.31  1.41*  1.33  1.28       Imaging:  Ct-abdomen-pelvis With    Result Date: 12/11/2018 12/11/2018 9:22 PM HISTORY/REASON FOR EXAM:  Abd pain, diverticulitis suspected Nausea, vomiting. TECHNIQUE/EXAM DESCRIPTION:   CT scan of the abdomen and pelvis with contrast. Contrast-enhanced helical scanning was obtained from the diaphragmatic domes through the pubic symphysis following the bolus administration of nonionic contrast without complication. 100 mL of Omnipaque 350 nonionic contrast was administered without complication. Low dose optimization technique was utilized for this CT exam including automated exposure control and adjustment of the mA and/or kV according to patient size. COMPARISON: CT chest 12/11/2018. FINDINGS: Lung bases: Please see dedicated CT chest report Abdomen: The liver is unremarkable. The spleen is unremarkable. There is stranding and fluid surrounding the entire pancreas, most in the pancreatic tail. There is high density fluid extending from the pancreatic tail to the left paracolic gutter. There are multiple lobulated peripancreatic fluid area surrounding the  pancreas The gallbladder demonstrates multiple layering gallstones. Very dilated CBD, measuring up to 1.6 cm. There is a soft tissue attenuation lesion in the  distal CBD (image 39 series 7) The adrenal glands are normal in size. The kidneys enhance symmetrically. There is a 7.5 cm cyst in the upper pole right kidney. No hydronephrosis. Tiny nonobstructive calculus in the lower pole left kidney. Moderate atherosclerotic disease of the abdominal aorta and its branches. Aortobiiliac graft There is no lymphadenopathy. No bowel wall thickening or bowel dilatation. Diffuse wall thickening of the stomach. Right lower quadrant ostomy. Pelvis: Limited visualization of the pelvis due to bilateral femur hardwares. There are small urinary bladder diverticula. Layering stones/debris in the urinary bladder. Moderate amount of high density fluid in the pelvis, likely hemorrhagic fluid No aggressive bone lesions are seen. ___________________________________     1. Stranding and fluid surrounding the entire pancreas with several lobulated peripancreatic fluid area surrounding the pancreas. This could be sequela of prior pancreatitis versus cystic pancreatic neoplasm. More high density fluid extending from the pancreatic tail to the left paracolic gutter could relate to hemorrhage of one of these cystic lesions or sequela of acute on chronic pancreatitis. Severely dilated CBD, measuring up to 1.6 cm. Soft tissue attenuation lesion in the distal CBD could be an obstructing mass or noncalcified stone. Further evaluation with MRCP and MR pancreas is recommended. 2. Cholelithiasis. 3. Diffuse wall thickening of the stomach could be gastritis or reactive change secondary to the pancreatic process. 4. Layering stones/debris in the urinary bladder.    Dx-chest-portable (1 View)    Result Date: 12/15/2018  12/15/2018 8:50 AM HISTORY/REASON FOR EXAM:  Shortness of Breath TECHNIQUE/EXAM DESCRIPTION AND NUMBER OF VIEWS: Single portable view of the chest. COMPARISON:  CXR 12/14/2018. CT for PE 12/11/2018. FINDINGS: Feeding tube is present. The heart is at the upper limits of normal in size. There is  persistent atelectasis or pneumonia in the left lower lobe with small left pleural effusion. Round lobulated opacity or mass in the right upper lobe is present. Minimal interstitial edema is present in the right lower lobe.     1.  Persistent atelectasis or pneumonia in the left lower lobe with small left pleural effusion. 2.  Minor residual edema and effusion in the right lower lobe. 3.  Lobulated pulmonary nodules in the right midlung again noted. Differential diagnosis includes lung carcinoma.    Dx-chest-portable (1 View)    Result Date: 12/14/2018 12/14/2018 3:45 AM HISTORY/REASON FOR EXAM:  Shortness of Breath. TECHNIQUE/EXAM DESCRIPTION AND NUMBER OF VIEWS: Single portable view of the chest. COMPARISON: 12/11/2018 FINDINGS: LUNGS: Unchanged right upper lobe mass. Small bilateral pleural effusions, left slightly worse than the right. Retrocardiac opacity, atelectasis versus consolidation. PNEUMOTHORAX: None. LINES AND TUBES: Enteric tube tip is distal to the GE junction, outside the field-of-view. MEDIASTINUM: Stable cardiac silhouette. Atherosclerosis. MUSCULOSKELETAL STRUCTURES: Unchanged.     1. New retrocardiac atelectasis versus consolidation. 2. Small bilateral pleural effusions and bibasilar atelectasis, left worse than right. 3. Stable right upper lobe mass. 4. Enteric tube tip is distal to the GE junction.    Dx-chest-portable (1 View)    Result Date: 12/11/2018 12/11/2018 4:35 PM HISTORY/REASON FOR EXAM:  Loss of appetite, sore throat, chills.  Nausea and vomiting. TECHNIQUE/EXAM DESCRIPTION AND NUMBER OF VIEWS: Single portable view of the chest. COMPARISON: 1/30/2018 FINDINGS: Cardiac mediastinal contour is unchanged. Ill-defined opacity again seen in the RIGHT mid to upper lung, slightly more apparent than prior exam. No pleural fluid collection or pneumothorax. Severe degenerative change of both shoulders.     1.  No pneumonia or pulmonary edema. 2.  Slight interval increase in size of  ill-defined RIGHT mid to upper lung nodular opacity, concerning for mass.    Mv-[id number] Ducts    Result Date: 12/13/2018 12/13/2018 3:05 PM HISTORY/REASON FOR EXAM:  Main OR ERCP TECHNIQUE/EXAM DESCRIPTION AND NUMBER OF VIEWS: Fluoroscopic unit obligated to the operating room for greater than one hour for ERCP procedure.  6 fluoroscopic images provided. COMPARISON: None FINDINGS: Initial cannulation the common bile duct which is dilated and shows filling defect consistent with stones which appear to be removed on later images. 6 seconds fluoroscopy time utilized.     Limited images obtained during ERCP procedure showing apparent removal of common bile duct stones.    Mr-abdomen-with & W/o    Result Date: 12/13/2018 12/12/2018 3:47 PM HISTORY/REASON FOR EXAM:  Neoplasm: pancreas, suspected; Obstruction of bile duct. TECHNIQUE/EXAM DESCRIPTION: MRI of the pancreas with dynamic IV gadolinium enhancement. MR imaging of the pancreas was performed.  MR images of the pancreas were obtained with coronal and axial single-shot fast spin-echo T2, fat-suppressed axial FRFSE T2, axial in-phase and out-of-phase FSPGR T1, axial DWI with a b-value of 600, 3D MRCP,  precontrast fat-suppressed FSPGR T1, dynamic gadolinium enhanced axial fat-suppressed T1 FSPGR in the arterial dominant, portal venous, 2-minute, and 4-minute delayed phases, with delayed coronal fat-suppressed T1 FSPGR sequence. . The study was performed on a [employer] 1.5 [place] MRI scanner. 7 mL Gadavist contrast was administered intravenously. COMPARISON: CT abdomen and pelvis 12/11/2018 FINDINGS: Motion artifact limits exam. Small bilateral pleural effusions with associated consolidation. Liver shows mildly nodular margins.  No enhancing mass demonstrated.  Prominent intrahepatic biliary ducts. Spleen is unremarkable. Small amount of peritoneal fluid present. RIGHT kidney cysts with largest at the upper pole measuring 7.7 cm.  LEFT kidney is unremarkable.  No  enhancing renal mass. Gallbladder shows multiple dependent signal voids consistent with stones. Common hepatic duct is dilated measuring 17 mm.,  Bile duct measures 14 mm.  Large ovoid signal voids within the distal common bile duct consistent with stones. Pancreatic duct is not significantly dilated. Complex fluid collection tracks along the pancreatic head and body, as seen on prior CT.  Edema in the pancreatic tail with fluid extending into the LEFT pararenal and perisplenic space.  T1 hyperintense material present. Multilevel Schmorl's nodes in the thoracolumbar spine, with reactive endplate changes.  Apparent edema within the L3 vertebral body.     1.  Marked biliary dilation with large common bile duct stones. 2.  Stones also present in the gallbladder. 3.  Complex peripancreatic fluid collection, likely pseudocyst, with apparent inflammatory changes in the pancreatic tail suggesting superimposed acute pancreatitis with fluid extending into the LEFT pararenal space, likely hemorrhagic pancreatitis.  Hemorrhage within cystic lesion at the dorsal aspect of pancreatic tail also noted.  Pancreatic mass is difficult to exclude. 4.  Small volume ascites. 5.  Small bilateral pleural effusions with associated consolidation. 6.  RIGHT kidney cysts. 7.  Possible subacute compression fracture of L3.     Ct-cta Chest Pulmonary Artery W/ Recons    Result Date: 12/11/2018 12/11/2018 9:22 PM HISTORY/REASON FOR EXAM:  PE suspected, high pretest prob; Rule out PE. Also comment on right upper lobe opacity TECHNIQUE/EXAM DESCRIPTION: CT angiogram scan for pulmonary embolism with contrast, with reconstructions. 1.25 mm helical sections were obtained from the lung apices through the lung bases following the rapid bolus administration of 100 mL of Omnipaque 350 nonionic contrast. Thin-section overlapping reconstruction interval was utilized. Coronal reconstructions were generated from the axial data. MIP post processing was  performed and utilized for the interpretation. Low dose optimization technique was utilized for this CT exam including automated exposure control and adjustment of the mA and/or kV according to patient size. COMPARISON: 3/24/2015 FINDINGS: Pulmonary Embolism: No. Main Pulmonary Arteries: No. Segmental branches: No. Subsegmental branches: No. Additional Comments: None. Lungs: Lobulated 1.7 x 1.8 x 3.1 cm mass in the right upper lobe. Diffuse emphysematous change. Patchy bibasilar opacities. Airway: There is debris completely filling the bilateral lower lobe airways. Pleura: No pleural effusion or pneumothorax. Nodes: No enlarged mediastinal or hilar lymph nodes. Additional findings: Thoracic aorta and great vessels:  No aneurysm. Moderate atherosclerotic disease Heart and pericardium: Moderate coronary artery calcification. No pericardial effusion. Thoracic spine:  No fracture or malalignment. No compression deformity. Chest wall:   Unremarkable. Diffuse wall thickening throughout the esophagus. There is retaining fluid in the esophagus. There is a 1.4 cm paraesophageal lymph node. Questionable enlarged right paraesophageal lymph node more superiorly (image 93 series 4). Visualized upper abdomen: Please see dedicated report     1. No CT evidence of pulmonary embolism. 2. Lobulated 1.7 x 1.8 x 3.1 cm mass in the right upper lobe, concerning for lung malignancy. PET/CT, and/or tissue sampling is recommended. 3. Diffuse wall thickening throughout the esophagus could relate to esophagitis. Mildly prominent 1.4 cm paraesophageal lymph node adjacent to the distal esophagus. Questionable enlarged right paraesophageal lymph nodes more proximally. 4. Fluid opacification of the esophagus. There is debris completely filled the bilateral lower lobe airways with associated patchy bibasilar opacities. This is concerning for aspiration pneumonia due to retained fluid in the esophagus.    Ec-echocardiogram Complete W/ Cont    Result  Date: 12/12/2018  Transthoracic Echo Report Echocardiography Laboratory CONCLUSIONS Left ventricular ejection fraction is visually estimated to be 65%. Hypokinesis of the apical septal wall. Grossly normal right ventricular size and systolic function. No significant valve disease or flow abnormalities. Compared to the images of the prior study done 3/9/2018 -  there has been no significant changeLV EF:  65    % FINDINGS Left Ventricle Normal left ventricular systolic function. Left ventricular ejection fraction is visually estimated to be 65%. Hypokinesis of the apical septal wall. Diastolic function is difficult to assess with tachycardia. Contrast was used to enhance visualization of the endocardial border. 3ML of contrast was administered. Existing IV was used. Located at the left ac fossa. Right Ventricle Right ventricle not well visualized. Grossly normal right ventricular size and systolic function. Right Atrium The right atrium is normal in size.  Normal inferior vena cava size and inspiratory collapse. Left Atrium The left atrium is normal in size.  Left atrial volume index is 27  mL/sq m. Mitral Valve Structurally normal mitral valve without significant stenosis.  Trace tricuspid regurgitation. Aortic Valve The aortic valve is not well visualized but appears to be opening well with no aortic insufficiency. Tricuspid Valve The tricuspid valve is not well visualized. Unable to estimate pulmonary artery pressure due to an inadequate tricuspid regurgitant jet. Pulmonic Valve The pulmonic valve is not well visualized. Pericardium Normal pericardium without effusion. Aorta Ascending aorta not well visualized. Segundo Barreto MD (Electronically Signed) Final Date:     12 December 2018                 11:30    Dx-abdomen For Tube Placement    Result Date: 12/14/2018 12/13/2018 11:29 PM HISTORY/REASON FOR EXAM:  Line evaluation. TECHNIQUE/EXAM DESCRIPTION AND NUMBER OF VIEWS:  1 view(s) of the abdomen.  COMPARISON:  No recent studies available for comparison. FINDINGS: Enteric tube has been placed. The tip projects over the junction of the second and third portions of duodenum. The bowel gas pattern is nonspecific.  Several dilated small bowel loops.     Enteric tube tip projects over the junction of the second and third portions of duodenum.      Micro:  Results     ** No results found for the last 168 hours. **          Assessment:  Active Hospital Problems    Diagnosis   • Sepsis (HCC) [A41.9]   • Acute on chronic respiratory failure with hypoxia (HCC) [J96.21]   • Pneumonia due to infectious organism [J18.9]   • Mass of upper lobe of right lung [R91.8]   • Acute biliary pancreatitis [K85.10]        ASSESSMENT:      Conner Mora is a 78 y.o.male admitted 12/11/2018. Pt has a past medical history of seizure disorder on Keppra, respiratory disorder on 2 L nocturnal oxygen, PAD, he has a colostomy and is status post colectomy in 2010.  Presented to the ED complaining of nausea, vomiting and abdominal pain times 2-weeks.  He also reported cough and increasing shortness of breath.  He was diagnosed with pneumonia based on CT chest.  He has pancreatitis thought secondary to gallstones and is status post ERCP. ID consulted for new bacteremia with Prevotella oralis.      PLAN:      Sepsis   Secondary to cholangitis and pneumonia  Afebrile  Persistent leukocytosis  Initial demand ischemia and AMS  Bcxs 12/11 + Prevotella oralis and Klebsiella  Bcxs 12/14 blood cultures NGTD   Anticipate IV abx through 12/28/18     Pancreatitis secondary to large gallstone  Complicated by pseudocyst  MRI on 1212 with marked biliary dilation, stones noted, peripancreatic fluid collection, likely pseudocyst with inflammatory changes suggesting acute pancreatitis  S/p ERCP on 12/13 with 35 x 18 mm stone obstructing the distal bile duct as well as multiple smaller stones, stone was not removed due to large size plan for lithotripsy at a  later date  Biliary stent placed for deep compression, improvement in alk phos and T bili  CT 12/18 Extensive multiloculated fluid collection about the pancreas, extending into the perisplenic region, increased in size from prior exam, likely complex pseudocyst.   No surgical plans at this time given acute respiratory failure and high oxygen requirements    Encephalopathy  Likely multifactorial  Monitor    Leukocytosis, decreased overall  Multifactorial  Monitor  On antibiotics above    Pneumonia  CT on 12/11 debris in the bilateral lower lobe airways and associated bibasilar opacity, fluid retained in the esophagus- concern for aspiration pneumonia  Pulm toilet  Remains on oxygen mask -no changes  Abx as above      Lung mass  Lobulated 1.7 x 1.8 x 3.1 cm mass in the right upper lobe-concern for malignancy  Repeat CT on 12/26- increased size of lobulated mass concerning for tumor progression  Possible plans for biopsy    Recommend palliative care evaluation    Prognosis poor overall     Discussed with Dr. Vidal

## 2018-12-27 NOTE — PROGRESS NOTES
Patient alert to person only, satting 95% on 2L 02 which is his baseline. Denies chest pain, on monitor. Patient had 30 seconds of 3rd degree HB, this strip shown to MD Vidal and EKG ordered, SR. CICU RN Manish at bedside as well, patient asymptomatic. Turn and reposition Q2HR. Tube feeds infusing as ordered, IV fluids adjusted, skin care completed to coccyx/sacrum area. Condom cath removed per patient, voiding in urinal or incontinent. Bilateral soft wrist restraints. Wife at bedside, MD Vidal spoke with her. Call bell in hand, alarms on, bed locked and in lowest position, whiteboard updated, reviewed plan of care with patient and wife.

## 2018-12-27 NOTE — CARE PLAN
Problem: Safety  Goal: Will remain free from falls  Outcome: PROGRESSING AS EXPECTED  Patient resting in bed with call light in reach and bed alarm on.      Problem: Pain Management  Goal: Pain level will decrease to patient's comfort goal  Outcome: PROGRESSING AS EXPECTED  Patient advised to request prn pain medication as needed for pain management.

## 2018-12-27 NOTE — CARE PLAN
Problem: Communication  Goal: The ability to communicate needs accurately and effectively will improve  Pt communicating needs appropriately. Oriented to the call light and to call for assistance.       Problem: Safety  Goal: Will remain free from falls  Safety precautions reviewed with pt, pt verbalized understanding and denies questions.  Pt demonstrated ability to use call light for assistance. Bed alarm in use

## 2018-12-27 NOTE — PROGRESS NOTES
MONITOR SUMMARY    SR-ST     FREQUENT PVC'S, FREQUENT COUPLETS    5 BEAT RUN VTACH, MD BENAVIDEZ AWARE, PATIENT ASYMPTOMATIC     30 SECONDS OF 3RD DEGREE HB, PATIENT ASYMPTOMATIC, MD BENAVIDEZ AWARE AND STRIPS DOCUMENTED IN CHART    RARE TRIPLETS, .18/.12/.38

## 2018-12-27 NOTE — PROGRESS NOTES
Hospital Medicine Daily Progress Note    Date of Service  12/26/2018    Chief Complaint  78 y.o. male admitted 12/11/2018 with 78 y.o. 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 Source is suspected pneumonia/pancreatic abscess. Patient had ERCP and biliary stent placed on 12/13/18. Bacteremia with Prevotella and Klebsiella.  Repeat Blood cultures no growth to date      Hospital Course  12/25: s/p 12/13 biliary stent placed by Dr. shay.  Doing well, mild jaundice remains.  Wife at bedside.  CT abdomen reviewed, no evidence of pancreatic mass, cholelithiasis noted and stranding and fluid surrounding the pancreas was seen.  Cortrak remains, pending repeat SLP eval.  12/26:  Patient with dry mucus membranes, started on 1/2 NS at 125/hr this a.m. With some improvement of mentation noted by afternoon.  Wife at bedside.  Discussed current progress of pneumonia.  The patient wants his cortrak removed, however only need to finish abx until 12/28.  They are not ready to stop all treatments.  The wife states she does not want to talk with palliative care again, stating they were stating he would never recover from his pneumonia.  I did state that the RUL mass is concerning for cancer.  We agreed to repeat the CT thorax and indeed the RUL mass has increased in size.  Will discuss with patient and wife if they are willing to undergo an IR biopsy of the mass.  Restraints were necessary this a.m. Due to patient attempting to pull out cortrak and confusion, but confusion improving with 1/2 NS and reduction of sodium levels.  153 this a.m.  Likely due to high output from colostomy.  Increased free water 250 tid to 300 q 6 hours.  Will cut back 1/2 /hr to avoid too quick of correction.  Safer to give free water flushes.    Consultants/Specialty  Surgery  Intensivist  Gastroenterology  Infectious disease   Palliative  care    Code Status  dnr/dni    Disposition  Home once medically stable, will need repeat ERCP 4-6 weeks to break up stones per Dr. Arzola.  Eventually stent removal.    Review of Systems  Review of Systems   Constitutional: Positive for malaise/fatigue. Negative for chills.   HENT: Negative for congestion, ear discharge and nosebleeds.    Eyes: Negative for photophobia, pain and discharge.   Respiratory: Negative for hemoptysis, sputum production and shortness of breath.    Cardiovascular: Negative for chest pain, palpitations and orthopnea.   Gastrointestinal: Negative for abdominal pain, nausea and vomiting.   Genitourinary: Negative for frequency, hematuria and urgency.   Musculoskeletal: Negative for back pain, joint pain and neck pain.   Skin: Negative for itching.   Neurological: Positive for weakness. Negative for dizziness, tingling and headaches.        Physical Exam  Temp:  [35.9 °C (96.7 °F)-36.7 °C (98.1 °F)] 36.4 °C (97.6 °F)  Pulse:  [] 108  Resp:  [16-20] 18  BP: (117-145)/(70-82) 145/82    Physical Exam   Constitutional: No distress.   HENT:   Head: Normocephalic and atraumatic.   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 Conjunctivae are normal.   Neck: Normal range of motion. Neck supple.   Cardiovascular: Normal rate and regular rhythm.    Pulmonary/Chest: No respiratory distress. He has no wheezes.   Abdominal: He exhibits no distension. There is no tenderness. There is no rebound.   Musculoskeletal: He exhibits no edema or tenderness.   Neurological: He is alert.   Skin: Skin is warm and dry. He is not diaphoretic.       Fluids    Intake/Output Summary (Last 24 hours) at 12/26/18 1731  Last data filed at 12/25/18 1800   Gross per 24 hour   Intake          1075.83 ml   Output              300 ml   Net           775.83 ml       Laboratory  Recent Labs      12/24/18   0039  12/25/18   0154  12/26/18   0415   WBC   19.7*  13.3*  12.9*   RBC  3.33*  3.31*  3.50*   HEMOGLOBIN  10.7*  10.5*  11.2*   HEMATOCRIT  34.4*  33.6*  36.0*   MCV  103.3*  101.5*  102.9*   MCH  32.1  31.7  32.0   MCHC  31.1*  31.3*  31.1*   RDW  54.4*  53.7*  54.5*   PLATELETCT  442  425  386   MPV  10.0  9.9  10.1     Recent Labs      12/25/18   0154  12/25/18   0916  12/26/18   0830   SODIUM  147*  146*  153*   POTASSIUM  3.4*  3.9  3.6   CHLORIDE  108  110  113*   CO2  30  26  32   GLUCOSE  118*  118*  118*   BUN  42*  43*  45*   CREATININE  1.29  1.31  1.41*   CALCIUM  10.8*  10.8*  10.6*                   Imaging      Assessment/Plan  Acute biliary pancreatitis- (present on admission)   Assessment & Plan    Status post ERCP with sphincterotomy and stenting on 12/13/2018 12/18/18 CT Abd showing increase in size of pancreatic pseudocyst  Enteral nutrition via cortrak feeing tube  Patient has retained large CBD stone for which he will need follow-up as outpatient for repeat ERCP with lithotripsy  Evaluated by Dr. Carter from surgery lap gregory canceled given his acute respiratory failure and high oxygen requirements  Seen at the bed side and in no acute distress       Sepsis (HCC)- (present on admission)   Assessment & Plan    This is severe sepsis with the following associated acute organ dysfunction(s): acute respiratory failure.   Abdominal source and pneumonia  On zosyn  I.D input is noted  Has improved     Mass of upper lobe of right lung- (present on admission)   Assessment & Plan    He will need CT-guided biopsy when he has recovered from this acute illness  Patient and wife have been made aware.  12/26:  Repeat CT chest with slight increase in spiculated right lung mass.  Will need IR biopsy set up.  Plan to discuss with patient and wife if would like to proceed.  This is concerning for malignancy.       Pneumonia due to infectious organism- (present on admission)   Assessment & Plan    Pneumonia due to Prevotella & Klebsiella  With bacteremia  likely secondary to aspiration  Zosyn, stop date 12/28.  As per I.D       Acute on chronic respiratory failure with hypoxia (HCC)- (present on admission)   Assessment & Plan    CTA chest negative for PE  SOB/failure due to pneumonia and sepsis  Antibiotics  Monitor vitals     Anemia- (present on admission)   Assessment & Plan    H/H ARE STATBLE  No sign of bleeding.  Monitor CBC     Dehydration with hypernatremia   Assessment & Plan    12/26:  Na 153 today with clinical signs of dehydration from high output colostomy.  Lactobacillus ordered daily since on abx to see if will help with high output liquidy.  Wife states it is always liquidy, no change.  1/2 NS at 125/hr x several hours today to help with acute dehydration, increased free water flushes 250 tid to 300 ml q 6 hours.  BMP tonight and in a.m. To ensure not too quick of overcorrection.     Leukocytosis- (present on admission)   Assessment & Plan    It is trending down  On zosyn  ID input is noted  Monitor cbc and vitals.  Cbc tomorrow     Pancreatic pseudocyst- (present on admission)   Assessment & Plan    Pain management  GI input is noted  Future drainage once cyst matured     Oral phase dysphagia   Assessment & Plan    Continue tube feeding  Continue speech therapy     Atrial flutter (HCC)   Assessment & Plan    Resolved  Off amiodarone drip  Monitor on vitals/telemetry  Evaluated by cardiology with no recommendations for anticoagulation  Will start him on low dose lopressor     Esophagitis- (present on admission)   Assessment & Plan    Continue Pepcid     Troponin level elevated- (present on admission)   Assessment & Plan    C/o due to demand ischemia  Echo EF 65% unchanged from prior     Generalized abdominal pain- (present on admission)   Assessment & Plan    Has resolved          VTE prophylaxis: lovenox

## 2018-12-28 NOTE — THERAPY
Speech Therapy Contact Note:   Attempted to see pt for dysphagia treatment. Per RN, pt currently NPO pending lung biopsy and MD requesting this clinician come back after procedure to re-evaluate swallow function. SLP to follow later as pt is appropriate.

## 2018-12-28 NOTE — PROGRESS NOTES
2 RN skin check complete with [place] LANDEROS.     Scabs on ears. Silicone tubing in place.   Buttocks red and blanching.

## 2018-12-28 NOTE — DISCHARGE PLANNING
Anticipated Discharge Disposition: Home    Action: LSW spoke with patient and wife at bedside. Both patient and wife stated that d/c plan at this point is to go back home. Patient's wife stated that she has additional family help at home. Patient and wife are open to having home health as part of d/c plan, but stated they are focusing more on biopsy results.     Barriers to Discharge: home health referral     Plan: LSW to f/u with patient and family for potential home health, follow for additional d/c needs

## 2018-12-28 NOTE — THERAPY
Speech Therapy Contact Note:  Attempted to see pt for swallow re-evaluation, however, pt still NPO pending lung biopsy. Will attempt to re-evaluate pt as appropriate.

## 2018-12-28 NOTE — PROGRESS NOTES
Infectious Disease Progress Note    Author: Vicki Marin M.D. Date & Time of service: 2018  12:54 PM    Chief Complaint:  Follow-up for sepsis and pneumonia      Interval History:  12/15 AF, O2 15 L oxygen mask, increase, Labs and micro results reviewed. Imaging reviewed, chest x-ray slightly worse on the left. Medications reviewed.    AF, O2 50 L High flow NC, Labs and micro results reviewed. Imaging reviewed.   AF WBC 24 c/o N/V today   AF WBC 23 continued nausea and vomiting-denies abd pain   AF WBC 22.5 +nausea, less vomiting CT reviewed   AF WBC 17.8 somnolent today   AF WBC 18 transferred out of ICU-getting swallowing study Less nausea   AF WBC 21.2 somnolent-no acute events   AF WBC 22 no new complaints   afebrile WBC 19.7 patient is more alert today per nursing, he is complaining of a dry mouth, denies any abdominal pain   afebrile WBC 13.3 patient is somewhat disoriented this morning and not answering questions appropriately   afebrile WBC 12.9 patient is in wrist restraints this morning and does not know where he is, he is only oriented to name,   afebrile WBC 13.3 patient is alert to name and he knows that he is in the hospital at [facility], repeat CT of the chest yesterday reveals slightly increase in the lobulated mass concerning for tumor progression   afebrile WBC 11.9 no clinical changes from yesterday, plan for IR biopsy of the lung mass today, multiple family members at bedside with questions answered    Review of Systems:  Review of Systems   HENT:        Dry mouth   Extremely limited review of systems as patient with intermittent encephalopathy and critical illness    Hemodynamics:  Temp (24hrs), Av.3 °C (97.4 °F), Min:36.1 °C (96.9 °F), Max:36.7 °C (98.1 °F)  Temperature: 36.1 °C (97 °F)  Pulse  Av  Min: 54  Max: 169  Blood Pressure : 127/74       Physical Exam:  Physical Exam   Constitutional: He appears  well-developed.   Elderly and chronically ill-appearing   HENT:   Head: Normocephalic and atraumatic.   Eyes: Pupils are equal, round, and reactive to light. EOM are normal.   Neck: Normal range of motion.   Cardiovascular: Normal rate and regular rhythm.    Pulmonary/Chest: Effort normal. He has no wheezes. He has no rales.   Diminished breath sounds bilaterally      Abdominal: Soft. Bowel sounds are normal. He exhibits no distension. There is no tenderness.   Ostomy     Musculoskeletal: He exhibits no edema.   Left BKA    Left upper extremity midline nontender   Lymphadenopathy:     He has no cervical adenopathy.   Neurological:   Oriented to name and place   Skin: Skin is warm. No rash noted. He is not diaphoretic.   Hyperpigmented, macular lesion on right upper face and eyelid, chronic   Psychiatric: He has a normal mood and affect.   Nursing note and vitals reviewed.      Meds:    Current Facility-Administered Medications:   •  1/2 NS  •  [START ON 12/29/2018] enoxaparin (LOVENOX) injection  •  potassium chloride SA  •  ascorbic acid  •  metoprolol  •  miconazole 2%-zinc oxide  •  ipratropium-albuterol  •  ferrous sulfate  •  levETIRAcetam  •  Pharmacy  •  acetaminophen  •  Notify provider if pain remains uncontrolled **AND** Use the numeric rating scale (NRS-11) on regular floors and Critical-Care Pain Observation Tool (CPOT) on ICUs/Trauma to assess pain **AND** Pulse Ox (Oximetry) **AND** Pharmacy Consult Request **AND** If patient difficult to arouse and/or has respiratory depression, stop any opiates that are currently infusing and call a Rapid Response. **AND** oxyCODONE immediate-release **AND** oxyCODONE immediate-release **AND** morphine injection  •  famotidine  •  ondansetron  •  Respiratory Care per Protocol  •  NS    Labs:  Recent Labs      12/26/18   0415  12/27/18   0035  12/28/18   0028   WBC  12.9*  13.3*  11.9*   RBC  3.50*  3.41*  3.33*   HEMOGLOBIN  11.2*  10.9*  10.6*   HEMATOCRIT  36.0*   35.7*  35.4*   MCV  102.9*  104.7*  106.3*   MCH  32.0  32.0  31.8   RDW  54.5*  56.0*  56.4*   PLATELETCT  386  389  361   MPV  10.1  10.0  10.3   NEUTSPOLYS  75.00*  76.40*  72.20*   LYMPHOCYTES  10.30*  9.80*  11.80*   MONOCYTES  9.70  7.30  8.20   EOSINOPHILS  4.10  5.00  6.10   BASOPHILS  0.50  1.00  1.10   RBCMORPHOLO   --    --   Present     Recent Labs      12/26/18   1650  12/27/18   0856  12/28/18   0841   SODIUM  151*  150*  154*   POTASSIUM  3.9  3.8  3.8   CHLORIDE  116*  114*  116*   CO2  27  27  30   GLUCOSE  104*  107*  90   BUN  44*  42*  45*     Recent Labs      12/26/18   0830  12/26/18   1650  12/27/18   0856  12/28/18   0841   ALBUMIN  2.7*   --   2.9*  2.9*   TBILIRUBIN  0.5   --   0.5  0.7   ALKPHOSPHAT  128*   --   132*  135*   TOTPROTEIN  6.4   --   6.5  6.8   ALTSGPT  13   --   11  13   ASTSGOT  13   --   16  14   CREATININE  1.41*  1.33  1.28  1.54*       Imaging:  Ct-abdomen-pelvis With    Result Date: 12/11/2018 12/11/2018 9:22 PM HISTORY/REASON FOR EXAM:  Abd pain, diverticulitis suspected Nausea, vomiting. TECHNIQUE/EXAM DESCRIPTION:   CT scan of the abdomen and pelvis with contrast. Contrast-enhanced helical scanning was obtained from the diaphragmatic domes through the pubic symphysis following the bolus administration of nonionic contrast without complication. 100 mL of Omnipaque 350 nonionic contrast was administered without complication. Low dose optimization technique was utilized for this CT exam including automated exposure control and adjustment of the mA and/or kV according to patient size. COMPARISON: CT chest 12/11/2018. FINDINGS: Lung bases: Please see dedicated CT chest report Abdomen: The liver is unremarkable. The spleen is unremarkable. There is stranding and fluid surrounding the entire pancreas, most in the pancreatic tail. There is high density fluid extending from the pancreatic tail to the left paracolic gutter. There are multiple lobulated peripancreatic fluid  area surrounding the  pancreas The gallbladder demonstrates multiple layering gallstones. Very dilated CBD, measuring up to 1.6 cm. There is a soft tissue attenuation lesion in the distal CBD (image 39 series 7) The adrenal glands are normal in size. The kidneys enhance symmetrically. There is a 7.5 cm cyst in the upper pole right kidney. No hydronephrosis. Tiny nonobstructive calculus in the lower pole left kidney. Moderate atherosclerotic disease of the abdominal aorta and its branches. Aortobiiliac graft There is no lymphadenopathy. No bowel wall thickening or bowel dilatation. Diffuse wall thickening of the stomach. Right lower quadrant ostomy. Pelvis: Limited visualization of the pelvis due to bilateral femur hardwares. There are small urinary bladder diverticula. Layering stones/debris in the urinary bladder. Moderate amount of high density fluid in the pelvis, likely hemorrhagic fluid No aggressive bone lesions are seen. ___________________________________     1. Stranding and fluid surrounding the entire pancreas with several lobulated peripancreatic fluid area surrounding the pancreas. This could be sequela of prior pancreatitis versus cystic pancreatic neoplasm. More high density fluid extending from the pancreatic tail to the left paracolic gutter could relate to hemorrhage of one of these cystic lesions or sequela of acute on chronic pancreatitis. Severely dilated CBD, measuring up to 1.6 cm. Soft tissue attenuation lesion in the distal CBD could be an obstructing mass or noncalcified stone. Further evaluation with MRCP and MR pancreas is recommended. 2. Cholelithiasis. 3. Diffuse wall thickening of the stomach could be gastritis or reactive change secondary to the pancreatic process. 4. Layering stones/debris in the urinary bladder.      Mr-abdomen-with & W/o    Result Date: 12/13/2018 12/12/2018 3:47 PM HISTORY/REASON FOR EXAM:  Neoplasm: pancreas, suspected; Obstruction of bile duct. TECHNIQUE/EXAM  DESCRIPTION: MRI of the pancreas with dynamic IV gadolinium enhancement. MR imaging of the pancreas was performed.  MR images of the pancreas were obtained with coronal and axial single-shot fast spin-echo T2, fat-suppressed axial FRFSE T2, axial in-phase and out-of-phase FSPGR T1, axial DWI with a b-value of 600, 3D MRCP,  precontrast fat-suppressed FSPGR T1, dynamic gadolinium enhanced axial fat-suppressed T1 FSPGR in the arterial dominant, portal venous, 2-minute, and 4-minute delayed phases, with delayed coronal fat-suppressed T1 FSPGR sequence. . The study was performed on a [employer] Signa 1.5 [place] MRI scanner. 7 mL Gadavist contrast was administered intravenously. COMPARISON: CT abdomen and pelvis 12/11/2018 FINDINGS: Motion artifact limits exam. Small bilateral pleural effusions with associated consolidation. Liver shows mildly nodular margins.  No enhancing mass demonstrated.  Prominent intrahepatic biliary ducts. Spleen is unremarkable. Small amount of peritoneal fluid present. RIGHT kidney cysts with largest at the upper pole measuring 7.7 cm.  LEFT kidney is unremarkable.  No enhancing renal mass. Gallbladder shows multiple dependent signal voids consistent with stones. Common hepatic duct is dilated measuring 17 mm.,  Bile duct measures 14 mm.  Large ovoid signal voids within the distal common bile duct consistent with stones. Pancreatic duct is not significantly dilated. Complex fluid collection tracks along the pancreatic head and body, as seen on prior CT.  Edema in the pancreatic tail with fluid extending into the LEFT pararenal and perisplenic space.  T1 hyperintense material present. Multilevel Schmorl's nodes in the thoracolumbar spine, with reactive endplate changes.  Apparent edema within the L3 vertebral body.     1.  Marked biliary dilation with large common bile duct stones. 2.  Stones also present in the gallbladder. 3.  Complex peripancreatic fluid collection, likely pseudocyst, with apparent  inflammatory changes in the pancreatic tail suggesting superimposed acute pancreatitis with fluid extending into the LEFT pararenal space, likely hemorrhagic pancreatitis.  Hemorrhage within cystic lesion at the dorsal aspect of pancreatic tail also noted.  Pancreatic mass is difficult to exclude. 4.  Small volume ascites. 5.  Small bilateral pleural effusions with associated consolidation. 6.  RIGHT kidney cysts. 7.  Possible subacute compression fracture of L3.     Ct-cta Chest Pulmonary Artery W/ Recons    Result Date: 12/11/2018 12/11/2018 9:22 PM HISTORY/REASON FOR EXAM:  PE suspected, high pretest prob; Rule out PE. Also comment on right upper lobe opacity TECHNIQUE/EXAM DESCRIPTION: CT angiogram scan for pulmonary embolism with contrast, with reconstructions. 1.25 mm helical sections were obtained from the lung apices through the lung bases following the rapid bolus administration of 100 mL of Omnipaque 350 nonionic contrast. Thin-section overlapping reconstruction interval was utilized. Coronal reconstructions were generated from the axial data. MIP post processing was performed and utilized for the interpretation. Low dose optimization technique was utilized for this CT exam including automated exposure control and adjustment of the mA and/or kV according to patient size. COMPARISON: 3/24/2015 FINDINGS: Pulmonary Embolism: No. Main Pulmonary Arteries: No. Segmental branches: No. Subsegmental branches: No. Additional Comments: None. Lungs: Lobulated 1.7 x 1.8 x 3.1 cm mass in the right upper lobe. Diffuse emphysematous change. Patchy bibasilar opacities. Airway: There is debris completely filling the bilateral lower lobe airways. Pleura: No pleural effusion or pneumothorax. Nodes: No enlarged mediastinal or hilar lymph nodes. Additional findings: Thoracic aorta and great vessels:  No aneurysm. Moderate atherosclerotic disease Heart and pericardium: Moderate coronary artery calcification. No pericardial  effusion. Thoracic spine:  No fracture or malalignment. No compression deformity. Chest wall:   Unremarkable. Diffuse wall thickening throughout the esophagus. There is retaining fluid in the esophagus. There is a 1.4 cm paraesophageal lymph node. Questionable enlarged right paraesophageal lymph node more superiorly (image 93 series 4). Visualized upper abdomen: Please see dedicated report     1. No CT evidence of pulmonary embolism. 2. Lobulated 1.7 x 1.8 x 3.1 cm mass in the right upper lobe, concerning for lung malignancy. PET/CT, and/or tissue sampling is recommended. 3. Diffuse wall thickening throughout the esophagus could relate to esophagitis. Mildly prominent 1.4 cm paraesophageal lymph node adjacent to the distal esophagus. Questionable enlarged right paraesophageal lymph nodes more proximally. 4. Fluid opacification of the esophagus. There is debris completely filled the bilateral lower lobe airways with associated patchy bibasilar opacities. This is concerning for aspiration pneumonia due to retained fluid in the esophagus.    Ec-echocardiogram Complete W/ Cont    Result Date: 12/12/2018  Transthoracic Echo Report Echocardiography Laboratory CONCLUSIONS Left ventricular ejection fraction is visually estimated to be 65%. Hypokinesis of the apical septal wall. Grossly normal right ventricular size and systolic function. No significant valve disease or flow abnormalities. Compared to the images of the prior study done 3/9/2018 -  there has been no significant changeLV EF:  65    % FINDINGS Left Ventricle Normal left ventricular systolic function. Left ventricular ejection fraction is visually estimated to be 65%. Hypokinesis of the apical septal wall. Diastolic function is difficult to assess with tachycardia. Contrast was used to enhance visualization of the endocardial border. 3ML of contrast was administered. Existing IV was used. Located at the left ac fossa. Right Ventricle Right ventricle not well  visualized. Grossly normal right ventricular size and systolic function. Right Atrium The right atrium is normal in size.  Normal inferior vena cava size and inspiratory collapse. Left Atrium The left atrium is normal in size.  Left atrial volume index is 27  mL/sq m. Mitral Valve Structurally normal mitral valve without significant stenosis.  Trace tricuspid regurgitation. Aortic Valve The aortic valve is not well visualized but appears to be opening well with no aortic insufficiency. Tricuspid Valve The tricuspid valve is not well visualized. Unable to estimate pulmonary artery pressure due to an inadequate tricuspid regurgitant jet. Pulmonic Valve The pulmonic valve is not well visualized. Pericardium Normal pericardium without effusion. Aorta Ascending aorta not well visualized. Segundo Barreto MD (Electronically Signed) Final Date:     12 December 2018                 11:30          Micro:  Results     ** No results found for the last 168 hours. **          Assessment:  Active Hospital Problems    Diagnosis   • Sepsis (HCC) [A41.9]   • Acute on chronic respiratory failure with hypoxia (HCC) [J96.21]   • Pneumonia due to infectious organism [J18.9]   • Mass of upper lobe of right lung [R91.8]   • Acute biliary pancreatitis [K85.10]        ASSESSMENT:      Conner Mora is a 78 y.o.male admitted 12/11/2018. Pt has a past medical history of seizure disorder on Keppra, respiratory disorder on 2 L nocturnal oxygen, PAD, he has a colostomy and is status post colectomy in 2010.  Presented to the ED complaining of nausea, vomiting and abdominal pain times 2-weeks.  He also reported cough and increasing shortness of breath.  He was diagnosed with pneumonia based on CT chest.  He has pancreatitis thought secondary to gallstones and is status post ERCP. ID consulted for new bacteremia with Prevotella oralis.      PLAN:      Sepsis   Secondary to cholangitis and pneumonia  Afebrile  Persistent  leukocytosis  Initial demand ischemia and AMS  Bcxs 12/11 + Prevotella oralis and Klebsiella  Bcxs 12/14 blood cultures NGTD   Anticipate IV abx through 12/28/18 today     Pancreatitis secondary to large gallstone  Complicated by pseudocyst  MRI on 1212 with marked biliary dilation, stones noted, peripancreatic fluid collection, likely pseudocyst with inflammatory changes suggesting acute pancreatitis  S/p ERCP on 12/13 with 35 x 18 mm stone obstructing the distal bile duct as well as multiple smaller stones, stone was not removed due to large size plan for lithotripsy at a later date  Biliary stent placed for deep compression, improvement in alk phos and T bili  CT 12/18 Extensive multiloculated fluid collection about the pancreas, extending into the perisplenic region, increased in size from prior exam, likely complex pseudocyst.   No surgical plans at this time given acute respiratory failure and high oxygen requirements    Encephalopathy, intermittent  Likely multifactorial  Monitor    Leukocytosis, decreased overall  Multifactorial  Monitor  On antibiotics above    Pneumonia  CT on 12/11 debris in the bilateral lower lobe airways and associated bibasilar opacity, fluid retained in the esophagus- concern for aspiration pneumonia  Pulm toilet  Remains on oxygen mask -no changes  Abx as above      Lung mass-ongoing workup  Lobulated 1.7 x 1.8 x 3.1 cm mass in the right upper lobe-concern for malignancy  Repeat CT on 12/26- increased size of lobulated mass concerning for tumor progression  Plan for IR biopsy today    Recommend palliative care evaluation    Prognosis poor overall     Discussed with Dr. Vidal.  ID signing off.  Please reconsult if needed

## 2018-12-28 NOTE — PROGRESS NOTES
Bedside report received from NOC RN Gris MUNOZ, pt is resting in bed, sleep with no signs of discomfort. Bed is locked in lowest position with call light, belongings within reach, white board updated, and POC discussed, tube feeds held at 0:15 this morning for procedure. All needs met at this time.

## 2018-12-28 NOTE — DIETARY
Nutrition support weekly update:  Day 17 of admit.  Conner Mora is a 78 y.o. male with admitting DX of sepsis and mass of upper dominick per H&P.  Tube feeding initiated on 12/14. Current TF via (route) [facility] is Diabetisource AC @65ml/hr = 1872kcals, 94g protein, and 1279ml free water (24.3kcals/kg, 1.2g of pro/kg of CBW - 77kg). TF currently off at this time 2' pt NPO for pending lung biopsy. Pt also pending lung biopsy.     Assessment:  Weight 77kg fluctuating wt status; however, stable since admit   Re-estimate of nutritional needs not indicated at this time     Evaluation:   1. TF currently held 2' pt NPO for pending lung biopsy.   2. Pt also pending ST eval to determine safety of PO diet initiation.   3. Last BM 12/27.     Recommendations/Plan:  1. As soon as medically feasible re-initiate TF and continue TF formula and rate of Diabetisource AC @65ml/hr unless pt's diet able to advance per ST recommendations.   2. RD to monitor TF re-initiation vs PO diet advancement to leave further recommendations accordingly.

## 2018-12-28 NOTE — CARE PLAN
Problem: Safety  Goal: Will remain free from falls  Outcome: PROGRESSING AS EXPECTED  Fall risk assessed, and in place.  Patient requires assistance of 2.  Patient educated not to get up with out assistance.  Patient verbalized understanding.  Bed alarm in place and on.  Call light with in reach.        Problem: Infection  Goal: Will remain free from infection  Outcome: PROGRESSING AS EXPECTED  Educated patient and family members on hand washing. Standard precautions in place. Assessed patient for s/s of infection.

## 2018-12-28 NOTE — PROGRESS NOTES
Assumed care at 1905 from [place] RN with Denny LANDEROS. Received bedside report.  Updated patient and wife on daily plan of care. Patient denies any additional needs at this time.  Patient belongings and call light with in reach.  Vitals stable. Bed alarm on and working appropriately.

## 2018-12-28 NOTE — PROGRESS NOTES
Hospital Medicine Daily Progress Note    Date of Service  12/27/2018    Chief Complaint  78 y.o. male admitted 12/11/2018 with 78 y.o. 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 Source is suspected pneumonia/pancreatic abscess. Patient had ERCP and biliary stent placed on 12/13/18. Bacteremia with Prevotella and Klebsiella.  Repeat Blood cultures no growth to date      Hospital Course  12/25: s/p 12/13 biliary stent placed by Dr. shay.  Doing well, mild jaundice remains.  Wife at bedside.  CT abdomen reviewed, no evidence of pancreatic mass, cholelithiasis noted and stranding and fluid surrounding the pancreas was seen.  Cortrak remains, pending repeat SLP eval.  12/26:  Patient with dry mucus membranes, started on 1/2 NS at 125/hr this a.m. With some improvement of mentation noted by afternoon.  Wife at bedside.  Discussed current progress of pneumonia.  The patient wants his cortrak removed, however only need to finish abx until 12/28.  They are not ready to stop all treatments.  The wife states she does not want to talk with palliative care again, stating they were stating he would never recover from his pneumonia.  I did state that the RUL mass is concerning for cancer.  We agreed to repeat the CT thorax and indeed the RUL mass has increased in size.  Will discuss with patient and wife if they are willing to undergo an IR biopsy of the mass.  Restraints were necessary this a.m. Due to patient attempting to pull out cortrak and confusion, but confusion improving with 1/2 NS and reduction of sodium levels.  153 this a.m.  Likely due to high output from colostomy.  Increased free water 250 tid to 300 q 6 hours.  Will cut back 1/2 /hr to avoid too quick of correction.  Safer to give free water flushes.  12/27:  Mentation improved with lowering of Na 153 to 150 and hydration, increased free water  flushes 300 to 400mg q 6 hours.  D/w patient and wife at bedside about CT finding of increased size of right pulmonary mass.  Want to proceed with biopsy.  Order for IR biopsy in a.m.  Will need to hold tube feeds 6 hours prior.    Consultants/Specialty  Surgery  Intensivist  Gastroenterology  Infectious disease   Palliative care    Code Status  dnr/dni    Disposition   PT/OT/SLP rec transitional care.  LTACH, SNF orders placed.   repeat ERCP 4-6 weeks to break up stones per Dr. Arzola.  Eventually stent removal.    Review of Systems  Review of Systems   Constitutional: Positive for malaise/fatigue. Negative for chills.   HENT: Negative for congestion, ear discharge and nosebleeds.    Eyes: Negative for photophobia, pain and discharge.   Respiratory: Negative for hemoptysis, sputum production and shortness of breath.    Cardiovascular: Negative for chest pain, palpitations and orthopnea.   Gastrointestinal: Negative for abdominal pain, nausea and vomiting.   Genitourinary: Negative for frequency, hematuria and urgency.   Musculoskeletal: Negative for back pain, joint pain and neck pain.   Skin: Negative for itching.   Neurological: Positive for weakness. Negative for dizziness, tingling and headaches.        Physical Exam  Temp:  [35.9 °C (96.6 °F)-36.7 °C (98.1 °F)] 36.1 °C (96.9 °F)  Pulse:  [] 93  Resp:  [16-29] 20  BP: (102-124)/(62-80) 124/77    Physical Exam   Constitutional: No distress.   HENT:   Head: Normocephalic and atraumatic.   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 Conjunctivae are normal.   Neck: Normal range of motion. Neck supple.   Cardiovascular: Normal rate and regular rhythm.    Pulmonary/Chest: No respiratory distress. He has no wheezes.   Abdominal: He exhibits no distension. There is no tenderness. There is no rebound.   Musculoskeletal: He exhibits no edema or tenderness.   Neurological: He is alert.   Skin:  Skin is warm and dry. He is not diaphoretic.       Fluids    Intake/Output Summary (Last 24 hours) at 12/27/18 2212  Last data filed at 12/27/18 2033   Gross per 24 hour   Intake              714 ml   Output             2055 ml   Net            -1341 ml       Laboratory  Recent Labs      12/25/18   0154  12/26/18   0415  12/27/18   0035   WBC  13.3*  12.9*  13.3*   RBC  3.31*  3.50*  3.41*   HEMOGLOBIN  10.5*  11.2*  10.9*   HEMATOCRIT  33.6*  36.0*  35.7*   MCV  101.5*  102.9*  104.7*   MCH  31.7  32.0  32.0   MCHC  31.3*  31.1*  30.5*   RDW  53.7*  54.5*  56.0*   PLATELETCT  425  386  389   MPV  9.9  10.1  10.0     Recent Labs      12/26/18   0830  12/26/18   1650  12/27/18   0856   SODIUM  153*  151*  150*   POTASSIUM  3.6  3.9  3.8   CHLORIDE  113*  116*  114*   CO2  32  27  27   GLUCOSE  118*  104*  107*   BUN  45*  44*  42*   CREATININE  1.41*  1.33  1.28   CALCIUM  10.6*  10.9*  10.9*                   Imaging      Assessment/Plan  Acute biliary pancreatitis- (present on admission)   Assessment & Plan    Status post ERCP with sphincterotomy and stenting on 12/13/2018 12/18/18 CT Abd showing increase in size of pancreatic pseudocyst  Enteral nutrition via cortrak feeing tube  Patient has retained large CBD stone for which he will need follow-up as outpatient for repeat ERCP with lithotripsy  Evaluated by Dr. Carter from surgery [facility] canceled given his acute respiratory failure and high oxygen requirements  Seen at the bed side and in no acute distress       Sepsis (HCC)- (present on admission)   Assessment & Plan    This is severe sepsis with the following associated acute organ dysfunction(s): acute respiratory failure.   Abdominal source and pneumonia  On zosyn  I.D input is noted  Has improved     Mass of upper lobe of right lung- (present on admission)   Assessment & Plan      12/26:  Repeat CT chest with slight increase in spiculated right lung mass.  Will need IR biopsy set up.  Plan to discuss  with patient and wife if would like to proceed.  This is concerning for malignancy.  12/27:  Patient mentally clear during discussion with wife at bedside. He has agreed to IR biopsy.  Orders placed.  Hold NGT feeds 6 hours prior.       Pneumonia due to infectious organism- (present on admission)   Assessment & Plan    Pneumonia due to Prevotella & Klebsiella  With bacteremia likely secondary to aspiration  Zosyn, stop date 12/28.  As per I.D       Acute on chronic respiratory failure with hypoxia (HCC)- (present on admission)   Assessment & Plan    CTA chest negative for PE  SOB/failure due to pneumonia and sepsis  Antibiotics  Monitor vitals     Anemia- (present on admission)   Assessment & Plan    H/H ARE STATBLE  No sign of bleeding.  Monitor CBC     Dehydration with hypernatremia   Assessment & Plan    12/26:  Na 153 today with clinical signs of dehydration from high output colostomy.  Lactobacillus ordered daily since on abx to see if will help with high output liquidy.  Wife states it is always liquidy, no change.  1/2 NS at 125/hr x several hours today to help with acute dehydration, increased free water flushes 250 tid to 300 ml q 6 hours.  BMP tonight and in a.m. To ensure not too quick of overcorrection.     Leukocytosis- (present on admission)   Assessment & Plan    It is trending down  On zosyn  ID input is noted  Monitor cbc and vitals.  Cbc tomorrow     Pancreatic pseudocyst- (present on admission)   Assessment & Plan    Pain management  GI input is noted  Future drainage once cyst matured     Oral phase dysphagia   Assessment & Plan    Continue tube feeding  Continue speech therapy     Atrial flutter (HCC)   Assessment & Plan    Resolved  Off amiodarone drip  Monitor on vitals/telemetry  Evaluated by cardiology with no recommendations for anticoagulation  Will start him on low dose lopressor     Esophagitis- (present on admission)   Assessment & Plan    Continue Pepcid     Troponin level elevated-  (present on admission)   Assessment & Plan    C/o due to demand ischemia  Echo EF 65% unchanged from prior     Generalized abdominal pain- (present on admission)   Assessment & Plan    Has resolved          VTE prophylaxis: lovenox

## 2018-12-29 PROBLEM — R57.9 SHOCK CIRCULATORY (HCC): Status: ACTIVE | Noted: 2018-01-01

## 2018-12-29 PROBLEM — N17.9 AKI (ACUTE KIDNEY INJURY) (HCC): Status: ACTIVE | Noted: 2018-01-01

## 2018-12-29 PROBLEM — E87.0 HYPERNATREMIA: Status: ACTIVE | Noted: 2018-01-01

## 2018-12-29 PROBLEM — J93.9 PNEUMOTHORAX ON RIGHT: Status: ACTIVE | Noted: 2018-01-01

## 2018-12-29 NOTE — PROCEDURES
Central Line Insertion  Date/Time: 12/28/2018 8:51 PM  Performed by: BRITTANY CARY  Authorized by: BRITTANY CARY     Consent:     Consent obtained:  Verbal and written    Consent given by:  Spouse    Risks discussed:  Arterial puncture, incorrect placement, pneumothorax and infection    Alternatives discussed:  No treatment and delayed treatment  Pre-procedure details:     Hand hygiene: Hand hygiene performed prior to insertion      Sterile barrier technique: All elements of maximal sterile technique followed      Skin preparation:  ChloraPrep  Sedation:     Sedation type:  None  Anesthesia:     Anesthesia method:  Local infiltration    Local anesthetic:  Lidocaine 2% w/o epi  Procedure details:     Location:  R internal jugular    Patient position:  Reverse Trendelenburg    Procedural supplies:  Triple lumen    Landmarks identified: yes      Ultrasound guidance: yes      Sterile ultrasound techniques: Sterile gel and sterile probe covers were used      Number of attempts:  1    Successful placement: yes    Post-procedure details:     Post-procedure:  Dressing applied and line sutured    Assessment:  Blood return through all ports and free fluid flow  Comments:      cxr pending

## 2018-12-29 NOTE — OR SURGEON
Immediate Post- Operative Note        PostOp Diagnosis: RIGHT PTX      Procedure(s): RIGHT chest tube    Valarie ZENDEJAS      Estimated Blood Loss: Less than 5 ml        Complications: None            12/28/2018     5:44 PM     Yeyo Lechuga

## 2018-12-29 NOTE — PROGRESS NOTES
Assumed care of patient at 0700.Received bedside report from TIGRE Kumar. Patient comfortable in bed with no needs or concerns at this time. Bed alarm set, call light within reach, bed in lowest position, treaded slipper socks on. See flowsheets for VS. Monitor ranges appropriate. Patient's pain is 0 / 10. No family at bed side. Will continue to monitor closely.     Drip verified.

## 2018-12-29 NOTE — CARE PLAN
Problem: Infection  Goal: Will remain free from infection  Outcome: PROGRESSING AS EXPECTED    Intervention: Assess signs and symptoms of infection  Done See VS and flowsheets.  Intervention: Implement standard precautions and perform hand washing before and after patient contact  Done.   Intervention: Give CDC handouts for infection prevention (infection prevention/hand washing, disease specific, and device specific) and document in Education  Done.   Intervention: Assess for removal of potential routes of infection, such as IV, central line, intra-arterial or urinary catheters  Done. Central line in place. Vasopressors being used. See MAR.      Problem: Skin Integrity  Goal: Risk for impaired skin integrity will decrease  Outcome: PROGRESSING SLOWER THAN EXPECTED  Patient at high risk.   Intervention: Assess risk factors for impaired skin integrity and/or pressure ulcers  Done see Vinny Score and Flowsheets/   Intervention: Assess and monitor skin integrity, appearance and/or temperature  Done. See flowsheets.

## 2018-12-29 NOTE — PROGRESS NOTES
Pt transferred to room T607. On 12 mcg/min mixed levophed gtt through LUE PIV midline catheter. HFNC 75L, 100%  initiated. Plan to place central line.

## 2018-12-29 NOTE — PROGRESS NOTES
IR Procedure Note:    On arrival in CT4, pt on simple mask at 4 lpm - significant history of pulmonary compromise.    Site Marked and Confirmed with CT imaging by MD, RT, patient and RN pre procedure.     Dr. Lechuga performed a CT guided biopsy of right lung mass.  Cores x 3 taken.  The pt tolerated the procedure well; ETCo2 baseline 27, with consistent waveform during the procedure.      Tegaderm and gauze applied to right midline chest, CDI and soft.  Pt lethargic, oriented and verbally appropriate post procedure. Move patient from CT table to bed into right lateral recumbent position. Pt became suddenly short of breath with SpO2 falling from 100% to 74% - oxygen increased to 10 lpm then 15 lpm with no significant improvement in SpO2. Dr. Lechuga notified. He requested pt be moved back to CT table for possible emergent chest tube placement.     Pt verbalized understanding of and agreement to right chest tube placement to Dr. Lechuga    1725: Repeat time out with Dr. Lechuga in room for emergent right chest tube placement for right pneumothorax visible on CT. 8 Fr chest tube to pleurevac and suction with pneumothorax resolution. Pt's SpO2 increased to 79% on NRB Mask @ 15 lpm.     Pt from CT table to bed with head of bed elevated. RT requested, RT Quang arrived to assist with pt oxygenation. Dr. Lechuga requested ICU transfer - waiting on bed assignment. Pt's SpO2 gradually increased to 88% or above. Heart rate remained between 130 and 150 , atrial fibrillation without significant ectopy. Pt's BP dropped 10 points systolic with position change to seated position. NS fluid bolus initiated - 200 ml.    RT Quang and TIGRE Bernard transported pt to T620 per CICU charge RN instruction. Pt remains on NRB mask @ 15 lpm - SpO2 >90%. Pt complaining of dry mouth and lips. Report to joan Mathis RN. Transfer of care. Pt's Wife present for transfer of care. Telemetry box returned to [facility]    Sedation End Time: 1810

## 2018-12-29 NOTE — OR SURGEON
Immediate Post- Operative Note        PostOp Diagnosis: RIGHT lung nodule      Procedure(s): CT biopsy      Estimated Blood Loss: Less than 5 ml        Complications: small amt alveolar hemorrhage            12/28/2018     5:10 PM     Yeyo Lechuga

## 2018-12-29 NOTE — PROGRESS NOTES
Nurse in IR called to inform this RN that patient has a pneumothorax from the lung biopsy, a chest tube has been placed and the patient will be getting transferred to ICU    Pt transferred to T622, family notified and taken to ICU room with patient belongings.

## 2018-12-29 NOTE — PROGRESS NOTES
Hospital Medicine Daily Progress Note    Date of Service  12/28/2018    Chief Complaint  78 y.o. male admitted 12/11/2018 with 78 y.o. 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 Source is suspected pneumonia/pancreatic abscess. Patient had ERCP and biliary stent placed on 12/13/18. Bacteremia with Prevotella and Klebsiella.  Repeat Blood cultures no growth to date      Hospital Course  12/25: s/p 12/13 biliary stent placed by Dr. shay.  Doing well, mild jaundice remains.  Wife at bedside.  CT abdomen reviewed, no evidence of pancreatic mass, cholelithiasis noted and stranding and fluid surrounding the pancreas was seen.  Cortrak remains, pending repeat SLP eval.  12/26:  Patient with dry mucus membranes, started on 1/2 NS at 125/hr this a.m. With some improvement of mentation noted by afternoon.  Wife at bedside.  Discussed current progress of pneumonia.  The patient wants his cortrak removed, however only need to finish abx until 12/28.  They are not ready to stop all treatments.  The wife states she does not want to talk with palliative care again, stating they were stating he would never recover from his pneumonia.  I did state that the RUL mass is concerning for cancer.  We agreed to repeat the CT thorax and indeed the RUL mass has increased in size.  Will discuss with patient and wife if they are willing to undergo an IR biopsy of the mass.  Restraints were necessary this a.m. Due to patient attempting to pull out cortrak and confusion, but confusion improving with 1/2 NS and reduction of sodium levels.  153 this a.m.  Likely due to high output from colostomy.  Increased free water 250 tid to 300 q 6 hours.  Will cut back 1/2 /hr to avoid too quick of correction.  Safer to give free water flushes.  12/27:  Mentation improved with lowering of Na 153 to 150 and hydration, increased free water  flushes 300 to 400mg q 6 hours.  D/w patient and wife at bedside about CT finding of increased size of right pulmonary mass.  Want to proceed with biopsy.  Order for IR biopsy in a.m.  Will need to hold tube feeds 6 hours prior.  12/28:  NA remains 154, mentation remains improved.  SR .  For IR lung biopsy today, npo.  Unable to do swallow eval until no longer NPO.  Added 1/2 NS at 125/hr since npo and Cr increase 1.28 to 1.54.  Spoke with wife at bedside and patient.  ADDENDUM:  Called by Dr. Lechuga, post right lung biopsy, developed right pneumothorax requiring IR placement of chest tube.  Appears to have alveolar hemorrhage on CT imaging.  Transfer to ICU initiated for closer monitoring.  DNR/DNI.    Consultants/Specialty  Surgery  Intensivist  Gastroenterology  Infectious disease   Palliative care    Code Status  dnr/dni    Disposition   PT/OT/SLP rec transitional care.  LTACH, SNF orders placed.  Wife refuses SNF.  Has home O2 at 2 LPM NC.  repeat ERCP 4-6 weeks to break up stones per Dr. Arzola.  Eventually stent removal.    Review of Systems  Review of Systems   Constitutional: Positive for malaise/fatigue. Negative for chills.   HENT: Negative for congestion, ear discharge and nosebleeds.    Eyes: Negative for photophobia, pain and discharge.   Respiratory: Negative for hemoptysis, sputum production and shortness of breath.    Cardiovascular: Negative for chest pain, palpitations and orthopnea.   Gastrointestinal: Negative for abdominal pain, nausea and vomiting.   Genitourinary: Negative for frequency, hematuria and urgency.   Musculoskeletal: Negative for back pain, joint pain and neck pain.   Skin: Negative for itching.   Neurological: Positive for weakness. Negative for dizziness, tingling and headaches.        Physical Exam  Temp:  [36.1 °C (96.9 °F)-36.5 °C (97.7 °F)] 36.1 °C (97 °F)  Pulse:  [] 109  Resp:  [14-24] 14  BP: (115-127)/(63-81) 120/81    Physical Exam   Constitutional: No  distress.   HENT:   Head: Normocephalic and atraumatic.   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 Conjunctivae are normal.   Neck: Normal range of motion. Neck supple.   Cardiovascular: Normal rate and regular rhythm.    Pulmonary/Chest: No respiratory distress. He has no wheezes.   Abdominal: He exhibits no distension. There is no tenderness. There is no rebound.   Musculoskeletal: He exhibits no edema or tenderness.   Neurological: He is alert.   Skin: Skin is warm and dry. He is not diaphoretic.       Fluids    Intake/Output Summary (Last 24 hours) at 12/28/18 1702  Last data filed at 12/28/18 0436   Gross per 24 hour   Intake               30 ml   Output              925 ml   Net             -895 ml       Laboratory  Recent Labs      12/26/18   0415  12/27/18   0035  12/28/18   0028   WBC  12.9*  13.3*  11.9*   RBC  3.50*  3.41*  3.33*   HEMOGLOBIN  11.2*  10.9*  10.6*   HEMATOCRIT  36.0*  35.7*  35.4*   MCV  102.9*  104.7*  106.3*   MCH  32.0  32.0  31.8   MCHC  31.1*  30.5*  29.9*   RDW  54.5*  56.0*  56.4*   PLATELETCT  386  389  361   MPV  10.1  10.0  10.3     Recent Labs      12/26/18   1650  12/27/18   0856  12/28/18   0841   SODIUM  151*  150*  154*   POTASSIUM  3.9  3.8  3.8   CHLORIDE  116*  114*  116*   CO2  27  27  30   GLUCOSE  104*  107*  90   BUN  44*  42*  45*   CREATININE  1.33  1.28  1.54*   CALCIUM  10.9*  10.9*  11.6*     Recent Labs      12/28/18   0921   INR  1.13               Imaging      Assessment/Plan  Acute biliary pancreatitis- (present on admission)   Assessment & Plan    Status post ERCP with sphincterotomy and stenting on 12/13/2018 12/18/18 CT Abd showing increase in size of pancreatic pseudocyst  Enteral nutrition via cortrak feeing tube  Patient has retained large CBD stone for which he will need follow-up as outpatient for repeat ERCP with lithotripsy  Evaluated by Dr. Carter from surgery [facility]  canceled given his acute respiratory failure and high oxygen requirements  Seen at the bed side and in no acute distress       Sepsis (HCC)- (present on admission)   Assessment & Plan    This is severe sepsis with the following associated acute organ dysfunction(s): acute respiratory failure.   Abdominal source and pneumonia  On zosyn  I.D input is noted  Has improved     Mass of upper lobe of right lung- (present on admission)   Assessment & Plan      12/26:  Repeat CT chest with slight increase in spiculated right lung mass.  Will need IR biopsy set up.  Plan to discuss with patient and wife if would like to proceed.  This is concerning for malignancy.  12/27:  Patient mentally clear during discussion with wife at bedside. He has agreed to IR biopsy.  Orders placed.  Hold NGT feeds 6 hours prior.       Pneumonia due to infectious organism- (present on admission)   Assessment & Plan    Pneumonia due to Prevotella & Klebsiella  With bacteremia likely secondary to aspiration  Zosyn, stop date 12/28.  As per I.D       Acute on chronic respiratory failure with hypoxia (HCC)- (present on admission)   Assessment & Plan    CTA chest negative for PE  SOB/failure due to pneumonia and sepsis  Antibiotics  Monitor vitals     Anemia- (present on admission)   Assessment & Plan    H/H ARE STATBLE  No sign of bleeding.  Monitor CBC     Dehydration with hypernatremia   Assessment & Plan    12/26:  Na 153 today with clinical signs of dehydration from high output colostomy.  Lactobacillus ordered daily since on abx to see if will help with high output liquidy.  Wife states it is always liquidy, no change.  1/2 NS at 125/hr x several hours today to help with acute dehydration, increased free water flushes 250 tid to 300 ml q 6 hours.  BMP tonight and in a.m. To ensure not too quick of overcorrection.     Leukocytosis- (present on admission)   Assessment & Plan    It is trending down  On zosyn  ID input is noted  Monitor cbc and  vitals.  Cbc tomorrow     Pancreatic pseudocyst- (present on admission)   Assessment & Plan    Pain management  GI input is noted  Future drainage once cyst matured     Oral phase dysphagia   Assessment & Plan    Continue tube feeding  Continue speech therapy     Atrial flutter (HCC)   Assessment & Plan    Resolved  Off amiodarone drip  Monitor on vitals/telemetry  Evaluated by cardiology with no recommendations for anticoagulation  Will start him on low dose lopressor     Esophagitis- (present on admission)   Assessment & Plan    Continue Pepcid     Troponin level elevated- (present on admission)   Assessment & Plan    C/o due to demand ischemia  Echo EF 65% unchanged from prior     Generalized abdominal pain- (present on admission)   Assessment & Plan    Has resolved          VTE prophylaxis: lovenox

## 2018-12-29 NOTE — CONSULTS
Critical Care Consultation    Date of consult: 12/28/2018    Referring Physician  Mone Vidal M.D.    Reason for Consultation  Right sided pneumothorax after lung bx.     History of Presenting Illness  78 y.o. male who presented 12/11/2018 with seizure disorder, hx colostomy, COPD 2l O2, PVD, gerd NVx2 weeks which was admitted on 12/11 with sepsis and afib and source was thought to be pancreatic and or pulmonary.  Blood Cx + 12/11 for prevotella and klebsiella patient also had pancreatitis from large gallstone and pseudocyst s/p ERCP 12/13 with large stone obstuction the distal duct and multiple smaller stone and biliary stent placed. He had a follow up CT scan with extensive multiloculated fluid collection about pancrease extending into perisplenic region this was thought to be complex pseudocyst formation. Patient had a lung mass 1.7 x 1.8 x 3.1 cm with IR bx done now with that required a small bore chest tube on right.  Patient was brought to the ICU for monitoring and when he presented patient was not hypotensive and shock systolic blood pressures in the 60s requiring boluses of fluids Jason-Synephrine pushes to stabilize the patient.  Bedside echo showed a full IVC with no collapsibility a very difficult cardiac window however anteriorly there was some concerns of pericardial fluid and potential malignant tamponade.  Stat echo tech was performed which showed no tamponade it was a pericardial fat however with RV dysfunction.  Patient started on broad-spectrum antibiotics and norepinephrine drip.  Patient was also requiring high flow nasal cannula.  History is limited patient is critically ill elderly frail and encephalopathic.  He denies any complaints.     Code Status  DNAR/DNI    Review of Systems  Review of Systems   Unable to perform ROS: Mental status change       Past Medical History   has a past medical history of Arrhythmia; Atrioventricular block, Mobitz type 1, [place] 3/2018; Cataract; Dental  disorder; Generalized abdominal pain (12/12/2018); GERD (gastroesophageal reflux disease) (7/8/2013); History of colostomy (10/26/2011); History of CVA (cerebrovascular accident) (4/29/2015); Hypertension; Indigestion; Multiple falls; On home oxygen therapy; Peripheral vascular disease (HCC); Seizure (HCC); Seizure disorder (HCC); and Snoring. He also has no past medical history of Alcohol abuse; Anxiety; Cancer (HCC); Depression; Self-injurious behavior; or Suicide attempt (HCC).    Surgical History   has a past surgical history that includes amputation, below the knee (Left, 1980); hip orif (7/21/2009); abdominal aortic aneurysm (10/14/2009); abdominal aortic aneurysm (2009); other abdominal surgery; sigmoidoscopy flex (11/9/2010); colectomy (11/21/2010); colostomy (11/21/2010); cataract phaco with iol (8/12/2014); cataract phaco with iol (8/26/2014); hip nailing intramedullary (Right, 3/9/2016); and ercp in or (N/A, 12/13/2018).    Family History  family history includes Heart Attack in his brother and mother; Hyperlipidemia in his sister; No Known Problems in his brother, brother, father, maternal grandfather, maternal grandmother, paternal grandfather, and paternal grandmother.    Social History   reports that he quit smoking about 7 years ago. His smoking use included Cigarettes. He started smoking about 64 years ago. He has a 54.00 pack-year smoking history. He has never used smokeless tobacco. He reports that he does not drink alcohol or use drugs.    Medications  Home Medications     Reviewed by Yordy Menezes (Pharmacy Tech) on 12/11/18 at 1717  Med List Status: Complete   Medication Last Dose Status   ascorbic acid (ASCORBIC ACID) 500 MG Tab 12/11/2018 Active   Aspirin 325 MG Tablet Delayed Response 12/10/2018 Active   Cholecalciferol (VITAMIN D3) 2000 UNITS Tab 12/10/2018 Active   ferrous sulfate 325 (65 FE) MG tablet 12/11/2018 Active   levETIRAcetam (KEPPRA) 100 MG/ML Solution 12/11/2018  Active   Omega-3 Fatty Acids (FISH OIL) 1000 MG Cap capsule 12/11/2018 Active              Current Facility-Administered Medications   Medication Dose Route Frequency Provider Last Rate Last Dose   • meropenem (MERREM) 500 mg in  mL IVPB  500 mg Intravenous Q8HRS Juan Jose Ramírez M.D.       • Pharmacy Consult Request ...Pain Management Review 1 Each  1 Each Other PRN Yeyo Lechuga M.D.       • SODIUM CHLORIDE 0.9 % IV SOLN            • NOREPINEPHRINE BITARTRATE 1 MG/ML IV SOLN            • SODIUM CHLORIDE 0.9 % IV SOLN            • norepinephrine (LEVOPHED) 8 mg in  mL Infusion  0-30 mcg/min Intravenous Continuous Juan Jose Ramírez M.D. 15 mL/hr at 12/28/18 2130 8 mcg/min at 12/28/18 2130   • Linezolid (ZYVOX) premix 600 mg  600 mg Intravenous Q12HRS Aime Patterson M.D.   Stopped at 12/28/18 2252   • enoxaparin (LOVENOX) inj 40 mg  40 mg Subcutaneous DAILY Mone Vidal M.D.       • potassium chloride SA (Kdur) tablet 20 mEq  20 mEq Feeding Tube DAILY Mone Vidal M.D.   20 mEq at 12/28/18 0656   • ascorbic acid tablet 500 mg  500 mg Feeding Tube DAILY Mone Vidal M.D.   500 mg at 12/28/18 0647   • metoprolol (LOPRESSOR) tablet 12.5 mg  12.5 mg Feeding Tube TWICE DAILY Mone Vidal M.D.   Stopped at 12/28/18 1800   • miconazole 2%-zinc oxide (RIGOBERTO) topical cream   Topical BID Narcisa Nuñez M.D.       • ipratropium-albuterol (DUONEB) nebulizer solution  3 mL Nebulization Q4H PRN (RT) Aime Patterson M.D.   3 mL at 12/15/18 1510   • ferrous sulfate (FEOSOL) 220 (44 Fe) MG/5ML solution 325 mg  325 mg Feeding Tube BID WITH MEALS Jason Niño M.D.   Stopped at 12/28/18 0600   • levETIRAcetam (KEPPRA) tablet 750 mg  750 mg Per NG Tube BID Jason Niño M.D.   750 mg at 12/28/18 2330   • Pharmacy Consult: Enteral tube feeding - review meds/change route/product selection   Other PRN Aime Patterson M.D.       • acetaminophen (TYLENOL) tablet 650 mg  650 mg Feeding Tube  Q6HRS PRN Aime Patterson M.D.       • Pharmacy Consult Request ...Pain Management Review   Other PRN Aime Patterson M.D.        And   • oxyCODONE immediate-release (ROXICODONE) tablet 2.5 mg  2.5 mg Feeding Tube Q3HRS PRN Aime Patterson M.D.        And   • oxyCODONE immediate-release (ROXICODONE) tablet 5 mg  5 mg Feeding Tube Q3HRS PRN Aime Patterson M.D.   5 mg at 12/18/18 0804    And   • morphine (pf) 10 mg/mL injection 2 mg  2 mg Intravenous Q3HRS PRN Aime Patterson M.D.   2 mg at 12/16/18 2246   • famotidine (PEPCID) tablet 20 mg  20 mg Per NG Tube BID Aime Patterson M.D.   20 mg at 12/28/18 0657   • ondansetron (ZOFRAN) syringe/vial injection 4 mg  4 mg Intravenous Q6HRS PRN Adonay Queen R.N.   4 mg at 12/18/18 0426   • Respiratory Care per Protocol   Nebulization Continuous RT Juan Cobian M.D.       • NS (BOLUS) infusion 1,000 mL  1,000 mL Intravenous Once PRN Juan Cobian M.D.   Stopped at 12/12/18 1440       Allergies  Allergies   Allergen Reactions   • Egg White Vomiting and Swelling   • Egg Yolk Vomiting and Swelling   • Chicken Allergy Vomiting and Swelling   • Omeprazole      Coughing and choking   • Prednisone      Mood changes   • Statins [Hmg-Coa-R Inhibitors] Shortness of Breath       Vital Signs last 24 hours  Temp:  [36.1 °C (97 °F)-36.5 °C (97.7 °F)] 36.3 °C (97.4 °F)  Pulse:  [] 118  Resp:  [14-44] 23  BP: ()/(39-81) 90/59    Physical Exam  Physical Exam   Constitutional:   Frail elderly male critically ill appearing  Malnourished   HENT:   Discoloration to the right forehead and orbit chronic birthmark appearance   Eyes: Pupils are equal, round, and reactive to light. EOM are normal.   Neck: Normal range of motion. No JVD present. No tracheal deviation present.   Cardiovascular:   No murmur heard.  Tachycardic   Pulmonary/Chest:   Decreased breath sounds barrel chested right chest tube without air leak   Abdominal:   Firm abdomen, left lower  abdomen colostomy with brown stool  Midline scar   Musculoskeletal: He exhibits no edema.   Left lower leg below the knee amputation    Left arm midline IV   Neurological:   Patient is alert looks around answers minimal if any questions moves all extremities   Skin: Skin is dry. There is pallor.       Fluids    Intake/Output Summary (Last 24 hours) at 12/29/18 0238  Last data filed at 12/29/18 0000   Gross per 24 hour   Intake                0 ml   Output             1780 ml   Net            -1780 ml       Laboratory  Recent Results (from the past 48 hour(s))   COMP METABOLIC PANEL    Collection Time: 12/27/18  8:56 AM   Result Value Ref Range    Sodium 150 (H) 135 - 145 mmol/L    Potassium 3.8 3.6 - 5.5 mmol/L    Chloride 114 (H) 96 - 112 mmol/L    Co2 27 20 - 33 mmol/L    Anion Gap 9.0 0.0 - 11.9    Glucose 107 (H) 65 - 99 mg/dL    Bun 42 (H) 8 - 22 mg/dL    Creatinine 1.28 0.50 - 1.40 mg/dL    Calcium 10.9 (H) 8.5 - 10.5 mg/dL    AST(SGOT) 16 12 - 45 U/L    ALT(SGPT) 11 2 - 50 U/L    Alkaline Phosphatase 132 (H) 30 - 99 U/L    Total Bilirubin 0.5 0.1 - 1.5 mg/dL    Albumin 2.9 (L) 3.2 - 4.9 g/dL    Total Protein 6.5 6.0 - 8.2 g/dL    Globulin 3.6 (H) 1.9 - 3.5 g/dL    A-G Ratio 0.8 g/dL   ESTIMATED GFR    Collection Time: 12/27/18  8:56 AM   Result Value Ref Range    GFR If African American >60 >60 mL/min/1.73 m 2    GFR If Non African American 55 (A) >60 mL/min/1.73 m 2   CBC WITH DIFFERENTIAL    Collection Time: 12/28/18 12:28 AM   Result Value Ref Range    WBC 11.9 (H) 4.8 - 10.8 K/uL    RBC 3.33 (L) 4.70 - 6.10 M/uL    Hemoglobin 10.6 (L) 14.0 - 18.0 g/dL    Hematocrit 35.4 (L) 42.0 - 52.0 %    .3 (H) 81.4 - 97.8 fL    MCH 31.8 27.0 - 33.0 pg    MCHC 29.9 (L) 33.7 - 35.3 g/dL    RDW 56.4 (H) 35.9 - 50.0 fL    Platelet Count 361 164 - 446 K/uL    MPV 10.3 9.0 - 12.9 fL    Neutrophils-Polys 72.20 (H) 44.00 - 72.00 %    Lymphocytes 11.80 (L) 22.00 - 41.00 %    Monocytes 8.20 0.00 - 13.40 %    Eosinophils  6.10 0.00 - 6.90 %    Basophils 1.10 0.00 - 1.80 %    Immature Granulocytes 0.60 0.00 - 0.90 %    Nucleated RBC 0.00 /100 WBC    Lymphs (Absolute) 1.40 1.00 - 4.80 K/uL    Monos (Absolute) 0.98 (H) 0.00 - 0.85 K/uL    Eos (Absolute) 0.72 (H) 0.00 - 0.51 K/uL    Baso (Absolute) 0.13 (H) 0.00 - 0.12 K/uL    Immature Granulocytes (abs) 0.07 0.00 - 0.11 K/uL    NRBC (Absolute) 0.00 K/uL    Neutrophils (Absolute) 8.58 (H) 1.82 - 7.42 K/uL   MORPHOLOGY    Collection Time: 12/28/18 12:28 AM   Result Value Ref Range    RBC Morphology Present     Smudge Cells Few     Toxic Gran Slight    PERIPHERAL SMEAR REVIEW    Collection Time: 12/28/18 12:28 AM   Result Value Ref Range    Peripheral Smear Review see below    PLATELET ESTIMATE    Collection Time: 12/28/18 12:28 AM   Result Value Ref Range    Plt Estimation Normal    DIFFERENTIAL COMMENT    Collection Time: 12/28/18 12:28 AM   Result Value Ref Range    Comments-Diff see below    COMP METABOLIC PANEL    Collection Time: 12/28/18  8:41 AM   Result Value Ref Range    Sodium 154 (H) 135 - 145 mmol/L    Potassium 3.8 3.6 - 5.5 mmol/L    Chloride 116 (H) 96 - 112 mmol/L    Co2 30 20 - 33 mmol/L    Anion Gap 8.0 0.0 - 11.9    Glucose 90 65 - 99 mg/dL    Bun 45 (H) 8 - 22 mg/dL    Creatinine 1.54 (H) 0.50 - 1.40 mg/dL    Calcium 11.6 (H) 8.5 - 10.5 mg/dL    AST(SGOT) 14 12 - 45 U/L    ALT(SGPT) 13 2 - 50 U/L    Alkaline Phosphatase 135 (H) 30 - 99 U/L    Total Bilirubin 0.7 0.1 - 1.5 mg/dL    Albumin 2.9 (L) 3.2 - 4.9 g/dL    Total Protein 6.8 6.0 - 8.2 g/dL    Globulin 3.9 (H) 1.9 - 3.5 g/dL    A-G Ratio 0.7 g/dL   ESTIMATED GFR    Collection Time: 12/28/18  8:41 AM   Result Value Ref Range    GFR If  53 (A) >60 mL/min/1.73 m 2    GFR If Non  44 (A) >60 mL/min/1.73 m 2   Prothrombin Time    Collection Time: 12/28/18  9:21 AM   Result Value Ref Range    PT 14.6 12.0 - 14.6 sec    INR 1.13 0.87 - 1.13   CBC WITHOUT DIFFERENTIAL    Collection Time:  12/28/18  7:05 PM   Result Value Ref Range    WBC 28.9 (H) 4.8 - 10.8 K/uL    RBC 3.04 (L) 4.70 - 6.10 M/uL    Hemoglobin 10.0 (L) 14.0 - 18.0 g/dL    Hematocrit 33.0 (L) 42.0 - 52.0 %    .6 (H) 81.4 - 97.8 fL    MCH 32.9 27.0 - 33.0 pg    MCHC 30.3 (L) 33.7 - 35.3 g/dL    RDW 58.3 (H) 35.9 - 50.0 fL    Platelet Count 510 (H) 164 - 446 K/uL    MPV 10.8 9.0 - 12.9 fL   BASIC METABOLIC PANEL    Collection Time: 12/28/18  7:05 PM   Result Value Ref Range    Sodium 155 (H) 135 - 145 mmol/L    Potassium 3.8 3.6 - 5.5 mmol/L    Chloride 118 (H) 96 - 112 mmol/L    Co2 26 20 - 33 mmol/L    Glucose 118 (H) 65 - 99 mg/dL    Bun 45 (H) 8 - 22 mg/dL    Creatinine 1.72 (H) 0.50 - 1.40 mg/dL    Calcium 10.9 (H) 8.5 - 10.5 mg/dL    Anion Gap 11.0 0.0 - 11.9   MAGNESIUM    Collection Time: 12/28/18  7:05 PM   Result Value Ref Range    Magnesium 2.3 1.5 - 2.5 mg/dL   PHOSPHORUS    Collection Time: 12/28/18  7:05 PM   Result Value Ref Range    Phosphorus 5.9 (H) 2.5 - 4.5 mg/dL   ESTIMATED GFR    Collection Time: 12/28/18  7:05 PM   Result Value Ref Range    GFR If  47 (A) >60 mL/min/1.73 m 2    GFR If Non  39 (A) >60 mL/min/1.73 m 2   EC-ECHOCARDIOGRAM COMPLETE W/ CONT    Collection Time: 12/28/18  9:14 PM   Result Value Ref Range    Eject.Frac. MOD 4C 58.94     Left Ventrical Ejection Fraction 60    LACTIC ACID    Collection Time: 12/28/18  9:45 PM   Result Value Ref Range    Lactic Acid 2.5 (H) 0.5 - 2.0 mmol/L   URINALYSIS    Collection Time: 12/29/18 12:00 AM   Result Value Ref Range    Color Yellow     Character Clear     Specific Gravity 1.013 <1.035    Ph 6.0 5.0 - 8.0    Glucose Negative Negative mg/dL    Ketones Negative Negative mg/dL    Protein Negative Negative mg/dL    Bilirubin Negative Negative    Urobilinogen, Urine 0.2 Negative    Nitrite Negative Negative    Leukocyte Esterase Negative Negative    Occult Blood Negative Negative    Micro Urine Req see below         Imaging  EC-ECHOCARDIOGRAM COMPLETE W/ CONT   Final Result      DX-CHEST-PORTABLE (1 VIEW)   Final Result         1. Interval placement of right central venous catheter. No other significant interval change.      CT-NEEDLE BX-LUNG-MEDIASTINUM RIGHT   Final Result      1.  CT-guided RIGHT biopsy   2.  Deployment of [employer] pleural plug   3.  Placement of RIGHT chest tube      Findings were discussed with HELENE BENAVIDEZ at the conclusion of the procedure.         DX-CHEST-PORTABLE (1 VIEW)   Final Result      1.  Minimal right apical pneumothorax following right chest tube placement.      2.  No midline shift.      3.  Right lung opacity consistent with edema or hemorrhage.      4.  No left lung consolidation.      CT-CHEST (THORAX) WITH   Final Result      1.  Lobulated mass in right upper pulmonary lobe appears slightly larger than on the prior exam which could indicate tumor progression.      2.  Moderate size left pleural effusion is identified.      3.  Significant consolidation again noted in the lower lobes bilaterally which could indicate pneumonia.      4.  Emphysema again noted.      5.  Fluid collections around the pancreatic tail and left anterior pararenal space and left abdomen noted likely resulting from pancreatitis. These were also present on the abdominal CT.            CT-ABDOMEN-PELVIS WITH   Final Result      1.  Extensive irregular and heterogeneous fluid collection about the pancreas, extending into the LEFT upper quadrant/perisplenic region and LEFT paracolic gutter which is more extensive than prior exam and likely indicates pancreatic pseudocyst.   2.  Inflammation adjacent the gastric fundus.   3.  Postoperative changes as described.  Biliary stent present.   4.  Increasing bilateral pleural fluid and associated atelectasis.      DX-CHEST-PORTABLE (1 VIEW)   Final Result         1.  Pulmonary edema and/or infiltrates.   2.  Small layering left pleural effusion   3.  Nodular density  in the right upper lung field, corresponding with pulmonary nodular mass on recent CT December 11, 2018. See CT for further characterization and recommendations.      DX-CHEST-PORTABLE (1 VIEW)   Final Result         1. Left effusion is smaller. Unchanged left basilar atelectasis.   2. Stable right basilar opacity, atelectasis versus consolidation.   3. Unchanged nodular mass lesion in the right upper lobe.      DX-CHEST-PORTABLE (1 VIEW)   Final Result      1.  Persistent atelectasis or pneumonia in the left lower lobe with small left pleural effusion.      2.  Minor residual edema and effusion in the right lower lobe.      3.  Lobulated pulmonary nodules in the right midlung again noted. Differential diagnosis includes lung carcinoma.      DX-CHEST-PORTABLE (1 VIEW)   Final Result         1. New retrocardiac atelectasis versus consolidation.   2. Small bilateral pleural effusions and bibasilar atelectasis, left worse than right.   3. Stable right upper lobe mass.   4. Enteric tube tip is distal to the GE junction.      DX-ABDOMEN FOR TUBE PLACEMENT   Final Result      Enteric tube tip projects over the junction of the second and third portions of duodenum.      AR-[id number] DUCTS   Final Result      Limited images obtained during ERCP procedure showing apparent removal of common bile duct stones.      MR-ABDOMEN-WITH & W/O   Final Result      1.  Marked biliary dilation with large common bile duct stones.   2.  Stones also present in the gallbladder.   3.  Complex peripancreatic fluid collection, likely pseudocyst, with apparent inflammatory changes in the pancreatic tail suggesting superimposed acute pancreatitis with fluid extending into the LEFT pararenal space, likely hemorrhagic pancreatitis.     Hemorrhage within cystic lesion at the dorsal aspect of pancreatic tail also noted.  Pancreatic mass is difficult to exclude.   4.  Small volume ascites.   5.  Small bilateral pleural effusions with associated  consolidation.   6.  RIGHT kidney cysts.   7.  Possible subacute compression fracture of L3.            EC-ECHOCARDIOGRAM COMPLETE W/ CONT   Final Result      CT-ABDOMEN-PELVIS WITH   Final Result         1. Stranding and fluid surrounding the entire pancreas with several lobulated peripancreatic fluid area surrounding the pancreas. This could be sequela of prior pancreatitis versus cystic pancreatic neoplasm.      More high density fluid extending from the pancreatic tail to the left paracolic gutter could relate to hemorrhage of one of these cystic lesions or sequela of acute on chronic pancreatitis.      Severely dilated CBD, measuring up to 1.6 cm. Soft tissue attenuation lesion in the distal CBD could be an obstructing mass or noncalcified stone.      Further evaluation with MRCP and MR pancreas is recommended.      2. Cholelithiasis.      3. Diffuse wall thickening of the stomach could be gastritis or reactive change secondary to the pancreatic process.      4. Layering stones/debris in the urinary bladder.      CT-CTA CHEST PULMONARY ARTERY W/ RECONS   Final Result         1. No CT evidence of pulmonary embolism.      2. Lobulated 1.7 x 1.8 x 3.1 cm mass in the right upper lobe, concerning for lung malignancy. PET/CT, and/or tissue sampling is recommended.      3. Diffuse wall thickening throughout the esophagus could relate to esophagitis. Mildly prominent 1.4 cm paraesophageal lymph node adjacent to the distal esophagus. Questionable enlarged right paraesophageal lymph nodes more proximally.      4. Fluid opacification of the esophagus. There is debris completely filled the bilateral lower lobe airways with associated patchy bibasilar opacities. This is concerning for aspiration pneumonia due to retained fluid in the esophagus.      DX-CHEST-PORTABLE (1 VIEW)   Final Result      1.  No pneumonia or pulmonary edema.   2.  Slight interval increase in size of ill-defined RIGHT mid to upper lung nodular  opacity, concerning for mass.      IR-MIDLINE CATHETER INSERTION >5 YRS    (Results Pending)   CT-CHEST,ABDOMEN,PELVIS W/O    (Results Pending)       Assessment/Plan  Acute biliary pancreatitis- (present on admission)   Assessment & Plan    Check lipase, follow up on ct abdomen, monitor LFT may need GI to take out biliary stent for ? Source      Sepsis (HCC)- (present on admission)   Assessment & Plan    Board spectrum antibiotics and rapidly narrow antibiotic coverage once source or culture data return.     Likely source: lung vs abdomen    Continue to monitor for adequate source control  Fluid resusitiation of crystalloids of at least 30ml/kg unless contraindicated.  Monitor lactate clearence and end organ perfusion  Monitor for patient fluid responsiveness vs EVLW and cappilary leak  If persistent hypotension will add vasopressor/iontropic support  If escalation of pressor requirement or concern for relative adrenal insufficiency will give a trial of steroids.     Kleb bacteremia that was Amp resistent will start jame, linezolid ID was following will get CT chest abdomen pelvis follow up on pseudocyst abdomen firm on exam  Will limit fluids since right heart dysfunction maintain on levophed  Lactate 2.7         Mass of upper lobe of right lung- (present on admission)   Assessment & Plan    Follow up path from biopsy     Pneumonia due to infectious organism- (present on admission)   Assessment & Plan    Question if aspiration or pneumonia follow up on Ct chest   Follow up on sputum cx and g&s  Jame/Linezolid  MRSA nares     Acute on chronic respiratory failure with hypoxia (HCC)- (present on admission)   Assessment & Plan    Home o2 nocturnal, history of COPD  Now exacerbated with sepsis/SIRS, gallstone pancreatitis,?  Pneumonia  High flow nasal cannula, respiratory protocols, may require intubation    No interval change but remains at risk for respiratory failure requiring intubation/ mechanical ventilation  With  weak cough will need to observe closely. With weak cough NOT a candidate for non-invasive vent/     Anemia- (present on admission)   Assessment & Plan    Transfuse for hg < 7  Monitor for bleeding     REZA (acute kidney injury) ([facility])   Assessment & Plan    Worsening kidney injury likely under repsented from low muscle mass and malnutrition  Renal adjust meds  Avoid nephrotoxins  Follow urine output     Hypernatremia   Assessment & Plan    Worsening Hypernatremia  Start free water flush  Cortract to be placed doubt this abiodun can swallow correctly     Leukocytosis- (present on admission)   Assessment & Plan    Increasing leukocytosis   Monitor   Treat for sepsis     Pancreatic pseudocyst- (present on admission)   Assessment & Plan    Follow up CT see if need for intervention     Generalized abdominal pain- (present on admission)   Assessment & Plan    Firm abdomen will check ct abdomen pelvis          Discussed patient condition and risk of morbidity and/or mortality with Hospitalist, Family, RN and Patient.    The patient remains critically ill septic shock pneumothorax RV dysfunction requiring norepinephrine infusion.  Critical care time = 85 minutes in directly providing and coordinating critical care and extensive data review.  No time overlap and excludes procedures.

## 2018-12-29 NOTE — PROGRESS NOTES
Patient wants to be full code per Hospitalist and bedside nurse. Will need to re-address just filed out POLST form and met with palliative care.    Juan Jose Ramírez MD  Critical Care Medicine

## 2018-12-29 NOTE — CARE PLAN
Problem: Bowel/Gastric:  Goal: Will not experience complications related to bowel motility  Outcome: PROGRESSING AS EXPECTED  Pt educated on the use of stool softeners to aide in bowel maintenance and the importance of regular bowel movements     Problem: Knowledge Deficit  Goal: Knowledge of disease process/condition, treatment plan, diagnostic tests, and medications will improve  Outcome: PROGRESSING AS EXPECTED  Pt understands the tests and procedures performed in order for diagnosis and treatment.

## 2018-12-29 NOTE — CARE PLAN
Problem: Communication  Goal: The ability to communicate needs accurately and effectively will improve  Outcome: PROGRESSING AS EXPECTED    Intervention: [place] patient and significant other/support system to call light to alert staff of needs  Patient oriented to surroundings. Drowsy yet answers questions appropriately   Intervention: Educate patient and significant other/support system about the plan of care, procedures, treatments, medications and allow for questions  Education regarding plan of care provided      Problem: Pain Management  Goal: Pain level will decrease to patient's comfort goal  Outcome: PROGRESSING AS EXPECTED  Patient denies pain at this time. Patient able to make needs known

## 2018-12-29 NOTE — PROGRESS NOTES
Critical Care Progress Note    Date of admission  12/11/2018    Chief Complaint  78 y.o. male admitted 12/11/2018 with abdominal pain.    Hospital Course    This gentleman was admitted to the hospital with pancreatitis and sepsis.  He has a lung mass and had a CT-guided needle biopsy resulting in pneumothorax.      Interval Problem Update  Reviewed last 24 hour events:      Lung bx - PTX  Oriented 3-4  Moves all 4  SR  NE 6-8 titrating  HFNC  50 L  50%  TF paused for CT  D5W at 75  Give albumin      Review of Systems  Review of Systems   Unable to perform ROS: Acuity of condition          Vital Signs for last 24 hours   Temp:  [36.1 °C (97 °F)-36.3 °C (97.4 °F)] 36.3 °C (97.4 °F)  Pulse:  [] 106  Resp:  [14-44] 25  BP: ()/(39-81) 90/59    Hemodynamic parameters for last 24 hours       Respiratory       Physical Exam   Physical Exam   Constitutional:   Ill-appearing gentleman   HENT:   Head: Normocephalic.   Right Ear: External ear normal.   Left Ear: External ear normal.   Mouth/Throat: Oropharynx is clear and moist.   Eyes: Pupils are equal, round, and reactive to light. Right eye exhibits no discharge. Left eye exhibits no discharge. No scleral icterus.   Neck: Neck supple. No JVD present. No tracheal deviation present.   Cardiovascular: Intact distal pulses.  Exam reveals no gallop.    Sinus rhythm   Pulmonary/Chest: He has no wheezes. He has rales (Scattered crackles bilaterally).   Abdominal: Soft. Bowel sounds are normal. He exhibits no distension. There is no tenderness. There is no rebound.   Musculoskeletal: He exhibits no tenderness.   No clubbing or cyanosis   Neurological:   Oriented x 3-4.  Moves all 4 extremities.   Skin: Skin is warm and dry. He is not diaphoretic. No erythema.       Medications  Current Facility-Administered Medications   Medication Dose Route Frequency Provider Last Rate Last Dose   • meropenem (MERREM) 500 mg in  mL IVPB  500 mg Intravenous Q8HRS Juan Jose Ramírez,  M.D.   Stopped at 12/29/18 0354   • Pharmacy Consult: Enteral tube feeding - review meds/change route/product selection   Other PRN Juan Jose Ramírez M.D.       • Pharmacy Consult Request ...Pain Management Review 1 Each  1 Each Other PRN Yeyo Lechuga M.D.       • SODIUM CHLORIDE 0.9 % IV SOLN            • NOREPINEPHRINE BITARTRATE 1 MG/ML IV SOLN            • SODIUM CHLORIDE 0.9 % IV SOLN            • norepinephrine (LEVOPHED) 8 mg in  mL Infusion  0-30 mcg/min Intravenous Continuous Juan Jose Ramírez M.D. 18.8 mL/hr at 12/29/18 0045 10 mcg/min at 12/29/18 0045   • Linezolid (ZYVOX) premix 600 mg  600 mg Intravenous Q12HRS Aime Patterson M.D.   Stopped at 12/28/18 2252   • enoxaparin (LOVENOX) inj 40 mg  40 mg Subcutaneous DAILY Mone Vidal M.D.       • potassium chloride SA (Kdur) tablet 20 mEq  20 mEq Feeding Tube DAILY Mone Vidal M.D.   20 mEq at 12/28/18 0656   • ascorbic acid tablet 500 mg  500 mg Feeding Tube DAILY Mone Vidal M.D.   500 mg at 12/28/18 0647   • metoprolol (LOPRESSOR) tablet 12.5 mg  12.5 mg Feeding Tube TWICE DAILY Mone Vidal M.D.   Stopped at 12/28/18 1800   • miconazole 2%-zinc oxide (RIGOBERTO) topical cream   Topical BID Narcisa Nuñez M.D.       • ipratropium-albuterol (DUONEB) nebulizer solution  3 mL Nebulization Q4H PRN (RT) Aime Patterson M.D.   3 mL at 12/15/18 1510   • ferrous sulfate (FEOSOL) 220 (44 Fe) MG/5ML solution 325 mg  325 mg Feeding Tube BID WITH MEALS Jason Niño M.D.   Stopped at 12/28/18 0600   • levETIRAcetam (KEPPRA) tablet 750 mg  750 mg Per NG Tube BID Jason Niño M.D.   750 mg at 12/28/18 2330   • Pharmacy Consult: Enteral tube feeding - review meds/change route/product selection   Other PRN Aime Patterson M.D.       • acetaminophen (TYLENOL) tablet 650 mg  650 mg Feeding Tube Q6HRS PRN Aime Patterson M.D.       • Pharmacy Consult Request ...Pain Management Review   Other PRN Aime Patterson M.D.         And   • oxyCODONE immediate-release (ROXICODONE) tablet 2.5 mg  2.5 mg Feeding Tube Q3HRS PRN Aime Patterson M.D.        And   • oxyCODONE immediate-release (ROXICODONE) tablet 5 mg  5 mg Feeding Tube Q3HRS PRN Aime Patterson M.D.   5 mg at 12/18/18 0804    And   • morphine (pf) 10 mg/mL injection 2 mg  2 mg Intravenous Q3HRS PRN Aime Patterson M.D.   2 mg at 12/16/18 2246   • famotidine (PEPCID) tablet 20 mg  20 mg Per NG Tube BID Aime Patterson M.D.   20 mg at 12/28/18 0657   • ondansetron (ZOFRAN) syringe/vial injection 4 mg  4 mg Intravenous Q6HRS PRN Adonay Queen R.N.   4 mg at 12/18/18 0426   • Respiratory Care per Protocol   Nebulization Continuous RT Juan Cobian M.D.       • NS (BOLUS) infusion 1,000 mL  1,000 mL Intravenous Once PRN Juan Cobian M.D.   Stopped at 12/12/18 1440       Fluids    Intake/Output Summary (Last 24 hours) at 12/29/18 0438  Last data filed at 12/29/18 0200   Gross per 24 hour   Intake                0 ml   Output             1680 ml   Net            -1680 ml       Laboratory          Recent Labs      12/27/18   0856  12/28/18   0841  12/28/18   1905   SODIUM  150*  154*  155*   POTASSIUM  3.8  3.8  3.8   CHLORIDE  114*  116*  118*   CO2  27  30  26   BUN  42*  45*  45*   CREATININE  1.28  1.54*  1.72*   MAGNESIUM   --    --   2.3   PHOSPHORUS   --    --   5.9*   CALCIUM  10.9*  11.6*  10.9*     Recent Labs      12/26/18   0830   12/27/18   0856  12/28/18   0841  12/28/18   1905   ALTSGPT  13   --   11  13   --    ASTSGOT  13   --   16  14   --    ALKPHOSPHAT  128*   --   132*  135*   --    TBILIRUBIN  0.5   --   0.5  0.7   --    GLUCOSE  118*   < >  107*  90  118*    < > = values in this interval not displayed.     Recent Labs      12/26/18   0830  12/27/18   0035  12/27/18   0856  12/28/18   0028  12/28/18   0841  12/28/18   1905   WBC   --   13.3*   --   11.9*   --   28.9*   NEUTSPOLYS   --   76.40*   --   72.20*   --    --    LYMPHOCYTES   --   9.80*    --   11.80*   --    --    MONOCYTES   --   7.30   --   8.20   --    --    EOSINOPHILS   --   5.00   --   6.10   --    --    BASOPHILS   --   1.00   --   1.10   --    --    ASTSGOT  13   --   16   --   14   --    ALTSGPT  13   --   11   --   13   --    ALKPHOSPHAT  128*   --   132*   --   135*   --    TBILIRUBIN  0.5   --   0.5   --   0.7   --      Recent Labs      12/27/18   0035  12/28/18   0028  12/28/18   0921  12/28/18   1905   RBC  3.41*  3.33*   --   3.04*   HEMOGLOBIN  10.9*  10.6*   --   10.0*   HEMATOCRIT  35.7*  35.4*   --   33.0*   PLATELETCT  389  361   --   510*   PROTHROMBTM   --    --   14.6   --    INR   --    --   1.13   --        Imaging  X-Ray:  I have personally reviewed the images and compared with prior images. and My impression is: Small right pneumothorax.  Unchanged right lung opacities.  Improved left lower lobe opacification.    Assessment/Plan  Shock circulatory (HCC)   Assessment & Plan    Titrating norepinephrine to keep mean arterial pressure greater than 65     Acute biliary pancreatitis- (present on admission)   Assessment & Plan    CT with slightly smaller fluid collections in the pancreas  Biliary stent placed on 12/13     Acute on chronic respiratory failure with hypoxia ([facility])- (present on admission)   Assessment & Plan    Significant and very high supplemental oxygen requirements on a HFNC  Continue oxygen     Sepsis ([facility])- (present on admission)   Assessment & Plan    Severe sepsis with associated acute respiratory failure  Continue empiric meropenem and Zyvox     Pneumothorax on right   Assessment & Plan    Following CT-guided needle biopsy on 12/28  S/P chest tube placement - keep chest tube to suction     REZA (acute kidney injury) ([facility])   Assessment & Plan    Renal function slightly improved   Gentle hydration  Monitor renal function  Avoid nephrotoxins  Renal dose medications     Leukocytosis- (present on admission)   Assessment & Plan    Improved     Pancreatic pseudocyst-  (present on admission)   Assessment & Plan    CT scan with slight decrease in size of fluid collection     Mass of upper lobe of right lung- (present on admission)   Assessment & Plan    S/P CT-guided biopsy on 12/28 - pathology pending     Pneumonia due to infectious organism- (present on admission)   Assessment & Plan    Query pneumonia  CT and x-ray changes may be due to hemorrhage associated with biopsy of the right upper lobe mass  Continue empiric meropenem and Zyvox for now     Hypernatremia   Assessment & Plan    Start D5W, 75 mL an hour     Anemia- (present on admission)   Assessment & Plan    Monitor hemoglobin  Transfuse PRBCs using conservative transfusion strategy          VTE:  Lovenox  Ulcer: Not Indicated  Lines: Central Line  Ongoing indication addressed    I have performed a physical exam and reviewed and updated ROS and Plan today (12/29/2018). In review of yesterday's note (12/28/2018), there are no changes except as documented above.     Critically ill in ICU.  I have assessed and reassessed his respiratory status, blood pressure, hemodynamics, cardiovascular status with titration of norepinephrine and neurologic status.  He requires significant and very high supplemental oxygen on a HFNC.  He is at high risk for worsening respiratory and cardiovascular system dysfunction.    Discussed patient condition and risk of morbidity and/or mortality with Hospitalist, RN, RT, Pharmacy, Charge nurse / hot rounds and QA team     The patient remains critically ill.  Critical care time = 35 minutes in directly providing and coordinating critical care and extensive data review.  No time overlap and excludes procedures.    Jorge Weeks MD  Pulmonary and Critical Care Medicine

## 2018-12-29 NOTE — PROGRESS NOTES
[facility] Medicine Daily Progress Note    Date of Service  12/29/2018    Chief Complaint  78 y.o. 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  He presented to the emergency room on 12/11/18 with abdominal pain.  Imaging done as part of initial workup included a CT of the abdomen that demonstrated lobulated fluid collection around the pancrease and a CT of the chest that showed a right upper lobe mass concerning for malignancy.  He was in atrial flutter and received amiodarone. The patient was treated for sepsis from pneumonia source and admitted to the ICU.    Gastroenterology was consulted and on 12/13/18 he had an ERCP, a large stone was noted in the distal bile duct, bile duct cannulation was achieved and the procedure was stopped with plans to follow up with electrohydraulic lithotripsy. On 12/28/18 the patient underwent CT guided biopsy of the lung mass that was complicated by alveolar hemorrhage and pneumothorax. A small bore chest tube was placed. He was transferred to the ICU post procedure, on arrival he was hypotensive.  He was started on pressors and broad spectrum antibiotics.      Interval Problem Update  Transferred to ICU yesterday after lung biopsy yesterday  Hypotensive, central line placed, started on levophed, titrating to maintain MAP of 65  Patient's speech is sparse, does not offer spontaneous complaints, he denies pain, says he is not short of breath    Consultants/Specialty  Critical Care, I discussed the patient's condition with Dr. Weeks this morning on ICU Rounds  Infectious disease    Code Status  Full Code    Disposition  ICU    Review of Systems  Review of Systems   Constitutional: Positive for malaise/fatigue. Negative for chills and weight loss.   Respiratory: Positive for cough and shortness of  breath.    Cardiovascular: Negative for chest pain and palpitations.   Gastrointestinal: Negative for abdominal pain, nausea and vomiting.   Musculoskeletal: Positive for back pain. Negative for neck pain.   Skin: Negative for itching and rash.   Neurological: Positive for weakness.        Physical Exam  Temp:  [36.1 °C (97 °F)-37 °C (98.6 °F)] 37 °C (98.6 °F)  Pulse:  [] 84  Resp:  [14-44] 26  BP: ()/(39-81) 90/59    Physical Exam   Constitutional: He is oriented to person, place, and time.   Frail, cachectic, chronically ill-appearing   HENT:   Head: Normocephalic and atraumatic.   Area of thickened purple skin right side of his face   Eyes: Conjunctivae and EOM are normal. Right eye exhibits no discharge. Left eye exhibits no discharge.   Cardiovascular: Normal rate, regular rhythm and intact distal pulses.    No murmur heard.  Pulmonary/Chest: Effort normal and breath sounds normal. No respiratory distress. He has no wheezes.   Abdominal: Soft. Bowel sounds are normal. He exhibits no distension. There is no tenderness. There is no rebound.   Ostomy with liquid output   Musculoskeletal: Normal range of motion. He exhibits no edema.   Neurological: He is alert and oriented to person, place, and time. No cranial nerve deficit.   Skin: Skin is warm. No erythema.       Fluids    Intake/Output Summary (Last 24 hours) at 12/29/18 0715  Last data filed at 12/29/18 0600   Gross per 24 hour   Intake                0 ml   Output             1980 ml   Net            -1980 ml       Laboratory  Recent Labs      12/28/18   0028  12/28/18   1905  12/29/18   0525   WBC  11.9*  28.9*  17.7*   RBC  3.33*  3.04*  2.74*   HEMOGLOBIN  10.6*  10.0*  8.8*   HEMATOCRIT  35.4*  33.0*  29.4*   MCV  106.3*  108.6*  107.3*   MCH  31.8  32.9  32.1   MCHC  29.9*  30.3*  29.9*   RDW  56.4*  58.3*  57.5*   PLATELETCT  361  510*  410   MPV  10.3  10.8  10.3     Recent Labs      12/27/18   0856  12/28/18   0841  12/28/18   1905    SODIUM  150*  154*  155*   POTASSIUM  3.8  3.8  3.8   CHLORIDE  114*  116*  118*   CO2  27  30  26   GLUCOSE  107*  90  118*   BUN  42*  45*  45*   CREATININE  1.28  1.54*  1.72*   CALCIUM  10.9*  11.6*  10.9*     Recent Labs      12/28/18   0921   INR  1.13               Imaging  CXR 12/28/18 (interpreted by me):  Right sided opacity likely due to alveolar hemorrhage  Chest tube in place  There is no large residual pneumothorax    Assessment/Plan  Shock circulatory (HCC)   Assessment & Plan    Differential diagnosis would include septic shock versus hypovolemic  Being treated with broad spectrum antibiotic  Continue norepinephrine drip, titrate to maintain a map of 65  Check cortisol     Acute biliary pancreatitis- (present on admission)   Assessment & Plan    Status post ERCP with sphincterotomy and stenting on 12/13/2018 12/18/18 CT Abd showing increase in size of pancreatic pseudocyst  Enteral nutrition via cortrak feeing tube  Patient has retained large CBD stone for which he will need follow-up as outpatient  Repeat CT chest/abdomen/pelvis today       Sepsis (HCC)- (present on admission)   Assessment & Plan    This is severe sepsis with the following associated acute organ dysfunction(s): acute respiratory failure.   Abdominal source and pneumonia  Restarted on antibiotics as he was hypotensive     Mass of upper lobe of right lung- (present on admission)   Assessment & Plan    Imaging concerning for malignancy  Patient had CT-guided biopsy on 12/28/2018  Pathology results pending       Pneumonia due to infectious organism- (present on admission)   Assessment & Plan    Pneumonia due to Prevotella & Klebsiella  Antibiotics restarted for hypotension, ?infeciton     Acute on chronic respiratory failure with hypoxia (HCC)- (present on admission)   Assessment & Plan    CTA chest negative for PE  Worsened respiratory failure due to alveolar hemorrhage and pneumothorax  High flow O2     Anemia- (present on  admission)   Assessment & Plan    Morning labs reviewed, hemoglobin is 8.8  Follow daily labs, transfuse for hemoglobin less than 7     Dehydration with hypernatremia   Assessment & Plan    Start D5W  Repeat labs AM     Leukocytosis- (present on admission)   Assessment & Plan    Antibiotics for now     Pancreatic pseudocyst- (present on admission)   Assessment & Plan    Pain management  GI input is noted  Future drainage once cyst matured     Oral phase dysphagia- (present on admission)   Assessment & Plan    Continue tube feeding  Continue speech therapy     Atrial flutter (HCC)   Assessment & Plan    Continuous hemodynamic monitoring     Esophagitis- (present on admission)   Assessment & Plan    Pepcid     Troponin level elevated- (present on admission)   Assessment & Plan    C/o due to demand ischemia  Echo EF 65% unchanged from prior          VTE prophylaxis: lovenox

## 2018-12-30 NOTE — PROGRESS NOTES
Assumed care of patient at 0700.Received bedside report from TIGRE Kumar. Patient comfortable in bed with no needs or concerns at this time. Bed alarm set, call light within reach, bed in lowest position, treaded slipper socks on. See flowsheets for VS. Monitor ranges appropriate. Patient's pain is 0 / 10. No family at bed side. Will continue to monitor closely.     Drips verified.

## 2018-12-30 NOTE — PROGRESS NOTES
Critical Care Progress Note    Date of admission  12/11/2018    Chief Complaint  78 y.o. male admitted 12/11/2018 with abdominal pain.    Hospital Course    This gentleman was admitted to the hospital with pancreatitis and sepsis.  He has a lung mass and had a CT-guided needle biopsy resulting in pneumothorax.      Interval Problem Update  Reviewed last 24 hour events:      PERRL  Some danielle last night  NE 2  Afebrile  SR  HFNC this am  Stop Lovenox  TF at 65  Transfuse 1 unit of blood  Guaiac stool - positive - start Pepcid  Stop antibiotics and watch  Stop D5W - cont enteral free water  Replete K      Review of Systems  Review of Systems   Unable to perform ROS: Acuity of condition       Vital Signs for last 24 hours   Temp:  [36.7 °C (98 °F)-37.2 °C (99 °F)] 36.9 °C (98.4 °F)  Pulse:  [] 83  Resp:  [19-31] 27    Hemodynamic parameters for last 24 hours       Respiratory       Physical Exam   Physical Exam   Constitutional:   Ill-appearing gentleman   HENT:   Head: Normocephalic.   Right Ear: External ear normal.   Left Ear: External ear normal.   Mouth/Throat: No oropharyngeal exudate.   Eyes: Pupils are equal, round, and reactive to light. Conjunctivae are normal. Right eye exhibits no discharge. Left eye exhibits no discharge.   Neck: Neck supple. No JVD present. No tracheal deviation present.   Cardiovascular: Intact distal pulses.  Exam reveals no friction rub.    Sinus rhythm   Pulmonary/Chest: No stridor. He has no wheezes. He has rales (Improved crackles bilaterally).   Abdominal: Soft. Bowel sounds are normal. He exhibits no distension. There is no tenderness. There is no guarding.   Musculoskeletal: He exhibits no tenderness.   No clubbing or cyanosis   Neurological:   Oriented x 3-4.  Moves all 4 extremities.   Skin: Skin is warm and dry. No rash noted. He is not diaphoretic.       Medications  Current Facility-Administered Medications   Medication Dose Route Frequency Provider Last Rate Last Dose    • meropenem (MERREM) 500 mg in  mL IVPB  500 mg Intravenous Q8HRS Juan Jose Ramírez M.D. 200 mL/hr at 12/30/18 0446 500 mg at 12/30/18 0446   • D5W infusion   Intravenous Continuous Sunil Caldwell M.D. 75 mL/hr at 12/29/18 2246     • albumin human 25% solution 25 g  25 g Intravenous Q6HRS Jorge Weeks M.D. 150 mL/hr at 12/30/18 0450 25 g at 12/30/18 0450   • Pharmacy Consult Request ...Pain Management Review   Other PRN Aime Patterson M.D.        And   • oxyCODONE immediate-release (ROXICODONE) tablet 2.5 mg  2.5 mg Feeding Tube Q3HRS PRN Aime Patterson M.D.        And   • oxyCODONE immediate-release (ROXICODONE) tablet 5 mg  5 mg Feeding Tube Q3HRS PRN Aime Patterson M.D.        And   • morphine (pf) 4 mg/ml injection 2 mg  2 mg Intravenous Q3HRS PRN Aime Patterson M.D.       • norepinephrine (LEVOPHED) 8 mg in  mL Infusion  0-30 mcg/min Intravenous Continuous Jorge Weeks M.D.   Stopped at 12/30/18 0437   • Linezolid (ZYVOX) premix 600 mg  600 mg Intravenous Q12HRS Aime Patterson M.D. 300 mL/hr at 12/30/18 0436 600 mg at 12/30/18 0436   • enoxaparin (LOVENOX) inj 40 mg  40 mg Subcutaneous DAILY Mone Vidal M.D.   40 mg at 12/30/18 0446   • potassium chloride SA (Kdur) tablet 20 mEq  20 mEq Feeding Tube DAILY Mone Vidal M.D.   20 mEq at 12/30/18 0446   • ascorbic acid tablet 500 mg  500 mg Feeding Tube DAILY Mone Vidal M.D.   500 mg at 12/30/18 0446   • metoprolol (LOPRESSOR) tablet 12.5 mg  12.5 mg Feeding Tube TWICE DAILY Mone Vidal M.D.   12.5 mg at 12/30/18 0446   • miconazole 2%-zinc oxide (RIGOBERTO) topical cream   Topical BID Narcisa Nuñez M.D.       • ipratropium-albuterol (DUONEB) nebulizer solution  3 mL Nebulization Q4H PRN (RT) Aime Patterson M.D.   3 mL at 12/15/18 1510   • ferrous sulfate (FEOSOL) 220 (44 Fe) MG/5ML solution 325 mg  325 mg Feeding Tube BID WITH MEALS Jason Niño M.D.   325 mg at 12/30/18 0436   •  levETIRAcetam (KEPPRA) tablet 750 mg  750 mg Per NG Tube BID Jason Niño M.D.   750 mg at 12/30/18 0436   • Pharmacy Consult: Enteral tube feeding - review meds/change route/product selection   Other PRN Aime Patterson M.D.       • acetaminophen (TYLENOL) tablet 650 mg  650 mg Feeding Tube Q6HRS PRN Aime Patterson M.D.       • famotidine (PEPCID) tablet 20 mg  20 mg Per NG Tube BID Aime Patterson M.D.   20 mg at 12/30/18 0446   • ondansetron (ZOFRAN) syringe/vial injection 4 mg  4 mg Intravenous Q6HRS PRN Adonay Queen R.N.   4 mg at 12/18/18 0426   • Respiratory Care per Protocol   Nebulization Continuous RT Juan Cobian M.D.           Fluids    Intake/Output Summary (Last 24 hours) at 12/30/18 0458  Last data filed at 12/30/18 0400   Gross per 24 hour   Intake              315 ml   Output             3060 ml   Net            -2745 ml       Laboratory  Recent Labs      12/29/18   1258   ISTATAPH  7.451   ISTATAPCO2  35.3   ISTATAPO2  99*   ISTATATCO2  26   WASIMJU4MLY  98   ISTATARTHCO3  24.6   ISTATARTBE  1   ISTATTEMP  97.4 F   ISTATFIO2  100   ISTATSPEC  Arterial   ISTATAPHTC  7.461   VIWKMJHK5HR  95*         Recent Labs      12/28/18   0841  12/28/18 1905  12/29/18   0743   SODIUM  154*  155*  153*   POTASSIUM  3.8  3.8  4.1   CHLORIDE  116*  118*  118*   CO2  30  26  28   BUN  45*  45*  43*   CREATININE  1.54*  1.72*  1.61*   MAGNESIUM   --   2.3   --    PHOSPHORUS   --   5.9*   --    CALCIUM  11.6*  10.9*  10.6*     Recent Labs      12/27/18   0856  12/28/18   0841  12/28/18   1905  12/29/18   0525  12/29/18   0743   ALTSGPT  11  13   --    --   12   ASTSGOT  16  14   --    --   13   ALKPHOSPHAT  132*  135*   --    --   107*   TBILIRUBIN  0.5  0.7   --    --   0.5   LIPASE   --    --    --   21   --    GLUCOSE  107*  90  118*   --   147*     Recent Labs      12/27/18   0856  12/28/18   0028  12/28/18   0841  12/28/18   1905  12/29/18   0525  12/29/18   0743   WBC   --   11.9*   --    28.9*  17.7*   --    NEUTSPOLYS   --   72.20*   --    --   78.50*   --    LYMPHOCYTES   --   11.80*   --    --   10.80*   --    MONOCYTES   --   8.20   --    --   6.70   --    EOSINOPHILS   --   6.10   --    --   2.70   --    BASOPHILS   --   1.10   --    --   0.50   --    ASTSGOT  16   --   14   --    --   13   ALTSGPT  11   --   13   --    --   12   ALKPHOSPHAT  132*   --   135*   --    --   107*   TBILIRUBIN  0.5   --   0.7   --    --   0.5     Recent Labs      12/28/18   0028  12/28/18   0921  12/28/18   1905  12/29/18   0525   RBC  3.33*   --   3.04*  2.74*   HEMOGLOBIN  10.6*   --   10.0*  8.8*   HEMATOCRIT  35.4*   --   33.0*  29.4*   PLATELETCT  361   --   510*  410   PROTHROMBTM   --   14.6   --    --    INR   --   1.13   --    --        Imaging  X-Ray:  I have personally reviewed the images and compared with prior images. and My impression is: Tiny right apical pneumothorax.  Improving right lung opacities.    Assessment/Plan  Shock circulatory (HCC)   Assessment & Plan    Titrating norepinephrine to keep mean arterial pressure greater than 65     Acute biliary pancreatitis- (present on admission)   Assessment & Plan    CT with slightly smaller fluid collections in the pancreas  Biliary stent placed on 12/13     Acute on chronic respiratory failure with hypoxia ([facility])- (present on admission)   Assessment & Plan    Significant and very high supplemental oxygen requirements on a HFNC  Continue oxygen     Sepsis ([facility])- (present on admission)   Assessment & Plan    Query whether sepsis is present  Leukocytosis improved  Afebrile  Just completed a long course of Zosyn on 12/28  Stop meropenem and Zyvox for now and observe  Blood cultures are negative from 12/28     Pneumothorax on right   Assessment & Plan    Following CT-guided needle biopsy on 12/28  S/P chest tube placement - keep chest tube to suction     REZA (acute kidney injury) ([facility])   Assessment & Plan    Renal function improving  Avoid  nephrotoxins  Renal dose medications     Leukocytosis- (present on admission)   Assessment & Plan    Improved     Pancreatic pseudocyst- (present on admission)   Assessment & Plan    CT scan with slight decrease in size of fluid collection     Mass of upper lobe of right lung- (present on admission)   Assessment & Plan    S/P CT-guided biopsy on 12/28 - pathology pending     Pneumonia due to infectious organism- (present on admission)   Assessment & Plan    Query whether pneumonia is actually present  CT and x-ray changes may be due to hemorrhage associated with biopsy of the right upper lobe mass  Chest x-ray improved  Stop antibiotics and observe off antibiotics     Hypernatremia   Assessment & Plan    Improved  Stop IV D5W  Continue enteral free water     Anemia- (present on admission)   Assessment & Plan    Monitor hemoglobin  Hemoglobin improved after transfusion  Stool positive for occult blood          VTE:  Contraindicated  Ulcer: H2 Antagonist  Lines: Central Line  Ongoing indication addressed    I have performed a physical exam and reviewed and updated ROS and Plan today (12/30/2018). In review of yesterday's note (12/29/2018), there are no changes except as documented above.     Keep in ICU.  He is critically ill.  I have assessed and reassessed his respiratory status, blood pressure, hemodynamics, cardiovascular status with titration of a norepinephrine drip and neurologic status.  He requires significant and very high supplemental oxygen on a HFNC.  He is at increased risk for worsening respiratory and cardiovascular system dysfunction.    Discussed patient condition and risk of morbidity and/or mortality with Hospitalist, RN, RT, Pharmacy, Charge nurse / hot rounds and QA team     The patient remains critically ill.  Critical care time = 32 minutes in directly providing and coordinating critical care and extensive data review.  No time overlap and excludes procedures.    Jorge Weeks,  MD  Pulmonary and Critical Care Medicine

## 2018-12-30 NOTE — ASSESSMENT & PLAN NOTE
Titrating norepinephrine to keep mean arterial pressure greater than 65    Now off norepi and will not resume as pt/wife wish for primary focus to be on comfort and getting home ASAP. However, small medical problems can be treated but no ICU level of care

## 2018-12-30 NOTE — CARE PLAN
Problem: Safety  Goal: Will remain free from falls  Outcome: PROGRESSING AS EXPECTED    Intervention: Assess risk factors for falls   12/29/18 2127   OTHER   Mobility Status Assessment 2-2 Healthcare Providers Required for Assistance with Ambulation & Transfer   Pt Calls for Assistance Yes     Intervention: Implement fall precautions   12/29/18 2127   OTHER   Environmental Precautions Personal Belongings, Wastebasket, Call Bell etc. in Easy Reach;Report Given to Other Health Care Providers Regarding Fall Risk;Transferred to Stronger Side;Communication Sign for Patients & Families;Mobility Assessed & Appropriate Sign Placed         Problem: Mobility  Goal: Risk for activity intolerance will decrease  Outcome: PROGRESSING SLOWER THAN EXPECTED

## 2018-12-30 NOTE — PROGRESS NOTES
Hospital Medicine Daily Progress Note    Date of Service  12/30/2018    Chief Complaint  78 y.o. 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  He presented to the emergency room on 12/11/18 with abdominal pain.  Imaging done as part of initial workup included a CT of the abdomen that demonstrated lobulated fluid collection around the pancreas and a CT of the chest that showed a right upper lobe mass concerning for malignancy.  He was in atrial flutter and received amiodarone. The patient was treated for sepsis from pneumonia source and admitted to the ICU.    Gastroenterology was consulted and on 12/13/18 he had an ERCP, a large stone was noted in the distal bile duct, bile duct cannulation was achieved and the procedure was stopped with plans to follow up with electrohydraulic lithotripsy. His blood cultures were positive for klebsiella and prevotella.  ID was involved in the case and he was treated with an extended course of zosyn.    On 12/28/18 the patient underwent CT guided biopsy of the lung mass that was complicated by alveolar hemorrhage and pneumothorax. A small bore chest tube was placed. He was transferred to the ICU post procedure, on arrival he was hypotensive.  He was started on pressors and broad spectrum antibiotics.      Interval Problem Update  Off high flow, on 8L oxymask  Still pretty lethargic, difficult to get him to talk  Denies pain, endorses cough shortness of breath  Hemoglobin dropped this morning, no hemoptysis, no blood from chest tube, no signs of blood in the ostomy    Consultants/Specialty  Critical Care, I discussed the patient's condition with Dr. Weeks this morning on ICU Rounds  Infectious disease    Code Status  Full Code    Disposition  ICU  Prognosis is guarded, appreciate palliative care  consultation today, we have tentatively scheduled a meeting to discuss patient's condition with plan of care and patient's wife on 12/31/18 @ 1:00    Review of Systems  Review of Systems   Constitutional: Positive for malaise/fatigue. Negative for chills and weight loss.   Respiratory: Positive for cough and shortness of breath.    Cardiovascular: Negative for chest pain and palpitations.   Gastrointestinal: Negative for abdominal pain, nausea and vomiting.   Musculoskeletal: Positive for back pain. Negative for neck pain.   Skin: Negative for itching and rash.   Neurological: Positive for weakness.        Physical Exam  Temp:  [36.7 °C (98 °F)-37.2 °C (99 °F)] 36.8 °C (98.2 °F)  Pulse:  [] 79  Resp:  [16-31] 21    Physical Exam   Constitutional: He is oriented to person, place, and time.   Frail, cachectic, chronically ill-appearing   HENT:   Head: Normocephalic and atraumatic.   Area of thickened purple skin right side of his face   Eyes: Conjunctivae and EOM are normal. Right eye exhibits no discharge. Left eye exhibits no discharge.   Cardiovascular: Normal rate, regular rhythm and intact distal pulses.    No murmur heard.  Pulmonary/Chest: Effort normal and breath sounds normal. No respiratory distress. He has no wheezes.   Abdominal: Soft. Bowel sounds are normal. He exhibits no distension. There is no tenderness. There is no rebound.   Ostomy with liquid output   Musculoskeletal: Normal range of motion. He exhibits no edema.   Neurological: He is alert and oriented to person, place, and time. No cranial nerve deficit.   Skin: Skin is warm. No erythema.       Fluids    Intake/Output Summary (Last 24 hours) at 12/30/18 0918  Last data filed at 12/30/18 0600   Gross per 24 hour   Intake              380 ml   Output             2840 ml   Net            -2460 ml       Laboratory  Recent Labs      12/28/18   1905  12/29/18   0525  12/30/18   0434   WBC  28.9*  17.7*  13.1*   RBC  3.04*  2.74*  2.11*    HEMOGLOBIN  10.0*  8.8*  6.9*   HEMATOCRIT  33.0*  29.4*  22.4*   MCV  108.6*  107.3*  106.2*   MCH  32.9  32.1  32.7   MCHC  30.3*  29.9*  30.8*   RDW  58.3*  57.5*  55.5*   PLATELETCT  510*  410  238   MPV  10.8  10.3  10.4     Recent Labs      12/28/18   1905  12/29/18   0743  12/30/18   0434   SODIUM  155*  153*  145   POTASSIUM  3.8  4.1  3.1*   CHLORIDE  118*  118*  113*   CO2  26  28  26   GLUCOSE  118*  147*  123*   BUN  45*  43*  30*   CREATININE  1.72*  1.61*  1.43*   CALCIUM  10.9*  10.6*  10.8*     Recent Labs      12/28/18   0921   INR  1.13               Imaging  CXR 12/30/18:  1. Decreased opacity in the right mid lung and right upper lobe.    Assessment/Plan  Shock circulatory (HCC)   Assessment & Plan    Patient was hypotensive after lung biopsy  Pressor support as needed  Transfuse 1 unit packed red blood cells     Acute biliary pancreatitis- (present on admission)   Assessment & Plan    Status post ERCP with sphincterotomy and stenting on 12/13/2018 12/18/18 CT Abd showing increase in size of pancreatic pseudocyst  Patient has retained large CBD stone for which he will need follow-up as outpatient       Acute on chronic respiratory failure with hypoxia (HCC)- (present on admission)   Assessment & Plan    CTA chest negative for PE  Worsened respiratory failure due to alveolar hemorrhage and pneumothorax  Critical care following  Supplemental oxygen       Sepsis (HCC)- (present on admission)   Assessment & Plan    This is severe sepsis with the following associated acute organ dysfunction(s): acute respiratory failure.   Abdominal source and pneumonia       Pneumothorax on right   Assessment & Plan    Smallbore chest tube in place     Leukocytosis- (present on admission)   Assessment & Plan    Stopping antibiotics, monitor for fever or worsened WBC     Pancreatic pseudocyst- (present on admission)   Assessment & Plan    Pain management  GI input is noted  Future drainage once cyst matured      Atrial flutter (HCC)   Assessment & Plan    Continuous hemodynamic monitoring  Holding anticoagulation for alveolar hemorrhage and anemia requiring transfusion     Mass of upper lobe of right lung- (present on admission)   Assessment & Plan    Imaging concerning for malignancy  Patient had CT-guided biopsy on 12/28/2018  Pathology results pending       Pneumonia due to infectious organism- (present on admission)   Assessment & Plan    Pneumonia due to Prevotella & Klebsiella  Antibiotics restarted for hypotension, ?infeciton     Dehydration with hypernatremia   Assessment & Plan    Enteral free water     Oral phase dysphagia- (present on admission)   Assessment & Plan    Continue tube feeding  Continue speech therapy     Esophagitis- (present on admission)   Assessment & Plan    Pepcid     Troponin level elevated- (present on admission)   Assessment & Plan    Echo demonstrates ejection fraction 65%     Anemia- (present on admission)   Assessment & Plan    Anemia is worsened today, transfuse 1 unit packed red blood cells  Guaiac stool, hold anticoagulation and DVT prophylaxis          VTE prophylaxis: SCD's

## 2018-12-30 NOTE — PROGRESS NOTES
HR danielle's down to 30's for 6 beats with no other alterations in pt physical status or vital signs noted. Converts immediately back to rate of 70's

## 2018-12-30 NOTE — ASSESSMENT & PLAN NOTE
Following CT-guided needle biopsy on 12/28  S/P chest tube placement - keep chest tube to suction    Chest tube to water seal today  CXR in am and then discontinue

## 2018-12-30 NOTE — PALLIATIVE CARE
"Palliative Care follow-up  Phone call from BS RN stating that family at bedside requesting a visit with the family to review current status, they're understanding and GOC for the patient.  Patient seen by Palliative Care 12/20/18 with completion of AD (Statement of Desires 2,3 & 5)/POLST (DNR, Comfort Care only); code status changed to DNR/DNI. 12/28/18 patient elected to undergo a CT guided lung bx complicated by alveolar hemorrhage, pneumothorax requiring a small bore chest tube to be placed, patient transferring back to ICU, where he changed his code status to Full code. Collaboration with Dr. Caldwell, BS RN-   Conversation may not be complete without pathology. Decision to meet with family tomorrow to review patient's status before Dr. Caldwell signs off.  I met with the patient and spouse at bedside, introduced myself and reason for visit.  Discussed family meeting to keep family up to date.  Spouse stated \" I don't want it go like the last time\".  Spouse explained that they received news that \"he might not make it out of the hospital\" and she does not want to hear any information \"like that again\".  Education provided on the responsibility of medical staff to keep the patient, family apprised of the current status, good or bad. Keeping both patient, family informed they are able to make decisions which are in the patient's best interest and are in accordance with his wishes.    Updated: ELOISA Pulido, Dr. Caldwell and Palliative Care    Plan: Family meeting 12/31/18 @ 1pm    Thank you for allowing Palliative Care to participate in this patient's care. Please feel free to call [phone] with any questions or concerns.  "

## 2018-12-31 PROBLEM — C34.90: Status: ACTIVE | Noted: 2018-01-01

## 2018-12-31 NOTE — CARE PLAN
Problem: Skin Integrity  Goal: Risk for impaired skin integrity will decrease  See Vinny Score    Intervention: Assess risk factors for impaired skin integrity and/or pressure ulcers  Done see flowsheets.  Intervention: Assess and monitor skin integrity, appearance and/or temperature  Done.       Problem: Mobility  Goal: Risk for activity intolerance will decrease  Outcome: PROGRESSING AS EXPECTED  Patient expressed interest in wanting to get be placed in a chair tomorrow.   Intervention: Provide rest periods  Done.   Intervention: Encourage patient to increase activity level in collaboration with Interdisciplinary Team  Done.

## 2018-12-31 NOTE — WOUND TEAM
"[facility]  Inpatient Services  Wound and Skin Care Progress Note    Admission Date: 12/11/2018     Consult Date: 12/12/2018 @ 0701   Re-consult Date: 12/20/2018 @1259  HPI, PMH, SH: Reviewed    Unit where seen by Wound Team: [id number]     WOUND FOLLOW UP RELATED TO:  Bilateral ear Pressure injuries    SUBJECTIVE:  \"O yea go ahead\"      Self Report / Pain Level:  Denies pain       OBJECTIVE:  Pt sitting up in chair with oxy mask in use.     WOUND TYPE, LOCATION, CHARACTERISTICS (Pressure Injuries: location, stage, POA or date identified)    Pressure Injury 12/19/18 Ear Right (Active)   Wound Image       Pressure Injury Stage Unstageable    State of Healing Non-healing    Site Assessment Yellow;Red;Painful;Dry    Fabiola-wound Assessment Red    Margins Attached edges;Undefined edges    Wound Length (cm) 1 cm    Wound Width (cm) 0.5 cm    Wound Surface Area (cm^2) 0.5 cm^2    Closure Open to air;None    Drainage Amount None    Non-staged Wound Description Not applicable    Treatments Cleansed;Site care    Cleansing Normal Saline Irrigation    Periwound Protectant Not Applicable    Dressing Options Open to Air    Dressing Cleansing/Solutions Not Applicable    Dressing Changed New    Dressing Change Frequency Every Shift    NEXT Weekly Photo (Inpatient Only) 01/07/19    WOUND NURSE ONLY - Odor None    WOUND NURSE ONLY - Exposed Structures Other (Comments)    WOUND NURSE ONLY - Tissue Type and Percentage 80% Yellow, 20% Red    WOUND NURSE ONLY - Time Spent with Patient (mins) 45        Pressure Injury 12/19/18 Ear Left (Active)   Wound Image       Pressure Injury Stage DTPI    State of Healing Non-healing    Site Assessment Red;Dry;Light purple    Fabiola-wound Assessment Red    Margins Attached edges;Defined edges    Wound Length (cm) 0.5 cm    Wound Width (cm) 0.5 cm    Wound Surface Area (cm^2) 0.25 cm^2    Closure None    Drainage Amount None    Non-staged Wound Description Not applicable    Treatments " Cleansed;Site care    Cleansing Normal Saline Irrigation    Periwound Protectant Not Applicable    Dressing Options Open to Air    Dressing Cleansing/Solutions Not Applicable    Dressing Change Frequency Every Shift    NEXT Weekly Photo (Inpatient Only) 01/07/19    WOUND NURSE ONLY - Odor None    WOUND NURSE ONLY - Exposed Structures Other (Comments)    WOUND NURSE ONLY - Tissue Type and Percentage 50% Red, 50% Purple      Lab Values:    WBC:       WBC   Date/Time Value Ref Range Status   12/31/2018 05:02 AM 14.1 (H) 4.8 - 10.8 K/uL Final   10/26/2010 10:15 AM 9.3 4.0 - 10.5 x10E3/uL Final     AIC:      Lab Results   Component Value Date/Time    HBA1C 5.5 03/06/2018 11:36 PM         Culture:   NA    INTERVENTIONS BY WOUND TEAM:  This RN in to reassess bilateral ears. Pt sitting up in recliner chair. This RN assessed the right ear. The pressure injury remains an unstageable. This RN attempted to clean off the eschar and slough however it is still firmly adhered. Pictures and measurements obtained. Gray foam pads already in use over oxy mask. Left ear was then assessed. Eschar was removed was cleansing to reveal what appears to be a sDTI. Measurements and pictures obtained. Will continue to monitor progress.     Dressing selection:  BILATERAL EARS: OFFLOAD WITH GRAY FOAM OR TENDER ; SACRUM: OSTOMY POWDER, MICONAZOLE-RIGOBERTO, VISCOPASTE STRIP, RIGOBERTO BARRIER PASTE       Interdisciplinary consultation: Patient, Bedside RN    EVALUATION: Pt is an older gentleman with an ileostomy and a left BKA site. Pt currently with cortrak and oxygen via Oxy Mask. Pt and family met with palliative care regarding POC at that time pt was made DNR/DNI. Pt however changed his code status back to full code and is requesting to meet with palliative regarding POC.  Per discharge plan if pt continues with treatment he will require LTACH, pt did indicate to Palliative that he would like to go home and be with his wife Courtney and not suffer,  however pt appears to be improving and code status has been changed. Pt has bilateral pressure injuries to his ears that are small, however given pts oxygen demands may be difficult to heal. Pt also has significant MASD to his sacrococcygeal and buttock area. There is some concern that pt may develop a pressure injury as the area is slow to miguel a at this time. Pt currently in an ICU low airloss bed and dressing orders in place to prevent further breakdown.    Factors affecting wound healing: Age, Hx CVA, L BKA, A. Fib, COPD, ARF, Seizures  Goals: Steady decrease in wound area and depth weekly.    NURSING PLAN OF CARE ORDERS (X):    Dressing changes: See PREVIOUS Dressing Care orders: X  Skin care: See PREVIOUS Skin Care orders: X  Rectal tube care: See Rectal Tube Care orders:   Other orders:      WOUND TEAM PLAN OF CARE (X):   NPWT change 3 x week:        Dressing changes by wound team:       Follow up as needed: X Wound team will evaluate pressure injury progress and healing weekly.   Other (explain):     Anticipated discharge plans (X):   SNF:           Home Care:           Outpatient Wound Center:            Self Care:            Other: X, Pt lives with granddaughter and wife.

## 2018-12-31 NOTE — PROGRESS NOTES
[facility] Medicine Daily Progress Note    Date of Service  12/31/2018    Chief Complaint  78 y.o. 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  He presented to the emergency room on 12/11/18 with abdominal pain.  Imaging done as part of initial workup included a CT of the abdomen that demonstrated lobulated fluid collection around the pancreas and a CT of the chest that showed a right upper lobe mass concerning for malignancy.  He was in atrial flutter and received amiodarone. The patient was treated for sepsis from pneumonia source and admitted to the ICU.    Gastroenterology was consulted and on 12/13/18 he had an ERCP, a large stone was noted in the distal bile duct, bile duct cannulation was achieved and the procedure was stopped with plans to follow up with electrohydraulic lithotripsy. His blood cultures were positive for klebsiella and prevotella.  ID was involved in the case and he was treated with an extended course of zosyn.    On 12/28/18 the patient underwent CT guided biopsy of the lung mass that was complicated by alveolar hemorrhage and pneumothorax. A small bore chest tube was placed. He was transferred to the ICU post procedure, on arrival he was hypotensive.  He was started on pressors and broad spectrum antibiotics.      Interval Problem Update  's-140's, off levophed this morning  Complains of abdominal discomfort, tube feeds paused today  On 6L NC, thinks breathing is a little better  Difficult to talk, has a sore throat, very dry    Met with patient and his family.  We discussed my concerns for his debility and lack of improvement during this hospital stay.  Also discussed that biospy results are pending.  Conner is fully oriented and making his own decisions at this time.  He seems to understand the concept that  the burden of treatment may outweigh the benefit, except for the sore throat he indicates that he is not really suffering at this point and wants to continue full care. He indicates that if the biospy shows malignancy, he might change his mind.    Consultants/Specialty  Critical Care, I discussed the patient's condition with Dr. Horn this morning on ICU Rounds  Infectious disease    Code Status  Full Code    Disposition  ICU    Review of Systems  Review of Systems   Constitutional: Positive for malaise/fatigue. Negative for chills and weight loss.   Respiratory: Positive for cough and shortness of breath.    Cardiovascular: Negative for chest pain and palpitations.   Gastrointestinal: Negative for abdominal pain, nausea and vomiting.   Musculoskeletal: Positive for back pain. Negative for neck pain.   Skin: Negative for itching and rash.   Neurological: Positive for weakness.        Physical Exam  Temp:  [36.2 °C (97.1 °F)-37.2 °C (99 °F)] 36.6 °C (97.9 °F)  Pulse:  [57-97] 82  Resp:  [16-34] 30  BP: (102)/(47) 102/47    Physical Exam   Constitutional: He is oriented to person, place, and time.   Frail, cachectic, chronically ill-appearing   HENT:   Head: Normocephalic and atraumatic.   Area of thickened purple skin right side of his face   Eyes: Conjunctivae and EOM are normal. Right eye exhibits no discharge. Left eye exhibits no discharge.   Cardiovascular: Normal rate, regular rhythm and intact distal pulses.    No murmur heard.  Pulmonary/Chest: Effort normal and breath sounds normal. No respiratory distress. He has no wheezes.   Abdominal: Soft. Bowel sounds are normal. He exhibits no distension. There is no tenderness. There is no rebound.   Ostomy with liquid output   Musculoskeletal: Normal range of motion. He exhibits no edema.   Neurological: He is alert and oriented to person, place, and time. No cranial nerve deficit.   Skin: Skin is warm. No erythema.       Fluids    Intake/Output Summary (Last 24  hours) at 12/31/18 0946  Last data filed at 12/31/18 0600   Gross per 24 hour   Intake           738.75 ml   Output             3575 ml   Net         -2836.25 ml       Laboratory  Recent Labs      12/29/18   0525  12/30/18   0434  12/30/18   1620  12/30/18   2316  12/31/18   0502   WBC  17.7*  13.1*   --    --   14.1*   RBC  2.74*  2.11*   --    --   2.67*   HEMOGLOBIN  8.8*  6.9*  8.2*  8.2*  8.5*   HEMATOCRIT  29.4*  22.4*  26.4*   --   27.5*   MCV  107.3*  106.2*   --    --   103.0*   MCH  32.1  32.7   --    --   31.8   MCHC  29.9*  30.8*   --    --   30.9*   RDW  57.5*  55.5*   --    --   57.9*   PLATELETCT  410  238   --    --   229   MPV  10.3  10.4   --    --   10.4     Recent Labs      12/29/18   0743  12/30/18   0434  12/31/18   0502   SODIUM  153*  145  143   POTASSIUM  4.1  3.1*  3.9   CHLORIDE  118*  113*  110   CO2  28  26  27   GLUCOSE  147*  123*  109*   BUN  43*  30*  32*   CREATININE  1.61*  1.43*  1.30   CALCIUM  10.6*  10.8*  11.8*                   Imaging  CXR 12/30/18:  1. Decreased opacity in the right mid lung and right upper lobe.    Assessment/Plan  Shock circulatory (HCC)   Assessment & Plan    Improved, off pressors     Acute biliary pancreatitis- (present on admission)   Assessment & Plan    Status post ERCP with sphincterotomy and stenting on 12/13/2018 12/18/18 CT Abd showing increase in size of pancreatic pseudocyst  Patient has retained large CBD stone, likely to need an additional specialty procedure for definitive treatment       Acute on chronic respiratory failure with hypoxia (HCC)- (present on admission)   Assessment & Plan    CTA chest negative for PE  Worsened respiratory failure due to alveolar hemorrhage and pneumothorax  Slowly improving  Critical care following       Sepsis (HCC)- (present on admission)   Assessment & Plan    This is severe sepsis with the following associated acute organ dysfunction(s): acute respiratory failure.   Abdominal source and pneumonia        Pneumothorax on right   Assessment & Plan    Smallbore chest tube in place     REZA (acute kidney injury) (HCC)   Assessment & Plan    Renal function improved     Leukocytosis- (present on admission)   Assessment & Plan    Stopping antibiotics, monitor for fever or worsened WBC     Pancreatic pseudocyst- (present on admission)   Assessment & Plan    Pain management  GI input is noted  Future drainage once cyst matured     Atrial flutter (HCC)   Assessment & Plan    Continuous hemodynamic monitoring  Holding anticoagulation for alveolar hemorrhage and anemia requiring transfusion     Mass of upper lobe of right lung- (present on admission)   Assessment & Plan    Imaging concerning for malignancy  Patient had CT-guided biopsy on 12/28/2018  Pathology results pending       Pneumonia due to infectious organism- (present on admission)   Assessment & Plan    Pneumonia due to Prevotella & Klebsiella  Antibiotics restarted for hypotension, ?infeciton     Dehydration with hypernatremia   Assessment & Plan    Enteral free water     Oral phase dysphagia- (present on admission)   Assessment & Plan    Continue tube feeding  Speech therapy re-eval     Esophagitis- (present on admission)   Assessment & Plan    Pepcid     Troponin level elevated- (present on admission)   Assessment & Plan    Echo demonstrates ejection fraction 65%     Anemia- (present on admission)   Assessment & Plan    Improved with transfusion  Continue daily labs  Maintain hemoglobin greater than 7          VTE prophylaxis: SCD's

## 2018-12-31 NOTE — DIETARY
Nutrition Services: TF Update - RD consulted in rounds d/t high ostomy output; RN states 800 ml drainage from ostomy site overnight.  It appears to be green bile, she reports.    Assessment:  Current TF running via jejunal Cortrak: Diabetisapple MOORE, goal rate 65 ml/hr, providing 1872 kcals, 94 grams protein, 1279 mL free water.  -pt is s/p colostomy placement for hx colitis, per H&P  - current TF providing patient with 92 grams fat/day - considered high amount   - pt with acute biliary pancreatitis s/p ERCP with sphincterotomy and stenting 12/13 (GI) and pancreatic pseudocysts; he is not currently a candidate for surgery, per discussion with RN  -no signs of blood from colostomy, per MD progress note  -no signs of pancreatic insufficiency noted at this time     Labs: glucose 109, BUN 32, calcium 11.8, CRP 10 (trending down)   Meds: vitamin C, Pepcid, Feosol, Klyte  Skin: patient noted to have thin, pale skin, appears cachectic, bilateral pressure injuries to ears - pt with increased protein needs for wound healing and adequate nutrition status.     Plan/Recommend:   1) Semi-elemental, moderate fat TF appropriate at this time, given bile appearing in pt's stools - please change TF to Impact Peptide 1.5 to 25 ml/hr and advance slowly to goal rate (by 15 ml q8-12 hours) of 50 ml/hr = 76 grams fat, 1800 kcal, 113 grams protein, 924 ml free water/day.   - consider pulling Cortrak back into the duodenum to promote greater surface area for improved absorption   - ? Consider trial of anti-diarrheal agent    - RD will monitor and make additional recommendations accordingly (pancreatic enzymes?)   2) Fluids per MD - changing TF formula to one of reduced volume, free water will be sacrificed.   -consider adjusting free water flushes to 200 q4 hrs   -free water from new TF + free water flushes at this rate would providing daily volume = 2124 ml

## 2018-12-31 NOTE — CARE PLAN
Problem: Oxygenation:  Goal: Maintain adequate oxygenation dependent on patient condition  Off HHF this AM  Oxymask 8 lpm with sats of 95%

## 2018-12-31 NOTE — PROGRESS NOTES
Critical Care Progress Note    Date of admission  12/11/2018    Chief Complaint  78 y.o. male admitted 12/11/2018 with abdominal pain.    Hospital Course    This gentleman was admitted to the hospital with pancreatitis and sepsis.  He has a lung mass and had a CT-guided needle biopsy resulting in pneumothorax.      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  6 L oxygen  Transfused 1 unit pRBCs  Sinus 60-90  norepi weaned to off today  Pt/wife ask for DNR.DNI status with no major escalations in care, no ICU level of care  Pt/wife ask for hospice consult so may go home with hospice support as soon as possible      Prior 24 hours  PERRL  Some danielle last night  NE 2  Afebrile  SR  HFNC this am  Stop Lovenox  TF at 65  Transfuse 1 unit of blood  Guaiac stool - positive - start Pepcid  Stop antibiotics and watch  Stop D5W - cont enteral free water  Replete K      Review of Systems  Review of Systems   Unable to perform ROS: Acuity of condition   Constitutional: Positive for malaise/fatigue and weight loss. Negative for chills and fever.   HENT: Negative.    Eyes: Negative.    Respiratory: Positive for cough, hemoptysis and shortness of breath.    Cardiovascular: Negative.    Gastrointestinal: Negative.    Genitourinary: Negative.    Musculoskeletal: Negative.    Skin: Negative.    Neurological: Positive for weakness.   Endo/Heme/Allergies: Negative.    Psychiatric/Behavioral: Negative.        Vital Signs for last 24 hours   Temp:  [36.2 °C (97.1 °F)-37.2 °C (99 °F)] 36.7 °C (98 °F)  Pulse:  [] 75  Resp:  [17-34] 32    Hemodynamic parameters for last 24 hours       Respiratory       Physical Exam   Physical Exam   Constitutional: He is oriented to person, place, and time.   Ill-appearing gentleman, cachectic   HENT:   Head: Normocephalic.   Right Ear: External ear normal.   Left Ear: External ear normal.   Mouth/Throat: No oropharyngeal exudate.    Eyes: Pupils are equal, round, and reactive to light. Conjunctivae are normal. Right eye exhibits no discharge. Left eye exhibits no discharge.   Neck: Neck supple. No JVD present. No tracheal deviation present.   Cardiovascular: Normal rate, regular rhythm and intact distal pulses.  Exam reveals no friction rub.    Sinus rhythm   Pulmonary/Chest: No stridor. He has no wheezes. He has rales (Improved crackles bilaterally).   Abdominal: Soft. Bowel sounds are normal. He exhibits no distension. There is no tenderness. There is no guarding.   Musculoskeletal: He exhibits no tenderness.   No clubbing or cyanosis   Neurological: He is oriented to person, place, and time.   Oriented x 3-4.  Moves all 4 extremities.   Skin: Skin is warm and dry. No rash noted. He is not diaphoretic.       Medications  Current Facility-Administered Medications   Medication Dose Route Frequency Provider Last Rate Last Dose   • potassium bicarbonate (KLYTE) effervescent tablet 25 mEq  25 mEq Feeding Tube DAILY Sunil Caldwell M.D.   25 mEq at 12/31/18 0514   • famotidine (PEPCID) tablet 20 mg  20 mg Feeding Tube DAILY Sunil Caldwell M.D.   20 mg at 12/31/18 0514   • Pharmacy Consult Request ...Pain Management Review   Other PRN Aime Patterson M.D.        And   • oxyCODONE immediate-release (ROXICODONE) tablet 2.5 mg  2.5 mg Feeding Tube Q3HRS PRN Aime Patterson M.D.        And   • oxyCODONE immediate-release (ROXICODONE) tablet 5 mg  5 mg Feeding Tube Q3HRS PRN Aime Patterson M.D.        And   • morphine (pf) 4 mg/ml injection 2 mg  2 mg Intravenous Q3HRS PRN Aime Patterson M.D.       • norepinephrine (LEVOPHED) 8 mg in  mL Infusion  0-30 mcg/min Intravenous Continuous Jorge Weeks M.D.   Stopped at 12/31/18 0000   • ascorbic acid tablet 500 mg  500 mg Feeding Tube DAILY Mone Vidal M.D.   500 mg at 12/31/18 0514   • metoprolol (LOPRESSOR) tablet 12.5 mg  12.5 mg Feeding Tube TWICE DAILY Mone Vidal  M.D.   12.5 mg at 12/31/18 0515   • miconazole 2%-zinc oxide (RIGOBERTO) topical cream   Topical BID Narcisa Nuñez M.D.       • ipratropium-albuterol (DUONEB) nebulizer solution  3 mL Nebulization Q4H PRN (RT) Aime Patterson M.D.   3 mL at 12/15/18 1510   • ferrous sulfate (FEOSOL) 220 (44 Fe) MG/5ML solution 325 mg  325 mg Feeding Tube BID WITH MEALS Jason Niño M.D.   325 mg at 12/31/18 0515   • levETIRAcetam (KEPPRA) tablet 750 mg  750 mg Per NG Tube BID Jason Niño M.D.   750 mg at 12/31/18 0514   • Pharmacy Consult: Enteral tube feeding - review meds/change route/product selection   Other PRN Aime Patterson M.D.       • acetaminophen (TYLENOL) tablet 650 mg  650 mg Feeding Tube Q6HRS PRN Aime Patterson M.D.       • ondansetron (ZOFRAN) syringe/vial injection 4 mg  4 mg Intravenous Q6HRS PRN Adonay Queen R.NRudy   4 mg at 12/18/18 0426   • Respiratory Care per Protocol   Nebulization Continuous RT Juan Cobian M.D.           Fluids    Intake/Output Summary (Last 24 hours) at 12/31/18 1736  Last data filed at 12/31/18 1400   Gross per 24 hour   Intake              520 ml   Output             2123 ml   Net            -1603 ml       Laboratory  Recent Labs      12/29/18   1258   ISTATAPH  7.451   ISTATAPCO2  35.3   ISTATAPO2  99*   ISTATATCO2  26   IYTWTMI3NNR  98   ISTATARTHCO3  24.6   ISTATARTBE  1   ISTATTEMP  97.4 F   ISTATFIO2  100   ISTATSPEC  Arterial   ISTATAPHTC  7.461   YCUZRBOH7CK  95*         Recent Labs      12/28/18   1905  12/29/18   0743  12/30/18   0434  12/31/18   0502   SODIUM  155*  153*  145  143   POTASSIUM  3.8  4.1  3.1*  3.9   CHLORIDE  118*  118*  113*  110   CO2  26  28  26  27   BUN  45*  43*  30*  32*   CREATININE  1.72*  1.61*  1.43*  1.30   MAGNESIUM  2.3   --    --    --    PHOSPHORUS  5.9*   --    --    --    CALCIUM  10.9*  10.6*  10.8*  11.8*     Recent Labs      12/29/18   0525  12/29/18   0743  12/30/18   0434  12/31/18   0502   ALTSGPT   --   12   7  7   ASTSGOT   --   13  10*  12   ALKPHOSPHAT   --   107*  80  88   TBILIRUBIN   --   0.5  0.4  0.5   LIPASE  21   --    --    --    GLUCOSE   --   147*  123*  109*     Recent Labs      12/29/18   0525  12/29/18   0743  12/30/18   0434  12/31/18   0502   WBC  17.7*   --   13.1*  14.1*   NEUTSPOLYS  78.50*   --   75.10*  76.20*   LYMPHOCYTES  10.80*   --   11.70*  11.10*   MONOCYTES  6.70   --   6.00  5.50   EOSINOPHILS  2.70   --   6.00  5.80   BASOPHILS  0.50   --   0.40  0.50   ASTSGOT   --   13  10*  12   ALTSGPT   --   12  7  7   ALKPHOSPHAT   --   107*  80  88   TBILIRUBIN   --   0.5  0.4  0.5     Recent Labs      12/29/18   0525  12/30/18   0434  12/30/18   1620  12/30/18   2316  12/31/18   0502   RBC  2.74*  2.11*   --    --   2.67*   HEMOGLOBIN  8.8*  6.9*  8.2*  8.2*  8.5*   HEMATOCRIT  29.4*  22.4*  26.4*   --   27.5*   PLATELETCT  410  238   --    --   229       Imaging  X-Ray:  I have personally reviewed the images and compared with prior images. and My impression is: Tiny right apical pneumothorax.  Improving right lung opacities.    Assessment/Plan  Shock circulatory (HCC)   Assessment & Plan    Titrating norepinephrine to keep mean arterial pressure greater than 65    Now off norepi and will not resume as pt/wife wish for primary focus to be on comfort and getting home ASAP. However, small medical problems can be treated but no ICU level of care     Acute biliary pancreatitis- (present on admission)   Assessment & Plan    CT with slightly smaller fluid collections in the pancreas  Biliary stent placed on 12/13     Acute on chronic respiratory failure with hypoxia (HCC)- (present on admission)   Assessment & Plan    Significant and very high supplemental oxygen requirements on a HFNC  Continue oxygen     Sepsis (HCC)- (present on admission)   Assessment & Plan    Query whether sepsis is present  Leukocytosis improved  Afebrile  Just completed a long course of Zosyn on 12/28  Stop meropenem and Zyvox  for now and observe  Blood cultures are negative from 12/28     Pneumothorax on right   Assessment & Plan    Following CT-guided needle biopsy on 12/28  S/P chest tube placement - keep chest tube to suction    Chest tube to water seal today  CXR in am and then discontinue     REZA (acute kidney injury) (HCC)   Assessment & Plan    Renal function improving  Avoid nephrotoxins  Renal dose medications     Leukocytosis- (present on admission)   Assessment & Plan    Improved     Pancreatic pseudocyst- (present on admission)   Assessment & Plan    CT scan with slight decrease in size of fluid collection     Mass of upper lobe of right lung- (present on admission)   Assessment & Plan    S/P CT-guided biopsy on 12/28 - pathology pending    Shows poorly differentiated lung cancer  Pt too debilitated now to be considered for treatment     Pneumonia due to infectious organism- (present on admission)   Assessment & Plan    Query whether pneumonia is actually present  CT and x-ray changes may be due to hemorrhage associated with biopsy of the right upper lobe mass  Chest x-ray improved  Stop antibiotics and observe off antibiotics     Hypernatremia   Assessment & Plan    Improved  Stop IV D5W  Decrease enteral free water      Oral phase dysphagia- (present on admission)   Assessment & Plan    Repeat swallow eval today     Anemia- (present on admission)   Assessment & Plan    Monitor hemoglobin  Hemoglobin improved after transfusion  Stool positive for occult blood          VTE:  Contraindicated  Ulcer: H2 Antagonist  Lines: Central Line  Ongoing indication addressed    I have performed a physical exam and reviewed and updated ROS and Plan today (12/31/2018). In review of yesterday's note (12/30/2018), there are no changes except as documented above.     He is critically ill.  I have assessed and reassessed his respiratory status, blood pressure, hemodynamics, cardiovascular status with titration of a norepinephrine drip and  neurologic status.  He requires significant and very high supplemental oxygen on a HFNC.  He is at increased risk for worsening respiratory and cardiovascular system dysfunction.    Discussed patient condition and risk of morbidity and/or mortality with Hospitalist, RN, RT, Pharmacy, Charge nurse / hot rounds and QA team     The patient remains critically ill.  Critical care time =  minutes in directly providing and coordinating critical care and extensive data review.  No time overlap and excludes procedures.

## 2018-12-31 NOTE — CARE PLAN
Problem: Communication  Goal: The ability to communicate needs accurately and effectively will improve  Outcome: PROGRESSING AS EXPECTED      Problem: Mobility  Goal: Risk for activity intolerance will decrease  Outcome: PROGRESSING AS EXPECTED    Intervention: Assess and monitor signs of activity intolerance  Pt understands the benefits of mobilization. Highly motivated to participate

## 2018-12-31 NOTE — PROGRESS NOTES
12 hour chart check.     Assumed care of patient at 0700.Received bedside report from TIGRE Kumar. Patient comfortable in bed with no needs or concerns at this time. Bed alarm set, call light within reach, bed in lowest position, treaded slipper socks on. See flowsheets for VS. Monitor ranges appropriate. Patient's pain is 0 / 10. No family at bed side. Will continue to monitor closely.

## 2019-01-01 ENCOUNTER — HOME CARE VISIT (OUTPATIENT)
Dept: HOSPICE | Facility: HOSPICE | Age: 79
End: 2019-01-01
Payer: MEDICARE

## 2019-01-01 ENCOUNTER — APPOINTMENT (OUTPATIENT)
Dept: HOSPICE | Facility: HOSPICE | Age: 79
End: 2019-01-01
Payer: MEDICARE

## 2019-01-01 ENCOUNTER — APPOINTMENT (OUTPATIENT)
Dept: RADIOLOGY | Facility: MEDICAL CENTER | Age: 79
DRG: 871 | End: 2019-01-01
Attending: INTERNAL MEDICINE
Payer: MEDICARE

## 2019-01-01 ENCOUNTER — HOSPICE ADMISSION (OUTPATIENT)
Dept: HOSPICE | Facility: HOSPICE | Age: 79
End: 2019-01-01
Payer: MEDICARE

## 2019-01-01 ENCOUNTER — PATIENT OUTREACH (OUTPATIENT)
Dept: HEALTH INFORMATION MANAGEMENT | Facility: OTHER | Age: 79
End: 2019-01-01

## 2019-01-01 VITALS
OXYGEN SATURATION: 97 % | HEIGHT: 73 IN | TEMPERATURE: 97 F | WEIGHT: 156.31 LBS | HEART RATE: 104 BPM | BODY MASS INDEX: 20.72 KG/M2 | SYSTOLIC BLOOD PRESSURE: 102 MMHG | DIASTOLIC BLOOD PRESSURE: 47 MMHG | RESPIRATION RATE: 22 BRPM

## 2019-01-01 VITALS
OXYGEN SATURATION: 89 % | DIASTOLIC BLOOD PRESSURE: 64 MMHG | HEART RATE: 104 BPM | SYSTOLIC BLOOD PRESSURE: 120 MMHG | RESPIRATION RATE: 28 BRPM

## 2019-01-01 VITALS
RESPIRATION RATE: 20 BRPM | DIASTOLIC BLOOD PRESSURE: 60 MMHG | OXYGEN SATURATION: 91 % | HEART RATE: 110 BPM | TEMPERATURE: 98.1 F | SYSTOLIC BLOOD PRESSURE: 108 MMHG

## 2019-01-01 LAB
ALBUMIN SERPL BCP-MCNC: 3.8 G/DL (ref 3.2–4.9)
ALBUMIN SERPL BCP-MCNC: 3.8 G/DL (ref 3.2–4.9)
ALBUMIN SERPL BCP-MCNC: 4 G/DL (ref 3.2–4.9)
ALBUMIN/GLOB SERPL: 1.1 G/DL
ALBUMIN/GLOB SERPL: 1.1 G/DL
ALBUMIN/GLOB SERPL: 1.3 G/DL
ALP SERPL-CCNC: 102 U/L (ref 30–99)
ALP SERPL-CCNC: 95 U/L (ref 30–99)
ALP SERPL-CCNC: 96 U/L (ref 30–99)
ALT SERPL-CCNC: 11 U/L (ref 2–50)
ALT SERPL-CCNC: 12 U/L (ref 2–50)
ALT SERPL-CCNC: 18 U/L (ref 2–50)
ANION GAP SERPL CALC-SCNC: 10 MMOL/L (ref 0–11.9)
ANION GAP SERPL CALC-SCNC: 7 MMOL/L (ref 0–11.9)
ANION GAP SERPL CALC-SCNC: 7 MMOL/L (ref 0–11.9)
AST SERPL-CCNC: 16 U/L (ref 12–45)
AST SERPL-CCNC: 16 U/L (ref 12–45)
AST SERPL-CCNC: 22 U/L (ref 12–45)
BACTERIA BLD CULT: NORMAL
BACTERIA BLD CULT: NORMAL
BASOPHILS # BLD AUTO: 0.2 % (ref 0–1.8)
BASOPHILS # BLD AUTO: 0.4 % (ref 0–1.8)
BASOPHILS # BLD AUTO: 0.5 % (ref 0–1.8)
BASOPHILS # BLD: 0.03 K/UL (ref 0–0.12)
BASOPHILS # BLD: 0.07 K/UL (ref 0–0.12)
BASOPHILS # BLD: 0.08 K/UL (ref 0–0.12)
BILIRUB SERPL-MCNC: 0.6 MG/DL (ref 0.1–1.5)
BILIRUB SERPL-MCNC: 0.6 MG/DL (ref 0.1–1.5)
BILIRUB SERPL-MCNC: 0.7 MG/DL (ref 0.1–1.5)
BUN SERPL-MCNC: 31 MG/DL (ref 8–22)
BUN SERPL-MCNC: 43 MG/DL (ref 8–22)
BUN SERPL-MCNC: 55 MG/DL (ref 8–22)
CALCIUM SERPL-MCNC: 12.2 MG/DL (ref 8.5–10.5)
CALCIUM SERPL-MCNC: 12.2 MG/DL (ref 8.5–10.5)
CALCIUM SERPL-MCNC: 12.6 MG/DL (ref 8.5–10.5)
CHLORIDE SERPL-SCNC: 108 MMOL/L (ref 96–112)
CHLORIDE SERPL-SCNC: 109 MMOL/L (ref 96–112)
CHLORIDE SERPL-SCNC: 112 MMOL/L (ref 96–112)
CO2 SERPL-SCNC: 27 MMOL/L (ref 20–33)
CO2 SERPL-SCNC: 27 MMOL/L (ref 20–33)
CO2 SERPL-SCNC: 28 MMOL/L (ref 20–33)
CREAT SERPL-MCNC: 1.1 MG/DL (ref 0.5–1.4)
CREAT SERPL-MCNC: 1.1 MG/DL (ref 0.5–1.4)
CREAT SERPL-MCNC: 1.15 MG/DL (ref 0.5–1.4)
EOSINOPHIL # BLD AUTO: 0.51 K/UL (ref 0–0.51)
EOSINOPHIL # BLD AUTO: 0.67 K/UL (ref 0–0.51)
EOSINOPHIL # BLD AUTO: 0.7 K/UL (ref 0–0.51)
EOSINOPHIL NFR BLD: 3.2 % (ref 0–6.9)
EOSINOPHIL NFR BLD: 4.1 % (ref 0–6.9)
EOSINOPHIL NFR BLD: 4.4 % (ref 0–6.9)
ERYTHROCYTE [DISTWIDTH] IN BLOOD BY AUTOMATED COUNT: 51.8 FL (ref 35.9–50)
ERYTHROCYTE [DISTWIDTH] IN BLOOD BY AUTOMATED COUNT: 53.1 FL (ref 35.9–50)
ERYTHROCYTE [DISTWIDTH] IN BLOOD BY AUTOMATED COUNT: 55.8 FL (ref 35.9–50)
GLOBULIN SER CALC-MCNC: 3.1 G/DL (ref 1.9–3.5)
GLOBULIN SER CALC-MCNC: 3.4 G/DL (ref 1.9–3.5)
GLOBULIN SER CALC-MCNC: 3.4 G/DL (ref 1.9–3.5)
GLUCOSE SERPL-MCNC: 117 MG/DL (ref 65–99)
GLUCOSE SERPL-MCNC: 129 MG/DL (ref 65–99)
GLUCOSE SERPL-MCNC: 132 MG/DL (ref 65–99)
HCT VFR BLD AUTO: 30.5 % (ref 42–52)
HCT VFR BLD AUTO: 31 % (ref 42–52)
HCT VFR BLD AUTO: 32.2 % (ref 42–52)
HGB BLD-MCNC: 9.5 G/DL (ref 14–18)
HGB BLD-MCNC: 9.7 G/DL (ref 14–18)
HGB BLD-MCNC: 9.8 G/DL (ref 14–18)
IMM GRANULOCYTES # BLD AUTO: 0.08 K/UL (ref 0–0.11)
IMM GRANULOCYTES # BLD AUTO: 0.08 K/UL (ref 0–0.11)
IMM GRANULOCYTES # BLD AUTO: 0.09 K/UL (ref 0–0.11)
IMM GRANULOCYTES NFR BLD AUTO: 0.5 % (ref 0–0.9)
LYMPHOCYTES # BLD AUTO: 1.7 K/UL (ref 1–4.8)
LYMPHOCYTES # BLD AUTO: 1.87 K/UL (ref 1–4.8)
LYMPHOCYTES # BLD AUTO: 1.99 K/UL (ref 1–4.8)
LYMPHOCYTES NFR BLD: 10.4 % (ref 22–41)
LYMPHOCYTES NFR BLD: 11.6 % (ref 22–41)
LYMPHOCYTES NFR BLD: 12.4 % (ref 22–41)
MCH RBC QN AUTO: 31.8 PG (ref 27–33)
MCH RBC QN AUTO: 31.8 PG (ref 27–33)
MCH RBC QN AUTO: 31.9 PG (ref 27–33)
MCHC RBC AUTO-ENTMCNC: 30.4 G/DL (ref 33.7–35.3)
MCHC RBC AUTO-ENTMCNC: 30.6 G/DL (ref 33.7–35.3)
MCHC RBC AUTO-ENTMCNC: 31.8 G/DL (ref 33.7–35.3)
MCV RBC AUTO: 100.3 FL (ref 81.4–97.8)
MCV RBC AUTO: 103.7 FL (ref 81.4–97.8)
MCV RBC AUTO: 104.5 FL (ref 81.4–97.8)
MONOCYTES # BLD AUTO: 0.79 K/UL (ref 0–0.85)
MONOCYTES # BLD AUTO: 0.83 K/UL (ref 0–0.85)
MONOCYTES # BLD AUTO: 0.88 K/UL (ref 0–0.85)
MONOCYTES NFR BLD AUTO: 4.8 % (ref 0–13.4)
MONOCYTES NFR BLD AUTO: 5.2 % (ref 0–13.4)
MONOCYTES NFR BLD AUTO: 5.5 % (ref 0–13.4)
NEUTROPHILS # BLD AUTO: 12.47 K/UL (ref 1.82–7.42)
NEUTROPHILS # BLD AUTO: 12.52 K/UL (ref 1.82–7.42)
NEUTROPHILS # BLD AUTO: 13.13 K/UL (ref 1.82–7.42)
NEUTROPHILS NFR BLD: 77.5 % (ref 44–72)
NEUTROPHILS NFR BLD: 78.3 % (ref 44–72)
NEUTROPHILS NFR BLD: 80 % (ref 44–72)
NRBC # BLD AUTO: 0 K/UL
NRBC BLD-RTO: 0 /100 WBC
PLATELET # BLD AUTO: 236 K/UL (ref 164–446)
PLATELET # BLD AUTO: 247 K/UL (ref 164–446)
PLATELET # BLD AUTO: 269 K/UL (ref 164–446)
PMV BLD AUTO: 10 FL (ref 9–12.9)
PMV BLD AUTO: 10.1 FL (ref 9–12.9)
PMV BLD AUTO: 10.3 FL (ref 9–12.9)
POTASSIUM SERPL-SCNC: 3.7 MMOL/L (ref 3.6–5.5)
POTASSIUM SERPL-SCNC: 3.8 MMOL/L (ref 3.6–5.5)
POTASSIUM SERPL-SCNC: 4.1 MMOL/L (ref 3.6–5.5)
PROT SERPL-MCNC: 7.1 G/DL (ref 6–8.2)
PROT SERPL-MCNC: 7.2 G/DL (ref 6–8.2)
PROT SERPL-MCNC: 7.2 G/DL (ref 6–8.2)
RBC # BLD AUTO: 2.99 M/UL (ref 4.7–6.1)
RBC # BLD AUTO: 3.04 M/UL (ref 4.7–6.1)
RBC # BLD AUTO: 3.08 M/UL (ref 4.7–6.1)
SIGNIFICANT IND 70042: NORMAL
SIGNIFICANT IND 70042: NORMAL
SITE SITE: NORMAL
SITE SITE: NORMAL
SODIUM SERPL-SCNC: 142 MMOL/L (ref 135–145)
SODIUM SERPL-SCNC: 146 MMOL/L (ref 135–145)
SODIUM SERPL-SCNC: 147 MMOL/L (ref 135–145)
SOURCE SOURCE: NORMAL
SOURCE SOURCE: NORMAL
WBC # BLD AUTO: 16 K/UL (ref 4.8–10.8)
WBC # BLD AUTO: 16.1 K/UL (ref 4.8–10.8)
WBC # BLD AUTO: 16.4 K/UL (ref 4.8–10.8)

## 2019-01-01 PROCEDURE — S9126 HOSPICE CARE, IN THE HOME, P: HCPCS

## 2019-01-01 PROCEDURE — A9270 NON-COVERED ITEM OR SERVICE: HCPCS | Performed by: HOSPITALIST

## 2019-01-01 PROCEDURE — G0299 HHS/HOSPICE OF RN EA 15 MIN: HCPCS

## 2019-01-01 PROCEDURE — 665036 HSPC NOTICE OF ELECTION NOE

## 2019-01-01 PROCEDURE — 700102 HCHG RX REV CODE 250 W/ 637 OVERRIDE(OP): Performed by: INTERNAL MEDICINE

## 2019-01-01 PROCEDURE — 700102 HCHG RX REV CODE 250 W/ 637 OVERRIDE(OP): Performed by: HOSPITALIST

## 2019-01-01 PROCEDURE — 71045 X-RAY EXAM CHEST 1 VIEW: CPT

## 2019-01-01 PROCEDURE — G0156 HHCP-SVS OF AIDE,EA 15 MIN: HCPCS

## 2019-01-01 PROCEDURE — 770001 HCHG ROOM/CARE - MED/SURG/GYN PRIV*

## 2019-01-01 PROCEDURE — 99239 HOSP IP/OBS DSCHRG MGMT >30: CPT | Performed by: HOSPITALIST

## 2019-01-01 PROCEDURE — 99232 SBSQ HOSP IP/OBS MODERATE 35: CPT | Performed by: HOSPITALIST

## 2019-01-01 PROCEDURE — A9270 NON-COVERED ITEM OR SERVICE: HCPCS | Performed by: INTERNAL MEDICINE

## 2019-01-01 PROCEDURE — 85025 COMPLETE CBC W/AUTO DIFF WBC: CPT

## 2019-01-01 PROCEDURE — 92526 ORAL FUNCTION THERAPY: CPT

## 2019-01-01 PROCEDURE — 80053 COMPREHEN METABOLIC PANEL: CPT

## 2019-01-01 PROCEDURE — 99233 SBSQ HOSP IP/OBS HIGH 50: CPT | Performed by: HOSPITALIST

## 2019-01-01 PROCEDURE — G0155 HHCP-SVS OF CSW,EA 15 MIN: HCPCS

## 2019-01-01 RX ORDER — FAMOTIDINE 20 MG/1
20 TABLET, FILM COATED ORAL 2 TIMES DAILY
Status: DISCONTINUED | OUTPATIENT
Start: 2019-01-01 | End: 2019-01-01 | Stop reason: HOSPADM

## 2019-01-01 RX ADMIN — METOPROLOL TARTRATE 12.5 MG: 25 TABLET, FILM COATED ORAL at 05:13

## 2019-01-01 RX ADMIN — Medication 325 MG: at 17:30

## 2019-01-01 RX ADMIN — OXYCODONE HYDROCHLORIDE AND ACETAMINOPHEN 500 MG: 500 TABLET ORAL at 05:13

## 2019-01-01 RX ADMIN — METOPROLOL TARTRATE 12.5 MG: 25 TABLET, FILM COATED ORAL at 05:15

## 2019-01-01 RX ADMIN — POTASSIUM BICARBONATE 25 MEQ: 25 TABLET, EFFERVESCENT ORAL at 05:15

## 2019-01-01 RX ADMIN — POTASSIUM BICARBONATE 25 MEQ: 25 TABLET, EFFERVESCENT ORAL at 05:12

## 2019-01-01 RX ADMIN — Medication 325 MG: at 04:57

## 2019-01-01 RX ADMIN — LEVETIRACETAM 750 MG: 500 TABLET ORAL at 05:14

## 2019-01-01 RX ADMIN — MICONAZOLE NITRATE: 20 CREAM TOPICAL at 17:31

## 2019-01-01 RX ADMIN — METOPROLOL TARTRATE 12.5 MG: 25 TABLET, FILM COATED ORAL at 05:00

## 2019-01-01 RX ADMIN — Medication 325 MG: at 17:33

## 2019-01-01 RX ADMIN — LEVETIRACETAM 750 MG: 500 TABLET ORAL at 05:13

## 2019-01-01 RX ADMIN — MICONAZOLE NITRATE: 20 CREAM TOPICAL at 06:00

## 2019-01-01 RX ADMIN — OXYCODONE HYDROCHLORIDE AND ACETAMINOPHEN 500 MG: 500 TABLET ORAL at 05:15

## 2019-01-01 RX ADMIN — POTASSIUM BICARBONATE 25 MEQ: 25 TABLET, EFFERVESCENT ORAL at 04:57

## 2019-01-01 RX ADMIN — LEVETIRACETAM 750 MG: 500 TABLET ORAL at 17:33

## 2019-01-01 RX ADMIN — FAMOTIDINE 20 MG: 20 TABLET ORAL at 04:58

## 2019-01-01 RX ADMIN — LEVETIRACETAM 750 MG: 500 TABLET ORAL at 17:30

## 2019-01-01 RX ADMIN — METOPROLOL TARTRATE 12.5 MG: 25 TABLET, FILM COATED ORAL at 18:00

## 2019-01-01 RX ADMIN — FAMOTIDINE 20 MG: 20 TABLET ORAL at 05:14

## 2019-01-01 RX ADMIN — Medication 325 MG: at 05:15

## 2019-01-01 RX ADMIN — MICONAZOLE NITRATE: 20 CREAM TOPICAL at 17:33

## 2019-01-01 RX ADMIN — OXYCODONE HYDROCHLORIDE AND ACETAMINOPHEN 500 MG: 500 TABLET ORAL at 04:58

## 2019-01-01 RX ADMIN — LEVETIRACETAM 750 MG: 500 TABLET ORAL at 04:57

## 2019-01-01 RX ADMIN — FAMOTIDINE 20 MG: 20 TABLET ORAL at 05:13

## 2019-01-01 RX ADMIN — METOPROLOL TARTRATE 12.5 MG: 25 TABLET, FILM COATED ORAL at 17:33

## 2019-01-01 RX ADMIN — Medication 325 MG: at 05:13

## 2019-01-01 SDOH — ECONOMIC STABILITY: HOUSING INSECURITY: UNSAFE COOKING RANGE AREA: 0

## 2019-01-01 SDOH — ECONOMIC STABILITY: HOUSING INSECURITY: UNSAFE APPLIANCES: 0

## 2019-01-01 SDOH — ECONOMIC STABILITY: HOUSING INSECURITY
HOME SAFETY: Y CATCH FIRE AWAY FROM YOUR OXYGEN. THIS INCLUDES CLEANING PRODUCTS THAT CONTAIN OIL, GREASE, ALCOHOL, OR OTHER LIQUIDS THAT CAN BURN  DO NOT USE VASELINE OR OTHER PETROLEUM-BASED CREAMS AND LOTIONS ON YOUR FACE OR UPPER PART OF YOUR BODY  AVOID TRIP

## 2019-01-01 SDOH — ECONOMIC STABILITY: GENERAL

## 2019-01-01 SDOH — ECONOMIC STABILITY: HOUSING INSECURITY
HOME SAFETY: OXYGEN ASSESSMENT COMPLETED.   THE FOLLOWING OXYGEN SAFETY EDUCATION WAS PROVIDED:  AVOID SMOKING AND OPEN FLAMES  POST 'OXYGEN IN USE' SIGN ON EXTERNAL DOOR OF HOME  NEED FUNCTIONAL FIRE EXTINGUISHER AND SMOKE DETECTORS IN HOME  KEEP LIQUIDS THAT MA

## 2019-01-01 SDOH — ECONOMIC STABILITY: GENERAL: NECESSITIES UNABLE TO AFFORD: MEDICAL EXPENSES

## 2019-01-01 SDOH — ECONOMIC STABILITY: HOUSING INSECURITY
HOME SAFETY: PING OVER OXYGEN TUBING  DO NOT PLUG CONCENTRATOR INTO EXTENSION CORD  DO NOT COVER O2 TUBING WITH RUG OR BLANKET

## 2019-01-01 ASSESSMENT — PATIENT HEALTH QUESTIONNAIRE - PHQ9
5. POOR APPETITE OR OVEREATING: 2 - MORE THAN HALF THE DAYS
CLINICAL INTERPRETATION OF PHQ2 SCORE: 2
SUM OF ALL RESPONSES TO PHQ QUESTIONS 1-9: 6
1. LITTLE INTEREST OR PLEASURE IN DOING THINGS: NOT AT ALL
SUM OF ALL RESPONSES TO PHQ9 QUESTIONS 1 AND 2: 0
2. FEELING DOWN, DEPRESSED, IRRITABLE, OR HOPELESS: NOT AT ALL
2. FEELING DOWN, DEPRESSED, IRRITABLE, OR HOPELESS: NOT AT ALL
1. LITTLE INTEREST OR PLEASURE IN DOING THINGS: NOT AT ALL
SUM OF ALL RESPONSES TO PHQ9 QUESTIONS 1 AND 2: 0

## 2019-01-01 ASSESSMENT — ACTIVITIES OF DAILY LIVING (ADL)
MONEY MANAGEMENT (EXPENSES/BILLS): TOTALLY DEPENDENT
DRESSING_REQUIRES_ASSISTANCE: 1
CONTINENCE_REQUIRES_ASSISTANCE: 1
AMBULATION_REQUIRES_ASSISTANCE: 1
PHYSICAL_TRANSFER_REQUIRES_ASSISTANCE: 1
CONTINENCE_REQUIRES_ASSISTANCE: 1
BATHING_REQUIRES_ASSISTANCE: 1
PHYSICAL_TRANSFER_REQUIRES_ASSISTANCE: 1
AMBULATION_REQUIRES_ASSISTANCE: 1
MONEY MANAGEMENT (EXPENSES/BILLS): TOTALLY DEPENDENT
EATING_REQUIRES_ASSISTANCE: 1
DRESSING_REQUIRES_ASSISTANCE: 1
BATHING_REQUIRES_ASSISTANCE: 1

## 2019-01-01 ASSESSMENT — ENCOUNTER SYMPTOMS
DRY SKIN: 1
FATIGUE: 1
FATIGUES EASILY: 1
FATIGUES EASILY: 1
DESCRIPTION OF MEMORY LOSS: SHORT TERM
COUGH: 1
SHORTNESS OF BREATH: 1
CHANGE IN LEVEL OF CONSCIOUSNESS: 1
SLEEP QUALITY: FAIR
SOMNOLENCE: 1
DYSPNEA ACTIVITY LEVEL: WHILE SPEAKING
COUGH CHARACTERISTICS: PRODUCTIVE
SHORTNESS OF BREATH: 1
DYSPNEA ON EXERTION: 1
COUGH CHARACTERISTICS: NON-PRODUCTIVE
FATIGUE: 1
COUGH: 1
SLEEP QUALITY: ADEQUATE
SHORTNESS OF BREATH: 1
SLEEP QUALITY: FAIR
SOMNOLENCE: 1
DYSPNEA ON EXERTION: 1
STOOL FREQUENCY: DAILY
COUGH CHARACTERISTICS: WEAK
COUGH CHARACTERISTICS: MOIST
DRY SKIN: 1
COUGH CHARACTERISTICS: WEAK
DIARRHEA: 1
COUGH CHARACTERISTICS: MOIST
DYSPNEA ACTIVITY LEVEL: AT REST
DRY SKIN: 1
ABDOMINAL PAIN: 1
SEIZURES: 1

## 2019-01-01 ASSESSMENT — PAIN SCALES - GENERAL
PAINLEVEL_OUTOF10: 0

## 2019-01-01 ASSESSMENT — NEW YORK HEART ASSOCIATION (NYHA) CLASSIFICATION: PATIENT MEETS NYHA AND SIGNIFICANT SYMPTOMS CRITERIA: 1

## 2019-01-01 ASSESSMENT — SOCIAL DETERMINANTS OF HEALTH (SDOH)
ACTIVE STRESSOR - NO STRESS FACTORS: 1
ACTIVE STRESSOR - EXHAUSTION: 1
ACTIVE STRESSOR - NO STRESS FACTORS: 1

## 2019-01-01 NOTE — DISCHARGE PLANNING
Unable to follow up on [facility] referral- no one answered at office. [facility] on call nurse did not know status.

## 2019-01-01 NOTE — DISCHARGE PLANNING
Received Choice form at 0866  Agency/Facility Name: [facility]  Referral sent per Choice form @ 1506

## 2019-01-01 NOTE — THERAPY
"Speech Language Therapy dysphagia treatment completed.   Functional Status:  Patient seen for reassessment of swallowing function on this date, per RN request.  Per patient and spouse, the patient is transitioning to hospice and going home in the coming days.  Presentation of PO included ice chips, nectars, purees, and thin liquids. The patient presented with overt s/sx of aspiration as seen by increased WOB, coughing with 100% of PO trials but choking with cup sips of thins and nectars.  Initiation of swallow trigger was mildly delayed and laryngeal elevation was palpated as weak but complete.  Provided extensive education to the patient and his spouse regarding s/sx of aspiration, eating despite risks, and associated complications.  Both verbalized understanding.  Patient and spouse provided with a handout regarding Dysphagia I diets and acceptable food items should he decide to eat despite risks.    Recommendations: At this time, recommend the patient continue NPO with tube feeding.  Will defer diet to MD but a Dysphagia I/nectars (puree) diet is the safer diet to reduce, but will NOT eliminate risk for aspiration as the patient presented with overt s/sx of aspiration with all consistencies consumed.     Plan of Care: Will benefit from Speech Therapy 3 times per week during the acute care  Post-Acute Therapy: Anticipate that the patient will have no further speech therapy needs after discharge from the hospital should he discharge home on hospice.      See \"Rehab Therapy-Acute\" Patient Summary Report for complete documentation.     "

## 2019-01-01 NOTE — ASSESSMENT & PLAN NOTE
Poorly diferentiated sqms cell  Now no further work up or evaluation as pt now discharging on hospice care

## 2019-01-01 NOTE — PROGRESS NOTES
[facility] Medicine Daily Progress Note    Date of Service  1/1/2019    Chief Complaint  78 y.o. 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  He presented to the emergency room on 12/11/18 with abdominal pain.  Imaging done as part of initial workup included a CT of the abdomen that demonstrated lobulated fluid collection around the pancreas and a CT of the chest that showed a right upper lobe mass concerning for malignancy.  He was in atrial flutter and received amiodarone. The patient was treated for sepsis from pneumonia source and admitted to the ICU.     Gastroenterology was consulted and on 12/13/18 he had an ERCP, a large stone was noted in the distal bile duct, bile duct cannulation was achieved and the procedure was stopped with plans to follow up with electrohydraulic lithotripsy. His blood cultures were positive for klebsiella and prevotella.  ID was involved in the case and he was treated with an extended course of zosyn.     On 12/28/18 the patient underwent CT guided biopsy of the lung mass that was complicated by alveolar hemorrhage and pneumothorax. A small bore chest tube was placed. He was transferred to the ICU post procedure, on arrival he was hypotensive.  He was started on pressors and broad spectrum antibiotics.      ROS unobtatinable as pt unable to answer questions today       Interval Problem Update  ROS limited by mentation  Good UOP with condom cath  On TF's to goal  Good ostomy output    Consultants/Specialty  Pulmonology    Code Status  DNAR/I    Disposition  Plan DC to Hospice with family at home    Review of Systems  Review of Systems   Unable to perform ROS: Mental status change        Physical Exam  Temp:  [36.6 °C (97.9 °F)-36.9 °C (98.4 °F)] 36.9 °C (98.4 °F)  Pulse:  [] 93  Resp:  [20-32] 24    Physical  Exam   Constitutional: He appears well-developed and well-nourished. No distress.   HENT:   Head: Normocephalic and atraumatic.   Eyes: Conjunctivae are normal.   Neck: No JVD present.   Cardiovascular: Normal rate.  Exam reveals no gallop.    No murmur heard.  Pulmonary/Chest: Effort normal. No stridor. No respiratory distress. He has no wheezes. He has rales.   Abdominal: Soft. There is no tenderness. There is no rebound and no guarding.   Musculoskeletal: He exhibits no edema.   Neurological: He is alert.   Rouses with dificulty, briefly alert   Skin: Skin is warm and dry. No rash noted. He is not diaphoretic.   Nursing note and vitals reviewed.      Fluids    Intake/Output Summary (Last 24 hours) at 01/01/19 0452  Last data filed at 01/01/19 0000   Gross per 24 hour   Intake              290 ml   Output             1825 ml   Net            -1535 ml       Laboratory  Recent Labs      12/29/18   0525  12/30/18   0434  12/30/18   1620  12/30/18   2316  12/31/18   0502   WBC  17.7*  13.1*   --    --   14.1*   RBC  2.74*  2.11*   --    --   2.67*   HEMOGLOBIN  8.8*  6.9*  8.2*  8.2*  8.5*   HEMATOCRIT  29.4*  22.4*  26.4*   --   27.5*   MCV  107.3*  106.2*   --    --   103.0*   MCH  32.1  32.7   --    --   31.8   MCHC  29.9*  30.8*   --    --   30.9*   RDW  57.5*  55.5*   --    --   57.9*   PLATELETCT  410  238   --    --   229   MPV  10.3  10.4   --    --   10.4     Recent Labs      12/29/18   0743  12/30/18   0434  12/31/18   0502   SODIUM  153*  145  143   POTASSIUM  4.1  3.1*  3.9   CHLORIDE  118*  113*  110   CO2  28  26  27   GLUCOSE  147*  123*  109*   BUN  43*  30*  32*   CREATININE  1.61*  1.43*  1.30   CALCIUM  10.6*  10.8*  11.8*                   Imaging  DX-CHEST-PORTABLE (1 VIEW)   Final Result      No significant interval change.      DX-CHEST-PORTABLE (1 VIEW)   Final Result         1.  Bilateral pulmonary opacifications again noted.      2.  Trace right apical pneumothorax is no longer appreciated.  Pigtail catheter in right upper hemithorax is unchanged.      DX-CHEST-PORTABLE (1 VIEW)   Final Result         1. Decreased opacity in the right mid lung and right upper lobe.      CT-CHEST,ABDOMEN,PELVIS W/O   Final Result         1.  Fairly diffuse airspace opacity in the right upper lobe may be related to hemorrhage from recent biopsy.      2.  Small residual anterior right pneumothorax with small caliber chest tube in place.      3.  Right lower lobe opacity with volume loss, possibly related to atelectasis. Superimposed pneumonia cannot be excluded.      4.  Small left pleural effusion. Adjacent airspace disease may be secondary to atelectasis or pneumonia.      5.  Pneumobilia with common bile duct stent in place.      6.  Cholelithiasis.      7.  Peripancreatic stranding and poorly defined fluid collections without IV contrast. Fluid collections may be slightly smaller than on the prior exam.      8.  Atherosclerosis.      EC-ECHOCARDIOGRAM COMPLETE W/ CONT   Final Result      DX-CHEST-PORTABLE (1 VIEW)   Final Result         1. Interval placement of right central venous catheter. No other significant interval change.      CT-NEEDLE BX-LUNG-MEDIASTINUM RIGHT   Final Result      1.  CT-guided RIGHT biopsy   2.  Deployment of [employer] pleural plug   3.  Placement of RIGHT chest tube      Findings were discussed with HELENE BENAVIDEZ at the conclusion of the procedure.         DX-CHEST-PORTABLE (1 VIEW)   Final Result      1.  Minimal right apical pneumothorax following right chest tube placement.      2.  No midline shift.      3.  Right lung opacity consistent with edema or hemorrhage.      4.  No left lung consolidation.      CT-CHEST (THORAX) WITH   Final Result      1.  Lobulated mass in right upper pulmonary lobe appears slightly larger than on the prior exam which could indicate tumor progression.      2.  Moderate size left pleural effusion is identified.      3.  Significant consolidation again noted in  the lower lobes bilaterally which could indicate pneumonia.      4.  Emphysema again noted.      5.  Fluid collections around the pancreatic tail and left anterior pararenal space and left abdomen noted likely resulting from pancreatitis. These were also present on the abdominal CT.            CT-ABDOMEN-PELVIS WITH   Final Result      1.  Extensive irregular and heterogeneous fluid collection about the pancreas, extending into the LEFT upper quadrant/perisplenic region and LEFT paracolic gutter which is more extensive than prior exam and likely indicates pancreatic pseudocyst.   2.  Inflammation adjacent the gastric fundus.   3.  Postoperative changes as described.  Biliary stent present.   4.  Increasing bilateral pleural fluid and associated atelectasis.      DX-CHEST-PORTABLE (1 VIEW)   Final Result         1.  Pulmonary edema and/or infiltrates.   2.  Small layering left pleural effusion   3.  Nodular density in the right upper lung field, corresponding with pulmonary nodular mass on recent CT December 11, 2018. See CT for further characterization and recommendations.      DX-CHEST-PORTABLE (1 VIEW)   Final Result         1. Left effusion is smaller. Unchanged left basilar atelectasis.   2. Stable right basilar opacity, atelectasis versus consolidation.   3. Unchanged nodular mass lesion in the right upper lobe.      DX-CHEST-PORTABLE (1 VIEW)   Final Result      1.  Persistent atelectasis or pneumonia in the left lower lobe with small left pleural effusion.      2.  Minor residual edema and effusion in the right lower lobe.      3.  Lobulated pulmonary nodules in the right midlung again noted. Differential diagnosis includes lung carcinoma.      DX-CHEST-PORTABLE (1 VIEW)   Final Result         1. New retrocardiac atelectasis versus consolidation.   2. Small bilateral pleural effusions and bibasilar atelectasis, left worse than right.   3. Stable right upper lobe mass.   4. Enteric tube tip is distal to the GE  junction.      DX-ABDOMEN FOR TUBE PLACEMENT   Final Result      Enteric tube tip projects over the junction of the second and third portions of duodenum.      [id number] DUCTS   Final Result      Limited images obtained during ERCP procedure showing apparent removal of common bile duct stones.      MR-ABDOMEN-WITH & W/O   Final Result      1.  Marked biliary dilation with large common bile duct stones.   2.  Stones also present in the gallbladder.   3.  Complex peripancreatic fluid collection, likely pseudocyst, with apparent inflammatory changes in the pancreatic tail suggesting superimposed acute pancreatitis with fluid extending into the LEFT pararenal space, likely hemorrhagic pancreatitis.     Hemorrhage within cystic lesion at the dorsal aspect of pancreatic tail also noted.  Pancreatic mass is difficult to exclude.   4.  Small volume ascites.   5.  Small bilateral pleural effusions with associated consolidation.   6.  RIGHT kidney cysts.   7.  Possible subacute compression fracture of L3.            EC-ECHOCARDIOGRAM COMPLETE W/ CONT   Final Result      CT-ABDOMEN-PELVIS WITH   Final Result         1. Stranding and fluid surrounding the entire pancreas with several lobulated peripancreatic fluid area surrounding the pancreas. This could be sequela of prior pancreatitis versus cystic pancreatic neoplasm.      More high density fluid extending from the pancreatic tail to the left paracolic gutter could relate to hemorrhage of one of these cystic lesions or sequela of acute on chronic pancreatitis.      Severely dilated CBD, measuring up to 1.6 cm. Soft tissue attenuation lesion in the distal CBD could be an obstructing mass or noncalcified stone.      Further evaluation with MRCP and MR pancreas is recommended.      2. Cholelithiasis.      3. Diffuse wall thickening of the stomach could be gastritis or reactive change secondary to the pancreatic process.      4. Layering stones/debris in the urinary  bladder.      CT-CTA CHEST PULMONARY ARTERY W/ RECONS   Final Result         1. No CT evidence of pulmonary embolism.      2. Lobulated 1.7 x 1.8 x 3.1 cm mass in the right upper lobe, concerning for lung malignancy. PET/CT, and/or tissue sampling is recommended.      3. Diffuse wall thickening throughout the esophagus could relate to esophagitis. Mildly prominent 1.4 cm paraesophageal lymph node adjacent to the distal esophagus. Questionable enlarged right paraesophageal lymph nodes more proximally.      4. Fluid opacification of the esophagus. There is debris completely filled the bilateral lower lobe airways with associated patchy bibasilar opacities. This is concerning for aspiration pneumonia due to retained fluid in the esophagus.      DX-CHEST-PORTABLE (1 VIEW)   Final Result      1.  No pneumonia or pulmonary edema.   2.  Slight interval increase in size of ill-defined RIGHT mid to upper lung nodular opacity, concerning for mass.      IR-MIDLINE CATHETER INSERTION >5 YRS    (Results Pending)        Assessment/Plan  Shock circulatory (HCC)   Assessment & Plan    Improved, off pressors     Acute biliary pancreatitis- (present on admission)   Assessment & Plan    Status post ERCP with sphincterotomy and stenting on 12/13/2018 12/18/18 CT Abd showing increase in size of pancreatic pseudocyst  Patient has retained large CBD stone, likely to need an additional specialty procedure for definitive treatment       Acute on chronic respiratory failure with hypoxia (HCC)- (present on admission)   Assessment & Plan    CTA chest negative for PE  Worsened respiratory failure due to alveolar hemorrhage and pneumothorax  Slowly improving  Critical care following       Sepsis (HCC)- (present on admission)   Assessment & Plan    This is severe sepsis with the following associated acute organ dysfunction(s): acute respiratory failure.   Abdominal source and pneumonia       Pneumothorax on right   Assessment & Plan    Gold  chest tube in place     REZA (acute kidney injury) ([facility])   Assessment & Plan    Renal function improved     Leukocytosis- (present on admission)   Assessment & Plan    Stopping antibiotics, monitor for fever or worsened WBC     Pancreatic pseudocyst- (present on admission)   Assessment & Plan    Pain management  GI input is noted  Future drainage once cyst matured     Atrial flutter (HCC)   Assessment & Plan    Continuous hemodynamic monitoring  Holding anticoagulation for alveolar hemorrhage and anemia requiring transfusion     Mass of upper lobe of right lung- (present on admission)   Assessment & Plan    Imaging concerning for malignancy  Patient had CT-guided biopsy on 12/28/2018  Pathology results pending       Pneumonia due to infectious organism- (present on admission)   Assessment & Plan    Pneumonia due to Prevotella & Klebsiella  Antibiotics restarted for hypotension, ?infeciton     Dehydration with hypernatremia   Assessment & Plan    Enteral free water     Oral phase dysphagia- (present on admission)   Assessment & Plan    Continue tube feeding  Speech therapy re-eval     Esophagitis- (present on admission)   Assessment & Plan    Pepcid     Troponin level elevated- (present on admission)   Assessment & Plan    Echo demonstrates ejection fraction 65%     Anemia- (present on admission)   Assessment & Plan    Improved with transfusion  Continue daily labs  Maintain hemoglobin greater than 7          VTE prophylaxis:

## 2019-01-01 NOTE — PALLIATIVE CARE
Palliative Care follow-up  PC RN discussed case with Dr. Horn, family is agreeable to hospice at home.    PC RN called Courtney, discussed their GOC. Courtney verbalized agreement with hospice at home. She has updates all family members and everyone is in agreement. Courtney would like a referral sent to [facility]. Courtney denies any further needs at this time.    PC RN obtained hospice choice for [facility], faxed to [facility].      Updated:   Dr. Horn    Plan:   [facility] [place] to evaluate pt for services, discharge to home with hospice once arranged.    Recommendations: I recommend a hospice consult.    Thank you for allowing Palliative Care to participate in this patient's care. Please feel free to call [phone] with any questions or concerns.

## 2019-01-02 NOTE — HOSPICE
Met with pt and wife at bedside and Pt's wife is going to get the Trailer set up to have him come home on [facility] care.   DME hospital bed and O2 Concentrator will be delivered today by 1700.   Pt will need transport service from hospital to home. Please have D/C set up for tomorrow in the am.

## 2019-01-02 NOTE — CARE PLAN
Problem: Nutritional:  Goal: Nutrition support tolerated and meeting greater than 85% of estimated needs  Outcome: MET Date Met: 01/02/19  TF @ goal:  Impact Peptide 1.5 @ 50 mL/hr.

## 2019-01-02 NOTE — PROGRESS NOTES
[facility] Medicine Daily Progress Note    Date of Service  1/2/2019    Chief Complaint  78 y.o. 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  He presented to the emergency room on 12/11/18 with abdominal pain.  Imaging done as part of initial workup included a CT of the abdomen that demonstrated lobulated fluid collection around the pancreas and a CT of the chest that showed a right upper lobe mass concerning for malignancy.  He was in atrial flutter and received amiodarone. The patient was treated for sepsis from pneumonia source and admitted to the ICU.     Gastroenterology was consulted and on 12/13/18 he had an ERCP, a large stone was noted in the distal bile duct, bile duct cannulation was achieved and the procedure was stopped with plans to follow up with electrohydraulic lithotripsy. His blood cultures were positive for klebsiella and prevotella.  ID was involved in the case and he was treated with an extended course of zosyn.     On 12/28/18 the patient underwent CT guided biopsy of the lung mass that was complicated by alveolar hemorrhage and pneumothorax. A small bore chest tube was placed. He was transferred to the ICU post procedure, on arrival he was hypotensive.  He was started on pressors and broad spectrum antibiotics.      ROS unobtatinable as pt unable to answer questions today       Interval Problem Update  ROS limited by mentation: pt is resting comfortably and in no acute distress  sinus  Good UOP with condom cath  CT to water seal  On TF's to goal  Good ostomy output  6 litres mask    Consultants/Specialty  Pulmonology    Code Status  DNAR/I    Disposition  Plan DC to Hospice with family at home    Review of Systems  Review of Systems   Unable to perform ROS: Mental status change        Physical Exam  Temp:  [36.1 °C (97  °F)-36.2 °C (97.2 °F)] 36.2 °C (97.2 °F)  Pulse:  [] 85  Resp:  [20-22] 20    Physical Exam   Constitutional: He appears well-developed and well-nourished. No distress.   HENT:   Head: Normocephalic and atraumatic.   Eyes: Conjunctivae are normal.   Neck: No JVD present.   Cardiovascular: Normal rate.  Exam reveals no friction rub.    No murmur heard.  Pulmonary/Chest: Effort normal. No stridor. No respiratory distress. He has no wheezes. He has rales.   Abdominal: Soft. He exhibits no distension. There is no tenderness. There is no rebound and no guarding.   Musculoskeletal: He exhibits no edema.   Neurological:   Rouses with dificulty, briefly alert   Skin: Skin is warm and dry. No rash noted. He is not diaphoretic.   Nursing note and vitals reviewed.      Fluids    Intake/Output Summary (Last 24 hours) at 01/02/19 0413  Last data filed at 01/01/19 2000   Gross per 24 hour   Intake              630 ml   Output              981 ml   Net             -351 ml       Laboratory  Recent Labs      12/31/18   0502  01/01/19   0510  01/02/19   0305   WBC  14.1*  16.0*  16.4*   RBC  2.67*  3.08*  3.04*   HEMOGLOBIN  8.5*  9.8*  9.7*   HEMATOCRIT  27.5*  32.2*  30.5*   MCV  103.0*  104.5*  100.3*   MCH  31.8  31.8  31.9   MCHC  30.9*  30.4*  31.8*   RDW  57.9*  55.8*  51.8*   PLATELETCT  229  236  247   MPV  10.4  10.0  10.3     Recent Labs      12/31/18   0502  01/01/19   0510  01/02/19   0305   SODIUM  143  142  146*   POTASSIUM  3.9  4.1  3.7   CHLORIDE  110  108  109   CO2  27  27  27   GLUCOSE  109*  117*  132*   BUN  32*  31*  43*   CREATININE  1.30  1.15  1.10   CALCIUM  11.8*  12.2*  12.2*                   Imaging  DX-CHEST-PORTABLE (1 VIEW)   Final Result      No significant interval change.      DX-CHEST-PORTABLE (1 VIEW)   Final Result         1.  Bilateral pulmonary opacifications again noted.      2.  Trace right apical pneumothorax is no longer appreciated. Pigtail catheter in right upper hemithorax is  unchanged.      DX-CHEST-PORTABLE (1 VIEW)   Final Result         1. Decreased opacity in the right mid lung and right upper lobe.      CT-CHEST,ABDOMEN,PELVIS W/O   Final Result         1.  Fairly diffuse airspace opacity in the right upper lobe may be related to hemorrhage from recent biopsy.      2.  Small residual anterior right pneumothorax with small caliber chest tube in place.      3.  Right lower lobe opacity with volume loss, possibly related to atelectasis. Superimposed pneumonia cannot be excluded.      4.  Small left pleural effusion. Adjacent airspace disease may be secondary to atelectasis or pneumonia.      5.  Pneumobilia with common bile duct stent in place.      6.  Cholelithiasis.      7.  Peripancreatic stranding and poorly defined fluid collections without IV contrast. Fluid collections may be slightly smaller than on the prior exam.      8.  Atherosclerosis.      EC-ECHOCARDIOGRAM COMPLETE W/ CONT   Final Result      DX-CHEST-PORTABLE (1 VIEW)   Final Result         1. Interval placement of right central venous catheter. No other significant interval change.      CT-NEEDLE BX-LUNG-MEDIASTINUM RIGHT   Final Result      1.  CT-guided RIGHT biopsy   2.  Deployment of [employer] pleural plug   3.  Placement of RIGHT chest tube      Findings were discussed with HELENE BENAVIDEZ at the conclusion of the procedure.         DX-CHEST-PORTABLE (1 VIEW)   Final Result      1.  Minimal right apical pneumothorax following right chest tube placement.      2.  No midline shift.      3.  Right lung opacity consistent with edema or hemorrhage.      4.  No left lung consolidation.      CT-CHEST (THORAX) WITH   Final Result      1.  Lobulated mass in right upper pulmonary lobe appears slightly larger than on the prior exam which could indicate tumor progression.      2.  Moderate size left pleural effusion is identified.      3.  Significant consolidation again noted in the lower lobes bilaterally which could  indicate pneumonia.      4.  Emphysema again noted.      5.  Fluid collections around the pancreatic tail and left anterior pararenal space and left abdomen noted likely resulting from pancreatitis. These were also present on the abdominal CT.            CT-ABDOMEN-PELVIS WITH   Final Result      1.  Extensive irregular and heterogeneous fluid collection about the pancreas, extending into the LEFT upper quadrant/perisplenic region and LEFT paracolic gutter which is more extensive than prior exam and likely indicates pancreatic pseudocyst.   2.  Inflammation adjacent the gastric fundus.   3.  Postoperative changes as described.  Biliary stent present.   4.  Increasing bilateral pleural fluid and associated atelectasis.      DX-CHEST-PORTABLE (1 VIEW)   Final Result         1.  Pulmonary edema and/or infiltrates.   2.  Small layering left pleural effusion   3.  Nodular density in the right upper lung field, corresponding with pulmonary nodular mass on recent CT December 11, 2018. See CT for further characterization and recommendations.      DX-CHEST-PORTABLE (1 VIEW)   Final Result         1. Left effusion is smaller. Unchanged left basilar atelectasis.   2. Stable right basilar opacity, atelectasis versus consolidation.   3. Unchanged nodular mass lesion in the right upper lobe.      DX-CHEST-PORTABLE (1 VIEW)   Final Result      1.  Persistent atelectasis or pneumonia in the left lower lobe with small left pleural effusion.      2.  Minor residual edema and effusion in the right lower lobe.      3.  Lobulated pulmonary nodules in the right midlung again noted. Differential diagnosis includes lung carcinoma.      DX-CHEST-PORTABLE (1 VIEW)   Final Result         1. New retrocardiac atelectasis versus consolidation.   2. Small bilateral pleural effusions and bibasilar atelectasis, left worse than right.   3. Stable right upper lobe mass.   4. Enteric tube tip is distal to the GE junction.      DX-ABDOMEN FOR TUBE  PLACEMENT   Final Result      Enteric tube tip projects over the junction of the second and third portions of duodenum.      EM-YXMM-BQWHYIX DUCTS   Final Result      Limited images obtained during ERCP procedure showing apparent removal of common bile duct stones.      MR-ABDOMEN-WITH & W/O   Final Result      1.  Marked biliary dilation with large common bile duct stones.   2.  Stones also present in the gallbladder.   3.  Complex peripancreatic fluid collection, likely pseudocyst, with apparent inflammatory changes in the pancreatic tail suggesting superimposed acute pancreatitis with fluid extending into the LEFT pararenal space, likely hemorrhagic pancreatitis.     Hemorrhage within cystic lesion at the dorsal aspect of pancreatic tail also noted.  Pancreatic mass is difficult to exclude.   4.  Small volume ascites.   5.  Small bilateral pleural effusions with associated consolidation.   6.  RIGHT kidney cysts.   7.  Possible subacute compression fracture of L3.            EC-ECHOCARDIOGRAM COMPLETE W/ CONT   Final Result      CT-ABDOMEN-PELVIS WITH   Final Result         1. Stranding and fluid surrounding the entire pancreas with several lobulated peripancreatic fluid area surrounding the pancreas. This could be sequela of prior pancreatitis versus cystic pancreatic neoplasm.      More high density fluid extending from the pancreatic tail to the left paracolic gutter could relate to hemorrhage of one of these cystic lesions or sequela of acute on chronic pancreatitis.      Severely dilated CBD, measuring up to 1.6 cm. Soft tissue attenuation lesion in the distal CBD could be an obstructing mass or noncalcified stone.      Further evaluation with MRCP and MR pancreas is recommended.      2. Cholelithiasis.      3. Diffuse wall thickening of the stomach could be gastritis or reactive change secondary to the pancreatic process.      4. Layering stones/debris in the urinary bladder.      CT-CTA CHEST PULMONARY  ARTERY W/ RECONS   Final Result         1. No CT evidence of pulmonary embolism.      2. Lobulated 1.7 x 1.8 x 3.1 cm mass in the right upper lobe, concerning for lung malignancy. PET/CT, and/or tissue sampling is recommended.      3. Diffuse wall thickening throughout the esophagus could relate to esophagitis. Mildly prominent 1.4 cm paraesophageal lymph node adjacent to the distal esophagus. Questionable enlarged right paraesophageal lymph nodes more proximally.      4. Fluid opacification of the esophagus. There is debris completely filled the bilateral lower lobe airways with associated patchy bibasilar opacities. This is concerning for aspiration pneumonia due to retained fluid in the esophagus.      DX-CHEST-PORTABLE (1 VIEW)   Final Result      1.  No pneumonia or pulmonary edema.   2.  Slight interval increase in size of ill-defined RIGHT mid to upper lung nodular opacity, concerning for mass.      IR-MIDLINE CATHETER INSERTION >5 YRS    (Results Pending)   DX-CHEST-PORTABLE (1 VIEW)    (Results Pending)        Assessment/Plan  Bronchogenic lung cancer, unspecified laterality (HCC)- (present on admission)   Assessment & Plan    Poorly diferentiated sqms cell  Now no further work up or evaluation as pt now discharging on hospice care     Shock circulatory (HCC)   Assessment & Plan    Improved, off pressors     Acute biliary pancreatitis- (present on admission)   Assessment & Plan    Status post ERCP with sphincterotomy and stenting on 12/13/2018 12/18/18 CT Abd showing increase in size of pancreatic pseudocyst  Patient has retained large CBD stone  Moving to comfort care       Acute on chronic respiratory failure with hypoxia (HCC)- (present on admission)   Assessment & Plan    CTA chest negative for PE  Worsened respiratory failure due to alveolar hemorrhage and pneumothorax  Has probably improved as much as he will  Critical care following       Sepsis (HCC)- (present on admission)   Assessment & Plan     This is severe sepsis with the following associated acute organ dysfunction(s): acute respiratory failure.   Abdominal source and pneumonia  Completed antibiotic course  Cultures neg       Pneumothorax on right   Assessment & Plan    Smallbore chest tube in place     REZA (acute kidney injury) ([facility])   Assessment & Plan    Renal function improved     Leukocytosis- (present on admission)   Assessment & Plan    Stopping antibiotics, monitor for fever or worsened WBC     Pancreatic pseudocyst- (present on admission)   Assessment & Plan    Pain management  GI input is noted  Future drainage once cyst matured     Atrial flutter ([facility])   Assessment & Plan    Continuous hemodynamic monitoring  Holding anticoagulation for alveolar hemorrhage and anemia requiring transfusion     Mass of upper lobe of right lung   Assessment & Plan    Imaging concerning for malignancy  Patient had CT-guided biopsy on 12/28/2018  Pathology results pending       Pneumonia due to infectious organism- (present on admission)   Assessment & Plan    Pneumonia due to Prevotella & Klebsiella  Completed antibiotic therapy     Dehydration with hypernatremia   Assessment & Plan    Enteral free water  Cont to follow daily BMP while in house     Oral phase dysphagia- (present on admission)   Assessment & Plan    Continue tube feeding  Speech therapy re-eval: pt doing quite poorly  Cont TF's while in house  DC on modified diet when Hospice arrnaged     Esophagitis- (present on admission)   Assessment & Plan    Pepcid     Troponin level elevated- (present on admission)   Assessment & Plan    Echo demonstrates ejection fraction 65%     Anemia- (present on admission)   Assessment & Plan    Improved with transfusion  Continue daily labs  Maintain hemoglobin greater than 7          VTE prophylaxis:

## 2019-01-03 NOTE — DISCHARGE SUMMARY
Discharge Summary    CHIEF COMPLAINT ON ADMISSION  Chief Complaint   Patient presents with   • Abdominal Pain   • Chills   • Sore Throat       Reason for Admission  Abdominal Pain     Admission Date  12/11/2018    CODE STATUS  Prior    HPI & HOSPITAL COURSE  This is a 78 y.o. presenting with complaint of abdominal pain.  On initial evaluation the patient was diagnosed with sepsis likely from pneumonia, acute hypoxic respiratory failure, and atrial flutter.  Initial imaging workup demonstrated a mass in the upper lobe of the right lung.  He also had a loculated fluid collection around his pancreas.    On December 13, an ERCP was completed and a large stone was noted in the distal common bile duct.  This was unable to be removed.  Subsequent count cultures came back positive for Klebsiella and Prevotella    On December 28 the patient underwent CT-guided biopsy of the lung mass.  This was comp gated by pneumothorax and alveolar hemorrhage.  Small bore chest tube was placed.  Subsequent cultures came back as squamous cell carcinoma.    After discussion with the patient's wife, the decision was made to move the patient to hospice care.  He will be discharged with home hospice.       Therefore, he is discharged in guarded and stable condition to hospice.    The patient met 2-midnight criteria for an inpatient stay at the time of discharge.    Discharge Date  1/3/2019    FOLLOW UP ITEMS POST DISCHARGE      DISCHARGE DIAGNOSES  Active Problems:    Sepsis (HCC) POA: Yes    Acute on chronic respiratory failure with hypoxia (HCC) POA: Yes    Acute biliary pancreatitis POA: Yes    Shock circulatory (HCC) POA: No    Bronchogenic lung cancer, unspecified laterality (HCC) POA: Yes      Overview: Poorly differentiated squamous cell lung cancer    Pneumonia due to infectious organism POA: Yes    Atrial flutter (HCC) POA: Unknown    Pancreatic pseudocyst POA: Yes    Leukocytosis POA: Yes    REZA (acute kidney injury) (HCC) POA:  Unknown    Pneumothorax on right POA: No    Anemia POA: Yes    Generalized abdominal pain POA: Yes    Troponin level elevated POA: Yes    Esophagitis POA: Yes    Oral phase dysphagia POA: Yes    Dehydration with hypernatremia POA: Unknown    Hypernatremia POA: Unknown  Resolved Problems:    Bilious vomiting with nausea POA: Yes    High anion gap metabolic acidosis POA: Yes    Hypokalemia POA: Yes    Lactic acidosis POA: Yes      FOLLOW UP  Future Appointments  Date Time Provider Department Center   4/15/2019 1:20 PM CORNELIO Silva [facility] None     [facility]  [address]  [place] [address]  [phone]          MEDICATIONS ON DISCHARGE     Medication List      CONTINUE taking these medications      Instructions   levETIRAcetam 100 MG/ML Soln  Commonly known as:  KEPPRA   Take 7.5 mL by mouth 2 times a day.  Dose:  750 mg        STOP taking these medications    ascorbic acid 500 MG Tabs  Commonly known as:  ascorbic acid     Aspirin 325 MG Tbec     ferrous sulfate 325 (65 Fe) MG tablet     fish oil 1000 MG Caps capsule     Vitamin D3 2000 units Tabs            Allergies  Allergies   Allergen Reactions   • Egg White Vomiting and Swelling   • Egg Yolk Vomiting and Swelling   • Chicken Allergy Vomiting and Swelling   • Omeprazole      Coughing and choking   • Prednisone      Mood changes   • Statins [Hmg-Coa-R Inhibitors] Shortness of Breath       DIET  No orders of the defined types were placed in this encounter.      ACTIVITY  As tolerated.  Weight bearing as tolerated    CONSULTATIONS  ID  Cardiology  Pulmonology  GI    PROCEDURES  ERCP  Chest tube    LABORATORY  Lab Results   Component Value Date    SODIUM 147 (H) 01/03/2019    POTASSIUM 3.8 01/03/2019    CHLORIDE 112 01/03/2019    CO2 28 01/03/2019    GLUCOSE 129 (H) 01/03/2019    BUN 55 (H) 01/03/2019    CREATININE 1.10 01/03/2019    CREATININE 1.20 04/21/2011    GLOMRATE 47 (L) 10/26/2010        Lab Results   Component Value Date    WBC  16.1 (H) 01/03/2019    WBC 9.3 10/26/2010    HEMOGLOBIN 9.5 (L) 01/03/2019    HEMATOCRIT 31.0 (L) 01/03/2019    PLATELETCT 269 01/03/2019        Total time of the discharge process exceeds 40 minutes.

## 2019-01-03 NOTE — THERAPY
Physical Therapy Contact Note    Per chart review, pt is now going home with hospice, family support 24/7; will likely need a hospital transport assist to return home as they have 4-5 stairs to enter their trailer unless family obtaining a ramp/lifting pt up in a w/c; no acute PT needs identified, will sign off; please reconsult should goals change;     Sylvia Meza, PT, DPT Pager: [phone]

## 2019-01-03 NOTE — PROGRESS NOTES
Pt going home w/ hospice. Transportation arranged for 1000.   AVS printed and reviewed w/ patient. Coretrack and midline IV removed per MD.    POLST/AVS to be sent w/ patient and transporters. DME set up at home by hospice RN

## 2019-01-03 NOTE — PROGRESS NOTES
[facility] Daily Progress Note    Date of Service  1/3/2019    Chief Complaint  78 y.o. 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  He presented to the emergency room on 12/11/18 with abdominal pain.  Imaging done as part of initial workup included a CT of the abdomen that demonstrated lobulated fluid collection around the pancreas and a CT of the chest that showed a right upper lobe mass concerning for malignancy.  He was in atrial flutter and received amiodarone. The patient was treated for sepsis from pneumonia source and admitted to the ICU.     Gastroenterology was consulted and on 12/13/18 he had an ERCP, a large stone was noted in the distal bile duct, bile duct cannulation was achieved and the procedure was stopped with plans to follow up with electrohydraulic lithotripsy. His blood cultures were positive for klebsiella and prevotella.  ID was involved in the case and he was treated with an extended course of zosyn.     On 12/28/18 the patient underwent CT guided biopsy of the lung mass that was complicated by alveolar hemorrhage and pneumothorax. A small bore chest tube was placed. He was transferred to the ICU post procedure, on arrival he was hypotensive.  He was started on pressors and broad spectrum antibiotics.      ROS unobtatinable as pt unable to answer questions today       Interval Problem Update  ROS limited by mentation: pt is resting comfortably and in no acute distress  sinus  Good UOP with condom cath  CT to water seal  On TF's to goal  Good ostomy output  6 litres mask    Consultants/Specialty  Pulmonology    Code Status  DNAR/I    Disposition  Plan DC to Hospice with family at home    Review of Systems  Review of Systems   Unable to perform ROS: Mental status change        Physical Exam  Temp:  [36.1 °C (97  °F)-36.6 °C (97.8 °F)] 36.5 °C (97.7 °F)  Pulse:  [] 112  Resp:  [20-30] 25    Physical Exam   Constitutional: He appears well-developed and well-nourished. No distress.   HENT:   Head: Normocephalic and atraumatic.   Eyes: Conjunctivae are normal.   Neck: No JVD present.   Cardiovascular: Normal rate.  Exam reveals no friction rub.    No murmur heard.  Pulmonary/Chest: Effort normal. No stridor. No respiratory distress. He has no wheezes. He has rales.   Abdominal: Soft. He exhibits no distension. There is no tenderness. There is no rebound and no guarding.   Musculoskeletal: He exhibits no edema.   Neurological:   Rouses with dificulty, briefly alert   Skin: Skin is warm and dry. No rash noted. He is not diaphoretic.   Nursing note and vitals reviewed.      Fluids    Intake/Output Summary (Last 24 hours) at 01/03/19 0537  Last data filed at 01/03/19 0400   Gross per 24 hour   Intake             1250 ml   Output                0 ml   Net             1250 ml       Laboratory  Recent Labs      01/01/19   0510  01/02/19   0305  01/03/19   0352   WBC  16.0*  16.4*  16.1*   RBC  3.08*  3.04*  2.99*   HEMOGLOBIN  9.8*  9.7*  9.5*   HEMATOCRIT  32.2*  30.5*  31.0*   MCV  104.5*  100.3*  103.7*   MCH  31.8  31.9  31.8   MCHC  30.4*  31.8*  30.6*   RDW  55.8*  51.8*  53.1*   PLATELETCT  236  247  269   MPV  10.0  10.3  10.1     Recent Labs      01/01/19   0510  01/02/19   0305  01/03/19   0352   SODIUM  142  146*  147*   POTASSIUM  4.1  3.7  3.8   CHLORIDE  108  109  112   CO2  27  27  28   GLUCOSE  117*  132*  129*   BUN  31*  43*  55*   CREATININE  1.15  1.10  1.10   CALCIUM  12.2*  12.2*  12.6*                   Imaging  DX-CHEST-PORTABLE (1 VIEW)   Final Result      Interval removal of right thoracostomy tube with no pneumothorax detected      Stable hyperinflation and pulmonary scarring with right greater than left reticular opacity suspicious for edema      DX-CHEST-PORTABLE (1 VIEW)   Final Result      No  significant interval change.      DX-CHEST-PORTABLE (1 VIEW)   Final Result      No significant interval change.      DX-CHEST-PORTABLE (1 VIEW)   Final Result         1.  Bilateral pulmonary opacifications again noted.      2.  Trace right apical pneumothorax is no longer appreciated. Pigtail catheter in right upper hemithorax is unchanged.      DX-CHEST-PORTABLE (1 VIEW)   Final Result         1. Decreased opacity in the right mid lung and right upper lobe.      CT-CHEST,ABDOMEN,PELVIS W/O   Final Result         1.  Fairly diffuse airspace opacity in the right upper lobe may be related to hemorrhage from recent biopsy.      2.  Small residual anterior right pneumothorax with small caliber chest tube in place.      3.  Right lower lobe opacity with volume loss, possibly related to atelectasis. Superimposed pneumonia cannot be excluded.      4.  Small left pleural effusion. Adjacent airspace disease may be secondary to atelectasis or pneumonia.      5.  Pneumobilia with common bile duct stent in place.      6.  Cholelithiasis.      7.  Peripancreatic stranding and poorly defined fluid collections without IV contrast. Fluid collections may be slightly smaller than on the prior exam.      8.  Atherosclerosis.      EC-ECHOCARDIOGRAM COMPLETE W/ CONT   Final Result      DX-CHEST-PORTABLE (1 VIEW)   Final Result         1. Interval placement of right central venous catheter. No other significant interval change.      CT-NEEDLE BX-LUNG-MEDIASTINUM RIGHT   Final Result      1.  CT-guided RIGHT biopsy   2.  Deployment of [employer] pleural plug   3.  Placement of RIGHT chest tube      Findings were discussed with HELENE BENAVIDEZ at the conclusion of the procedure.         DX-CHEST-PORTABLE (1 VIEW)   Final Result      1.  Minimal right apical pneumothorax following right chest tube placement.      2.  No midline shift.      3.  Right lung opacity consistent with edema or hemorrhage.      4.  No left lung consolidation.       CT-CHEST (THORAX) WITH   Final Result      1.  Lobulated mass in right upper pulmonary lobe appears slightly larger than on the prior exam which could indicate tumor progression.      2.  Moderate size left pleural effusion is identified.      3.  Significant consolidation again noted in the lower lobes bilaterally which could indicate pneumonia.      4.  Emphysema again noted.      5.  Fluid collections around the pancreatic tail and left anterior pararenal space and left abdomen noted likely resulting from pancreatitis. These were also present on the abdominal CT.            CT-ABDOMEN-PELVIS WITH   Final Result      1.  Extensive irregular and heterogeneous fluid collection about the pancreas, extending into the LEFT upper quadrant/perisplenic region and LEFT paracolic gutter which is more extensive than prior exam and likely indicates pancreatic pseudocyst.   2.  Inflammation adjacent the gastric fundus.   3.  Postoperative changes as described.  Biliary stent present.   4.  Increasing bilateral pleural fluid and associated atelectasis.      DX-CHEST-PORTABLE (1 VIEW)   Final Result         1.  Pulmonary edema and/or infiltrates.   2.  Small layering left pleural effusion   3.  Nodular density in the right upper lung field, corresponding with pulmonary nodular mass on recent CT December 11, 2018. See CT for further characterization and recommendations.      DX-CHEST-PORTABLE (1 VIEW)   Final Result         1. Left effusion is smaller. Unchanged left basilar atelectasis.   2. Stable right basilar opacity, atelectasis versus consolidation.   3. Unchanged nodular mass lesion in the right upper lobe.      DX-CHEST-PORTABLE (1 VIEW)   Final Result      1.  Persistent atelectasis or pneumonia in the left lower lobe with small left pleural effusion.      2.  Minor residual edema and effusion in the right lower lobe.      3.  Lobulated pulmonary nodules in the right midlung again noted. Differential diagnosis includes  lung carcinoma.      DX-CHEST-PORTABLE (1 VIEW)   Final Result         1. New retrocardiac atelectasis versus consolidation.   2. Small bilateral pleural effusions and bibasilar atelectasis, left worse than right.   3. Stable right upper lobe mass.   4. Enteric tube tip is distal to the GE junction.      DX-ABDOMEN FOR TUBE PLACEMENT   Final Result      Enteric tube tip projects over the junction of the second and third portions of duodenum.      [id number] DUCTS   Final Result      Limited images obtained during ERCP procedure showing apparent removal of common bile duct stones.      MR-ABDOMEN-WITH & W/O   Final Result      1.  Marked biliary dilation with large common bile duct stones.   2.  Stones also present in the gallbladder.   3.  Complex peripancreatic fluid collection, likely pseudocyst, with apparent inflammatory changes in the pancreatic tail suggesting superimposed acute pancreatitis with fluid extending into the LEFT pararenal space, likely hemorrhagic pancreatitis.     Hemorrhage within cystic lesion at the dorsal aspect of pancreatic tail also noted.  Pancreatic mass is difficult to exclude.   4.  Small volume ascites.   5.  Small bilateral pleural effusions with associated consolidation.   6.  RIGHT kidney cysts.   7.  Possible subacute compression fracture of L3.            EC-ECHOCARDIOGRAM COMPLETE W/ CONT   Final Result      CT-ABDOMEN-PELVIS WITH   Final Result         1. Stranding and fluid surrounding the entire pancreas with several lobulated peripancreatic fluid area surrounding the pancreas. This could be sequela of prior pancreatitis versus cystic pancreatic neoplasm.      More high density fluid extending from the pancreatic tail to the left paracolic gutter could relate to hemorrhage of one of these cystic lesions or sequela of acute on chronic pancreatitis.      Severely dilated CBD, measuring up to 1.6 cm. Soft tissue attenuation lesion in the distal CBD could be an obstructing  mass or noncalcified stone.      Further evaluation with MRCP and MR pancreas is recommended.      2. Cholelithiasis.      3. Diffuse wall thickening of the stomach could be gastritis or reactive change secondary to the pancreatic process.      4. Layering stones/debris in the urinary bladder.      CT-CTA CHEST PULMONARY ARTERY W/ RECONS   Final Result         1. No CT evidence of pulmonary embolism.      2. Lobulated 1.7 x 1.8 x 3.1 cm mass in the right upper lobe, concerning for lung malignancy. PET/CT, and/or tissue sampling is recommended.      3. Diffuse wall thickening throughout the esophagus could relate to esophagitis. Mildly prominent 1.4 cm paraesophageal lymph node adjacent to the distal esophagus. Questionable enlarged right paraesophageal lymph nodes more proximally.      4. Fluid opacification of the esophagus. There is debris completely filled the bilateral lower lobe airways with associated patchy bibasilar opacities. This is concerning for aspiration pneumonia due to retained fluid in the esophagus.      DX-CHEST-PORTABLE (1 VIEW)   Final Result      1.  No pneumonia or pulmonary edema.   2.  Slight interval increase in size of ill-defined RIGHT mid to upper lung nodular opacity, concerning for mass.      IR-MIDLINE CATHETER INSERTION >5 YRS    (Results Pending)        Assessment/Plan  Bronchogenic lung cancer, unspecified laterality (HCC)- (present on admission)   Assessment & Plan    Poorly diferentiated sqms cell  Now no further work up or evaluation as pt now discharging on hospice care     Shock circulatory (HCC)   Assessment & Plan    Improved, off pressors     Acute biliary pancreatitis- (present on admission)   Assessment & Plan    Status post ERCP with sphincterotomy and stenting on 12/13/2018 12/18/18 CT Abd showing increase in size of pancreatic pseudocyst  Patient has retained large CBD stone  Moving to comfort care       Acute on chronic respiratory failure with hypoxia (HCC)-  (present on admission)   Assessment & Plan    CTA chest negative for PE  Worsened respiratory failure due to alveolar hemorrhage and pneumothorax  Has probably improved as much as he will  Critical care following       Sepsis ([facility])- (present on admission)   Assessment & Plan    This is severe sepsis with the following associated acute organ dysfunction(s): acute respiratory failure.   Abdominal source and pneumonia  Completed antibiotic course  Cultures neg       Pneumothorax on right   Assessment & Plan    Smallbore chest tube in place     REZA (acute kidney injury) ([facility])   Assessment & Plan    Renal function improved     Leukocytosis- (present on admission)   Assessment & Plan    Stopping antibiotics, monitor for fever or worsened WBC     Pancreatic pseudocyst- (present on admission)   Assessment & Plan    Pain management  GI input is noted  Future drainage once cyst matured     Atrial flutter ([facility])   Assessment & Plan    Continuous hemodynamic monitoring  Holding anticoagulation for alveolar hemorrhage and anemia requiring transfusion     Mass of upper lobe of right lung   Assessment & Plan    Imaging concerning for malignancy  Patient had CT-guided biopsy on 12/28/2018  Pathology results pending       Pneumonia due to infectious organism- (present on admission)   Assessment & Plan    Pneumonia due to Prevotella & Klebsiella  Completed antibiotic therapy     Dehydration with hypernatremia   Assessment & Plan    Enteral free water  Cont to follow daily BMP while in house     Oral phase dysphagia- (present on admission)   Assessment & Plan    Continue tube feeding  Speech therapy re-eval: pt doing quite poorly  Cont TF's while in house  DC on modified diet when Hospice arrnaged     Esophagitis- (present on admission)   Assessment & Plan    Pepcid     Troponin level elevated- (present on admission)   Assessment & Plan    Echo demonstrates ejection fraction 65%     Anemia- (present on admission)   Assessment & Plan     Improved with transfusion  Continue daily labs  Maintain hemoglobin greater than 7          VTE prophylaxis:

## 2019-01-03 NOTE — DISCHARGE PLANNING
Care Transition Team Discharge Planning    Anticipated Discharge Information  Discharge Address: [address] [address] [place] [address]  Discharge Contact Phone Number: [phone], [phone]     1/3/19  Spoke with hospitalist about discharge home with hospice Informed that hospice obtained DME yesterday in preparation for patient to return home today. Hospitalist to enter discharge and complete discharge sunmmary.         Discharge Plan: Home with hospice

## 2019-01-03 NOTE — CARE PLAN
Problem: Bowel/Gastric:  Goal: Will not experience complications related to bowel motility    Intervention: Assess baseline bowel pattern  Normoactive x4  Intervention: Implement interventions to promote bowel evacuation if inadequate bowel movements in past 48 hours  Senna, ambulation, fluids  Intervention: Implement Bowel Protocol, if applicable  Will implement if needed. Not currently needed      Problem: Pain Management  Goal: Pain level will decrease to patient's comfort goal    Intervention: Follow pain managment plan developed in collaboration with patient and Interdisciplinary Team  Done. Pt not requiring pharmacological intervention   Intervention: Educate and implement non-pharmacologic comfort measures. Examples: relaxation, distration, play therapy, activity therapy, massage, etc.  Relaxation, rest, repositioned, distraction, TV, music

## 2019-01-03 NOTE — DISCHARGE INSTRUCTIONS
Discharge Instructions    Discharged to home by medical transportation with escort. Discharged via ambulance, hospital escort: Yes.  Special equipment needed: Oxygen    Be sure to schedule a follow-up appointment with your primary care doctor or any specialists as instructed.     Discharge Plan:   Influenza Vaccine Indication: Patient Refuses    I understand that a diet low in cholesterol, fat, and sodium is recommended for good health. Unless I have been given specific instructions below for another diet, I accept this instruction as my diet prescription.   Other diet: Reg    Special Instructions: None    · Is patient discharged on Warfarin / Coumadin?   No     Depression / Suicide Risk    As you are discharged from this [facility] facility, it is important to learn how to keep safe from harming yourself.    Recognize the warning signs:  · Abrupt changes in personality, positive or negative- including increase in energy   · Giving away possessions  · Change in eating patterns- significant weight changes-  positive or negative  · Change in sleeping patterns- unable to sleep or sleeping all the time   · Unwillingness or inability to communicate  · Depression  · Unusual sadness, discouragement and loneliness  · Talk of wanting to die  · Neglect of personal appearance   · Rebelliousness- reckless behavior  · Withdrawal from people/activities they love  · Confusion- inability to concentrate     If you or a loved one observes any of these behaviors or has concerns about self-harm, here's what you can do:  · Talk about it- your feelings and reasons for harming yourself  · Remove any means that you might use to hurt yourself (examples: pills, rope, extension cords, firearm)  · Get professional help from the community (Mental Health, Substance Abuse, psychological counseling)  · Do not be alone:Call your Safe Contact- someone whom you trust who will be there for you.  · Call your local CRISIS HOTLINE 439-2086 or  517-239-7459  · Call your local Children's Mobile Crisis Response Team [place] (896) 733-6868 or www.[employer]  · Call the toll free National Suicide Prevention Hotlines   · National Suicide Prevention Lifeline 762-849-FUAR (1952)  · National [employer] Line Network 800-SUICIDE (214-6008)

## 2019-01-03 NOTE — CARE PLAN
Problem: Bowel/Gastric:  Goal: Normal bowel function is maintained or improved  Outcome: MET Date Met: 01/02/19  Pt has functioning ostomy; adequate output.    Problem: Mobility  Goal: Risk for activity intolerance will decrease  Outcome: PROGRESSING AS EXPECTED  Patient was able to get OOB to chair today w/ 1 assist; tolerated very well, has good strength.

## 2019-01-03 NOTE — DISCHARGE PLANNING
Received Transport Form @ 1143  Spoke to Marybeth @ [employer]    Transport is scheduled for 1/3 @10:30 going to [address] [facility] [address] [place].

## 2020-01-26 NOTE — ED NOTES
CXR @ bedside    [facility] [address] [facility] [facility] initial consultation note    Tania Zarco                                             Chief Complaint: New patient referred by Sudheer Hunter for headaches     Chief complaint: Tania Zarco is a 34 y. o.oldmale came for further evaluation regarding his ?sleep apnea  with referral from Ms. Sudheer Hunter, [place]/Dr. Haven Bradley MD      [place]:  Sleep/Wake schedule:  Usual time to go to bed during the work/regular day of week: 10:30 PM.  Usual time to wake up during the work//regular day of week: 5:15 AM.  Over the weekends his sleep schedule:  [x]phase delayed. He usually falls a sleep in less than: 10 minutes. He takes naps: Yes. Number of naps per week:  1 time on the weekend. During each nap he spends a total of: 60 Minutes. The naps were reported as refreshing: Yes. Sleep Hygiene:    Is the temperature and evironment in his bed room is acceptable to him: Yes. He watches Television in his bed room: No.  He read books, study, pay bills etc in the bed: No.  Frequency He wake up during night/sleep: 2 to 3 to take care of his children. His sons are 5months and 2.5years. Majority of nocturnal awakenings are for urination: No.    Difficulty in falling back to sleep after nocturnal awakenings: No  .  Do you drink coffee: No.   Do you drink caffeinated beverages i.e sodas: Yes. 2 to 3 can/s per day. Mountain dew. Do you drink tea:No.   Do you drink alcoholic beverages: Yes. 1 to 2 drink/s per week- socially       History of recreational drug use: No.     History of tobacco smoking:No.        Sleep apnea symptoms:  Noticed to have loud snoring:Yes-occasionally when ever his nose is stuffy. Noted by his family member- spouse  Witnessed apneas during sleep noticed: No.  History of choking and gasping sensation at night time: No.  History of headaches in the morning:Yes. History of dry mouth in the morning: Yes.   History of Allergies   Allergen Reactions    Tetracyclines & Related Other (See Comments)     Headaches         Current Outpatient Medications   Medication Sig Dispense Refill    divalproex (DEPAKOTE) 250 MG DR tablet Take 1 tablet by mouth daily 30 tablet 2    diazepam (VALIUM) 10 MG tablet Take 10 mg by mouth 3 times daily as needed. 0    diphenoxylate-atropine (LOMOTIL) 2.5-0.025 MG per tablet Take by mouth.  loperamide (IMODIUM) 2 MG capsule Take 1 capsule by mouth daily as needed      montelukast (SINGULAIR) 10 MG tablet Take 10 mg by mouth daily as needed      Multiple Vitamins-Minerals (THERAPEUTIC MULTIVITAMIN-MINERALS) tablet Take 1 tablet by mouth daily      cetirizine (ZYRTEC) 10 MG tablet Take 10 mg by mouth daily       No current facility-administered medications for this visit. Family History   Problem Relation Age of Onset    No Known Problems Mother     Heart Attack Father     Other Sister         TETROLOGY OF FELSIOBHAN    No Known Problems Sister     No Known Problems Sister         Review of Systems:   General/Constitutional: No recent loss of weight or appetite changes. No fever or chills. HENT: Negative. S/p Nasal polyps removal surgery in March, 2019. Eyes: Negative. Upper respiratory tract: No nasal stuffiness or post nasal drip. Lower respiratory tract/ lungs: No cough or sputum production. No hemoptysis. Cardiovascular: No palpitations or chest pain. Gastrointestinal: No nausea or vomiting. Neurological: No focal neurologiacal weakness. Extremities: No edema. Musculoskeletal: No complaints. Genitourinary: No complaints. Hematological: Negative. Psychiatric/Behavioral: Negative. Skin: No itching.     /82 (Site: Left Upper Arm, Position: Sitting, Cuff Size: Medium Adult)   Pulse 88   Ht 6' 1\" (1.854 m)   Wt 246 lb 9.6 oz (111.9 kg)   SpO2 98% Comment: room air at rest  BMI 32.53 kg/m²   Mallampati airway Class: 4  Neck Circumference:.17.50 2019.      Recommendations/Plan:  -Will schedule patient for polysomnogram in the sleep lab. -I had a discussion with patient regarding avialable treatment options for his sleep disorder breathing including but not limited to CPAP titration in the sleep lab Vs.Dental appliance placement with referral to a local dentist Vs other available surgical options including Uvulopalatopharyngoplasty, maxillomandibular ostomy and tracheostomy as last option. At the end of discussion, he is not decided on his   treatment if he found to have obstructive sleep apnea at this time.  -We will see Dash Rocha back in 1week after the sleep study to go over the sleep study results and further management options.  -He was educated to practice good sleep hygiene practices. He  was provided with a good sleep hygiene hand out.  -Bell Dalal was advised to make earlier appointment with my clinic if he develops any worsening of sleep symptoms. He verbalizes understanding.  -Bell Dalal was advised to not to drive any motor vehicles or operate heavy equipment until his sleep symptoms are under good control. Dash Rocha verbalizes understanding.  -He was advised to loose weight by controlling diet and doing exercise once cleared by his family physician. - Dash Rocha was educated about my impression and plan. He verbalizes understanding.

## 2022-10-18 NOTE — PROGRESS NOTES
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 Source is suspected pneumonia/pancreatic abscess. Patient had ERCP and biliary stent placed on 12/13/18. Bacteremia with Prevotella and Klebsiella.  Repeat Blood cultures no growth to date      Interval Problem Update  On high flow NC 60L and 50%  PEP therapy per RT  Wife at patient's bedside.  Patient was mobilized to chair today.  Patient Alert  Patient now DNR/DNI    Consultants/Specialty  Intensivist  Gastroenterology  Infectious disease     Code Status  DNR/DNI    Disposition  Monitor in ICU    Review of Systems  Review of Systems   Constitutional: Negative for fever.   HENT: Negative for sore throat.    Respiratory: Negative for shortness of breath.    Cardiovascular: Negative for chest pain and leg swelling.   Gastrointestinal: Positive for abdominal pain (LUQ). Negative for nausea and vomiting.   Musculoskeletal: Negative for neck pain.   Neurological: Positive for weakness. Negative for headaches.   Psychiatric/Behavioral: The patient is not nervous/anxious.         Physical Exam  Temp:  [36.4 °C (97.5 °F)-36.8 °C (98.2 °F)] 36.4 °C (97.5 °F)  Pulse:  [] 104  Resp:  [15-30] 29    Physical Exam   Constitutional: He is oriented to person, place, and time. He appears well-developed. He appears ill. No distress.   HENT:   Head: Normocephalic and atraumatic.   Nose: Nose normal.   Mouth/Throat: Oropharynx is clear and moist.   Eyes: Conjunctivae and EOM are normal. Right eye exhibits no discharge. Left eye exhibits no discharge. No scleral icterus.   Neck: No tracheal deviation present.   Cardiovascular: Normal rate, regular rhythm, normal heart sounds and intact distal pulses.    No murmur heard.  Pulmonary/Chest: Effort  normal and breath sounds normal. No respiratory distress. He has no wheezes.   Abdominal: Soft. Bowel sounds are normal. He exhibits no distension. There is tenderness.   Musculoskeletal: He exhibits no edema.   Neurological: He is alert and oriented to person, place, and time. No cranial nerve deficit.   Skin: Skin is warm and dry. He is not diaphoretic.   Psychiatric: He has a normal mood and affect. His behavior is normal. Thought content normal.   Vitals reviewed.      Fluids    Intake/Output Summary (Last 24 hours) at 12/19/18 2026  Last data filed at 12/19/18 1800   Gross per 24 hour   Intake              870 ml   Output             2800 ml   Net            -1930 ml       Laboratory  Recent Labs      12/17/18   0330  12/18/18   0620  12/19/18   0400   WBC  24.3*  23.2*  22.5*   RBC  3.48*  3.44*  3.36*   HEMOGLOBIN  11.2*  11.1*  10.6*   HEMATOCRIT  35.5*  34.3*  33.7*   MCV  99.7*  99.7*  100.3*   MCH  32.2  32.3  31.5   MCHC  32.3*  32.4*  31.5*   RDW  51.3*  51.8*  52.5*   PLATELETCT  372  339  399   MPV  9.2  9.5  9.5     Recent Labs      12/17/18   0930  12/19/18   0400   SODIUM  136  137   POTASSIUM  4.0  4.5   CHLORIDE  99  95*   CO2  33  35*   GLUCOSE  125*  126*   BUN  28*  24*   CREATININE  0.72  0.91   CALCIUM  8.9  9.6                   Imaging  CT-ABDOMEN-PELVIS WITH   Final Result      1.  Extensive irregular and heterogeneous fluid collection about the pancreas, extending into the LEFT upper quadrant/perisplenic region and LEFT paracolic gutter which is more extensive than prior exam and likely indicates pancreatic pseudocyst.   2.  Inflammation adjacent the gastric fundus.   3.  Postoperative changes as described.  Biliary stent present.   4.  Increasing bilateral pleural fluid and associated atelectasis.      DX-CHEST-PORTABLE (1 VIEW)   Final Result         1.  Pulmonary edema and/or infiltrates.   2.  Small layering left pleural effusion   3.  Nodular density in the right upper lung field,  corresponding with pulmonary nodular mass on recent CT December 11, 2018. See CT for further characterization and recommendations.      DX-CHEST-PORTABLE (1 VIEW)   Final Result         1. Left effusion is smaller. Unchanged left basilar atelectasis.   2. Stable right basilar opacity, atelectasis versus consolidation.   3. Unchanged nodular mass lesion in the right upper lobe.      DX-CHEST-PORTABLE (1 VIEW)   Final Result      1.  Persistent atelectasis or pneumonia in the left lower lobe with small left pleural effusion.      2.  Minor residual edema and effusion in the right lower lobe.      3.  Lobulated pulmonary nodules in the right midlung again noted. Differential diagnosis includes lung carcinoma.      DX-CHEST-PORTABLE (1 VIEW)   Final Result         1. New retrocardiac atelectasis versus consolidation.   2. Small bilateral pleural effusions and bibasilar atelectasis, left worse than right.   3. Stable right upper lobe mass.   4. Enteric tube tip is distal to the GE junction.      DX-ABDOMEN FOR TUBE PLACEMENT   Final Result      Enteric tube tip projects over the junction of the second and third portions of duodenum.      RT-BCIN-NDGOAGA DUCTS   Final Result      Limited images obtained during ERCP procedure showing apparent removal of common bile duct stones.      MR-ABDOMEN-WITH & W/O   Final Result      1.  Marked biliary dilation with large common bile duct stones.   2.  Stones also present in the gallbladder.   3.  Complex peripancreatic fluid collection, likely pseudocyst, with apparent inflammatory changes in the pancreatic tail suggesting superimposed acute pancreatitis with fluid extending into the LEFT pararenal space, likely hemorrhagic pancreatitis.     Hemorrhage within cystic lesion at the dorsal aspect of pancreatic tail also noted.  Pancreatic mass is difficult to exclude.   4.  Small volume ascites.   5.  Small bilateral pleural effusions with associated consolidation.   6.  RIGHT kidney  cysts.   7.  Possible subacute compression fracture of L3.            EC-ECHOCARDIOGRAM COMPLETE W/ CONT   Final Result      CT-ABDOMEN-PELVIS WITH   Final Result         1. Stranding and fluid surrounding the entire pancreas with several lobulated peripancreatic fluid area surrounding the pancreas. This could be sequela of prior pancreatitis versus cystic pancreatic neoplasm.      More high density fluid extending from the pancreatic tail to the left paracolic gutter could relate to hemorrhage of one of these cystic lesions or sequela of acute on chronic pancreatitis.      Severely dilated CBD, measuring up to 1.6 cm. Soft tissue attenuation lesion in the distal CBD could be an obstructing mass or noncalcified stone.      Further evaluation with MRCP and MR pancreas is recommended.      2. Cholelithiasis.      3. Diffuse wall thickening of the stomach could be gastritis or reactive change secondary to the pancreatic process.      4. Layering stones/debris in the urinary bladder.      CT-CTA CHEST PULMONARY ARTERY W/ RECONS   Final Result         1. No CT evidence of pulmonary embolism.      2. Lobulated 1.7 x 1.8 x 3.1 cm mass in the right upper lobe, concerning for lung malignancy. PET/CT, and/or tissue sampling is recommended.      3. Diffuse wall thickening throughout the esophagus could relate to esophagitis. Mildly prominent 1.4 cm paraesophageal lymph node adjacent to the distal esophagus. Questionable enlarged right paraesophageal lymph nodes more proximally.      4. Fluid opacification of the esophagus. There is debris completely filled the bilateral lower lobe airways with associated patchy bibasilar opacities. This is concerning for aspiration pneumonia due to retained fluid in the esophagus.      DX-CHEST-PORTABLE (1 VIEW)   Final Result      1.  No pneumonia or pulmonary edema.   2.  Slight interval increase in size of ill-defined RIGHT mid to upper lung nodular opacity, concerning for mass.       IR-MIDLINE CATHETER INSERTION >5 YRS    (Results Pending)        Assessment/Plan  Acute biliary pancreatitis   Assessment & Plan    Status post ERCP with sphincterotomy and stenting on 12/13/2018 12/18/18 CT Abd showing increase in size of pancreatic pseudocyst  Enteral nutrition via cortrak feeing tube  Patient has retained large CBD stone for which he will need follow-up as outpatient for repeat ERCP with lithotripsy  Evaluated by Dr. Carter from surgery lap gregory canceled given his acute respiratory failure and high oxygen requirements       Sepsis (HCC)- (present on admission)   Assessment & Plan    This is severe sepsis with the following associated acute organ dysfunction(s): acute respiratory failure.   Abdominal source and pneumonia  Antibitoics  ID consulting  Monitor vitals, I/O's, imaging     Mass of upper lobe of right lung- (present on admission)   Assessment & Plan    He will need CT-guided biopsy when he has recovered from this acute illness  Patient and wife have been made aware       Pneumonia due to infectious organism- (present on admission)   Assessment & Plan    Pneumonia due to GNB anaerobes Prevotella   With bacteremia likely secondary to aspiration  Zosyn       Acute on chronic respiratory failure with hypoxia (HCC)- (present on admission)   Assessment & Plan    CTA chest negative for PE  SOB/failure due to pneumonia and sepsis  Antibiotics  Monitor vitals     Anemia- (present on admission)   Assessment & Plan    12/19 Hgb:10.6  12/18 Hgb:11.1  No sign of bleeding.  Monitor CBC     Leukocytosis   Assessment & Plan    Ongoing  Ct Abd/pelvis ordered and shows increase in pseudocyst  ID consulting  Monitor cbc and vitals.  12/19 WBC:22.5     Pancreatic pseudocyst   Assessment & Plan    Pain management  GI consulting  Future drainage once cyst matured     Oral phase dysphagia   Assessment & Plan    Continue tube feeding  Continue speech therapy     Atrial flutter (HCC)   Assessment & Plan     Resolved  Off amiodarone drip  Monitor on vitals/telemetry  Evaluated by cardiology with no recommendations for anticoagulation     Esophagitis- (present on admission)   Assessment & Plan    Continue Pepcid     Troponin level elevated- (present on admission)   Assessment & Plan    C/o due to demand ischemia  Echo EF 65% unchanged from prior     Generalized abdominal pain- (present on admission)   Assessment & Plan    Likely due to pancreatic pseudocyst  Pain management          VTE prophylaxis: SCDs       Information: Selecting Yes will display possible errors in your note based on the variables you have selected. This validation is only offered as a suggestion for you. PLEASE NOTE THAT THE VALIDATION TEXT WILL BE REMOVED WHEN YOU FINALIZE YOUR NOTE. IF YOU WANT TO FAX A PRELIMINARY NOTE YOU WILL NEED TO TOGGLE THIS TO 'NO' IF YOU DO NOT WANT IT IN YOUR FAXED NOTE.

## (undated) DEVICE — HEAD HOLDER JUNIOR/ADULT

## (undated) DEVICE — KIT CUSTOM PROCEDURE SINGLE FOR ENDO  (15/CA)

## (undated) DEVICE — MASK ANESTHESIA ADULT  - (100/CA)

## (undated) DEVICE — KIT ANESTHESIA W/CIRCUIT & 3/LT BAG W/FILTER (20EA/CA)

## (undated) DEVICE — CANISTER SUCTION 3000ML MECHANICAL FILTER AUTO SHUTOFF MEDI-VAC NONSTERILE LF DISP  (40EA/CA)

## (undated) DEVICE — SYRINGE DISP. 12 CC LL - (100/BX)

## (undated) DEVICE — SPHINCTEROTOME CLEVER CUT

## (undated) DEVICE — BITE BLOCK ADULT 60FR (100EA/CA)

## (undated) DEVICE — ELECTRODE DUAL RETURN W/ CORD - (50/PK)

## (undated) DEVICE — FILM CASSETTE ENDO

## (undated) DEVICE — GUIDE JAGWIRE 035 STRAIGHT (2EA/BX)

## (undated) DEVICE — SUCTION INSTRUMENT YANKAUER BULBOUS TIP W/O VENT (50EA/CA)

## (undated) DEVICE — SYRINGE 30 ML LS (56/BX)

## (undated) DEVICE — CONTAINER, SPECIMEN, STERILE

## (undated) DEVICE — INTRODUCER STENT NAVIFLEX 10FR

## (undated) DEVICE — NEPTUNE 4 PORT MANIFOLD - (20/PK)

## (undated) DEVICE — PROTECTOR ULNA NERVE - (36PR/CA)

## (undated) DEVICE — TUBE E-T HI-LO CUFF 7.5MM (10EA/PK)

## (undated) DEVICE — EXTRACTOR PRO XL 9-12 MM ABOVE

## (undated) DEVICE — SPONGE GAUZE NON-STERILE 4X4 - (2000/CA 10PK/CA)